# Patient Record
Sex: MALE | Race: OTHER | HISPANIC OR LATINO
[De-identification: names, ages, dates, MRNs, and addresses within clinical notes are randomized per-mention and may not be internally consistent; named-entity substitution may affect disease eponyms.]

---

## 2017-06-06 ENCOUNTER — NON-APPOINTMENT (OUTPATIENT)
Age: 22
End: 2017-06-06

## 2017-06-06 ENCOUNTER — LABORATORY RESULT (OUTPATIENT)
Age: 22
End: 2017-06-06

## 2017-06-06 ENCOUNTER — APPOINTMENT (OUTPATIENT)
Dept: INTERNAL MEDICINE | Facility: CLINIC | Age: 22
End: 2017-06-06

## 2017-06-06 VITALS — SYSTOLIC BLOOD PRESSURE: 110 MMHG | DIASTOLIC BLOOD PRESSURE: 70 MMHG

## 2017-06-06 VITALS — HEIGHT: 64 IN | DIASTOLIC BLOOD PRESSURE: 82 MMHG | SYSTOLIC BLOOD PRESSURE: 140 MMHG

## 2017-06-06 DIAGNOSIS — K59.00 CONSTIPATION, UNSPECIFIED: ICD-10-CM

## 2017-06-07 ENCOUNTER — TRANSCRIPTION ENCOUNTER (OUTPATIENT)
Age: 22
End: 2017-06-07

## 2017-06-07 LAB
ALBUMIN SERPL ELPH-MCNC: 4.9 G/DL
ALP BLD-CCNC: 135 U/L
ALT SERPL-CCNC: 15 U/L
ANION GAP SERPL CALC-SCNC: 18 MMOL/L
AST SERPL-CCNC: 20 U/L
BILIRUB SERPL-MCNC: 0.3 MG/DL
BUN SERPL-MCNC: 17 MG/DL
CALCIUM SERPL-MCNC: 9.4 MG/DL
CHLORIDE SERPL-SCNC: 100 MMOL/L
CHOLEST SERPL-MCNC: 164 MG/DL
CHOLEST/HDLC SERPL: 2.8 RATIO
CO2 SERPL-SCNC: 24 MMOL/L
CREAT SERPL-MCNC: 0.82 MG/DL
GLUCOSE SERPL-MCNC: 95 MG/DL
HDLC SERPL-MCNC: 58 MG/DL
LDLC SERPL CALC-MCNC: 90 MG/DL
MEV IGG FLD QL IA: 117 AU/ML
MEV IGG+IGM SER-IMP: POSITIVE
MUV AB SER-ACNC: POSITIVE
MUV IGG SER QL IA: 44.6 AU/ML
POTASSIUM SERPL-SCNC: 4.6 MMOL/L
PROT SERPL-MCNC: 8.5 G/DL
RUBV IGG FLD-ACNC: 1.6 INDEX
RUBV IGG SER-IMP: POSITIVE
SODIUM SERPL-SCNC: 142 MMOL/L
TRIGL SERPL-MCNC: 78 MG/DL
TSH SERPL-ACNC: 2.09 UIU/ML

## 2017-06-21 ENCOUNTER — APPOINTMENT (OUTPATIENT)
Dept: INTERNAL MEDICINE | Facility: CLINIC | Age: 22
End: 2017-06-21

## 2017-06-21 VITALS
DIASTOLIC BLOOD PRESSURE: 70 MMHG | SYSTOLIC BLOOD PRESSURE: 116 MMHG | WEIGHT: 120 LBS | HEIGHT: 64 IN | BODY MASS INDEX: 20.49 KG/M2

## 2017-06-21 DIAGNOSIS — R10.30 LOWER ABDOMINAL PAIN, UNSPECIFIED: ICD-10-CM

## 2017-06-21 DIAGNOSIS — R58 HEMORRHAGE, NOT ELSEWHERE CLASSIFIED: ICD-10-CM

## 2017-06-21 DIAGNOSIS — Z78.9 OTHER SPECIFIED HEALTH STATUS: ICD-10-CM

## 2017-06-21 LAB
BASOPHILS # BLD AUTO: 0.02 K/UL
BASOPHILS NFR BLD AUTO: 0.5 %
EOSINOPHIL # BLD AUTO: 0.02 K/UL
EOSINOPHIL NFR BLD AUTO: 0.5 %
HCT VFR BLD CALC: 39.9 %
HGB BLD-MCNC: 12.1 G/DL
IMM GRANULOCYTES NFR BLD AUTO: 0 %
LYMPHOCYTES # BLD AUTO: 1.3 K/UL
LYMPHOCYTES NFR BLD AUTO: 30.2 %
MAN DIFF?: NORMAL
MCHC RBC-ENTMCNC: 23.1 PG
MCHC RBC-ENTMCNC: 30.3 GM/DL
MCV RBC AUTO: 76.3 FL
MONOCYTES # BLD AUTO: 0.28 K/UL
MONOCYTES NFR BLD AUTO: 6.5 %
NEUTROPHILS # BLD AUTO: 2.68 K/UL
NEUTROPHILS NFR BLD AUTO: 62.3 %
PLATELET # BLD AUTO: 373 K/UL
RBC # BLD: 5.23 M/UL
RBC # FLD: 16.7 %
WBC # FLD AUTO: 4.3 K/UL

## 2017-06-26 LAB
ADJUSTED MITOGEN: 0.4 IU/ML
ADJUSTED TB AG: 0 IU/ML
M TB IFN-G BLD-IMP: ABNORMAL
QUANTIFERON GOLD NIL: 0.04 IU/ML

## 2017-07-05 ENCOUNTER — APPOINTMENT (OUTPATIENT)
Dept: INTERNAL MEDICINE | Facility: CLINIC | Age: 22
End: 2017-07-05

## 2017-07-07 ENCOUNTER — APPOINTMENT (OUTPATIENT)
Dept: INTERNAL MEDICINE | Facility: CLINIC | Age: 22
End: 2017-07-07

## 2017-07-19 ENCOUNTER — APPOINTMENT (OUTPATIENT)
Dept: INTERNAL MEDICINE | Facility: CLINIC | Age: 22
End: 2017-07-19

## 2017-07-19 DIAGNOSIS — Z02.89 ENCOUNTER FOR OTHER ADMINISTRATIVE EXAMINATIONS: ICD-10-CM

## 2017-07-19 DIAGNOSIS — Z23 ENCOUNTER FOR IMMUNIZATION: ICD-10-CM

## 2017-07-21 ENCOUNTER — APPOINTMENT (OUTPATIENT)
Dept: INTERNAL MEDICINE | Facility: CLINIC | Age: 22
End: 2017-07-21

## 2017-07-21 DIAGNOSIS — Z11.1 ENCOUNTER FOR SCREENING FOR RESPIRATORY TUBERCULOSIS: ICD-10-CM

## 2018-03-26 ENCOUNTER — APPOINTMENT (OUTPATIENT)
Dept: FAMILY MEDICINE | Facility: CLINIC | Age: 23
End: 2018-03-26

## 2018-08-02 ENCOUNTER — APPOINTMENT (OUTPATIENT)
Dept: INTERNAL MEDICINE | Facility: CLINIC | Age: 23
End: 2018-08-02

## 2018-10-10 ENCOUNTER — RECORD ABSTRACTING (OUTPATIENT)
Age: 23
End: 2018-10-10

## 2018-10-15 ENCOUNTER — APPOINTMENT (OUTPATIENT)
Dept: INTERNAL MEDICINE | Facility: CLINIC | Age: 23
End: 2018-10-15
Payer: MEDICAID

## 2018-10-15 VITALS
SYSTOLIC BLOOD PRESSURE: 140 MMHG | HEIGHT: 64 IN | TEMPERATURE: 98.6 F | DIASTOLIC BLOOD PRESSURE: 100 MMHG | WEIGHT: 120 LBS | BODY MASS INDEX: 20.49 KG/M2

## 2018-10-15 VITALS — SYSTOLIC BLOOD PRESSURE: 120 MMHG | DIASTOLIC BLOOD PRESSURE: 80 MMHG

## 2018-10-15 DIAGNOSIS — B35.1 TINEA UNGUIUM: ICD-10-CM

## 2018-10-15 DIAGNOSIS — Z11.3 ENCOUNTER FOR SCREENING FOR INFECTIONS WITH A PREDOMINANTLY SEXUAL MODE OF TRANSMISSION: ICD-10-CM

## 2018-10-15 DIAGNOSIS — Z98.2 PRESENCE OF CEREBROSPINAL FLUID DRAINAGE DEVICE: ICD-10-CM

## 2018-10-15 DIAGNOSIS — R10.9 UNSPECIFIED ABDOMINAL PAIN: ICD-10-CM

## 2018-10-15 DIAGNOSIS — Z23 ENCOUNTER FOR IMMUNIZATION: ICD-10-CM

## 2018-10-15 PROCEDURE — 36415 COLL VENOUS BLD VENIPUNCTURE: CPT

## 2018-10-15 PROCEDURE — 99395 PREV VISIT EST AGE 18-39: CPT | Mod: 25

## 2018-10-15 PROCEDURE — 90686 IIV4 VACC NO PRSV 0.5 ML IM: CPT

## 2018-10-15 PROCEDURE — G0008: CPT

## 2018-10-15 NOTE — PHYSICAL EXAM
[No Acute Distress] : no acute distress [Normal Sclera/Conjunctiva] : normal sclera/conjunctiva [Normal Outer Ear/Nose] : the outer ears and nose were normal in appearance [Thyroid Normal, No Nodules] : the thyroid was normal and there were no nodules present [Clear to Auscultation] : lungs were clear to auscultation bilaterally [Normal Rate] : normal rate  [Regular Rhythm] : with a regular rhythm [No Edema] : there was no peripheral edema [Soft] : abdomen soft [No Masses] : no abdominal mass palpated [No Hernias] : no hernias [Scoliosis] : scoliosis [No Rash] : no rash [Normal Affect] : the affect was normal [de-identified] : wheelchair bound

## 2018-10-15 NOTE — PLAN
[FreeTextEntry1] : Check sugar, chol, std's today.\par Will refer to GI and get sono since he's c/o pain or rectal bleeding for years.

## 2018-10-15 NOTE — HISTORY OF PRESENT ILLNESS
[FreeTextEntry1] : physical [de-identified] : Requests STD screening.\par Has abd pain after a BM.  No blood in stool as previously reported.  Never saw GI.  \par He requests an exam to r/o hernia.

## 2018-10-18 ENCOUNTER — TRANSCRIPTION ENCOUNTER (OUTPATIENT)
Age: 23
End: 2018-10-18

## 2018-10-18 LAB
25(OH)D3 SERPL-MCNC: 17 NG/ML
ALBUMIN SERPL ELPH-MCNC: 4.6 G/DL
ALP BLD-CCNC: 108 U/L
ALT SERPL-CCNC: 9 U/L
ANION GAP SERPL CALC-SCNC: 17 MMOL/L
AST SERPL-CCNC: 17 U/L
BASOPHILS # BLD AUTO: 0.03 K/UL
BASOPHILS NFR BLD AUTO: 0.6 %
BILIRUB SERPL-MCNC: 0.5 MG/DL
BUN SERPL-MCNC: 19 MG/DL
CALCIUM SERPL-MCNC: 9.8 MG/DL
CHLORIDE SERPL-SCNC: 105 MMOL/L
CHOLEST SERPL-MCNC: 143 MG/DL
CHOLEST/HDLC SERPL: 2.9 RATIO
CO2 SERPL-SCNC: 24 MMOL/L
CREAT SERPL-MCNC: 0.81 MG/DL
EOSINOPHIL # BLD AUTO: 0.12 K/UL
EOSINOPHIL NFR BLD AUTO: 2.5 %
GLUCOSE SERPL-MCNC: 70 MG/DL
HBA1C MFR BLD HPLC: 5.1 %
HBV SURFACE AB SER QL: NONREACTIVE
HBV SURFACE AG SER QL: NONREACTIVE
HCT VFR BLD CALC: 38.6 %
HCV AB SER QL: NONREACTIVE
HCV S/CO RATIO: 0.21 S/CO
HDLC SERPL-MCNC: 49 MG/DL
HGB BLD-MCNC: 12.5 G/DL
HIV1+2 AB SPEC QL IA.RAPID: NONREACTIVE
IMM GRANULOCYTES NFR BLD AUTO: 0.2 %
LDLC SERPL CALC-MCNC: 84 MG/DL
LYMPHOCYTES # BLD AUTO: 1.64 K/UL
LYMPHOCYTES NFR BLD AUTO: 34.5 %
MAN DIFF?: NORMAL
MCHC RBC-ENTMCNC: 26.5 PG
MCHC RBC-ENTMCNC: 32.4 GM/DL
MCV RBC AUTO: 81.8 FL
MONOCYTES # BLD AUTO: 0.28 K/UL
MONOCYTES NFR BLD AUTO: 5.9 %
NEUTROPHILS # BLD AUTO: 2.67 K/UL
NEUTROPHILS NFR BLD AUTO: 56.3 %
PLATELET # BLD AUTO: 456 K/UL
POTASSIUM SERPL-SCNC: 4.5 MMOL/L
PROT SERPL-MCNC: 8.3 G/DL
RBC # BLD: 4.72 M/UL
RBC # FLD: 15.6 %
SODIUM SERPL-SCNC: 146 MMOL/L
T PALLIDUM AB SER QL IA: NEGATIVE
TRIGL SERPL-MCNC: 48 MG/DL
TSH SERPL-ACNC: 2.62 UIU/ML
VIT B12 SERPL-MCNC: 341 PG/ML
WBC # FLD AUTO: 4.75 K/UL

## 2019-07-11 ENCOUNTER — TRANSCRIPTION ENCOUNTER (OUTPATIENT)
Age: 24
End: 2019-07-11

## 2022-08-21 ENCOUNTER — NON-APPOINTMENT (OUTPATIENT)
Age: 27
End: 2022-08-21

## 2022-10-03 ENCOUNTER — APPOINTMENT (OUTPATIENT)
Dept: GASTROENTEROLOGY | Facility: CLINIC | Age: 27
End: 2022-10-03

## 2023-05-16 ENCOUNTER — APPOINTMENT (OUTPATIENT)
Dept: FAMILY MEDICINE | Facility: CLINIC | Age: 28
End: 2023-05-16
Payer: MEDICAID

## 2023-05-16 ENCOUNTER — NON-APPOINTMENT (OUTPATIENT)
Age: 28
End: 2023-05-16

## 2023-05-16 VITALS
DIASTOLIC BLOOD PRESSURE: 85 MMHG | RESPIRATION RATE: 12 BRPM | HEIGHT: 60 IN | OXYGEN SATURATION: 90 % | SYSTOLIC BLOOD PRESSURE: 131 MMHG | TEMPERATURE: 97.7 F | HEART RATE: 90 BPM

## 2023-05-16 DIAGNOSIS — Q05.9 SPINA BIFIDA, UNSPECIFIED: ICD-10-CM

## 2023-05-16 DIAGNOSIS — R19.8 OTHER SPECIFIED SYMPTOMS AND SIGNS INVOLVING THE DIGESTIVE SYSTEM AND ABDOMEN: ICD-10-CM

## 2023-05-16 DIAGNOSIS — Z00.00 ENCOUNTER FOR GENERAL ADULT MEDICAL EXAMINATION W/OUT ABNORMAL FINDINGS: ICD-10-CM

## 2023-05-16 DIAGNOSIS — Q67.5 CONGENITAL DEFORMITY OF SPINE: ICD-10-CM

## 2023-05-16 PROCEDURE — 99385 PREV VISIT NEW AGE 18-39: CPT | Mod: 25

## 2023-05-16 PROCEDURE — G0444 DEPRESSION SCREEN ANNUAL: CPT | Mod: 59

## 2023-05-16 NOTE — REVIEW OF SYSTEMS
[Negative] : Musculoskeletal [Abdominal Pain] : no abdominal pain [Nausea] : no nausea [Constipation] : no constipation [Diarrhea] : no diarrhea [Vomiting] : no vomiting [Melena] : no melena [FreeTextEntry7] : blood when wiping for 2 days

## 2023-05-16 NOTE — PHYSICAL EXAM
[No Acute Distress] : no acute distress [Well-Appearing] : well-appearing [Normal] : normal rate, regular rhythm, normal S1 and S2 and no murmur heard [No Stool to Guaiac] : no stool to guaiac [No Mass] : no mass [de-identified] : in wheelchair [FreeTextEntry1] : chaperone Josselyn Ragogna present- no hemorrhoid or fissure noted

## 2023-05-16 NOTE — HEALTH RISK ASSESSMENT
[0] : 2) Feeling down, depressed, or hopeless: Not at all (0) [PHQ-2 Negative - No further assessment needed] : PHQ-2 Negative - No further assessment needed [No] : In the past 12 months have you used drugs other than those required for medical reasons? No [With Family] : lives with family [Employed] : employed [Feels Safe at Home] : Feels safe at home [Fully functional (bathing, dressing, toileting, transferring, walking, feeding)] : Fully functional (bathing, dressing, toileting, transferring, walking, feeding) [Fully functional (using the telephone, shopping, preparing meals, housekeeping, doing laundry, using] : Fully functional and needs no help or supervision to perform IADLs (using the telephone, shopping, preparing meals, housekeeping, doing laundry, using transportation, managing medications and managing finances) [Never] : Never [[id number]] : 0 [HIVDate] : 10/15/2018 [HepatitisCDate] : 10/15/2018 [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

## 2023-05-16 NOTE — ASSESSMENT
[FreeTextEntry1] : CPE- routine labs drawn today, will follow-up results.\par \par Screenings:\par I spent 5 minutes performing a depression screening on this patient.\par \par Congenital scoliosis/spina bifida-independent with ADLs, requesting PT/OT referral which were given today.\par \par Blood when wiping with bowel-no obvious deformities on rectal exam, advised increasing fiber and fluid hydration.  Return precautions given\par \par RTC in 3 months, or sooner as needed

## 2023-05-16 NOTE — HISTORY OF PRESENT ILLNESS
[FreeTextEntry1] : pt. here for c.p.e [de-identified] : Tyshawn is a 26 yo M with pmhx of spina bifida and scoliosis who presents today for new patient CPE. He has had multiple surgeries in the past including bladder surgery, spinal surgeries, leg surgeries,  shunt. He is wheelchair bound but independent with ADLs. He follows with neurology Dr. Harjit Dorsey and urology Dr. Jarrett- he straight caths himself. Previous pcp at [facility] in [place]. He also follows with plastic surgery for buttock ulcers.\par \par He reports regular BM but notes blood when wiping for the last two days. He does not report straining with BMs. No diarrhea or pain after bowel movements.\par \par He takes no prescription medications, just takes a MV.\par He is requesting referral to PT/OT.

## 2023-05-17 ENCOUNTER — NON-APPOINTMENT (OUTPATIENT)
Age: 28
End: 2023-05-17

## 2023-05-17 LAB
25(OH)D3 SERPL-MCNC: 21.7 NG/ML
ALBUMIN SERPL ELPH-MCNC: 4.8 G/DL
ALP BLD-CCNC: 190 U/L
ALT SERPL-CCNC: 15 U/L
ANION GAP SERPL CALC-SCNC: 13 MMOL/L
AST SERPL-CCNC: 17 U/L
BASOPHILS # BLD AUTO: 0.04 K/UL
BASOPHILS NFR BLD AUTO: 0.9 %
BILIRUB SERPL-MCNC: 0.5 MG/DL
BUN SERPL-MCNC: 21 MG/DL
CALCIUM SERPL-MCNC: 9.6 MG/DL
CHLORIDE SERPL-SCNC: 103 MMOL/L
CHOLEST SERPL-MCNC: 157 MG/DL
CO2 SERPL-SCNC: 23 MMOL/L
CREAT SERPL-MCNC: 0.83 MG/DL
EGFR: 122 ML/MIN/1.73M2
EOSINOPHIL # BLD AUTO: 0.1 K/UL
EOSINOPHIL NFR BLD AUTO: 2.3 %
ESTIMATED AVERAGE GLUCOSE: 97 MG/DL
GLUCOSE SERPL-MCNC: 94 MG/DL
HBA1C MFR BLD HPLC: 5 %
HCT VFR BLD CALC: 47.5 %
HDLC SERPL-MCNC: 48 MG/DL
HGB BLD-MCNC: 14.7 G/DL
IMM GRANULOCYTES NFR BLD AUTO: 0 %
LDLC SERPL CALC-MCNC: 88 MG/DL
LYMPHOCYTES # BLD AUTO: 1.56 K/UL
LYMPHOCYTES NFR BLD AUTO: 36.4 %
MAN DIFF?: NORMAL
MCHC RBC-ENTMCNC: 27.6 PG
MCHC RBC-ENTMCNC: 30.9 GM/DL
MCV RBC AUTO: 89.1 FL
MONOCYTES # BLD AUTO: 0.22 K/UL
MONOCYTES NFR BLD AUTO: 5.1 %
NEUTROPHILS # BLD AUTO: 2.36 K/UL
NEUTROPHILS NFR BLD AUTO: 55.3 %
NONHDLC SERPL-MCNC: 109 MG/DL
PLATELET # BLD AUTO: 362 K/UL
POTASSIUM SERPL-SCNC: 4.4 MMOL/L
PROT SERPL-MCNC: 8.2 G/DL
RBC # BLD: 5.33 M/UL
RBC # FLD: 13.8 %
SODIUM SERPL-SCNC: 139 MMOL/L
TRIGL SERPL-MCNC: 104 MG/DL
TSH SERPL-ACNC: 2.56 UIU/ML
WBC # FLD AUTO: 4.28 K/UL

## 2023-08-14 ENCOUNTER — APPOINTMENT (OUTPATIENT)
Dept: FAMILY MEDICINE | Facility: CLINIC | Age: 28
End: 2023-08-14

## 2024-01-13 ENCOUNTER — TRANSCRIPTION ENCOUNTER (OUTPATIENT)
Age: 29
End: 2024-01-13

## 2024-01-14 ENCOUNTER — TRANSCRIPTION ENCOUNTER (OUTPATIENT)
Age: 29
End: 2024-01-14

## 2024-01-14 ENCOUNTER — RESULT REVIEW (OUTPATIENT)
Age: 29
End: 2024-01-14

## 2024-01-14 ENCOUNTER — INPATIENT (INPATIENT)
Facility: HOSPITAL | Age: 29
LOS: 35 days | Discharge: ANOTHER HOSPITAL | DRG: 853 | End: 2024-02-19
Attending: SURGERY | Admitting: SURGERY
Payer: COMMERCIAL

## 2024-01-14 VITALS — OXYGEN SATURATION: 96 % | RESPIRATION RATE: 16 BRPM | HEART RATE: 131 BPM

## 2024-01-14 DIAGNOSIS — M41.9 SCOLIOSIS, UNSPECIFIED: Chronic | ICD-10-CM

## 2024-01-14 DIAGNOSIS — Z98.2 PRESENCE OF CEREBROSPINAL FLUID DRAINAGE DEVICE: ICD-10-CM

## 2024-01-14 DIAGNOSIS — Z87.728 PERSONAL HISTORY OF OTHER SPECIFIED (CORRECTED) CONGENITAL MALFORMATIONS OF NERVOUS SYSTEM AND SENSE ORGANS: ICD-10-CM

## 2024-01-14 DIAGNOSIS — A41.9 SEPSIS, UNSPECIFIED ORGANISM: ICD-10-CM

## 2024-01-14 DIAGNOSIS — U07.1 COVID-19: ICD-10-CM

## 2024-01-14 LAB
ACETONE SERPL-MCNC: ABNORMAL
ACETONE SERPL-MCNC: ABNORMAL
ALBUMIN SERPL ELPH-MCNC: 2.6 G/DL — LOW (ref 3.3–5.2)
ALBUMIN SERPL ELPH-MCNC: 2.6 G/DL — LOW (ref 3.3–5.2)
ALBUMIN SERPL ELPH-MCNC: 2.8 G/DL — LOW (ref 3.3–5.2)
ALBUMIN SERPL ELPH-MCNC: 2.8 G/DL — LOW (ref 3.3–5.2)
ALBUMIN SERPL ELPH-MCNC: 3.7 G/DL — SIGNIFICANT CHANGE UP (ref 3.3–5.2)
ALBUMIN SERPL ELPH-MCNC: 3.7 G/DL — SIGNIFICANT CHANGE UP (ref 3.3–5.2)
ALP SERPL-CCNC: 106 U/L — SIGNIFICANT CHANGE UP (ref 40–120)
ALP SERPL-CCNC: 106 U/L — SIGNIFICANT CHANGE UP (ref 40–120)
ALP SERPL-CCNC: 139 U/L — HIGH (ref 40–120)
ALP SERPL-CCNC: 139 U/L — HIGH (ref 40–120)
ALP SERPL-CCNC: 87 U/L — SIGNIFICANT CHANGE UP (ref 40–120)
ALP SERPL-CCNC: 87 U/L — SIGNIFICANT CHANGE UP (ref 40–120)
ALT FLD-CCNC: 19 U/L — SIGNIFICANT CHANGE UP
ALT FLD-CCNC: 19 U/L — SIGNIFICANT CHANGE UP
ALT FLD-CCNC: 20 U/L — SIGNIFICANT CHANGE UP
ALT FLD-CCNC: 20 U/L — SIGNIFICANT CHANGE UP
ALT FLD-CCNC: 23 U/L — SIGNIFICANT CHANGE UP
ALT FLD-CCNC: 23 U/L — SIGNIFICANT CHANGE UP
ANION GAP SERPL CALC-SCNC: 18 MMOL/L — HIGH (ref 5–17)
ANION GAP SERPL CALC-SCNC: 21 MMOL/L — HIGH (ref 5–17)
APPEARANCE UR: ABNORMAL
APPEARANCE UR: ABNORMAL
APTT BLD: 34.3 SEC — SIGNIFICANT CHANGE UP (ref 24.5–35.6)
APTT BLD: 34.3 SEC — SIGNIFICANT CHANGE UP (ref 24.5–35.6)
APTT BLD: 34.8 SEC — SIGNIFICANT CHANGE UP (ref 24.5–35.6)
APTT BLD: 34.8 SEC — SIGNIFICANT CHANGE UP (ref 24.5–35.6)
APTT BLD: 35 SEC — SIGNIFICANT CHANGE UP (ref 24.5–35.6)
APTT BLD: 35 SEC — SIGNIFICANT CHANGE UP (ref 24.5–35.6)
AST SERPL-CCNC: 36 U/L — SIGNIFICANT CHANGE UP
AST SERPL-CCNC: 36 U/L — SIGNIFICANT CHANGE UP
AST SERPL-CCNC: 44 U/L — HIGH
AST SERPL-CCNC: 44 U/L — HIGH
AST SERPL-CCNC: 69 U/L — HIGH
AST SERPL-CCNC: 69 U/L — HIGH
BACTERIA # UR AUTO: NEGATIVE /HPF — SIGNIFICANT CHANGE UP
BACTERIA # UR AUTO: NEGATIVE /HPF — SIGNIFICANT CHANGE UP
BASE EXCESS BLDV CALC-SCNC: -12.4 MMOL/L — LOW (ref -2–3)
BASE EXCESS BLDV CALC-SCNC: -12.4 MMOL/L — LOW (ref -2–3)
BASE EXCESS BLDV CALC-SCNC: -9.6 MMOL/L — LOW (ref -2–3)
BASE EXCESS BLDV CALC-SCNC: -9.6 MMOL/L — LOW (ref -2–3)
BASOPHILS # BLD AUTO: 0 K/UL — SIGNIFICANT CHANGE UP (ref 0–0.2)
BASOPHILS # BLD AUTO: 0 K/UL — SIGNIFICANT CHANGE UP (ref 0–0.2)
BASOPHILS NFR BLD AUTO: 0 % — SIGNIFICANT CHANGE UP (ref 0–2)
BASOPHILS NFR BLD AUTO: 0 % — SIGNIFICANT CHANGE UP (ref 0–2)
BILIRUB DIRECT SERPL-MCNC: 0.1 MG/DL — SIGNIFICANT CHANGE UP (ref 0–0.3)
BILIRUB DIRECT SERPL-MCNC: 0.1 MG/DL — SIGNIFICANT CHANGE UP (ref 0–0.3)
BILIRUB INDIRECT FLD-MCNC: 0.2 MG/DL — SIGNIFICANT CHANGE UP (ref 0.2–1)
BILIRUB INDIRECT FLD-MCNC: 0.2 MG/DL — SIGNIFICANT CHANGE UP (ref 0.2–1)
BILIRUB SERPL-MCNC: 0.3 MG/DL — LOW (ref 0.4–2)
BILIRUB SERPL-MCNC: <0.2 MG/DL — LOW (ref 0.4–2)
BILIRUB SERPL-MCNC: <0.2 MG/DL — LOW (ref 0.4–2)
BILIRUB UR-MCNC: NEGATIVE — SIGNIFICANT CHANGE UP
BILIRUB UR-MCNC: NEGATIVE — SIGNIFICANT CHANGE UP
BLD GP AB SCN SERPL QL: SIGNIFICANT CHANGE UP
BLD GP AB SCN SERPL QL: SIGNIFICANT CHANGE UP
BUN SERPL-MCNC: 27.1 MG/DL — HIGH (ref 8–20)
BUN SERPL-MCNC: 27.1 MG/DL — HIGH (ref 8–20)
BUN SERPL-MCNC: 32.3 MG/DL — HIGH (ref 8–20)
BUN SERPL-MCNC: 32.3 MG/DL — HIGH (ref 8–20)
BUN SERPL-MCNC: 36.1 MG/DL — HIGH (ref 8–20)
BUN SERPL-MCNC: 36.1 MG/DL — HIGH (ref 8–20)
BUN SERPL-MCNC: 36.6 MG/DL — HIGH (ref 8–20)
BUN SERPL-MCNC: 36.6 MG/DL — HIGH (ref 8–20)
CA-I SERPL-SCNC: 1.07 MMOL/L — LOW (ref 1.15–1.33)
CA-I SERPL-SCNC: 1.07 MMOL/L — LOW (ref 1.15–1.33)
CA-I SERPL-SCNC: 1.09 MMOL/L — LOW (ref 1.15–1.33)
CA-I SERPL-SCNC: 1.09 MMOL/L — LOW (ref 1.15–1.33)
CALCIUM SERPL-MCNC: 6.7 MG/DL — LOW (ref 8.4–10.5)
CALCIUM SERPL-MCNC: 6.7 MG/DL — LOW (ref 8.4–10.5)
CALCIUM SERPL-MCNC: 6.8 MG/DL — LOW (ref 8.4–10.5)
CALCIUM SERPL-MCNC: 6.8 MG/DL — LOW (ref 8.4–10.5)
CALCIUM SERPL-MCNC: 7.1 MG/DL — LOW (ref 8.4–10.5)
CALCIUM SERPL-MCNC: 7.1 MG/DL — LOW (ref 8.4–10.5)
CALCIUM SERPL-MCNC: 7.7 MG/DL — LOW (ref 8.4–10.5)
CALCIUM SERPL-MCNC: 7.7 MG/DL — LOW (ref 8.4–10.5)
CAST: 5 /LPF — HIGH (ref 0–4)
CAST: 5 /LPF — HIGH (ref 0–4)
CHLORIDE BLDV-SCNC: 104 MMOL/L — SIGNIFICANT CHANGE UP (ref 96–108)
CHLORIDE BLDV-SCNC: 104 MMOL/L — SIGNIFICANT CHANGE UP (ref 96–108)
CHLORIDE BLDV-SCNC: 105 MMOL/L — SIGNIFICANT CHANGE UP (ref 96–108)
CHLORIDE BLDV-SCNC: 105 MMOL/L — SIGNIFICANT CHANGE UP (ref 96–108)
CHLORIDE SERPL-SCNC: 100 MMOL/L — SIGNIFICANT CHANGE UP (ref 96–108)
CHLORIDE SERPL-SCNC: 100 MMOL/L — SIGNIFICANT CHANGE UP (ref 96–108)
CHLORIDE SERPL-SCNC: 104 MMOL/L — SIGNIFICANT CHANGE UP (ref 96–108)
CHLORIDE SERPL-SCNC: 104 MMOL/L — SIGNIFICANT CHANGE UP (ref 96–108)
CHLORIDE SERPL-SCNC: 106 MMOL/L — SIGNIFICANT CHANGE UP (ref 96–108)
CHLORIDE SERPL-SCNC: 106 MMOL/L — SIGNIFICANT CHANGE UP (ref 96–108)
CHLORIDE SERPL-SCNC: 98 MMOL/L — SIGNIFICANT CHANGE UP (ref 96–108)
CHLORIDE SERPL-SCNC: 98 MMOL/L — SIGNIFICANT CHANGE UP (ref 96–108)
CO2 SERPL-SCNC: 13 MMOL/L — LOW (ref 22–29)
CO2 SERPL-SCNC: 15 MMOL/L — LOW (ref 22–29)
CO2 SERPL-SCNC: 15 MMOL/L — LOW (ref 22–29)
COLOR SPEC: ABNORMAL
COLOR SPEC: ABNORMAL
CREAT SERPL-MCNC: 1.33 MG/DL — HIGH (ref 0.5–1.3)
CREAT SERPL-MCNC: 1.33 MG/DL — HIGH (ref 0.5–1.3)
CREAT SERPL-MCNC: 1.61 MG/DL — HIGH (ref 0.5–1.3)
CREAT SERPL-MCNC: 1.61 MG/DL — HIGH (ref 0.5–1.3)
CREAT SERPL-MCNC: 2.04 MG/DL — HIGH (ref 0.5–1.3)
DIFF PNL FLD: ABNORMAL
DIFF PNL FLD: ABNORMAL
EGFR: 44 ML/MIN/1.73M2 — LOW
EGFR: 59 ML/MIN/1.73M2 — LOW
EGFR: 59 ML/MIN/1.73M2 — LOW
EGFR: 74 ML/MIN/1.73M2 — SIGNIFICANT CHANGE UP
EGFR: 74 ML/MIN/1.73M2 — SIGNIFICANT CHANGE UP
EOSINOPHIL # BLD AUTO: 0 K/UL — SIGNIFICANT CHANGE UP (ref 0–0.5)
EOSINOPHIL # BLD AUTO: 0 K/UL — SIGNIFICANT CHANGE UP (ref 0–0.5)
EOSINOPHIL NFR BLD AUTO: 0 % — SIGNIFICANT CHANGE UP (ref 0–6)
EOSINOPHIL NFR BLD AUTO: 0 % — SIGNIFICANT CHANGE UP (ref 0–6)
FLUAV AG NPH QL: SIGNIFICANT CHANGE UP
FLUAV AG NPH QL: SIGNIFICANT CHANGE UP
FLUBV AG NPH QL: SIGNIFICANT CHANGE UP
FLUBV AG NPH QL: SIGNIFICANT CHANGE UP
GAS PNL BLDA: SIGNIFICANT CHANGE UP
GAS PNL BLDV: 130 MMOL/L — LOW (ref 136–145)
GAS PNL BLDV: 130 MMOL/L — LOW (ref 136–145)
GAS PNL BLDV: 132 MMOL/L — LOW (ref 136–145)
GAS PNL BLDV: 132 MMOL/L — LOW (ref 136–145)
GAS PNL BLDV: SIGNIFICANT CHANGE UP
GLUCOSE BLDV-MCNC: 108 MG/DL — HIGH (ref 70–99)
GLUCOSE BLDV-MCNC: 108 MG/DL — HIGH (ref 70–99)
GLUCOSE BLDV-MCNC: 122 MG/DL — HIGH (ref 70–99)
GLUCOSE BLDV-MCNC: 122 MG/DL — HIGH (ref 70–99)
GLUCOSE SERPL-MCNC: 101 MG/DL — HIGH (ref 70–99)
GLUCOSE SERPL-MCNC: 101 MG/DL — HIGH (ref 70–99)
GLUCOSE SERPL-MCNC: 112 MG/DL — HIGH (ref 70–99)
GLUCOSE SERPL-MCNC: 112 MG/DL — HIGH (ref 70–99)
GLUCOSE SERPL-MCNC: 117 MG/DL — HIGH (ref 70–99)
GLUCOSE SERPL-MCNC: 117 MG/DL — HIGH (ref 70–99)
GLUCOSE SERPL-MCNC: 136 MG/DL — HIGH (ref 70–99)
GLUCOSE SERPL-MCNC: 136 MG/DL — HIGH (ref 70–99)
GLUCOSE UR QL: NEGATIVE MG/DL — SIGNIFICANT CHANGE UP
GLUCOSE UR QL: NEGATIVE MG/DL — SIGNIFICANT CHANGE UP
HCO3 BLDV-SCNC: 15 MMOL/L — LOW (ref 22–29)
HCO3 BLDV-SCNC: 15 MMOL/L — LOW (ref 22–29)
HCO3 BLDV-SCNC: 18 MMOL/L — LOW (ref 22–29)
HCO3 BLDV-SCNC: 18 MMOL/L — LOW (ref 22–29)
HCT VFR BLD CALC: 40.3 % — SIGNIFICANT CHANGE UP (ref 39–50)
HCT VFR BLD CALC: 40.3 % — SIGNIFICANT CHANGE UP (ref 39–50)
HCT VFR BLD CALC: 41.8 % — SIGNIFICANT CHANGE UP (ref 39–50)
HCT VFR BLD CALC: 41.8 % — SIGNIFICANT CHANGE UP (ref 39–50)
HCT VFR BLD CALC: 42.5 % — SIGNIFICANT CHANGE UP (ref 39–50)
HCT VFR BLD CALC: 42.5 % — SIGNIFICANT CHANGE UP (ref 39–50)
HCT VFR BLD CALC: 46.2 % — SIGNIFICANT CHANGE UP (ref 39–50)
HCT VFR BLD CALC: 46.2 % — SIGNIFICANT CHANGE UP (ref 39–50)
HCT VFR BLDA CALC: 42 % — SIGNIFICANT CHANGE UP
HCT VFR BLDA CALC: 42 % — SIGNIFICANT CHANGE UP
HCT VFR BLDA CALC: 48 % — SIGNIFICANT CHANGE UP
HCT VFR BLDA CALC: 48 % — SIGNIFICANT CHANGE UP
HGB BLD CALC-MCNC: 13.9 G/DL — SIGNIFICANT CHANGE UP (ref 12.6–17.4)
HGB BLD CALC-MCNC: 13.9 G/DL — SIGNIFICANT CHANGE UP (ref 12.6–17.4)
HGB BLD CALC-MCNC: 16.1 G/DL — SIGNIFICANT CHANGE UP (ref 12.6–17.4)
HGB BLD CALC-MCNC: 16.1 G/DL — SIGNIFICANT CHANGE UP (ref 12.6–17.4)
HGB BLD-MCNC: 13.3 G/DL — SIGNIFICANT CHANGE UP (ref 13–17)
HGB BLD-MCNC: 13.3 G/DL — SIGNIFICANT CHANGE UP (ref 13–17)
HGB BLD-MCNC: 14 G/DL — SIGNIFICANT CHANGE UP (ref 13–17)
HGB BLD-MCNC: 14 G/DL — SIGNIFICANT CHANGE UP (ref 13–17)
HGB BLD-MCNC: 14.6 G/DL — SIGNIFICANT CHANGE UP (ref 13–17)
HGB BLD-MCNC: 14.6 G/DL — SIGNIFICANT CHANGE UP (ref 13–17)
HGB BLD-MCNC: 15.3 G/DL — SIGNIFICANT CHANGE UP (ref 13–17)
HGB BLD-MCNC: 15.3 G/DL — SIGNIFICANT CHANGE UP (ref 13–17)
HOROWITZ INDEX BLDV+IHG-RTO: 50 — SIGNIFICANT CHANGE UP
HOROWITZ INDEX BLDV+IHG-RTO: 50 — SIGNIFICANT CHANGE UP
HOROWITZ INDEX BLDV+IHG-RTO: SIGNIFICANT CHANGE UP
HOROWITZ INDEX BLDV+IHG-RTO: SIGNIFICANT CHANGE UP
INR BLD: 1.07 RATIO — SIGNIFICANT CHANGE UP (ref 0.85–1.18)
INR BLD: 1.07 RATIO — SIGNIFICANT CHANGE UP (ref 0.85–1.18)
INR BLD: 1.2 RATIO — HIGH (ref 0.85–1.18)
INR BLD: 1.2 RATIO — HIGH (ref 0.85–1.18)
INR BLD: 1.22 RATIO — HIGH (ref 0.85–1.18)
INR BLD: 1.22 RATIO — HIGH (ref 0.85–1.18)
KETONES UR-MCNC: ABNORMAL MG/DL
KETONES UR-MCNC: ABNORMAL MG/DL
LACTATE BLDV-MCNC: 1.5 MMOL/L — SIGNIFICANT CHANGE UP (ref 0.5–2)
LACTATE BLDV-MCNC: 1.5 MMOL/L — SIGNIFICANT CHANGE UP (ref 0.5–2)
LACTATE BLDV-MCNC: 2.5 MMOL/L — HIGH (ref 0.5–2)
LACTATE BLDV-MCNC: 2.5 MMOL/L — HIGH (ref 0.5–2)
LEUKOCYTE ESTERASE UR-ACNC: ABNORMAL
LEUKOCYTE ESTERASE UR-ACNC: ABNORMAL
LIDOCAIN IGE QN: 37 U/L — SIGNIFICANT CHANGE UP (ref 22–51)
LIDOCAIN IGE QN: 37 U/L — SIGNIFICANT CHANGE UP (ref 22–51)
LYMPHOCYTES # BLD AUTO: 0 % — LOW (ref 13–44)
LYMPHOCYTES # BLD AUTO: 0 % — LOW (ref 13–44)
LYMPHOCYTES # BLD AUTO: 0 K/UL — LOW (ref 1–3.3)
LYMPHOCYTES # BLD AUTO: 0 K/UL — LOW (ref 1–3.3)
MAGNESIUM SERPL-MCNC: 1.3 MG/DL — LOW (ref 1.6–2.6)
MAGNESIUM SERPL-MCNC: 1.3 MG/DL — LOW (ref 1.6–2.6)
MAGNESIUM SERPL-MCNC: 2.3 MG/DL — SIGNIFICANT CHANGE UP (ref 1.6–2.6)
MAGNESIUM SERPL-MCNC: 2.3 MG/DL — SIGNIFICANT CHANGE UP (ref 1.6–2.6)
MAGNESIUM SERPL-MCNC: 2.9 MG/DL — HIGH (ref 1.6–2.6)
MAGNESIUM SERPL-MCNC: 2.9 MG/DL — HIGH (ref 1.6–2.6)
MANUAL SMEAR VERIFICATION: SIGNIFICANT CHANGE UP
MANUAL SMEAR VERIFICATION: SIGNIFICANT CHANGE UP
MCHC RBC-ENTMCNC: 28.4 PG — SIGNIFICANT CHANGE UP (ref 27–34)
MCHC RBC-ENTMCNC: 28.4 PG — SIGNIFICANT CHANGE UP (ref 27–34)
MCHC RBC-ENTMCNC: 28.7 PG — SIGNIFICANT CHANGE UP (ref 27–34)
MCHC RBC-ENTMCNC: 28.7 PG — SIGNIFICANT CHANGE UP (ref 27–34)
MCHC RBC-ENTMCNC: 28.9 PG — SIGNIFICANT CHANGE UP (ref 27–34)
MCHC RBC-ENTMCNC: 28.9 PG — SIGNIFICANT CHANGE UP (ref 27–34)
MCHC RBC-ENTMCNC: 29 PG — SIGNIFICANT CHANGE UP (ref 27–34)
MCHC RBC-ENTMCNC: 29 PG — SIGNIFICANT CHANGE UP (ref 27–34)
MCHC RBC-ENTMCNC: 33 GM/DL — SIGNIFICANT CHANGE UP (ref 32–36)
MCHC RBC-ENTMCNC: 33 GM/DL — SIGNIFICANT CHANGE UP (ref 32–36)
MCHC RBC-ENTMCNC: 33.1 GM/DL — SIGNIFICANT CHANGE UP (ref 32–36)
MCHC RBC-ENTMCNC: 33.1 GM/DL — SIGNIFICANT CHANGE UP (ref 32–36)
MCHC RBC-ENTMCNC: 33.5 GM/DL — SIGNIFICANT CHANGE UP (ref 32–36)
MCHC RBC-ENTMCNC: 33.5 GM/DL — SIGNIFICANT CHANGE UP (ref 32–36)
MCHC RBC-ENTMCNC: 34.4 GM/DL — SIGNIFICANT CHANGE UP (ref 32–36)
MCHC RBC-ENTMCNC: 34.4 GM/DL — SIGNIFICANT CHANGE UP (ref 32–36)
MCV RBC AUTO: 83.7 FL — SIGNIFICANT CHANGE UP (ref 80–100)
MCV RBC AUTO: 83.7 FL — SIGNIFICANT CHANGE UP (ref 80–100)
MCV RBC AUTO: 86.1 FL — SIGNIFICANT CHANGE UP (ref 80–100)
MCV RBC AUTO: 86.1 FL — SIGNIFICANT CHANGE UP (ref 80–100)
MCV RBC AUTO: 86.5 FL — SIGNIFICANT CHANGE UP (ref 80–100)
MCV RBC AUTO: 86.5 FL — SIGNIFICANT CHANGE UP (ref 80–100)
MCV RBC AUTO: 87.3 FL — SIGNIFICANT CHANGE UP (ref 80–100)
MCV RBC AUTO: 87.3 FL — SIGNIFICANT CHANGE UP (ref 80–100)
METAMYELOCYTES # FLD: 4.3 % — HIGH (ref 0–0)
METAMYELOCYTES # FLD: 4.3 % — HIGH (ref 0–0)
MONOCYTES # BLD AUTO: 0.25 K/UL — SIGNIFICANT CHANGE UP (ref 0–0.9)
MONOCYTES # BLD AUTO: 0.25 K/UL — SIGNIFICANT CHANGE UP (ref 0–0.9)
MONOCYTES NFR BLD AUTO: 2.6 % — SIGNIFICANT CHANGE UP (ref 2–14)
MONOCYTES NFR BLD AUTO: 2.6 % — SIGNIFICANT CHANGE UP (ref 2–14)
NEUTROPHILS # BLD AUTO: 8.81 K/UL — HIGH (ref 1.8–7.4)
NEUTROPHILS # BLD AUTO: 8.81 K/UL — HIGH (ref 1.8–7.4)
NEUTROPHILS NFR BLD AUTO: 87 % — HIGH (ref 43–77)
NEUTROPHILS NFR BLD AUTO: 87 % — HIGH (ref 43–77)
NEUTS BAND # BLD: 6.1 % — SIGNIFICANT CHANGE UP (ref 0–8)
NEUTS BAND # BLD: 6.1 % — SIGNIFICANT CHANGE UP (ref 0–8)
NITRITE UR-MCNC: NEGATIVE — SIGNIFICANT CHANGE UP
NITRITE UR-MCNC: NEGATIVE — SIGNIFICANT CHANGE UP
PCO2 BLDV: 35 MMHG — LOW (ref 42–55)
PCO2 BLDV: 35 MMHG — LOW (ref 42–55)
PCO2 BLDV: 39 MMHG — LOW (ref 42–55)
PCO2 BLDV: 39 MMHG — LOW (ref 42–55)
PH BLDV: 7.24 — LOW (ref 7.32–7.43)
PH BLDV: 7.24 — LOW (ref 7.32–7.43)
PH BLDV: 7.26 — LOW (ref 7.32–7.43)
PH BLDV: 7.26 — LOW (ref 7.32–7.43)
PH UR: 6.5 — SIGNIFICANT CHANGE UP (ref 5–8)
PH UR: 6.5 — SIGNIFICANT CHANGE UP (ref 5–8)
PHOSPHATE SERPL-MCNC: 2.9 MG/DL — SIGNIFICANT CHANGE UP (ref 2.4–4.7)
PHOSPHATE SERPL-MCNC: 2.9 MG/DL — SIGNIFICANT CHANGE UP (ref 2.4–4.7)
PHOSPHATE SERPL-MCNC: 3.2 MG/DL — SIGNIFICANT CHANGE UP (ref 2.4–4.7)
PHOSPHATE SERPL-MCNC: 3.2 MG/DL — SIGNIFICANT CHANGE UP (ref 2.4–4.7)
PHOSPHATE SERPL-MCNC: 3.3 MG/DL — SIGNIFICANT CHANGE UP (ref 2.4–4.7)
PHOSPHATE SERPL-MCNC: 3.3 MG/DL — SIGNIFICANT CHANGE UP (ref 2.4–4.7)
PLAT MORPH BLD: NORMAL — SIGNIFICANT CHANGE UP
PLAT MORPH BLD: NORMAL — SIGNIFICANT CHANGE UP
PLATELET # BLD AUTO: 101 K/UL — LOW (ref 150–400)
PLATELET # BLD AUTO: 101 K/UL — LOW (ref 150–400)
PLATELET # BLD AUTO: 110 K/UL — LOW (ref 150–400)
PLATELET # BLD AUTO: 110 K/UL — LOW (ref 150–400)
PLATELET # BLD AUTO: 138 K/UL — LOW (ref 150–400)
PLATELET # BLD AUTO: 138 K/UL — LOW (ref 150–400)
PLATELET # BLD AUTO: 92 K/UL — LOW (ref 150–400)
PLATELET # BLD AUTO: 92 K/UL — LOW (ref 150–400)
PO2 BLDV: 52 MMHG — HIGH (ref 25–45)
PO2 BLDV: 52 MMHG — HIGH (ref 25–45)
PO2 BLDV: <42 MMHG — SIGNIFICANT CHANGE UP (ref 25–45)
PO2 BLDV: <42 MMHG — SIGNIFICANT CHANGE UP (ref 25–45)
POTASSIUM BLDV-SCNC: 3.8 MMOL/L — SIGNIFICANT CHANGE UP (ref 3.5–5.1)
POTASSIUM BLDV-SCNC: 3.8 MMOL/L — SIGNIFICANT CHANGE UP (ref 3.5–5.1)
POTASSIUM BLDV-SCNC: 4.4 MMOL/L — SIGNIFICANT CHANGE UP (ref 3.5–5.1)
POTASSIUM BLDV-SCNC: 4.4 MMOL/L — SIGNIFICANT CHANGE UP (ref 3.5–5.1)
POTASSIUM SERPL-MCNC: 3.4 MMOL/L — LOW (ref 3.5–5.3)
POTASSIUM SERPL-MCNC: 3.4 MMOL/L — LOW (ref 3.5–5.3)
POTASSIUM SERPL-MCNC: 3.9 MMOL/L — SIGNIFICANT CHANGE UP (ref 3.5–5.3)
POTASSIUM SERPL-MCNC: 3.9 MMOL/L — SIGNIFICANT CHANGE UP (ref 3.5–5.3)
POTASSIUM SERPL-MCNC: 4.1 MMOL/L — SIGNIFICANT CHANGE UP (ref 3.5–5.3)
POTASSIUM SERPL-MCNC: 4.1 MMOL/L — SIGNIFICANT CHANGE UP (ref 3.5–5.3)
POTASSIUM SERPL-MCNC: 4.2 MMOL/L — SIGNIFICANT CHANGE UP (ref 3.5–5.3)
POTASSIUM SERPL-MCNC: 4.2 MMOL/L — SIGNIFICANT CHANGE UP (ref 3.5–5.3)
POTASSIUM SERPL-SCNC: 3.4 MMOL/L — LOW (ref 3.5–5.3)
POTASSIUM SERPL-SCNC: 3.4 MMOL/L — LOW (ref 3.5–5.3)
POTASSIUM SERPL-SCNC: 3.9 MMOL/L — SIGNIFICANT CHANGE UP (ref 3.5–5.3)
POTASSIUM SERPL-SCNC: 3.9 MMOL/L — SIGNIFICANT CHANGE UP (ref 3.5–5.3)
POTASSIUM SERPL-SCNC: 4.1 MMOL/L — SIGNIFICANT CHANGE UP (ref 3.5–5.3)
POTASSIUM SERPL-SCNC: 4.1 MMOL/L — SIGNIFICANT CHANGE UP (ref 3.5–5.3)
POTASSIUM SERPL-SCNC: 4.2 MMOL/L — SIGNIFICANT CHANGE UP (ref 3.5–5.3)
POTASSIUM SERPL-SCNC: 4.2 MMOL/L — SIGNIFICANT CHANGE UP (ref 3.5–5.3)
PROT SERPL-MCNC: 5 G/DL — LOW (ref 6.6–8.7)
PROT SERPL-MCNC: 5 G/DL — LOW (ref 6.6–8.7)
PROT SERPL-MCNC: 5.6 G/DL — LOW (ref 6.6–8.7)
PROT SERPL-MCNC: 5.6 G/DL — LOW (ref 6.6–8.7)
PROT SERPL-MCNC: 6.6 G/DL — SIGNIFICANT CHANGE UP (ref 6.6–8.7)
PROT SERPL-MCNC: 6.6 G/DL — SIGNIFICANT CHANGE UP (ref 6.6–8.7)
PROT UR-MCNC: 100 MG/DL
PROT UR-MCNC: 100 MG/DL
PROTHROM AB SERPL-ACNC: 11.9 SEC — SIGNIFICANT CHANGE UP (ref 9.5–13)
PROTHROM AB SERPL-ACNC: 11.9 SEC — SIGNIFICANT CHANGE UP (ref 9.5–13)
PROTHROM AB SERPL-ACNC: 13.3 SEC — HIGH (ref 9.5–13)
PROTHROM AB SERPL-ACNC: 13.3 SEC — HIGH (ref 9.5–13)
PROTHROM AB SERPL-ACNC: 13.5 SEC — HIGH (ref 9.5–13)
PROTHROM AB SERPL-ACNC: 13.5 SEC — HIGH (ref 9.5–13)
RBC # BLD: 4.68 M/UL — SIGNIFICANT CHANGE UP (ref 4.2–5.8)
RBC # BLD: 4.68 M/UL — SIGNIFICANT CHANGE UP (ref 4.2–5.8)
RBC # BLD: 4.83 M/UL — SIGNIFICANT CHANGE UP (ref 4.2–5.8)
RBC # BLD: 4.83 M/UL — SIGNIFICANT CHANGE UP (ref 4.2–5.8)
RBC # BLD: 5.08 M/UL — SIGNIFICANT CHANGE UP (ref 4.2–5.8)
RBC # BLD: 5.08 M/UL — SIGNIFICANT CHANGE UP (ref 4.2–5.8)
RBC # BLD: 5.29 M/UL — SIGNIFICANT CHANGE UP (ref 4.2–5.8)
RBC # BLD: 5.29 M/UL — SIGNIFICANT CHANGE UP (ref 4.2–5.8)
RBC # FLD: 15.3 % — HIGH (ref 10.3–14.5)
RBC # FLD: 15.3 % — HIGH (ref 10.3–14.5)
RBC # FLD: 15.4 % — HIGH (ref 10.3–14.5)
RBC # FLD: 15.4 % — HIGH (ref 10.3–14.5)
RBC # FLD: 15.5 % — HIGH (ref 10.3–14.5)
RBC # FLD: 15.5 % — HIGH (ref 10.3–14.5)
RBC # FLD: 15.7 % — HIGH (ref 10.3–14.5)
RBC # FLD: 15.7 % — HIGH (ref 10.3–14.5)
RBC BLD AUTO: NORMAL — SIGNIFICANT CHANGE UP
RBC BLD AUTO: NORMAL — SIGNIFICANT CHANGE UP
RBC CASTS # UR COMP ASSIST: 1208 /HPF — HIGH (ref 0–4)
RBC CASTS # UR COMP ASSIST: 1208 /HPF — HIGH (ref 0–4)
RSV RNA NPH QL NAA+NON-PROBE: SIGNIFICANT CHANGE UP
RSV RNA NPH QL NAA+NON-PROBE: SIGNIFICANT CHANGE UP
SAO2 % BLDV: 70.7 % — SIGNIFICANT CHANGE UP
SAO2 % BLDV: 70.7 % — SIGNIFICANT CHANGE UP
SAO2 % BLDV: 85.7 % — SIGNIFICANT CHANGE UP
SAO2 % BLDV: 85.7 % — SIGNIFICANT CHANGE UP
SARS-COV-2 RNA SPEC QL NAA+PROBE: DETECTED
SARS-COV-2 RNA SPEC QL NAA+PROBE: DETECTED
SODIUM SERPL-SCNC: 132 MMOL/L — LOW (ref 135–145)
SODIUM SERPL-SCNC: 132 MMOL/L — LOW (ref 135–145)
SODIUM SERPL-SCNC: 133 MMOL/L — LOW (ref 135–145)
SODIUM SERPL-SCNC: 133 MMOL/L — LOW (ref 135–145)
SODIUM SERPL-SCNC: 137 MMOL/L — SIGNIFICANT CHANGE UP (ref 135–145)
SODIUM SERPL-SCNC: 137 MMOL/L — SIGNIFICANT CHANGE UP (ref 135–145)
SODIUM SERPL-SCNC: 138 MMOL/L — SIGNIFICANT CHANGE UP (ref 135–145)
SODIUM SERPL-SCNC: 138 MMOL/L — SIGNIFICANT CHANGE UP (ref 135–145)
SP GR SPEC: 1.03 — SIGNIFICANT CHANGE UP (ref 1–1.03)
SP GR SPEC: 1.03 — SIGNIFICANT CHANGE UP (ref 1–1.03)
SQUAMOUS # UR AUTO: 5 /HPF — SIGNIFICANT CHANGE UP (ref 0–5)
SQUAMOUS # UR AUTO: 5 /HPF — SIGNIFICANT CHANGE UP (ref 0–5)
TROPONIN T, HIGH SENSITIVITY RESULT: 36 NG/L — SIGNIFICANT CHANGE UP (ref 0–51)
TROPONIN T, HIGH SENSITIVITY RESULT: 36 NG/L — SIGNIFICANT CHANGE UP (ref 0–51)
UROBILINOGEN FLD QL: 1 MG/DL — SIGNIFICANT CHANGE UP (ref 0.2–1)
UROBILINOGEN FLD QL: 1 MG/DL — SIGNIFICANT CHANGE UP (ref 0.2–1)
WBC # BLD: 3.84 K/UL — SIGNIFICANT CHANGE UP (ref 3.8–10.5)
WBC # BLD: 3.84 K/UL — SIGNIFICANT CHANGE UP (ref 3.8–10.5)
WBC # BLD: 4.41 K/UL — SIGNIFICANT CHANGE UP (ref 3.8–10.5)
WBC # BLD: 4.41 K/UL — SIGNIFICANT CHANGE UP (ref 3.8–10.5)
WBC # BLD: 7.03 K/UL — SIGNIFICANT CHANGE UP (ref 3.8–10.5)
WBC # BLD: 7.03 K/UL — SIGNIFICANT CHANGE UP (ref 3.8–10.5)
WBC # BLD: 9.46 K/UL — SIGNIFICANT CHANGE UP (ref 3.8–10.5)
WBC # BLD: 9.46 K/UL — SIGNIFICANT CHANGE UP (ref 3.8–10.5)
WBC # FLD AUTO: 3.84 K/UL — SIGNIFICANT CHANGE UP (ref 3.8–10.5)
WBC # FLD AUTO: 3.84 K/UL — SIGNIFICANT CHANGE UP (ref 3.8–10.5)
WBC # FLD AUTO: 4.41 K/UL — SIGNIFICANT CHANGE UP (ref 3.8–10.5)
WBC # FLD AUTO: 4.41 K/UL — SIGNIFICANT CHANGE UP (ref 3.8–10.5)
WBC # FLD AUTO: 7.03 K/UL — SIGNIFICANT CHANGE UP (ref 3.8–10.5)
WBC # FLD AUTO: 7.03 K/UL — SIGNIFICANT CHANGE UP (ref 3.8–10.5)
WBC # FLD AUTO: 9.46 K/UL — SIGNIFICANT CHANGE UP (ref 3.8–10.5)
WBC # FLD AUTO: 9.46 K/UL — SIGNIFICANT CHANGE UP (ref 3.8–10.5)
WBC UR QL: 41 /HPF — HIGH (ref 0–5)
WBC UR QL: 41 /HPF — HIGH (ref 0–5)

## 2024-01-14 PROCEDURE — 44140 PARTIAL REMOVAL OF COLON: CPT

## 2024-01-14 PROCEDURE — 70450 CT HEAD/BRAIN W/O DYE: CPT | Mod: 26

## 2024-01-14 PROCEDURE — 74018 RADEX ABDOMEN 1 VIEW: CPT | Mod: 26

## 2024-01-14 PROCEDURE — 74177 CT ABD & PELVIS W/CONTRAST: CPT | Mod: 26,MG

## 2024-01-14 PROCEDURE — 88307 TISSUE EXAM BY PATHOLOGIST: CPT | Mod: 26

## 2024-01-14 PROCEDURE — 71275 CT ANGIOGRAPHY CHEST: CPT | Mod: 26,MG

## 2024-01-14 PROCEDURE — 44120 REMOVAL OF SMALL INTESTINE: CPT

## 2024-01-14 PROCEDURE — 99223 1ST HOSP IP/OBS HIGH 75: CPT

## 2024-01-14 PROCEDURE — G1004: CPT

## 2024-01-14 PROCEDURE — 93010 ELECTROCARDIOGRAM REPORT: CPT

## 2024-01-14 PROCEDURE — 71045 X-RAY EXAM CHEST 1 VIEW: CPT | Mod: 26,77

## 2024-01-14 PROCEDURE — 49000 EXPLORATION OF ABDOMEN: CPT

## 2024-01-14 PROCEDURE — 36556 INSERT NON-TUNNEL CV CATH: CPT | Mod: RT

## 2024-01-14 PROCEDURE — 88305 TISSUE EXAM BY PATHOLOGIST: CPT | Mod: 26

## 2024-01-14 PROCEDURE — 36600 WITHDRAWAL OF ARTERIAL BLOOD: CPT | Mod: LT

## 2024-01-14 PROCEDURE — 72125 CT NECK SPINE W/O DYE: CPT | Mod: 26

## 2024-01-14 PROCEDURE — 70250 X-RAY EXAM OF SKULL: CPT | Mod: 26

## 2024-01-14 PROCEDURE — 99291 CRITICAL CARE FIRST HOUR: CPT | Mod: 25

## 2024-01-14 PROCEDURE — 99285 EMERGENCY DEPT VISIT HI MDM: CPT | Mod: 57

## 2024-01-14 PROCEDURE — 71045 X-RAY EXAM CHEST 1 VIEW: CPT | Mod: 26,76

## 2024-01-14 PROCEDURE — 51860 REPAIR OF BLADDER WOUND: CPT

## 2024-01-14 PROCEDURE — 51702 INSERT TEMP BLADDER CATH: CPT

## 2024-01-14 DEVICE — STAPLER COVIDIEN ENDO GIA 60-3.8MM BLUE: Type: IMPLANTABLE DEVICE | Status: FUNCTIONAL

## 2024-01-14 DEVICE — STAPLER COVIDIEN ENDO GIA 60-3.8MM BLUE RELOAD: Type: IMPLANTABLE DEVICE | Status: FUNCTIONAL

## 2024-01-14 RX ORDER — BENZOCAINE 10 %
1 GEL (GRAM) MUCOUS MEMBRANE EVERY 6 HOURS
Refills: 0 | Status: DISCONTINUED | OUTPATIENT
Start: 2024-01-14 | End: 2024-01-22

## 2024-01-14 RX ORDER — ONDANSETRON 8 MG/1
4 TABLET, FILM COATED ORAL ONCE
Refills: 0 | Status: COMPLETED | OUTPATIENT
Start: 2024-01-14 | End: 2024-01-14

## 2024-01-14 RX ORDER — CALCIUM GLUCONATE 100 MG/ML
2 VIAL (ML) INTRAVENOUS ONCE
Refills: 0 | Status: COMPLETED | OUTPATIENT
Start: 2024-01-14 | End: 2024-01-14

## 2024-01-14 RX ORDER — SODIUM CHLORIDE 9 MG/ML
1000 INJECTION, SOLUTION INTRAVENOUS ONCE
Refills: 0 | Status: COMPLETED | OUTPATIENT
Start: 2024-01-14 | End: 2024-01-14

## 2024-01-14 RX ORDER — FENTANYL CITRATE 50 UG/ML
50 INJECTION INTRAVENOUS ONCE
Refills: 0 | Status: DISCONTINUED | OUTPATIENT
Start: 2024-01-14 | End: 2024-01-14

## 2024-01-14 RX ORDER — NOREPINEPHRINE BITARTRATE/D5W 8 MG/250ML
0.05 PLASTIC BAG, INJECTION (ML) INTRAVENOUS
Qty: 8 | Refills: 0 | Status: DISCONTINUED | OUTPATIENT
Start: 2024-01-14 | End: 2024-01-16

## 2024-01-14 RX ORDER — PIPERACILLIN AND TAZOBACTAM 4; .5 G/20ML; G/20ML
3.38 INJECTION, POWDER, LYOPHILIZED, FOR SOLUTION INTRAVENOUS ONCE
Refills: 0 | Status: COMPLETED | OUTPATIENT
Start: 2024-01-14 | End: 2024-01-14

## 2024-01-14 RX ORDER — BENZOCAINE AND MENTHOL 5; 1 G/100ML; G/100ML
1 LIQUID ORAL EVERY 8 HOURS
Refills: 0 | Status: DISCONTINUED | OUTPATIENT
Start: 2024-01-14 | End: 2024-01-22

## 2024-01-14 RX ORDER — ACETAMINOPHEN 500 MG
1000 TABLET ORAL ONCE
Refills: 0 | Status: COMPLETED | OUTPATIENT
Start: 2024-01-14 | End: 2024-01-14

## 2024-01-14 RX ORDER — CALCIUM GLUCONATE 100 MG/ML
2 VIAL (ML) INTRAVENOUS
Refills: 0 | Status: COMPLETED | OUTPATIENT
Start: 2024-01-14 | End: 2024-01-14

## 2024-01-14 RX ORDER — PIPERACILLIN AND TAZOBACTAM 4; .5 G/20ML; G/20ML
3.38 INJECTION, POWDER, LYOPHILIZED, FOR SOLUTION INTRAVENOUS EVERY 8 HOURS
Refills: 0 | Status: DISCONTINUED | OUTPATIENT
Start: 2024-01-14 | End: 2024-01-16

## 2024-01-14 RX ORDER — CHLORHEXIDINE GLUCONATE 213 G/1000ML
15 SOLUTION TOPICAL EVERY 12 HOURS
Refills: 0 | Status: DISCONTINUED | OUTPATIENT
Start: 2024-01-14 | End: 2024-01-15

## 2024-01-14 RX ORDER — LIDOCAINE HCL 20 MG/ML
5 VIAL (ML) INJECTION ONCE
Refills: 0 | Status: DISCONTINUED | OUTPATIENT
Start: 2024-01-14 | End: 2024-01-16

## 2024-01-14 RX ORDER — VASOPRESSIN 20 [USP'U]/ML
0.04 INJECTION INTRAVENOUS
Qty: 40 | Refills: 0 | Status: DISCONTINUED | OUTPATIENT
Start: 2024-01-14 | End: 2024-01-15

## 2024-01-14 RX ORDER — SODIUM CHLORIDE 9 MG/ML
1000 INJECTION, SOLUTION INTRAVENOUS
Refills: 0 | Status: DISCONTINUED | OUTPATIENT
Start: 2024-01-14 | End: 2024-01-15

## 2024-01-14 RX ORDER — SODIUM CHLORIDE 9 MG/ML
2100 INJECTION INTRAMUSCULAR; INTRAVENOUS; SUBCUTANEOUS ONCE
Refills: 0 | Status: COMPLETED | OUTPATIENT
Start: 2024-01-14 | End: 2024-01-14

## 2024-01-14 RX ORDER — MAGNESIUM SULFATE 500 MG/ML
2 VIAL (ML) INJECTION
Refills: 0 | Status: COMPLETED | OUTPATIENT
Start: 2024-01-14 | End: 2024-01-14

## 2024-01-14 RX ORDER — VANCOMYCIN HCL 1 G
1000 VIAL (EA) INTRAVENOUS ONCE
Refills: 0 | Status: COMPLETED | OUTPATIENT
Start: 2024-01-14 | End: 2024-01-14

## 2024-01-14 RX ORDER — CHLORHEXIDINE GLUCONATE 213 G/1000ML
1 SOLUTION TOPICAL DAILY
Refills: 0 | Status: DISCONTINUED | OUTPATIENT
Start: 2024-01-14 | End: 2024-01-22

## 2024-01-14 RX ORDER — FENTANYL CITRATE 50 UG/ML
0.5 INJECTION INTRAVENOUS
Qty: 2500 | Refills: 0 | Status: DISCONTINUED | OUTPATIENT
Start: 2024-01-14 | End: 2024-01-15

## 2024-01-14 RX ORDER — METOCLOPRAMIDE HCL 10 MG
10 TABLET ORAL ONCE
Refills: 0 | Status: COMPLETED | OUTPATIENT
Start: 2024-01-14 | End: 2024-01-14

## 2024-01-14 RX ADMIN — FENTANYL CITRATE 50 MICROGRAM(S): 50 INJECTION INTRAVENOUS at 07:12

## 2024-01-14 RX ADMIN — Medication 25 GRAM(S): at 11:51

## 2024-01-14 RX ADMIN — PIPERACILLIN AND TAZOBACTAM 200 GRAM(S): 4; .5 INJECTION, POWDER, LYOPHILIZED, FOR SOLUTION INTRAVENOUS at 11:51

## 2024-01-14 RX ADMIN — PIPERACILLIN AND TAZOBACTAM 200 GRAM(S): 4; .5 INJECTION, POWDER, LYOPHILIZED, FOR SOLUTION INTRAVENOUS at 07:12

## 2024-01-14 RX ADMIN — ONDANSETRON 4 MILLIGRAM(S): 8 TABLET, FILM COATED ORAL at 06:38

## 2024-01-14 RX ADMIN — BENZOCAINE AND MENTHOL 1 LOZENGE: 5; 1 LIQUID ORAL at 11:00

## 2024-01-14 RX ADMIN — SODIUM CHLORIDE 2000 MILLILITER(S): 9 INJECTION, SOLUTION INTRAVENOUS at 14:30

## 2024-01-14 RX ADMIN — Medication 1000 MILLIGRAM(S): at 07:00

## 2024-01-14 RX ADMIN — Medication 200 GRAM(S): at 11:51

## 2024-01-14 RX ADMIN — SODIUM CHLORIDE 120 MILLILITER(S): 9 INJECTION, SOLUTION INTRAVENOUS at 11:47

## 2024-01-14 RX ADMIN — Medication 400 MILLIGRAM(S): at 06:38

## 2024-01-14 RX ADMIN — Medication 1 SPRAY(S): at 10:00

## 2024-01-14 RX ADMIN — ONDANSETRON 4 MILLIGRAM(S): 8 TABLET, FILM COATED ORAL at 06:45

## 2024-01-14 RX ADMIN — FENTANYL CITRATE 50 MICROGRAM(S): 50 INJECTION INTRAVENOUS at 07:30

## 2024-01-14 RX ADMIN — SODIUM CHLORIDE 2000 MILLILITER(S): 9 INJECTION, SOLUTION INTRAVENOUS at 13:46

## 2024-01-14 RX ADMIN — Medication 250 MILLIGRAM(S): at 07:26

## 2024-01-14 RX ADMIN — Medication 200 GRAM(S): at 13:45

## 2024-01-14 RX ADMIN — Medication 25 GRAM(S): at 14:32

## 2024-01-14 RX ADMIN — CHLORHEXIDINE GLUCONATE 1 APPLICATION(S): 213 SOLUTION TOPICAL at 10:03

## 2024-01-14 RX ADMIN — SODIUM CHLORIDE 2000 MILLILITER(S): 9 INJECTION, SOLUTION INTRAVENOUS at 11:52

## 2024-01-14 RX ADMIN — SODIUM CHLORIDE 1000 MILLILITER(S): 9 INJECTION, SOLUTION INTRAVENOUS at 08:48

## 2024-01-14 RX ADMIN — Medication 104 MILLIGRAM(S): at 08:52

## 2024-01-14 RX ADMIN — VASOPRESSIN 6 UNIT(S)/MIN: 20 INJECTION INTRAVENOUS at 14:34

## 2024-01-14 RX ADMIN — SODIUM CHLORIDE 2100 MILLILITER(S): 9 INJECTION INTRAMUSCULAR; INTRAVENOUS; SUBCUTANEOUS at 06:45

## 2024-01-14 RX ADMIN — Medication 6.56 MICROGRAM(S)/KG/MIN: at 08:44

## 2024-01-14 NOTE — PATIENT PROFILE ADULT - NSPROPTRIGHTSUPPORTPERSON_GEN_A_NUR
Detail Level: Detailed Quality 110: Preventive Care And Screening: Influenza Immunization: Influenza Immunization Administered during Influenza season Quality 111:Pneumonia Vaccination Status For Older Adults: Pneumococcal Vaccination Previously Received Quality 130: Documentation Of Current Medications In The Medical Record: Current Medications Documented same name as above

## 2024-01-14 NOTE — CONSULT NOTE ADULT - CONSULT REASON
spina bifida & VPS @ [facility] by Dr Dorsey,   pt here w hypotension/ hypovolemia and suspected sepsis & SBO per imaging spina bifida & VPS @ [facility] by Dr Dorsey,   pt here w hypotension/ hypovolemia and suspected sepsis & SBO per imaging

## 2024-01-14 NOTE — ED PROVIDER NOTE - PHYSICAL EXAMINATION
Gen: ill appear young male, appears tired, responsive to voice. Vomiting on exam. +incontinence of stool (baseline per pt)  Head: normocephalic, atraumatic  EENT: EOMI, dry mucous membranes, no scleral icterus  Lung: no increased work of breathing, clear to auscultation bilaterally, no wheezing,  speaking in full sentences  CV: tachycardic rate, regular rhythm, normal s1/s2, 2+ radial pulses bilaterally  Abd: distended, tender to palpation  MSK: (+) b/l edema up to ankles, emaciated b/l LEs  Neuro: Awake, alert, answering questions appropriately

## 2024-01-14 NOTE — ED PROVIDER NOTE - OBJECTIVE STATEMENT
28 year old male with PMHx spina bifida,  shunt, scoliosis, wheelchair bound, BIBA from home for lethargy. Per EMS, patient was found on toilet lethargic, poorly responsive, BP found to be 50/30, given 250cc IVF, BP improved to 60/40 en route. Patient reports 2 days of intermittent fevers treated with tylenol at home, nasal congestion, then this evening developed abdominal distention and pain, nausea, vomiting, severe generalized weakness. Per mom at bedside patient has had normal activity and PO intake until this evening. Denies any chest pain, difficulty breathing, other complaints.

## 2024-01-14 NOTE — ED PROVIDER NOTE - CLINICAL SUMMARY MEDICAL DECISION MAKING FREE TEXT BOX
28 year old male BIBA from home lethargic, hypotensive, tachycardic, found to be febrile here with rectal temp ~102F. Sepsis work up initiated. Patient was difficult access, emergent R subclavian line placed. CT imaging obtained. 28 year old male BIBA from home lethargic, hypotensive, tachycardic, found to be febrile here with rectal temp ~102F. Sepsis work up initiated. Patient was difficult access, emergent R subclavian line placed. CT imaging obtained demonstrating "Severe small bowel obstruction with a transition point in the right mid   abdomen". Surgery consulted.

## 2024-01-14 NOTE — ED ADULT NURSE NOTE - OBJECTIVE STATEMENT
pt presents for 1 day of severe abd pain, hx spina bifida, sciliocis, wheel chair bound. no bowel movement in 5 days, +fevers at home. straight caths self at home, pt states "it feels like I have a uti." pts abd distended, +n/v yellow liquid, no signs of blood/coffee ground emesis. patient a&ox4, resp even and unlabored.

## 2024-01-14 NOTE — H&P ADULT - HISTORY OF PRESENT ILLNESS
SUBJECTIVE: 29yo M w/ hx of spina bfida and scoliosis, wheelchair bound at baseline, presenting with abdominal pain, n/v. Pt states that the pain began yesterday morning and has been persistent. He had a bowel movement this morning but prior to that his last bowel movement was one week prior. Denies passing gas. Denies fevers at home. Has been vomiting and nauseous. At home he was lethargic and EMS was called. At that time he was hypotensive and tachycardic and he was BIBEMS to [facility] ED for further workup. On arrival he was tachycardic to the 130s and hypotensive to 80s/50s. Labs notable for K 3.4, bicarb 13, Cr 2.04. Actively vomiting in ED. CT scan showed dilated bowel and stomach consistent with severe SBO with transition point in mid abdomen. Surgery consulted for management.     GENERAL: NAD, lying in bed comfortably  HEAD:  Atraumatic, normocephalic  NECK: Supple, no JVD  HEART: Sinus tachycardia, hypotensive on monitor   LUNGS: Unlabored respirations. No conversational dyspnea.   ABDOMEN: Soft, mildly tender to palpation, distended.   EXTREMITIES: No clubbing, cyanosis, or edema  NERVOUS SYSTEM:  A&Ox3  SKIN: No rashes or lesions   SUBJECTIVE: 29yo M w/ hx of spina bfida and scoliosis, wheelchair bound at baseline, presenting with abdominal pain, n/v. Pt states that the pain began yesterday morning and has been persistent. He had a bowel movement this morning but prior to that his last bowel movement was one week prior. Denies passing gas. Denies fevers at home. Has been vomiting and nauseous. At home he was lethargic and EMS was called. At that time he was hypotensive and tachycardic and he was BIBEMS to [facility] ED for further workup. On arrival he was tachycardic to the 130s and hypotensive to 80s/50s. Labs notable for K 3.4, bicarb 13, Cr 2.04. Actively vomiting in ED. CT scan showed dilated bowel and stomach consistent with severe SBO with transition point in mid abdomen. Surgery consulted for management.     GENERAL: NAD, lying in bed comfortably  HEAD:  Atraumatic, normocephalic  NECK: Supple, no JVD  HEART: Sinus tachycardia, hypotensive on monitor   LUNGS: Unlabored respirations. No conversational dyspnea.   ABDOMEN: Soft, mildly tender to palpation, distended.   EXTREMITIES: No clubbing, cyanosis, or edema  NERVOUS SYSTEM:  A&Ox3  SKIN: No rashes or lesions   SUBJECTIVE: 27yo M w/ hx of spina bfida and scoliosis, wheelchair bound at baseline, presenting with abdominal pain, n/v. Pt states that the pain began yesterday morning and has been persistent. He had a bowel movement this morning but prior to that his last bowel movement was one week prior. Denies passing gas. Denies fevers at home. Has been vomiting and nauseous. At home he was lethargic and EMS was called. At that time he was hypotensive and tachycardic and he was BIBEMS to [facility] ED for further workup. On arrival he was tachycardic to the 130s and hypotensive to 80s/50s. Labs notable for K 3.4, bicarb 13, Cr 2.04. Actively vomiting in ED. CT scan showed dilated bowel and stomach consistent with severe SBO with transition point in mid abdomen. Surgery consulted for management.     GENERAL: NAD, lying in bed comfortably  HEAD:  Atraumatic, normocephalic  NECK: Supple, no JVD  HEART: Sinus tachycardia, hypotensive on monitor   LUNGS: Unlabored respirations. No conversational dyspnea.   ABDOMEN: Soft, mildly tender to palpation, distended.   EXTREMITIES: No clubbing, cyanosis, or edema  NERVOUS SYSTEM:  A&Ox3  SKIN: No rashes or lesions

## 2024-01-14 NOTE — ED ADULT NURSE REASSESSMENT NOTE - NS ED NURSE REASSESS COMMENT FT1
unable to place catheter, two RN attempted, Md byers attempted. md llanos and team aware.
Assumed care of pt at 07:15 as stated in report from ILIA Ronquillo. Charting as noted. Patient A&O x4, denies pain/discomfort, denies CP/SOB. Updated on the plan of care. Call bell within reach, bed locked in lowest position. IV site flushed w/ NS. No redness, swelling or pain noted to site. No signs of acute distress noted, safety maintained. Pt remains on CM in sinus tach.

## 2024-01-14 NOTE — PATIENT PROFILE ADULT - FALL HARM RISK - HARM RISK INTERVENTIONS
Assistance OOB with selected safe patient handling equipment/Communicate Risk of Fall with Harm to all staff/Discuss with provider need for PT consult/Monitor gait and stability/Provide patient with walking aids - walker, cane, crutches/Reinforce activity limits and safety measures with patient and family/Tailored Fall Risk Interventions/Visual Cue: Yellow wristband and red socks/Bed in lowest position, wheels locked, appropriate side rails in place/Call bell, personal items and telephone in reach/Instruct patient to call for assistance before getting out of bed or chair/Non-slip footwear when patient is out of bed/[place] to call system/Physically safe environment - no spills, clutter or unnecessary equipment/Purposeful Proactive Rounding/Room/bathroom lighting operational, light cord in reach Assistance OOB with selected safe patient handling equipment/Communicate Risk of Fall with Harm to all staff/Discuss with provider need for PT consult/Monitor gait and stability/Provide patient with walking aids - walker, cane, crutches/Reinforce activity limits and safety measures with patient and family/Tailored Fall Risk Interventions/Visual Cue: Yellow wristband and red socks/Bed in lowest position, wheels locked, appropriate side rails in place/Call bell, personal items and telephone in reach/Instruct patient to call for assistance before getting out of bed or chair/Non-slip footwear when patient is out of bed/[place] to call system/Physically safe environment - no spills, clutter or unnecessary equipment/Purposeful Proactive Rounding/Room/bathroom lighting operational, light cord in reach

## 2024-01-14 NOTE — H&P ADULT - ATTENDING COMMENTS
Patient seen and examined in ED. Pt hypotensive and tachycardic, NG with >800 cc feculent outpt. Will admit to ICU, resuscitate, plan for OR today.

## 2024-01-14 NOTE — ED PROVIDER NOTE - ATTENDING CONTRIBUTION TO CARE
I personally saw the patient with the resident, and completed the key components of the history and physical exam. I then discussed the management plan with the resident.     I have personally provided _50__ minutes of critical care time exclusive of time spent on separately billable procedures. Time includes review of laboratory data, radiology results, discussion with consultants, and monitoring for potential decompensation. Interventions were performed as documented above

## 2024-01-14 NOTE — ED ADULT NURSE NOTE - SEPSIS REFERENCE DATA FOR CRITERIA 1 WBC
1847 Pt arrived from OR via bed. Report given by anesthesia and RN. T306 Awake, clear speech, follows commands, 6L mask even non labored breathing, lungs clear airway patent, neck soft. ST, SR. Skin pink warm dry. PIV patent.     1857, 1858, 1859 c/o pain and nausea, treated per mar    1901 MD Melendez at bedside.     1909 shivering, treated per mar. Warm blankets.     1912 continue to treat for pain per mar    1922 updated Lalo, justinaer. Updated Roman, s.o.     1927 MD Narayanan at bedside. Pt sleeping, face relaxed, even non labored breathing, skin pink warm dry.     1945 neck soft. Airway patent. Resting comfortably.    1954 sitting up, awake, alert, clear speech, follows commands, tolerating sips of water. DENIES nausea and pain.     2009 report given to KALA Luis T306. Pt ready for transfer to room.         
Abnormal WBC: < 4,000 OR > 12,000

## 2024-01-14 NOTE — PATIENT PROFILE ADULT - OVER THE PAST TWO WEEKS HAVE YOU FELT DOWN, DEPRESSED OR HOPELESS?
Emergency Department Provider Note  Location: [address] [place] [address]  1/10/2023     Patient Identification  Joo Sanders is a 22 y.o. male    Chief Complaint  Pharyngitis (Runny nose )      Mode of Arrival  private car    HPI  (History provided by patient)  This is a 22 y.o. male without relevant past medical history presented today for sore throat. Symptoms have been ongoing for the last 2 days. He is endorsing congestion and dry cough. He is also endorsing bilateral ear pain. Denies any associated fever, nausea or vomiting. Denies any chest pain, shortness of breath, palpitations, abdominal pain, or changes to bowel or bladder. He denies any sick contacts. He is up-to-date on his vaccinations. ROS  Review of Systems   All other systems reviewed and are negative. I have reviewed the following nursing documentation:  Allergies: Allergies   Allergen Reactions    Banana Nausea And Vomiting     Watermelon kiwi        Past medical history:  has a past medical history of Asthma, Gonorrhea (01/04/2020), and Seasonal allergies. Past surgical history:  has no past surgical history on file. Home medications:   Prior to Admission medications    Medication Sig Start Date End Date Taking?  Authorizing Provider   penicillin v potassium (VEETID) 500 MG tablet Take 1 tablet by mouth 3 times daily for 10 days 1/10/23 1/20/23 Yes Chuy Amato MD   ibuprofen (IBU) 600 MG tablet Take 1 tablet by mouth every 8 hours as needed for Pain or Fever (with food) 9/23/21   Pj Skelton MD   albuterol sulfate HFA (VENTOLIN HFA) 108 (90 Base) MCG/ACT inhaler Inhale 2 puffs into the lungs 4 times daily as needed for Wheezing 4/29/21   Debora Garibay MD   cetirizine (ZYRTEC ALLERGY) 10 MG tablet Take 1 tablet by mouth daily 4/7/21   Russ العلي MD   fluticasone Daniel Dixon) 50 MCG/ACT nasal spray 2 sprays by Each Nostril route daily 4/7/21   Russ العلي MD       Social history:  reports that he has never smoked. He has never used smokeless tobacco. He reports current alcohol use. He reports that he does not use drugs. Family history:  History reviewed. No pertinent family history. Exam  ED Triage Vitals   BP Temp Temp Source Heart Rate Resp SpO2 Height Weight   01/10/23 1042 01/10/23 1042 01/10/23 1042 01/10/23 1042 01/10/23 1042 01/10/23 1042 01/10/23 1040 01/10/23 1040   (!) 153/95 98.3 °F (36.8 °C) Oral 80 18 100 % 6' (1.829 m) 275 lb (124.7 kg)     Physical Exam  Vitals and nursing note reviewed. Constitutional:       General: He is not in acute distress. Appearance: He is well-developed. HENT:      Head: Normocephalic and atraumatic. Right Ear: A middle ear effusion is present. Tympanic membrane is erythematous. Left Ear: Tympanic membrane and ear canal normal.      Nose: Congestion and rhinorrhea present. Mouth/Throat:      Pharynx: Posterior oropharyngeal erythema present. No oropharyngeal exudate. Tonsils: No tonsillar exudate. 1+ on the right. 1+ on the left. Eyes:      Conjunctiva/sclera: Conjunctivae normal.   Cardiovascular:      Rate and Rhythm: Normal rate and regular rhythm. Heart sounds: Normal heart sounds. No murmur heard. No friction rub. Pulmonary:      Effort: Pulmonary effort is normal.      Breath sounds: Normal breath sounds. Abdominal:      General: There is no distension. Palpations: Abdomen is soft. Tenderness: There is no abdominal tenderness. Musculoskeletal:      Cervical back: Normal range of motion and neck supple. Skin:     General: Skin is warm. Capillary Refill: Capillary refill takes less than 2 seconds. Coloration: Skin is not pale. Neurological:      General: No focal deficit present. Mental Status: He is alert.    Psychiatric:         Mood and Affect: Mood normal.         Behavior: Behavior normal.           MDM/ED Course  ED Medication Orders (From admission, onward)      Start Ordered     Status Ordering Provider    01/10/23 1145 01/10/23 1131  penicillin v potassium (VEETID) tablet 500 mg  ONCE        Question:  Antimicrobial Indications  Answer:  Head and Neck Infection    Last MAR action: Given - by Mark Daniel on 01/10/23 at [address]            EKG  No EKG obtained at this visit      Radiology  No results found. Labs  Results for orders placed or performed during the hospital encounter of 01/10/23   Strep Screen Group A Throat    Specimen: Throat   Result Value Ref Range    Rapid Strep A Screen POSITIVE (A) Negative   Rapid influenza A/B antigens    Specimen: Nasopharyngeal   Result Value Ref Range    Rapid Influenza A Ag Negative Negative    Rapid Influenza B Ag Negative Negative         - Patient seen and evaluated in room 9.  22 y.o. male presented for cough and sore throat. He also has ear pain. Patient presented hypertensive, afebrile, normal heart rate, normal respiratory rate and satting at 100% on room air. On exam he did have an erythematous right tympanic membrane, nasal congestion and oropharyngeal erythema. Given history and exam my differential diagnosis includes but is not limited to COVID-19, influenza, strep pharyngitis, acute otitis media. I have lower suspicion for underlying pneumonia given clear breath sounds and work of breathing therefore I did not obtain a chest x-ray. He has no signs concerning for peritonsillar abscess. I obtained labs  as noted below  - Patient was placed on telemetry during his/her ED stay and no malignant dysrhythmia observed. - Pertinent old records reviewed. -Patient has PCP followup  - Patient was given 500mg penicillinV oral in the ED. Upon reassessment, patient remained hemodynamically. - Diagnostic studies reviewed and interpreted by me     - Lab:   Rapid strep positive  Influenza negative  Covid 19 pending    - I discussed the results with patient.   We agreed to treat with 500mg Penicillin TID x 10 days for acute strep pharyngitis    - Return precautions also discussed. patient verbalized understanding of care plan and agreed to follow-up with PCP as advised. Clinical Impression:  1. Strep pharyngitis          Disposition:  Discharge to home in good condition. Blood pressure (!) 153/95, pulse 80, temperature 98.3 °F (36.8 °C), temperature source Oral, resp. rate 18, height 6' (1.829 m), weight 275 lb (124.7 kg), SpO2 100 %. Patient was given scripts for the following medications. I counseled patient how to take these medications. Discharge Medication List as of 1/10/2023 11:52 AM        START taking these medications    Details   penicillin v potassium (VEETID) 500 MG tablet Take 1 tablet by mouth 3 times daily for 10 days, Disp-30 tablet, R-0Normal             Disposition referral (if applicable):  No follow-up provider specified. This chart was generated in part by using Dragon Dictation system and may contain errors related to that system including errors in grammar, punctuation, and spelling, as well as words and phrases that may be inappropriate. If there are any questions or concerns please feel free to contact the dictating provider for clarification.      Starla Hoover MD  [address] [place] [address]       Starla Hoover MD  01/10/23 0188 no

## 2024-01-14 NOTE — CONSULT NOTE ADULT - SUBJECTIVE AND OBJECTIVE BOX
27yo M w/ hx of spina bifida VPS @ [facility] by Dr Dorsey and scoliosis/ no sensation distal to pubic line, wheelchair bound at baseline w no movement of his legs, self catheterizes bladder, presenting with abdominal pain, n/v. Pt states that the pain began yesterday morning and has been persistent. He had a bowel movement this morning but prior to that his last bowel movement was one week prior. Denies passing gas. Denies fevers at home. Has been vomiting and nauseous. At home he was lethargic and EMS was called. At that time he was hypotensive and tachycardic and he was BIBEMS to [facility] ED for further workup. On arrival he was tachycardic to the 130s and hypotensive to 80s/50s. Labs notable for K 3.4, bicarb 13, Cr 2.04. Actively vomiting in ED. CT scan showed dilated bowel and stomach consistent with severe SBO with transition point in mid abdomen. Surgery consulted for management.     Dr Harris/ NSx consulted for evaluation of VPS and possible need of externalization if needs sx/ open abdomen for SBO management    pt seen in SICU bed, AAOx3 and FX, able to provide relevant history   -headache  denies new to UEs or worsening weakness  denies numbness/ tingling  denies visual changes  denies C/T/LS  Spine pain  + NGT  + void/ cline cath inserted per urology   + bed rest   + venodynes b/l when in bed       MEDICATIONS  (STANDING):  chlorhexidine 2% Cloths 1 Application(s) Topical daily  lidocaine 2% Jelly 5 milliLiter(s) IntraUrethral once  lidocaine 2% Jelly 5 milliLiter(s) IntraUrethral once  multiple electrolytes Injection Type 1 1000 milliLiter(s) (120 mL/Hr) IV Continuous <Continuous>  multiple electrolytes Injection Type 1 Bolus 1000 milliLiter(s) IV Bolus once  norepinephrine Infusion 0.05 MICROgram(s)/kG/Min (6.56 mL/Hr) IV Continuous <Continuous>  piperacillin/tazobactam IVPB.- 3.375 Gram(s) IV Intermittent once  piperacillin/tazobactam IVPB.. 3.375 Gram(s) IV Intermittent every 8 hours  vasopressin Infusion 0.04 Unit(s)/Min (6 mL/Hr) IV Continuous <Continuous>    MEDICATIONS  (PRN):  benzocaine 20% Spray 1 Spray(s) Topical every 6 hours PRN sore throat  benzocaine/menthol Lozenge 1 Lozenge Oral every 8 hours PRN Sore Throat    Allergies    No Known Allergies    Intolerances      Vital Signs Last 24 Hrs  T(C): 37.6 (14 Jan 2024 15:00), Max: 38.9 (14 Jan 2024 05:30)  T(F): 99.7 (14 Jan 2024 15:00), Max: 102 (14 Jan 2024 05:30)  HR: 99 (14 Jan 2024 15:00) (81 - 135)  BP: 101/70 (14 Jan 2024 11:45) (78/46 - 107/75)  BP(mean): 80 (14 Jan 2024 11:45) (59 - 84)  RR: 18 (14 Jan 2024 15:00) (16 - 23)  SpO2: 95% (14 Jan 2024 15:00) (76% - 100%)    Parameters below as of 14 Jan 2024 09:30  Patient On (Oxygen Delivery Method): room air        GCS: 15  Eye response (E)4  Verbal response (V)5  Motor response (M)6        PHYSICAL EXAM:  GENERAL: NAD, well-groomed, well-developed, AAOx3 and very cooperative  HEAD: Atraumatic, Normocephalic, no palpable step-off appreciated on palpation  EYES: b/l EOMI, PERRL, conjunctiva and sclera clear,   NECK: Supple, nontender to palpation   TS/LS: nontender to palpation midline or paraspinal muscles b/l,  NERVOUS SYSTEM:  Alert & Oriented X3, speech is clear and fluent, no dysarthria appreciated. Good concentration & cooperative; Motor Strength 5/5 B/L upper and 0/5 lower extremities w deformities and cold to touch w scars, sensory is at baseline and symmetric b/l face/ chest/UEs and abdomen; No pronators b/l, cerebellar signs grossly intact b/l.FS/ TML.  SKIN: No rashes or lesions appreciated       LABS:                        14.0   7.03  )-----------( 101      ( 14 Jan 2024 10:50 )             41.8     01-14    133<L>  |  100  |  36.6<H>  ----------------------------<  117<H>  3.9   |  15.0<L>  |  2.04<H>    Ca    6.8<L>      14 Jan 2024 10:50  Phos  2.9     01-14  Mg     1.3     01-14    TPro  5.6<L>  /  Alb  2.8<L>  /  TBili  <0.2<L>  /  DBili  x   /  AST  44<H>  /  ALT  19  /  AlkPhos  106  01-14    PT/INR - ( 14 Jan 2024 10:55 )   PT: 13.5 sec;   INR: 1.22 ratio         PTT - ( 14 Jan 2024 10:55 )  PTT:34.8 sec      Urinalysis Basic - ( 14 Jan 2024 10:50 )    Color: x / Appearance: x / SG: x / pH: x  Gluc: 117 mg/dL / Ketone: x  / Bili: x / Urobili: x   Blood: x / Protein: x / Nitrite: x   Leuk Esterase: x / RBC: x / WBC x   Sq Epi: x / Non Sq Epi: x / Bacteria: x          RADIOLOGY & ADDITIONAL TESTS:    < from: CT Cervical Spine No Cont (01.14.24 @ 12:58) >  ACC: [id number] EXAM:  CT CERVICAL SPINE   ORDERED BY: LINNEA HARRELL     PROCEDURE DATE:  01/14/2024    INTERPRETATION:  CLINICAL INDICATION:  shunt  TECHNIQUE:Transaxial images were obtained from the skull base to T2. Multiplanar reconstructed images were obtained.  COMPARISON: There are no prior studies available for comparison.    FINDINGS:    There is no acute fracture or subluxation. Vertebral body heights are maintained. Alignment at the craniocervical junction is unremarkable.   Atlanto-dental distance is not widened. There is no prevertebral soft tissue swelling. 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 Partially visualized surgical fixation hardware noted in the thoracic spine.    The cervical lordosis is unremarkable. There are multilevel degenerative changes characterized by disc osteophyte complexes and facet and uncinate hypertrophy. This results in mild canal stenosis and multilevel neural foraminal narrowing.  Partially visualized enteric tube.  The lung apices are unremarkable.   shunt catheter noted in the left neck.  Partially visualized right-sided central venous catheter.    IMPRESSION:  No acute fracture or traumatic malalignment.   shunt catheter noted in the left neck.    --- End of Report ---  SUE CARBALLO MD; Attending Radiologist  This document has been electronically signed. Jan 14 2024  3:29PM  < end of copied text >      < from: CT Head No Cont (01.14.24 @ 12:58) >  ACC: [id number] EXAM:  CT BRAIN   ORDERED BY: LINNEA HARRELL     PROCEDURE DATE:  01/14/2024    INTERPRETATION:  CT HEAD WITHOUT CONTRAST  TECHNIQUE: Multiple axial images of the head were obtained from the skull base to the vertex without intravenous contrast.  Multiplanar reformats were created according to the standard protocol.    INDICATION: VPS/min size assessment.  COMPARISON: None available at the time of interpretation.  _____________________  FINDINGS:    Findings compatible with a Chiari II malformation.  There are 2 abandoned left parieto-occipital shunt catheters. There is a left parietal approach ventricular shunt catheter with the tip terminating in the posterior body/atria of the right lateral ventricle.   Slitlike ventricular caliber.  No findings suspicious for an acute large vascular territory infarct. No significant midline shift. No acute intracranial hemorrhage. Linear calcific 2.0 x 1.0 x 1.0 cm mass in the medial left parietal lobe.  Postsurgical changes left parietal skull/scalp. No acute fracture.  Paranasal Sinuses, Mastoid Air Cells, and Orbits: Mucosal thickening of some of the ethmoid air cells and of the maxillary sinuses. Visualized mastoid air cells clear. Orbits are unremarkable.  ______________________  IMPRESSION:  1.  No acute intracranial abnormality. No comparison examinations were available at the time of interpretation. There is slitlike ventricular caliber of indeterminate clinical significance. Clinical correlation is advised. An addendum to this report can be made to assess for stability of ventricular caliber once a comparison examination is made available.  2.  The 2.0 x 1.0 x 1.0 cm calcific mass in the left parietal lobe has a broad differential. Comparison with prior examinations would be useful to assess for stability. If these are not available, further evaluation with MR brain without and with contrast would be recommended.  This report was sent via Teams to Linnea Harrell  at1/14/2024 3:34 PM.    --- End of Report ---  JONATAN WILSON MD; Attending Radiologist  This document has been electronically signed. Jan 14 2024  3:37PM  < end of copied text >        I spent a total time of 55 mins with the patient at bedside of which more than 50% of time was spent on counseling/coordination of care 27yo M w/ hx of spina bifida VPS @ [facility] by Dr Dorsey and scoliosis/ no sensation distal to pubic line, wheelchair bound at baseline w no movement of his legs, self catheterizes bladder, presenting with abdominal pain, n/v. Pt states that the pain began yesterday morning and has been persistent. He had a bowel movement this morning but prior to that his last bowel movement was one week prior. Denies passing gas. Denies fevers at home. Has been vomiting and nauseous. At home he was lethargic and EMS was called. At that time he was hypotensive and tachycardic and he was BIBEMS to [facility] ED for further workup. On arrival he was tachycardic to the 130s and hypotensive to 80s/50s. Labs notable for K 3.4, bicarb 13, Cr 2.04. Actively vomiting in ED. CT scan showed dilated bowel and stomach consistent with severe SBO with transition point in mid abdomen. Surgery consulted for management.     Dr Harris/ NSx consulted for evaluation of VPS and possible need of externalization if needs sx/ open abdomen for SBO management    pt seen in SICU bed, AAOx3 and FX, able to provide relevant history   -headache  denies new to UEs or worsening weakness  denies numbness/ tingling  denies visual changes  denies C/T/LS  Spine pain  + NGT  + void/ cline cath inserted per urology   + bed rest   + venodynes b/l when in bed       MEDICATIONS  (STANDING):  chlorhexidine 2% Cloths 1 Application(s) Topical daily  lidocaine 2% Jelly 5 milliLiter(s) IntraUrethral once  lidocaine 2% Jelly 5 milliLiter(s) IntraUrethral once  multiple electrolytes Injection Type 1 1000 milliLiter(s) (120 mL/Hr) IV Continuous <Continuous>  multiple electrolytes Injection Type 1 Bolus 1000 milliLiter(s) IV Bolus once  norepinephrine Infusion 0.05 MICROgram(s)/kG/Min (6.56 mL/Hr) IV Continuous <Continuous>  piperacillin/tazobactam IVPB.- 3.375 Gram(s) IV Intermittent once  piperacillin/tazobactam IVPB.. 3.375 Gram(s) IV Intermittent every 8 hours  vasopressin Infusion 0.04 Unit(s)/Min (6 mL/Hr) IV Continuous <Continuous>    MEDICATIONS  (PRN):  benzocaine 20% Spray 1 Spray(s) Topical every 6 hours PRN sore throat  benzocaine/menthol Lozenge 1 Lozenge Oral every 8 hours PRN Sore Throat    Allergies    No Known Allergies    Intolerances      Vital Signs Last 24 Hrs  T(C): 37.6 (14 Jan 2024 15:00), Max: 38.9 (14 Jan 2024 05:30)  T(F): 99.7 (14 Jan 2024 15:00), Max: 102 (14 Jan 2024 05:30)  HR: 99 (14 Jan 2024 15:00) (81 - 135)  BP: 101/70 (14 Jan 2024 11:45) (78/46 - 107/75)  BP(mean): 80 (14 Jan 2024 11:45) (59 - 84)  RR: 18 (14 Jan 2024 15:00) (16 - 23)  SpO2: 95% (14 Jan 2024 15:00) (76% - 100%)    Parameters below as of 14 Jan 2024 09:30  Patient On (Oxygen Delivery Method): room air        GCS: 15  Eye response (E)4  Verbal response (V)5  Motor response (M)6        PHYSICAL EXAM:  GENERAL: NAD, well-groomed, well-developed, AAOx3 and very cooperative  HEAD: Atraumatic, Normocephalic, no palpable step-off appreciated on palpation  EYES: b/l EOMI, PERRL, conjunctiva and sclera clear,   NECK: Supple, nontender to palpation   TS/LS: nontender to palpation midline or paraspinal muscles b/l,  NERVOUS SYSTEM:  Alert & Oriented X3, speech is clear and fluent, no dysarthria appreciated. Good concentration & cooperative; Motor Strength 5/5 B/L upper and 0/5 lower extremities w deformities and cold to touch w scars, sensory is at baseline and symmetric b/l face/ chest/UEs and abdomen; No pronators b/l, cerebellar signs grossly intact b/l.FS/ TML.  SKIN: No rashes or lesions appreciated       LABS:                        14.0   7.03  )-----------( 101      ( 14 Jan 2024 10:50 )             41.8     01-14    133<L>  |  100  |  36.6<H>  ----------------------------<  117<H>  3.9   |  15.0<L>  |  2.04<H>    Ca    6.8<L>      14 Jan 2024 10:50  Phos  2.9     01-14  Mg     1.3     01-14    TPro  5.6<L>  /  Alb  2.8<L>  /  TBili  <0.2<L>  /  DBili  x   /  AST  44<H>  /  ALT  19  /  AlkPhos  106  01-14    PT/INR - ( 14 Jan 2024 10:55 )   PT: 13.5 sec;   INR: 1.22 ratio         PTT - ( 14 Jan 2024 10:55 )  PTT:34.8 sec      Urinalysis Basic - ( 14 Jan 2024 10:50 )    Color: x / Appearance: x / SG: x / pH: x  Gluc: 117 mg/dL / Ketone: x  / Bili: x / Urobili: x   Blood: x / Protein: x / Nitrite: x   Leuk Esterase: x / RBC: x / WBC x   Sq Epi: x / Non Sq Epi: x / Bacteria: x          RADIOLOGY & ADDITIONAL TESTS:    < from: CT Cervical Spine No Cont (01.14.24 @ 12:58) >  ACC: [id number] EXAM:  CT CERVICAL SPINE   ORDERED BY: LINNEA HARRELL     PROCEDURE DATE:  01/14/2024    INTERPRETATION:  CLINICAL INDICATION:  shunt  TECHNIQUE:Transaxial images were obtained from the skull base to T2. Multiplanar reconstructed images were obtained.  COMPARISON: There are no prior studies available for comparison.    FINDINGS:    There is no acute fracture or subluxation. Vertebral body heights are maintained. Alignment at the craniocervical junction is unremarkable.   Atlanto-dental distance is not widened. There is no prevertebral soft tissue swelling. 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 Partially visualized surgical fixation hardware noted in the thoracic spine.    The cervical lordosis is unremarkable. There are multilevel degenerative changes characterized by disc osteophyte complexes and facet and uncinate hypertrophy. This results in mild canal stenosis and multilevel neural foraminal narrowing.  Partially visualized enteric tube.  The lung apices are unremarkable.   shunt catheter noted in the left neck.  Partially visualized right-sided central venous catheter.    IMPRESSION:  No acute fracture or traumatic malalignment.   shunt catheter noted in the left neck.    --- End of Report ---  SUE CARBALLO MD; Attending Radiologist  This document has been electronically signed. Jan 14 2024  3:29PM  < end of copied text >      < from: CT Head No Cont (01.14.24 @ 12:58) >  ACC: [id number] EXAM:  CT BRAIN   ORDERED BY: LINNEA HARRELL     PROCEDURE DATE:  01/14/2024    INTERPRETATION:  CT HEAD WITHOUT CONTRAST  TECHNIQUE: Multiple axial images of the head were obtained from the skull base to the vertex without intravenous contrast.  Multiplanar reformats were created according to the standard protocol.    INDICATION: VPS/min size assessment.  COMPARISON: None available at the time of interpretation.  _____________________  FINDINGS:    Findings compatible with a Chiari II malformation.  There are 2 abandoned left parieto-occipital shunt catheters. There is a left parietal approach ventricular shunt catheter with the tip terminating in the posterior body/atria of the right lateral ventricle.   Slitlike ventricular caliber.  No findings suspicious for an acute large vascular territory infarct. No significant midline shift. No acute intracranial hemorrhage. Linear calcific 2.0 x 1.0 x 1.0 cm mass in the medial left parietal lobe.  Postsurgical changes left parietal skull/scalp. No acute fracture.  Paranasal Sinuses, Mastoid Air Cells, and Orbits: Mucosal thickening of some of the ethmoid air cells and of the maxillary sinuses. Visualized mastoid air cells clear. Orbits are unremarkable.  ______________________  IMPRESSION:  1.  No acute intracranial abnormality. No comparison examinations were available at the time of interpretation. There is slitlike ventricular caliber of indeterminate clinical significance. Clinical correlation is advised. An addendum to this report can be made to assess for stability of ventricular caliber once a comparison examination is made available.  2.  The 2.0 x 1.0 x 1.0 cm calcific mass in the left parietal lobe has a broad differential. Comparison with prior examinations would be useful to assess for stability. If these are not available, further evaluation with MR brain without and with contrast would be recommended.  This report was sent via Teams to Linnea Harrell  at1/14/2024 3:34 PM.    --- End of Report ---  JONATAN WILSON MD; Attending Radiologist  This document has been electronically signed. Jan 14 2024  3:37PM  < end of copied text >        I spent a total time of 55 mins with the patient at bedside of which more than 50% of time was spent on counseling/coordination of care

## 2024-01-14 NOTE — CHART NOTE - NSCHARTNOTEFT_GEN_A_CORE
Patient has had about >1.5 L drainage from NGT.  Has received a total of 5 L IVF resuscitation thus far.  (Lactate was 2.0 on admission).  Now on Norepinephrine and Vasopressin w/HR in 90s and SBP in low 100s w/MAPs in 80s.  Patient appears comfortable and is using his cell phone.  Reports that he is feeling better.  On exam, abdomen is much softer but still distended, minimally tender diffusely but no guarding, no rebound, no tenderness to percussion.  Of note, patient notes that he has decreased sensation in his abdominal wall below his waistline.  He reports that he can feel me examining his abdomen.  Bedside POCUS now w/improvement in filling of ventricles but still w/collapsing IVC.  Will give an additional liter of fluid.  Bladder now noted to have modest amount urine in it - whereas earlier bladder was reported to be almost empty.  Dr. Harris ([facility] [place] [facility]) reviewed images of CT head and notes that the ventricles are collapsed suggesting that the  shunt is functioning.  Now weaning norepinephrine and dosing has halved in last hour w/ongoing MAPs >80.  Urology is at bedside to place cline.  Will recheck labs after fluid bolus complete.  Will send UA/urine culture as well.  Could this patient who straight cath's himself (and has been difficult to catheterize today) have a UTI which is, in part, driving this septic picture?  Discussed above with Dr. Huddleston. Patient has had about >1.5 L drainage from NGT.  Has received a total of 5 L IVF resuscitation thus far.  (Lactate was 2.0 on admission).  Now on Norepinephrine and Vasopressin w/HR in 90s and SBP in low 100s w/MAPs in 80s.  Patient appears comfortable and is using his cell phone.  Reports that he is feeling better.  On exam, abdomen is much softer but still distended, minimally tender diffusely but no guarding, no rebound, no tenderness to percussion.  Of note, patient notes that he has decreased sensation in his abdominal wall below his waistline.  He reports that he can feel me examining his abdomen.  Bedside POCUS now w/improvement in filling of ventricles but still w/collapsing IVC.  Will give an additional liter of fluid.  Bladder now noted to have modest amount urine in it - whereas earlier bladder was reported to be almost empty.  Dr. Harris ([facility]) reviewed images of CT head and notes that the ventricles are collapsed suggesting that the  shunt is functioning.  Now weaning norepinephrine and dosing has halved in last hour w/ongoing MAPs >80.  Urology is at bedside to place cline.  Will recheck labs after fluid bolus complete.  Will send UA/urine culture as well.  Could this patient who straight cath's himself (and has been difficult to catheterize today) have a UTI which is, in part, driving this septic picture?  Discussed above with Dr. Huddleston. Patient has had about >1.5 L drainage from NGT.  Has received a total of 5 L IVF resuscitation thus far.  (Lactate was 2.0 on admission).  Now on Norepinephrine and Vasopressin w/HR in 90s and SBP in low 100s w/MAPs in 80s.  Patient appears comfortable and is using his cell phone.  Reports that he is feeling markedly better.  On exam, abdomen is much softer but still distended, minimally tender diffusely but no guarding, no rebound, no tenderness to percussion.  Of note, patient notes that he has decreased sensation in his abdominal wall below his waistline.  He reports that he can feel me examining his abdomen.  Bedside POCUS now w/improvement in filling of ventricles but still w/collapsing IVC.  Will give an additional liter of fluid.  Bladder now noted to have modest amount urine in it - whereas earlier bladder was reported to be almost empty.  Dr. Harris ([facility] [place]) reviewed images of CT head and notes that the ventricles are collapsed suggesting that the  shunt is functioning.  Now weaning norepinephrine and dosing has halved in last hour w/ongoing MAPs >80.  Urology is at bedside to place cline.  Will recheck labs after fluid bolus complete.  Will send UA/urine culture as well.  Discussed above with Dr. Huddleston. Patient has had about >1.5 L drainage from NGT.  Has received a total of 5 L IVF resuscitation thus far.  (Lactate was 2.0 on admission).  Now on Norepinephrine and Vasopressin w/HR in 90s and SBP in low 100s w/MAPs in 80s.  Patient appears comfortable and is using his cell phone.  Reports that he is feeling markedly better.  On exam, abdomen is much softer but still distended, minimally tender diffusely but no guarding, no rebound, no tenderness to percussion.  Of note, patient notes that he has decreased sensation in his abdominal wall below his waistline.  He reports that he can feel me examining his abdomen.  Bedside POCUS now w/improvement in filling of ventricles but still w/collapsing IVC.  Will give an additional liter of fluid.  Bladder now noted to have modest amount urine in it - whereas earlier bladder was reported to be almost empty.  Dr. Harris ([facility] [place]) reviewed images of CT head and notes that the ventricles are collapsed suggesting that the  shunt is functioning.  Now weaning norepinephrine and dosing has halved in last hour w/ongoing MAPs >80.  Urology is at bedside to place cline.  Will recheck labs after fluid bolus complete.  Will send UA/urine culture as well.  Discussed above with Dr. Huddleston.

## 2024-01-14 NOTE — PROGRESS NOTE ADULT - SUBJECTIVE AND OBJECTIVE BOX
NSGY Attg:    Case d/w Dr. Huddleston intra-operatively.    There was no pre-operative bowel perforation or free air.  No vic contamination of distal catheter with fecal material.    will defer externalization of shunt at this time  will follow  if persistent fevers, will likely consider shunt tap to assess for shunt infection

## 2024-01-14 NOTE — PROGRESS NOTE ADULT - SUBJECTIVE AND OBJECTIVE BOX
INTERVAL HPI/OVERNIGHT EVENTS:        MEDICATIONS  (STANDING):  chlorhexidine 2% Cloths 1 Application(s) Topical daily  lidocaine 2% Jelly 5 milliLiter(s) IntraUrethral once  lidocaine 2% Jelly 5 milliLiter(s) IntraUrethral once  multiple electrolytes Injection Type 1 1000 milliLiter(s) (120 mL/Hr) IV Continuous <Continuous>  norepinephrine Infusion 0.05 MICROgram(s)/kG/Min (6.56 mL/Hr) IV Continuous <Continuous>    MEDICATIONS  (PRN):  benzocaine 20% Spray 1 Spray(s) Topical every 6 hours PRN sore throat  benzocaine/menthol Lozenge 1 Lozenge Oral every 8 hours PRN Sore Throat      Drug Dosing Weight    Weight (kg): 70 (14 Jan 2024 07:48)      PAST MEDICAL & SURGICAL HISTORY:      ICU Vital Signs Last 24 Hrs  T(C): 37.5 (14 Jan 2024 10:00), Max: 38.9 (14 Jan 2024 05:30)  T(F): 99.5 (14 Jan 2024 10:00), Max: 102 (14 Jan 2024 05:30)  HR: 131 (14 Jan 2024 10:00) (81 - 135)  BP: 94/50 (14 Jan 2024 10:00) (78/46 - 107/70)  BP(mean): 61 (14 Jan 2024 10:00) (61 - 82)  ABP: --  ABP(mean): --  RR: 20 (14 Jan 2024 10:00) (16 - 23)  SpO2: 98% (14 Jan 2024 10:00) (76% - 100%)    O2 Parameters below as of 14 Jan 2024 09:30  Patient On (Oxygen Delivery Method): room air                I&O's Detail          Physical Exam:    Neurological: Awake, appears sick, oriented to person, place and situation    Neck: Neck supple, No JVD    Respiratory:  Breathing comfortably w/mild tachypnea, no accessory muscle use    Cardiovascular: Regular rate & rhythm, normal S1,S2, tachycardia    Gastrointestinal: Distended (although less so now that NGT placed) w/mild tenderness throughout w/o rebound or guarding, midline and transverse incisions well healed, unable to discern fascial defects    Extremities: No peripheral edema, legs contracted and shortened in frog leg position, diminutive feet w/incisions on medial surface    Vascular: Equal and normal pulses: 2+ peripheral pulses throughout    Skin: No rashes    LABS:  CBC Full  -  ( 14 Jan 2024 05:30 )  WBC Count : 3.84 K/uL  RBC Count : 5.29 M/uL  Hemoglobin : 15.3 g/dL  Hematocrit : 46.2 %  Platelet Count - Automated : 138 K/uL  Mean Cell Volume : 87.3 fl  Mean Cell Hemoglobin : 28.9 pg  Mean Cell Hemoglobin Concentration : 33.1 gm/dL  Auto Neutrophil # : x  Auto Lymphocyte # : x  Auto Monocyte # : x  Auto Eosinophil # : x  Auto Basophil # : x  Auto Neutrophil % : x  Auto Lymphocyte % : x  Auto Monocyte % : x  Auto Eosinophil % : x  Auto Basophil % : x    01-14    138  |  104  |  36.1<H>  ----------------------------<  101<H>  3.4<L>   |  13.0<L>  |  2.04<H>    Ca    6.7<L>      14 Jan 2024 05:30    TPro  6.6  /  Alb  3.7  /  TBili  0.3<L>  /  DBili  x   /  AST  36  /  ALT  20  /  AlkPhos  139<H>  01-14    PT/INR - ( 14 Jan 2024 05:30 )   PT: 13.3 sec;   INR: 1.20 ratio         PTT - ( 14 Jan 2024 05:30 )  PTT:35.0 sec  Urinalysis Basic - ( 14 Jan 2024 05:30 )    Color: x / Appearance: x / SG: x / pH: x  Gluc: 101 mg/dL / Ketone: x  / Bili: x / Urobili: x   Blood: x / Protein: x / Nitrite: x   Leuk Esterase: x / RBC: x / WBC x   Sq Epi: x / Non Sq Epi: x / Bacteria: x           INTERVAL HPI/OVERNIGHT EVENTS:  Asked by Dr. Huddleston to evaluate this 28 year old wheelchair bound gentleman w/hx of spina bifida and scoliosis who presents with one day history of abdominal pain, nausea and vomiting.  Last full BM was one week ago but patient reports having a very small BM this am.  Patient vomited on first attempt at NGT placement.  Patient hypotensive to 60s when first presented to hospital w/tachycardia to 130s.  Found on CT scan to have significantly distended small bowel with high grade small bowel obstruction.  Patient has a  shunt which was revised several years ago.    MEDICATIONS  (STANDING):  chlorhexidine 2% Cloths 1 Application(s) Topical daily  lidocaine 2% Jelly 5 milliLiter(s) IntraUrethral once  lidocaine 2% Jelly 5 milliLiter(s) IntraUrethral once  multiple electrolytes Injection Type 1 1000 milliLiter(s) (120 mL/Hr) IV Continuous <Continuous>  norepinephrine Infusion 0.05 MICROgram(s)/kG/Min (6.56 mL/Hr) IV Continuous <Continuous>    MEDICATIONS  (PRN):  benzocaine 20% Spray 1 Spray(s) Topical every 6 hours PRN sore throat  benzocaine/menthol Lozenge 1 Lozenge Oral every 8 hours PRN Sore Throat      Drug Dosing Weight    Weight (kg): 70 (14 Jan 2024 07:48)      PAST MEDICAL & SURGICAL HISTORY:      ICU Vital Signs Last 24 Hrs  T(C): 37.5 (14 Jan 2024 10:00), Max: 38.9 (14 Jan 2024 05:30)  T(F): 99.5 (14 Jan 2024 10:00), Max: 102 (14 Jan 2024 05:30)  HR: 131 (14 Jan 2024 10:00) (81 - 135)  BP: 94/50 (14 Jan 2024 10:00) (78/46 - 107/70)  BP(mean): 61 (14 Jan 2024 10:00) (61 - 82)  ABP: --  ABP(mean): --  RR: 20 (14 Jan 2024 10:00) (16 - 23)  SpO2: 98% (14 Jan 2024 10:00) (76% - 100%)    O2 Parameters below as of 14 Jan 2024 09:30  Patient On (Oxygen Delivery Method): room air      I&O's Detail    Physical Exam:    Neurological: Awake, appears sick, oriented to person, place and situation    Neck: Neck supple, No JVD    Respiratory:  Breathing comfortably w/mild tachypnea, no accessory muscle use    Cardiovascular: Regular rate & rhythm, normal S1,S2, tachycardia    Gastrointestinal: Distended (although less so now that NGT placed) w/mild tenderness throughout w/o rebound or guarding, midline and transverse incisions well healed, unable to discern fascial defects at incisions    Extremities: No peripheral edema, legs contracted and shortened in frog leg position, diminutive feet w/incisions on medial surface    Vascular: Equal and normal pulses: 2+ peripheral pulses throughout    Skin: No rashes    LABS:  CBC Full  -  ( 14 Jan 2024 05:30 )  WBC Count : 3.84 K/uL  RBC Count : 5.29 M/uL  Hemoglobin : 15.3 g/dL  Hematocrit : 46.2 %  Platelet Count - Automated : 138 K/uL  Mean Cell Volume : 87.3 fl  Mean Cell Hemoglobin : 28.9 pg  Mean Cell Hemoglobin Concentration : 33.1 gm/dL  Auto Neutrophil # : x  Auto Lymphocyte # : x  Auto Monocyte # : x  Auto Eosinophil # : x  Auto Basophil # : x  Auto Neutrophil % : x  Auto Lymphocyte % : x  Auto Monocyte % : x  Auto Eosinophil % : x  Auto Basophil % : x    01-14    138  |  104  |  36.1<H>  ----------------------------<  101<H>  3.4<L>   |  13.0<L>  |  2.04<H>    Ca    6.7<L>      14 Jan 2024 05:30    TPro  6.6  /  Alb  3.7  /  TBili  0.3<L>  /  DBili  x   /  AST  36  /  ALT  20  /  AlkPhos  139<H>  01-14    PT/INR - ( 14 Jan 2024 05:30 )   PT: 13.3 sec;   INR: 1.20 ratio         PTT - ( 14 Jan 2024 05:30 )  PTT:35.0 sec  Urinalysis Basic - ( 14 Jan 2024 05:30 )    Color: x / Appearance: x / SG: x / pH: x  Gluc: 101 mg/dL / Ketone: x  / Bili: x / Urobili: x   Blood: x / Protein: x / Nitrite: x   Leuk Esterase: x / RBC: x / WBC x   Sq Epi: x / Non Sq Epi: x / Bacteria: x           INTERVAL HPI/OVERNIGHT EVENTS:  Asked by Dr. Huddleston to evaluate this 28 year old wheelchair bound gentleman w/hx of spina bifida, b/l lower extremity paraplegia and scoliosis who presents with one day history of abdominal pain, nausea and vomiting.  Last full BM was one week ago but patient reports having a very small BM this am.  Patient vomited on first attempt at NGT placement.  Patient hypotensive to 60s when first presented to hospital w/tachycardia to 130s.  Found on CT scan to have significantly distended small bowel with high grade small bowel obstruction.  Patient has a  shunt which was revised several years ago.    MEDICATIONS  (STANDING):  chlorhexidine 2% Cloths 1 Application(s) Topical daily  lidocaine 2% Jelly 5 milliLiter(s) IntraUrethral once  lidocaine 2% Jelly 5 milliLiter(s) IntraUrethral once  multiple electrolytes Injection Type 1 1000 milliLiter(s) (120 mL/Hr) IV Continuous <Continuous>  norepinephrine Infusion 0.05 MICROgram(s)/kG/Min (6.56 mL/Hr) IV Continuous <Continuous>    MEDICATIONS  (PRN):  benzocaine 20% Spray 1 Spray(s) Topical every 6 hours PRN sore throat  benzocaine/menthol Lozenge 1 Lozenge Oral every 8 hours PRN Sore Throat      Drug Dosing Weight    Weight (kg): 70 (14 Jan 2024 07:48)      PAST MEDICAL & SURGICAL HISTORY:      ICU Vital Signs Last 24 Hrs  T(C): 37.5 (14 Jan 2024 10:00), Max: 38.9 (14 Jan 2024 05:30)  T(F): 99.5 (14 Jan 2024 10:00), Max: 102 (14 Jan 2024 05:30)  HR: 131 (14 Jan 2024 10:00) (81 - 135)  BP: 94/50 (14 Jan 2024 10:00) (78/46 - 107/70)  BP(mean): 61 (14 Jan 2024 10:00) (61 - 82)  ABP: --  ABP(mean): --  RR: 20 (14 Jan 2024 10:00) (16 - 23)  SpO2: 98% (14 Jan 2024 10:00) (76% - 100%)    O2 Parameters below as of 14 Jan 2024 09:30  Patient On (Oxygen Delivery Method): room air      I&O's Detail    Physical Exam:    Neurological: Awake, appears sick, oriented to person, place and situation, conversant and appropriate    Neck: Neck supple, No JVD    Respiratory:  Breathing comfortably w/mild tachypnea, no accessory muscle use    Cardiovascular: Regular rate & rhythm, normal S1,S2, tachycardia    Gastrointestinal: Distended (although less so now that NGT placed) w/mild tenderness throughout w/o rebound or guarding, midline and transverse incisions well healed, unable to discern fascial defects at incisions    Extremities: No peripheral edema, legs contracted and shortened in frog leg position, b/l lower extremity plegia, diminutive feet w/incisions on medial surface    Skin: No rashes, feet cool w/poor cap refill    LABS:  CBC Full  -  ( 14 Jan 2024 05:30 )  WBC Count : 3.84 K/uL  RBC Count : 5.29 M/uL  Hemoglobin : 15.3 g/dL  Hematocrit : 46.2 %  Platelet Count - Automated : 138 K/uL  Mean Cell Volume : 87.3 fl  Mean Cell Hemoglobin : 28.9 pg  Mean Cell Hemoglobin Concentration : 33.1 gm/dL  Auto Neutrophil # : x  Auto Lymphocyte # : x  Auto Monocyte # : x  Auto Eosinophil # : x  Auto Basophil # : x  Auto Neutrophil % : x  Auto Lymphocyte % : x  Auto Monocyte % : x  Auto Eosinophil % : x  Auto Basophil % : x    01-14    138  |  104  |  36.1<H>  ----------------------------<  101<H>  3.4<L>   |  13.0<L>  |  2.04<H>    Ca    6.7<L>      14 Jan 2024 05:30    TPro  6.6  /  Alb  3.7  /  TBili  0.3<L>  /  DBili  x   /  AST  36  /  ALT  20  /  AlkPhos  139<H>  01-14    PT/INR - ( 14 Jan 2024 05:30 )   PT: 13.3 sec;   INR: 1.20 ratio         PTT - ( 14 Jan 2024 05:30 )  PTT:35.0 sec  Urinalysis Basic - ( 14 Jan 2024 05:30 )    Color: x / Appearance: x / SG: x / pH: x  Gluc: 101 mg/dL / Ketone: x  / Bili: x / Urobili: x   Blood: x / Protein: x / Nitrite: x   Leuk Esterase: x / RBC: x / WBC x   Sq Epi: x / Non Sq Epi: x / Bacteria: x

## 2024-01-14 NOTE — CONSULT NOTE ADULT - ASSESSMENT
27yo M w/ hx of spina bifida VPS @ [facility] by Dr Dorsey and scoliosis/ no sensation distal to pubic line, wheelchair bound at baseline w no movement of his legs, self catheterizes bladder, presenting with abdominal pain, n/v. Pt states that the pain began yesterday morning and has been persistent. He had a bowel movement this morning but prior to that his last bowel movement was one week prior. Denies passing gas. Denies fevers at home. Has been vomiting and nauseous. At home he was lethargic and EMS was called. At that time he was hypotensive and tachycardic and he was BIBEMS to [facility] ED for further workup. On arrival he was tachycardic to the 130s and hypotensive to 80s/50s. Labs notable for K 3.4, bicarb 13, Cr 2.04. Actively vomiting in ED. CT scan showed dilated bowel and stomach consistent with severe SBO with transition point in mid abdomen. Surgery consulted for management.     Dr Harris/ NSx consulted for evaluation of VPS and possible need of externalization if needs sx/ open abdomen for SBO management    pt seen w Dr. Harris and images recommended/ ordered for further assessment and recommendations:  shunt series  CTB   CT C-Spine   no immediate NSx intervention indicated at this time  neuro-checks q1 HR and ICU care per primary team   HOB > 30 degrees  hold all AC/AP/ ASA or chemical DVT PPx  defer further care to primary team   case and plan d/w SICU team/ Dr Prince 29yo M w/ hx of spina bifida VPS @ [facility] by Dr Dorsey and scoliosis/ no sensation distal to pubic line, wheelchair bound at baseline w no movement of his legs, self catheterizes bladder, presenting with abdominal pain, n/v. Pt states that the pain began yesterday morning and has been persistent. He had a bowel movement this morning but prior to that his last bowel movement was one week prior. Denies passing gas. Denies fevers at home. Has been vomiting and nauseous. At home he was lethargic and EMS was called. At that time he was hypotensive and tachycardic and he was BIBEMS to [facility] ED for further workup. On arrival he was tachycardic to the 130s and hypotensive to 80s/50s. Labs notable for K 3.4, bicarb 13, Cr 2.04. Actively vomiting in ED. CT scan showed dilated bowel and stomach consistent with severe SBO with transition point in mid abdomen. Surgery consulted for management.     Dr Harris/ NSx consulted for evaluation of VPS and possible need of externalization if needs sx/ open abdomen for SBO management    pt seen w Dr. Harris and images recommended/ ordered for further assessment and recommendations:  shunt series  CTB   CT C-Spine   no immediate NSx intervention indicated at this time  neuro-checks q1 HR and ICU care per primary team   HOB > 30 degrees  hold all AC/AP/ ASA or chemical DVT PPx  defer further care to primary team   case and plan d/w SICU team/ Dr Prince

## 2024-01-14 NOTE — CONSULT NOTE ADULT - NS ATTEND AMEND GEN_ALL_CORE FT
NSGY Attg:    see above    patient seen and examined    agree with above  A and O x 3  UE 5/5 bilaterally  stable chronic bilateral LE plegia  shunt catheters x 2 palpable at level of the clavicle    plan of care determined for  shun  imaging reviewed  no hydrocephalus or subdural hematomas/hygromas  intact  shunt catheter runs medially at level of clavicle  plan per ACS/gen surg -- patient consented for externalization of  shunt if needed based on clinical course for SBO/intra-abdominal findings with risks, benefits, and alternatives as below:    benefit: hopeful prevention of shunt infection  alternative: no surgical intervention  risks: bleeding, infection, anticipated need for conversion to ventriculopleural shunt in future

## 2024-01-14 NOTE — ED ADULT NURSE NOTE - NSFALLRISKINTERV_ED_ALL_ED
Assistance OOB with selected safe patient handling equipment if applicable/Assistance with ambulation/Communicate fall risk and risk factors to all staff, patient, and family/Monitor gait and stability/Provide patient with walking aids/Provide visual cue: yellow wristband, yellow gown, etc/Reinforce activity limits and safety measures with patient and family/Call bell, personal items and telephone in reach/Instruct patient to call for assistance before getting out of bed/chair/stretcher/Non-slip footwear applied when patient is off stretcher/[place] to call system/Physically safe environment - no spills, clutter or unnecessary equipment/Purposeful Proactive Rounding/Room/bathroom lighting operational, light cord in reach Assistance OOB with selected safe patient handling equipment if applicable/Assistance with ambulation/Communicate fall risk and risk factors to all staff, patient, and family/Monitor gait and stability/Provide patient with walking aids/Provide visual cue: yellow wristband, yellow gown, etc/Reinforce activity limits and safety measures with patient and family/Call bell, personal items and telephone in reach/Instruct patient to call for assistance before getting out of bed/chair/stretcher/Non-slip footwear applied when patient is off stretcher/[place] to call system/Physically safe environment - no spills, clutter or unnecessary equipment/Purposeful Proactive Rounding/Room/bathroom lighting operational, light cord in reach

## 2024-01-14 NOTE — ED PROVIDER NOTE - PROGRESS NOTE DETAILS
[place]: Surgery at bedside - patient to be admitted to SICU. [place]: Patient seen and examined at bedside during sign out. Dx of SBO shared with patient, still hypotensive after 2L NS. Patient with considerable pain, no nausea or vomiting at this time. Also resident and RN unable to get cline - patient self caths with 10 F at home, smallest we have is 14 F. Surgery called, Urology called - another L of LR ordered, peripheral levo started.

## 2024-01-14 NOTE — ED PROCEDURE NOTE - CPROC ED NUMBER OF LUMENS1
Daily Note     Today's date: 2019  Patient name: Jose Antonio Brennan  :   MRN: [id number]  Referring provider: Belen Duron DO  Dx:   Encounter Diagnosis     ICD-10-CM    1  Acute right-sided low back pain without sciatica M54 5                   Subjective: Pt reports some R sided LBP this visit  Objective: See treatment diary below    Assessment: Pt compliant with HEP assigned in POC and exercise diary- manual strengthing this visit pt reports decreased stiffness and LBP post treatment  Tolerated treatment well  Patient would benefit from continued PT to improve LBP  Plan: Continue per plan of care  Manual  8/15 8/21           LC 10 minutes 10 min           Hamstring stretch Supine 90/90  x5 hold 20 sec  [id number]           Piriformis stretch nt 3x30"           Prone quad stretch x5 hold 20 sec  5x20"           Hip flexor stretch nt 3x30"               Exercise Diary                           DKC nt HEP           Seated trunk flexion nt HEP           PT nt HEP           PT with hip flexion nt HEP           PT with abd crunch   nt HEP triple lumen No.

## 2024-01-14 NOTE — H&P ADULT - NSHPPHYSICALEXAM_GEN_ALL_CORE
GENERAL: NAD  HEAD:  Atraumatic, normocephalic  NECK: Supple, no JVD  HEART: Sinus tachycardia, hypotensive  LUNGS: Unlabored respirations. No conversational dyspnea.   ABDOMEN: Soft, mildly tender to palpation, nondistended   EXTREMITIES: No clubbing, cyanosis, or edema  NERVOUS SYSTEM:  A&Ox3  SKIN: No rashes or lesions

## 2024-01-14 NOTE — PROGRESS NOTE ADULT - ASSESSMENT
28 year old gentleman w/hx of spina bifida, b/l lower extremity paraplegia and scoliosis who presents with high grade bowel obstruction w/shock.  Admit to critical care setting for aggressive resuscitation.    (NGT placed in ER w/drainage of 1L feculent material w/mild improvement in discomfort)    -Arterial line to be placed.  Norepinephrine dosing increasing, will add vasopressin as well.  Bedside POCUS reported to show collapsed IVC w/kissing ventricles and some degree of cardioplegia.  Aggressive hyrdation to continue.  3rd and 4th liters of fluid going in.  Follow abg.  Lactate 2.0 but patient w/significant metabolic acidosis and serum bicarb of 13.  -Adequate sats currently on RA w/grossly clear cxr.  Of note lung fields are very small secondary to body habitus and distended abdomen.  -H/H adequate, scds, chemical prophylaxis  -NPO, NGT -continues to drain - now less dark material  -Patient straight cath's himself several times per day.  Team was unable to place cline in ER.  Urology to see patient to assist with cline placement.  Patient w/latex allergy precluding use of a conventional coudet catheter.  Bladder scan on arrival to SICU suggests minimal urine in bladder.  GALEN w/Cr to 2 -unclear baseline (patient receives care at [facility])  -Febrile to 102.  Found to be COVID +.  Blood cultures sent.  Would send off UA and C&S once cline can be placed as well - but UA may likely be colonized as patient chronically straight cath's.  Would empirically cover w/zosyn  - shunt - Dr. Huddleston has consulted NSGY   -K adequate, would replete Ca (as it does not correct when accounting for albumin)    Plan to resuscitate patient and then re-evaluate for possible operation for high grade small bowel obstruction later today    (Note completed late secondary to patient and SICU acuity). 28 year old gentleman w/hx of spina bifida, b/l lower extremity paraplegia and scoliosis who presents with high grade bowel obstruction w/shock.  Admit to critical care setting for aggressive resuscitation.    (NGT placed in ER w/drainage of 1L feculent material w/mild improvement in discomfort)    -Arterial line to be placed.  Norepinephrine dosing increasing, will add vasopressin as well.  Bedside POCUS reported to show collapsed IVC w/kissing ventricles and some degree of cardioplegia.  Aggressive hyrdation to continue.  3rd and 4th liters of fluid going in.  Follow abg.  Lactate 2.0 but patient w/significant metabolic acidosis and serum bicarb of 13.  -Adequate sats currently on RA w/grossly clear cxr.  Of note lung fields are very small secondary to body habitus and distended abdomen.  -H/H adequate, scds, chemical prophylaxis  -NPO, NGT -continues to drain - now less dark material  -Patient straight cath's himself several times per day.  Team was unable to place cline in ER.  Urology to see patient to assist with cline placement.  Patient w/latex allergy precluding use of a conventional coudet catheter.  Bladder scan on arrival to SICU suggests minimal urine in bladder.  GALEN w/Cr to 2 -unclear baseline (patient receives care at [place])  -Febrile to 102.  Found to be COVID +.  Blood cultures sent.  Would send off UA and C&S once cline can be placed as well - but UA may likely be colonized as patient chronically straight cath's.  Would empirically cover w/zosyn  - shunt - Dr. Huddleston has consulted NSGY   -K adequate, would replete Ca (as it does not correct when accounting for albumin)    Plan to resuscitate patient and then re-evaluate for possible operation for high grade small bowel obstruction later today    (Note completed late secondary to patient and SICU acuity). 28 year old gentleman w/hx of spina bifida, b/l lower extremity paraplegia and scoliosis who presents with high grade bowel obstruction w/shock.  Admit to critical care setting for aggressive resuscitation.    (NGT placed in ER w/drainage of 1L feculent material w/mild improvement in discomfort)    -Arterial line to be placed.  Norepinephrine dosing increasing, will add vasopressin as well.  Bedside POCUS reported to show collapsed IVC w/kissing ventricles and some degree of cardioplegia.  Aggressive hyrdation to continue.  3rd and 4th liters of fluid going in.  Follow abg.  Lactate 2.0 but patient w/significant metabolic acidosis and serum bicarb of 13.  -Adequate sats currently on RA w/grossly clear cxr.  Of note lung fields are very small secondary to body habitus and distended abdomen.  -H/H adequate, scds, chemical prophylaxis  -NPO, NGT -continues to drain - now less dark material  -Patient straight cath's himself several times per day.  Team was unable to place cline in ER.  Urology to see patient to assist with cline placement.  Patient w/latex allergy precluding use of a conventional coudet catheter.  Bladder scan on arrival to SICU suggests minimal urine in bladder.  GALEN w/Cr to 2 -unclear baseline (patient receives care at [place])  -Febrile to 102.  Found to be COVID +.  Blood cultures sent.  Would send off UA and C&S once cline can be placed as well - but UA may likely be colonized as patient chronically straight cath's.  Would empirically cover w/zosyn  - shunt - Dr. Huddleston has consulted NSGY   -K adequate, would replete Ca (as it does not correct when accounting for albumin)  -R subclavian TLC placed in ER, appears to be in adequate position - OK to use - on CXR w/o pneumothorax    Plan to resuscitate patient and then re-evaluate for possible operation for high grade small bowel obstruction later today    (Note completed late secondary to patient and SICU acuity). 28 year old gentleman w/hx of spina bifida, b/l lower extremity paraplegia and scoliosis who presents with high grade bowel obstruction w/shock.  Admit to critical care setting for aggressive resuscitation.    (NGT placed in ER w/drainage of 1L feculent material w/mild improvement in discomfort)    -Arterial line to be placed.  Norepinephrine dosing increasing, will add vasopressin as well.  Bedside POCUS reported to show collapsed IVC w/kissing ventricles and some degree of cardioplegia.  Aggressive hyrdation to continue.  3rd and 4th liters of fluid going in.  Follow abg.  Lactate 2.0 but patient w/significant metabolic acidosis and serum bicarb of 13.  -Adequate sats currently on RA w/grossly clear cxr.  Of note lung fields are very small secondary to body habitus and distended abdomen.  -H/H adequate, scds, chemical prophylaxis  -NPO, NGT -continues to drain - now less dark material  -Patient straight cath's himself several times per day.  Team was unable to place cline in ER.  Urology to see patient to assist with cline placement.  Patient w/latex allergy precluding use of a conventional coudet catheter.  Bladder scan on arrival to SICU suggests minimal urine in bladder.  GALEN w/Cr to 2 -unclear baseline (patient receives care at [place])  -Febrile to 102.  Found to be COVID +.  Blood cultures sent.  Would send off UA and C&S once cline can be placed as well - but UA may likely be colonized as patient chronically straight cath's.  Would empirically cover w/zosyn  - shunt - Dr. Huddleston has consulted NSGY   -K adequate, would replete Ca (as it does not correct when accounting for albumin)  -R subclavian TLC placed in ER, appears to be in adequate position - OK to use - on CXR w/o pneumothorax    Plan to resuscitate patient and then re-evaluate for possible operation for high grade small bowel obstruction later today    (Note completed late secondary to patient and SICU acuity).

## 2024-01-14 NOTE — H&P ADULT - ASSESSMENT
ASSESSMENT: 27yo M w/ hx of spina bifida presenting with SBO. Pt acidotic and hemodynamically unstable, will require operative intervention.     PLAN:  - admit to SICU  - add on for ex lap   - vasopressors started in ED for hemodynamic support  - IVF resuscitation   - NGT to suction  - strict i/o  - serial abdominal exams    Pt seen and plan discussed with attendings, Dr. Huddleston and Dr. Prince   ASSESSMENT: 29yo M w/ hx of spina bifida presenting with SBO. Pt acidotic and hemodynamically unstable, will require operative intervention.     PLAN:  - admit to SICU  - add on for ex lap   - vasopressors started in ED for hemodynamic support  - IVF resuscitation   - NGT to suction  - strict i/o  - serial abdominal exams    Pt seen and plan discussed with attendings, Dr. Huddleston and Dr. Prince

## 2024-01-14 NOTE — ED ADULT TRIAGE NOTE - CHIEF COMPLAINT QUOTE
Pt. BIBA for abdominal pain x2 hours. Pt. abdomen distended and hard on arrival. Pt. BP in field 60/40s. Unable to obtain  BP on both arms  in triage. Pt. has hx of spina bifida. Pt. is A&Ox4 in triage. +2 radial pulses. IVF started by EMS. hx of  shunt. Pt. BIBA for abdominal pain x2 hours. Pt. abdomen distended and hard on arrival. Pt. BP in field 60/40s. Unable to obtain  BP on both arms  in triage. Pt. has hx of spina bifida. Pt. is A&Ox4 in triage. +2 radial pulses. IVF started by EMS. hx of  shunt. Pt. brought to CC. MD Cruz and MD Jacobs at bedside.

## 2024-01-15 LAB
ANION GAP SERPL CALC-SCNC: 17 MMOL/L — SIGNIFICANT CHANGE UP (ref 5–17)
ANION GAP SERPL CALC-SCNC: 17 MMOL/L — SIGNIFICANT CHANGE UP (ref 5–17)
ANION GAP SERPL CALC-SCNC: 18 MMOL/L — HIGH (ref 5–17)
ANION GAP SERPL CALC-SCNC: 18 MMOL/L — HIGH (ref 5–17)
ANISOCYTOSIS BLD QL: SLIGHT — SIGNIFICANT CHANGE UP
BASOPHILS # BLD AUTO: 0 K/UL — SIGNIFICANT CHANGE UP (ref 0–0.2)
BASOPHILS # BLD AUTO: 0.02 K/UL — SIGNIFICANT CHANGE UP (ref 0–0.2)
BASOPHILS # BLD AUTO: 0.02 K/UL — SIGNIFICANT CHANGE UP (ref 0–0.2)
BASOPHILS NFR BLD AUTO: 0 % — SIGNIFICANT CHANGE UP (ref 0–2)
BASOPHILS NFR BLD AUTO: 0.4 % — SIGNIFICANT CHANGE UP (ref 0–2)
BASOPHILS NFR BLD AUTO: 0.4 % — SIGNIFICANT CHANGE UP (ref 0–2)
BLASTS # FLD: 0.4 % — HIGH (ref 0–0)
BLASTS # FLD: 0.4 % — HIGH (ref 0–0)
BUN SERPL-MCNC: 17.5 MG/DL — SIGNIFICANT CHANGE UP (ref 8–20)
BUN SERPL-MCNC: 17.5 MG/DL — SIGNIFICANT CHANGE UP (ref 8–20)
BUN SERPL-MCNC: 23.4 MG/DL — HIGH (ref 8–20)
BUN SERPL-MCNC: 23.4 MG/DL — HIGH (ref 8–20)
BURR CELLS BLD QL SMEAR: PRESENT — SIGNIFICANT CHANGE UP
CALCIUM SERPL-MCNC: 7.2 MG/DL — LOW (ref 8.4–10.5)
CALCIUM SERPL-MCNC: 7.2 MG/DL — LOW (ref 8.4–10.5)
CALCIUM SERPL-MCNC: 7.3 MG/DL — LOW (ref 8.4–10.5)
CALCIUM SERPL-MCNC: 7.3 MG/DL — LOW (ref 8.4–10.5)
CHLORIDE SERPL-SCNC: 107 MMOL/L — SIGNIFICANT CHANGE UP (ref 96–108)
CHLORIDE SERPL-SCNC: 107 MMOL/L — SIGNIFICANT CHANGE UP (ref 96–108)
CHLORIDE SERPL-SCNC: 108 MMOL/L — SIGNIFICANT CHANGE UP (ref 96–108)
CHLORIDE SERPL-SCNC: 108 MMOL/L — SIGNIFICANT CHANGE UP (ref 96–108)
CO2 SERPL-SCNC: 16 MMOL/L — LOW (ref 22–29)
CO2 SERPL-SCNC: 16 MMOL/L — LOW (ref 22–29)
CO2 SERPL-SCNC: 18 MMOL/L — LOW (ref 22–29)
CO2 SERPL-SCNC: 18 MMOL/L — LOW (ref 22–29)
CREAT SERPL-MCNC: 1.21 MG/DL — SIGNIFICANT CHANGE UP (ref 0.5–1.3)
CREAT SERPL-MCNC: 1.21 MG/DL — SIGNIFICANT CHANGE UP (ref 0.5–1.3)
CREAT SERPL-MCNC: 1.31 MG/DL — HIGH (ref 0.5–1.3)
CREAT SERPL-MCNC: 1.31 MG/DL — HIGH (ref 0.5–1.3)
DACRYOCYTES BLD QL SMEAR: SLIGHT — SIGNIFICANT CHANGE UP
DACRYOCYTES BLD QL SMEAR: SLIGHT — SIGNIFICANT CHANGE UP
EGFR: 75 ML/MIN/1.73M2 — SIGNIFICANT CHANGE UP
EGFR: 75 ML/MIN/1.73M2 — SIGNIFICANT CHANGE UP
EGFR: 83 ML/MIN/1.73M2 — SIGNIFICANT CHANGE UP
EGFR: 83 ML/MIN/1.73M2 — SIGNIFICANT CHANGE UP
ELLIPTOCYTES BLD QL SMEAR: SLIGHT — SIGNIFICANT CHANGE UP
EOSINOPHIL # BLD AUTO: 0 K/UL — SIGNIFICANT CHANGE UP (ref 0–0.5)
EOSINOPHIL # BLD AUTO: 0.08 K/UL — SIGNIFICANT CHANGE UP (ref 0–0.5)
EOSINOPHIL # BLD AUTO: 0.08 K/UL — SIGNIFICANT CHANGE UP (ref 0–0.5)
EOSINOPHIL NFR BLD AUTO: 0 % — SIGNIFICANT CHANGE UP (ref 0–6)
EOSINOPHIL NFR BLD AUTO: 2.2 % — SIGNIFICANT CHANGE UP (ref 0–6)
EOSINOPHIL NFR BLD AUTO: 2.2 % — SIGNIFICANT CHANGE UP (ref 0–6)
GAS PNL BLDA: SIGNIFICANT CHANGE UP
GIANT PLATELETS BLD QL SMEAR: PRESENT — SIGNIFICANT CHANGE UP
GLUCOSE SERPL-MCNC: 86 MG/DL — SIGNIFICANT CHANGE UP (ref 70–99)
GLUCOSE SERPL-MCNC: 86 MG/DL — SIGNIFICANT CHANGE UP (ref 70–99)
GLUCOSE SERPL-MCNC: 89 MG/DL — SIGNIFICANT CHANGE UP (ref 70–99)
GLUCOSE SERPL-MCNC: 89 MG/DL — SIGNIFICANT CHANGE UP (ref 70–99)
HCT VFR BLD CALC: 36.6 % — LOW (ref 39–50)
HCT VFR BLD CALC: 36.6 % — LOW (ref 39–50)
HCT VFR BLD CALC: 44 % — SIGNIFICANT CHANGE UP (ref 39–50)
HCT VFR BLD CALC: 44 % — SIGNIFICANT CHANGE UP (ref 39–50)
HGB BLD-MCNC: 12.8 G/DL — LOW (ref 13–17)
HGB BLD-MCNC: 12.8 G/DL — LOW (ref 13–17)
HGB BLD-MCNC: 14.6 G/DL — SIGNIFICANT CHANGE UP (ref 13–17)
HGB BLD-MCNC: 14.6 G/DL — SIGNIFICANT CHANGE UP (ref 13–17)
HYPOCHROMIA BLD QL: SLIGHT — SIGNIFICANT CHANGE UP
HYPOCHROMIA BLD QL: SLIGHT — SIGNIFICANT CHANGE UP
LYMPHOCYTES # BLD AUTO: 0.21 K/UL — LOW (ref 1–3.3)
LYMPHOCYTES # BLD AUTO: 0.21 K/UL — LOW (ref 1–3.3)
LYMPHOCYTES # BLD AUTO: 0.24 K/UL — LOW (ref 1–3.3)
LYMPHOCYTES # BLD AUTO: 0.24 K/UL — LOW (ref 1–3.3)
LYMPHOCYTES # BLD AUTO: 0.29 K/UL — LOW (ref 1–3.3)
LYMPHOCYTES # BLD AUTO: 0.29 K/UL — LOW (ref 1–3.3)
LYMPHOCYTES # BLD AUTO: 0.31 K/UL — LOW (ref 1–3.3)
LYMPHOCYTES # BLD AUTO: 0.31 K/UL — LOW (ref 1–3.3)
LYMPHOCYTES # BLD AUTO: 2.6 % — LOW (ref 13–44)
LYMPHOCYTES # BLD AUTO: 2.6 % — LOW (ref 13–44)
LYMPHOCYTES # BLD AUTO: 3.5 % — LOW (ref 13–44)
LYMPHOCYTES # BLD AUTO: 3.5 % — LOW (ref 13–44)
LYMPHOCYTES # BLD AUTO: 5.4 % — LOW (ref 13–44)
LYMPHOCYTES # BLD AUTO: 5.4 % — LOW (ref 13–44)
LYMPHOCYTES # BLD AUTO: 7.5 % — LOW (ref 13–44)
LYMPHOCYTES # BLD AUTO: 7.5 % — LOW (ref 13–44)
MACROCYTES BLD QL: SLIGHT — SIGNIFICANT CHANGE UP
MAGNESIUM SERPL-MCNC: 2.2 MG/DL — SIGNIFICANT CHANGE UP (ref 1.6–2.6)
MAGNESIUM SERPL-MCNC: 2.2 MG/DL — SIGNIFICANT CHANGE UP (ref 1.6–2.6)
MAGNESIUM SERPL-MCNC: 2.2 MG/DL — SIGNIFICANT CHANGE UP (ref 1.8–2.6)
MAGNESIUM SERPL-MCNC: 2.2 MG/DL — SIGNIFICANT CHANGE UP (ref 1.8–2.6)
MANUAL SMEAR VERIFICATION: SIGNIFICANT CHANGE UP
MCHC RBC-ENTMCNC: 27.4 PG — SIGNIFICANT CHANGE UP (ref 27–34)
MCHC RBC-ENTMCNC: 27.4 PG — SIGNIFICANT CHANGE UP (ref 27–34)
MCHC RBC-ENTMCNC: 28.8 PG — SIGNIFICANT CHANGE UP (ref 27–34)
MCHC RBC-ENTMCNC: 28.8 PG — SIGNIFICANT CHANGE UP (ref 27–34)
MCHC RBC-ENTMCNC: 33.2 GM/DL — SIGNIFICANT CHANGE UP (ref 32–36)
MCHC RBC-ENTMCNC: 33.2 GM/DL — SIGNIFICANT CHANGE UP (ref 32–36)
MCHC RBC-ENTMCNC: 35 GM/DL — SIGNIFICANT CHANGE UP (ref 32–36)
MCHC RBC-ENTMCNC: 35 GM/DL — SIGNIFICANT CHANGE UP (ref 32–36)
MCV RBC AUTO: 82.4 FL — SIGNIFICANT CHANGE UP (ref 80–100)
MCV RBC AUTO: 82.4 FL — SIGNIFICANT CHANGE UP (ref 80–100)
MCV RBC AUTO: 82.7 FL — SIGNIFICANT CHANGE UP (ref 80–100)
MCV RBC AUTO: 82.7 FL — SIGNIFICANT CHANGE UP (ref 80–100)
METAMYELOCYTES # FLD: 0.9 % — HIGH (ref 0–0)
METAMYELOCYTES # FLD: 0.9 % — HIGH (ref 0–0)
METAMYELOCYTES # FLD: 1.8 % — HIGH (ref 0–0)
METAMYELOCYTES # FLD: 1.8 % — HIGH (ref 0–0)
METAMYELOCYTES # FLD: 4.4 % — HIGH (ref 0–0)
METAMYELOCYTES # FLD: 4.4 % — HIGH (ref 0–0)
METAMYELOCYTES # FLD: 5.4 % — HIGH (ref 0–0)
METAMYELOCYTES # FLD: 5.4 % — HIGH (ref 0–0)
MICROCYTES BLD QL: SLIGHT — SIGNIFICANT CHANGE UP
MICROCYTES BLD QL: SLIGHT — SIGNIFICANT CHANGE UP
MONOCYTES # BLD AUTO: 0 K/UL — SIGNIFICANT CHANGE UP (ref 0–0.9)
MONOCYTES # BLD AUTO: 0 K/UL — SIGNIFICANT CHANGE UP (ref 0–0.9)
MONOCYTES # BLD AUTO: 0.07 K/UL — SIGNIFICANT CHANGE UP (ref 0–0.9)
MONOCYTES # BLD AUTO: 0.07 K/UL — SIGNIFICANT CHANGE UP (ref 0–0.9)
MONOCYTES # BLD AUTO: 0.08 K/UL — SIGNIFICANT CHANGE UP (ref 0–0.9)
MONOCYTES # BLD AUTO: 0.08 K/UL — SIGNIFICANT CHANGE UP (ref 0–0.9)
MONOCYTES # BLD AUTO: 0.24 K/UL — SIGNIFICANT CHANGE UP (ref 0–0.9)
MONOCYTES # BLD AUTO: 0.24 K/UL — SIGNIFICANT CHANGE UP (ref 0–0.9)
MONOCYTES NFR BLD AUTO: 0 % — LOW (ref 2–14)
MONOCYTES NFR BLD AUTO: 0 % — LOW (ref 2–14)
MONOCYTES NFR BLD AUTO: 0.9 % — LOW (ref 2–14)
MONOCYTES NFR BLD AUTO: 0.9 % — LOW (ref 2–14)
MONOCYTES NFR BLD AUTO: 2.2 % — SIGNIFICANT CHANGE UP (ref 2–14)
MONOCYTES NFR BLD AUTO: 2.2 % — SIGNIFICANT CHANGE UP (ref 2–14)
MONOCYTES NFR BLD AUTO: 5.4 % — SIGNIFICANT CHANGE UP (ref 2–14)
MONOCYTES NFR BLD AUTO: 5.4 % — SIGNIFICANT CHANGE UP (ref 2–14)
MYELOCYTES NFR BLD: 0.9 % — HIGH (ref 0–0)
MYELOCYTES NFR BLD: 6.3 % — HIGH (ref 0–0)
MYELOCYTES NFR BLD: 6.3 % — HIGH (ref 0–0)
NEUTROPHILS # BLD AUTO: 3.11 K/UL — SIGNIFICANT CHANGE UP (ref 1.8–7.4)
NEUTROPHILS # BLD AUTO: 3.11 K/UL — SIGNIFICANT CHANGE UP (ref 1.8–7.4)
NEUTROPHILS # BLD AUTO: 3.3 K/UL — SIGNIFICANT CHANGE UP (ref 1.8–7.4)
NEUTROPHILS # BLD AUTO: 3.3 K/UL — SIGNIFICANT CHANGE UP (ref 1.8–7.4)
NEUTROPHILS # BLD AUTO: 7.27 K/UL — SIGNIFICANT CHANGE UP (ref 1.8–7.4)
NEUTROPHILS # BLD AUTO: 7.27 K/UL — SIGNIFICANT CHANGE UP (ref 1.8–7.4)
NEUTROPHILS # BLD AUTO: 8.42 K/UL — HIGH (ref 1.8–7.4)
NEUTROPHILS # BLD AUTO: 8.42 K/UL — HIGH (ref 1.8–7.4)
NEUTROPHILS NFR BLD AUTO: 73.9 % — SIGNIFICANT CHANGE UP (ref 43–77)
NEUTROPHILS NFR BLD AUTO: 73.9 % — SIGNIFICANT CHANGE UP (ref 43–77)
NEUTROPHILS NFR BLD AUTO: 80.2 % — HIGH (ref 43–77)
NEUTROPHILS NFR BLD AUTO: 80.2 % — HIGH (ref 43–77)
NEUTROPHILS NFR BLD AUTO: 82.6 % — HIGH (ref 43–77)
NEUTROPHILS NFR BLD AUTO: 82.6 % — HIGH (ref 43–77)
NEUTROPHILS NFR BLD AUTO: 86.8 % — HIGH (ref 43–77)
NEUTROPHILS NFR BLD AUTO: 86.8 % — HIGH (ref 43–77)
NEUTS BAND # BLD: 0.9 % — SIGNIFICANT CHANGE UP (ref 0–8)
NEUTS BAND # BLD: 1.8 % — SIGNIFICANT CHANGE UP (ref 0–8)
NEUTS BAND # BLD: 1.8 % — SIGNIFICANT CHANGE UP (ref 0–8)
NEUTS BAND # BLD: 11.3 % — HIGH (ref 0–8)
NEUTS BAND # BLD: 11.3 % — HIGH (ref 0–8)
NRBC # BLD: 0 /100 WBCS — SIGNIFICANT CHANGE UP (ref 0–0)
NRBC # BLD: 0 /100 WBCS — SIGNIFICANT CHANGE UP (ref 0–0)
OVALOCYTES BLD QL SMEAR: SLIGHT — SIGNIFICANT CHANGE UP
PHOSPHATE SERPL-MCNC: 2.6 MG/DL — SIGNIFICANT CHANGE UP (ref 2.4–4.7)
PHOSPHATE SERPL-MCNC: 2.6 MG/DL — SIGNIFICANT CHANGE UP (ref 2.4–4.7)
PHOSPHATE SERPL-MCNC: 2.9 MG/DL — SIGNIFICANT CHANGE UP (ref 2.4–4.7)
PHOSPHATE SERPL-MCNC: 2.9 MG/DL — SIGNIFICANT CHANGE UP (ref 2.4–4.7)
PLAT MORPH BLD: NORMAL — SIGNIFICANT CHANGE UP
PLATELET # BLD AUTO: 67 K/UL — LOW (ref 150–400)
PLATELET # BLD AUTO: 67 K/UL — LOW (ref 150–400)
PLATELET # BLD AUTO: 95 K/UL — LOW (ref 150–400)
PLATELET # BLD AUTO: 95 K/UL — LOW (ref 150–400)
POIKILOCYTOSIS BLD QL AUTO: SIGNIFICANT CHANGE UP
POLYCHROMASIA BLD QL SMEAR: SLIGHT — SIGNIFICANT CHANGE UP
POTASSIUM SERPL-MCNC: 3.6 MMOL/L — SIGNIFICANT CHANGE UP (ref 3.5–5.3)
POTASSIUM SERPL-MCNC: 3.6 MMOL/L — SIGNIFICANT CHANGE UP (ref 3.5–5.3)
POTASSIUM SERPL-MCNC: 4.3 MMOL/L — SIGNIFICANT CHANGE UP (ref 3.5–5.3)
POTASSIUM SERPL-MCNC: 4.3 MMOL/L — SIGNIFICANT CHANGE UP (ref 3.5–5.3)
POTASSIUM SERPL-SCNC: 3.6 MMOL/L — SIGNIFICANT CHANGE UP (ref 3.5–5.3)
POTASSIUM SERPL-SCNC: 3.6 MMOL/L — SIGNIFICANT CHANGE UP (ref 3.5–5.3)
POTASSIUM SERPL-SCNC: 4.3 MMOL/L — SIGNIFICANT CHANGE UP (ref 3.5–5.3)
POTASSIUM SERPL-SCNC: 4.3 MMOL/L — SIGNIFICANT CHANGE UP (ref 3.5–5.3)
PROCALCITONIN SERPL-MCNC: >100 NG/ML — HIGH (ref 0.02–0.1)
RBC # BLD: 4.44 M/UL — SIGNIFICANT CHANGE UP (ref 4.2–5.8)
RBC # BLD: 4.44 M/UL — SIGNIFICANT CHANGE UP (ref 4.2–5.8)
RBC # BLD: 5.32 M/UL — SIGNIFICANT CHANGE UP (ref 4.2–5.8)
RBC # BLD: 5.32 M/UL — SIGNIFICANT CHANGE UP (ref 4.2–5.8)
RBC # FLD: 15.6 % — HIGH (ref 10.3–14.5)
RBC # FLD: 15.6 % — HIGH (ref 10.3–14.5)
RBC # FLD: 15.7 % — HIGH (ref 10.3–14.5)
RBC # FLD: 15.7 % — HIGH (ref 10.3–14.5)
RBC BLD AUTO: ABNORMAL
SMUDGE CELLS # BLD: PRESENT — SIGNIFICANT CHANGE UP
SMUDGE CELLS # BLD: PRESENT — SIGNIFICANT CHANGE UP
SODIUM SERPL-SCNC: 142 MMOL/L — SIGNIFICANT CHANGE UP (ref 135–145)
SPHEROCYTES BLD QL SMEAR: SLIGHT — SIGNIFICANT CHANGE UP
SPHEROCYTES BLD QL SMEAR: SLIGHT — SIGNIFICANT CHANGE UP
VARIANT LYMPHS # BLD: 1.7 % — SIGNIFICANT CHANGE UP (ref 0–6)
VARIANT LYMPHS # BLD: 1.7 % — SIGNIFICANT CHANGE UP (ref 0–6)
VARIANT LYMPHS # BLD: 2.6 % — SIGNIFICANT CHANGE UP (ref 0–6)
VARIANT LYMPHS # BLD: 2.6 % — SIGNIFICANT CHANGE UP (ref 0–6)
VARIANT LYMPHS # BLD: 2.7 % — SIGNIFICANT CHANGE UP (ref 0–6)
VARIANT LYMPHS # BLD: 2.7 % — SIGNIFICANT CHANGE UP (ref 0–6)
VARIANT LYMPHS # BLD: 3.5 % — SIGNIFICANT CHANGE UP (ref 0–6)
VARIANT LYMPHS # BLD: 3.5 % — SIGNIFICANT CHANGE UP (ref 0–6)
WBC # BLD: 8.2 K/UL — SIGNIFICANT CHANGE UP (ref 3.8–10.5)
WBC # BLD: 8.2 K/UL — SIGNIFICANT CHANGE UP (ref 3.8–10.5)
WBC # BLD: 8.97 K/UL — SIGNIFICANT CHANGE UP (ref 3.8–10.5)
WBC # BLD: 8.97 K/UL — SIGNIFICANT CHANGE UP (ref 3.8–10.5)
WBC # FLD AUTO: 8.2 K/UL — SIGNIFICANT CHANGE UP (ref 3.8–10.5)
WBC # FLD AUTO: 8.2 K/UL — SIGNIFICANT CHANGE UP (ref 3.8–10.5)
WBC # FLD AUTO: 8.97 K/UL — SIGNIFICANT CHANGE UP (ref 3.8–10.5)
WBC # FLD AUTO: 8.97 K/UL — SIGNIFICANT CHANGE UP (ref 3.8–10.5)

## 2024-01-15 PROCEDURE — 71045 X-RAY EXAM CHEST 1 VIEW: CPT | Mod: 26

## 2024-01-15 PROCEDURE — 99232 SBSQ HOSP IP/OBS MODERATE 35: CPT

## 2024-01-15 RX ORDER — ALBUMIN HUMAN 25 %
500 VIAL (ML) INTRAVENOUS ONCE
Refills: 0 | Status: COMPLETED | OUTPATIENT
Start: 2024-01-15 | End: 2024-01-15

## 2024-01-15 RX ORDER — FENTANYL CITRATE 50 UG/ML
50 INJECTION INTRAVENOUS ONCE
Refills: 0 | Status: DISCONTINUED | OUTPATIENT
Start: 2024-01-15 | End: 2024-01-15

## 2024-01-15 RX ORDER — SODIUM CHLORIDE 9 MG/ML
1000 INJECTION, SOLUTION INTRAVENOUS
Refills: 0 | Status: DISCONTINUED | OUTPATIENT
Start: 2024-01-15 | End: 2024-01-16

## 2024-01-15 RX ORDER — HEPARIN SODIUM 5000 [USP'U]/ML
5000 INJECTION INTRAVENOUS; SUBCUTANEOUS EVERY 8 HOURS
Refills: 0 | Status: DISCONTINUED | OUTPATIENT
Start: 2024-01-15 | End: 2024-01-16

## 2024-01-15 RX ORDER — CALCIUM GLUCONATE 100 MG/ML
2 VIAL (ML) INTRAVENOUS ONCE
Refills: 0 | Status: COMPLETED | OUTPATIENT
Start: 2024-01-15 | End: 2024-01-16

## 2024-01-15 RX ORDER — SODIUM CHLORIDE 9 MG/ML
1000 INJECTION, SOLUTION INTRAVENOUS
Refills: 0 | Status: DISCONTINUED | OUTPATIENT
Start: 2024-01-15 | End: 2024-01-20

## 2024-01-15 RX ORDER — ALBUMIN HUMAN 25 %
250 VIAL (ML) INTRAVENOUS ONCE
Refills: 0 | Status: COMPLETED | OUTPATIENT
Start: 2024-01-15 | End: 2024-01-15

## 2024-01-15 RX ORDER — POTASSIUM PHOSPHATE, MONOBASIC POTASSIUM PHOSPHATE, DIBASIC 236; 224 MG/ML; MG/ML
15 INJECTION, SOLUTION INTRAVENOUS ONCE
Refills: 0 | Status: COMPLETED | OUTPATIENT
Start: 2024-01-15 | End: 2024-01-15

## 2024-01-15 RX ADMIN — CHLORHEXIDINE GLUCONATE 1 APPLICATION(S): 213 SOLUTION TOPICAL at 12:01

## 2024-01-15 RX ADMIN — SODIUM CHLORIDE 120 MILLILITER(S): 9 INJECTION, SOLUTION INTRAVENOUS at 08:37

## 2024-01-15 RX ADMIN — HEPARIN SODIUM 5000 UNIT(S): 5000 INJECTION INTRAVENOUS; SUBCUTANEOUS at 13:22

## 2024-01-15 RX ADMIN — FENTANYL CITRATE 3.5 MICROGRAM(S)/KG/HR: 50 INJECTION INTRAVENOUS at 02:41

## 2024-01-15 RX ADMIN — FENTANYL CITRATE 50 MICROGRAM(S): 50 INJECTION INTRAVENOUS at 03:00

## 2024-01-15 RX ADMIN — Medication 6.56 MICROGRAM(S)/KG/MIN: at 02:41

## 2024-01-15 RX ADMIN — Medication 250 MILLILITER(S): at 09:45

## 2024-01-15 RX ADMIN — Medication 6.56 MICROGRAM(S)/KG/MIN: at 00:39

## 2024-01-15 RX ADMIN — HEPARIN SODIUM 5000 UNIT(S): 5000 INJECTION INTRAVENOUS; SUBCUTANEOUS at 06:35

## 2024-01-15 RX ADMIN — PIPERACILLIN AND TAZOBACTAM 25 GRAM(S): 4; .5 INJECTION, POWDER, LYOPHILIZED, FOR SOLUTION INTRAVENOUS at 13:21

## 2024-01-15 RX ADMIN — SODIUM CHLORIDE 1000 MILLILITER(S): 9 INJECTION, SOLUTION INTRAVENOUS at 20:03

## 2024-01-15 RX ADMIN — CHLORHEXIDINE GLUCONATE 15 MILLILITER(S): 213 SOLUTION TOPICAL at 06:35

## 2024-01-15 RX ADMIN — PIPERACILLIN AND TAZOBACTAM 25 GRAM(S): 4; .5 INJECTION, POWDER, LYOPHILIZED, FOR SOLUTION INTRAVENOUS at 22:21

## 2024-01-15 RX ADMIN — PIPERACILLIN AND TAZOBACTAM 25 GRAM(S): 4; .5 INJECTION, POWDER, LYOPHILIZED, FOR SOLUTION INTRAVENOUS at 00:37

## 2024-01-15 RX ADMIN — PIPERACILLIN AND TAZOBACTAM 25 GRAM(S): 4; .5 INJECTION, POWDER, LYOPHILIZED, FOR SOLUTION INTRAVENOUS at 06:35

## 2024-01-15 RX ADMIN — SODIUM CHLORIDE 2000 MILLILITER(S): 9 INJECTION, SOLUTION INTRAVENOUS at 00:38

## 2024-01-15 RX ADMIN — POTASSIUM PHOSPHATE, MONOBASIC POTASSIUM PHOSPHATE, DIBASIC 62.5 MILLIMOLE(S): 236; 224 INJECTION, SOLUTION INTRAVENOUS at 20:03

## 2024-01-15 RX ADMIN — SODIUM CHLORIDE 100 MILLILITER(S): 9 INJECTION, SOLUTION INTRAVENOUS at 21:12

## 2024-01-15 RX ADMIN — Medication 200 GRAM(S): at 00:38

## 2024-01-15 RX ADMIN — FENTANYL CITRATE 50 MICROGRAM(S): 50 INJECTION INTRAVENOUS at 02:41

## 2024-01-15 RX ADMIN — Medication 125 MILLILITER(S): at 23:38

## 2024-01-15 RX ADMIN — HEPARIN SODIUM 5000 UNIT(S): 5000 INJECTION INTRAVENOUS; SUBCUTANEOUS at 22:21

## 2024-01-15 RX ADMIN — Medication 250 MILLILITER(S): at 02:39

## 2024-01-15 RX ADMIN — SODIUM CHLORIDE 120 MILLILITER(S): 9 INJECTION, SOLUTION INTRAVENOUS at 00:38

## 2024-01-15 NOTE — PROGRESS NOTE ADULT - SUBJECTIVE AND OBJECTIVE BOX
SICU Daily Progress Note  =====================================================  HPI:  27yo M w/ hx of spina bfida and scoliosis, wheelchair bound at baseline, presenting with abdominal pain, n/v. Pt states that the pain began yesterday morning and has been persistent. He had a bowel movement this morning but prior to that his last bowel movement was one week prior. Denies passing gas. Denies fevers at home. Has been vomiting and nauseous. At home he was lethargic and EMS was called. At that time he was hypotensive and tachycardic and he was BIBEMS to [facility] ED for further workup. On arrival he was tachycardic to the 130s and hypotensive to 80s/50s. Labs notable for K 3.4, bicarb 13, Cr 2.04. Actively vomiting in ED. CT scan showed dilated bowel and stomach consistent with severe SBO with transition point in mid abdomen. Surgery consulted for management.    (14 Jan 2024 11:27)      Interval/Overnight Events:       Patient was brought to the OR 1/14 for ex lap and small bowel resection, c/b bladder perf with primary repair. Neurosurgery involved d/t h/o peritoneal VPS. Received 6L crystalloid resuscitation pre-op, 4L intra-op, and 1L crystalloid and 1L Albumin 5% post-op in the SICU. Patient was on levo and vaso post-op, pressor requirements now downtrending, vaso off and Levo @.05. Acidosis is improving and lactate cleared. Patient tolerating PSV 5/6/40% since this morning.         PMH:      Spina bifida (wheelchair bound)     shunt      Allergies: No Known Allergies      MEDICATIONS:   --------------------------------------------------------------------------------------  Neurologic Medications  fentaNYL   Infusion 0.5 MICROgram(s)/kG/Hr IV Continuous <Continuous>    Respiratory Medications    Cardiovascular Medications  norepinephrine Infusion 0.05 MICROgram(s)/kG/Min IV Continuous <Continuous>    Gastrointestinal Medications  multiple electrolytes Injection Type 1 1000 milliLiter(s) IV Continuous <Continuous>    Genitourinary Medications    Hematologic/Oncologic Medications  heparin   Injectable 5000 Unit(s) SubCutaneous every 8 hours    Antimicrobial/Immunologic Medications  piperacillin/tazobactam IVPB.. 3.375 Gram(s) IV Intermittent every 8 hours    Endocrine/Metabolic Medications  vasopressin Infusion 0.04 Unit(s)/Min IV Continuous <Continuous>    Topical/Other Medications  benzocaine 20% Spray 1 Spray(s) Topical every 6 hours PRN sore throat  benzocaine/menthol Lozenge 1 Lozenge Oral every 8 hours PRN Sore Throat  chlorhexidine 0.12% Liquid 15 milliLiter(s) Oral Mucosa every 12 hours  chlorhexidine 2% Cloths 1 Application(s) Topical daily  lidocaine 2% Jelly 5 milliLiter(s) IntraUrethral once  lidocaine 2% Jelly 5 milliLiter(s) IntraUrethral once    METABOLIC/FLUIDS/ELECTROLYTES  multiple electrolytes Injection Type 1 1000 milliLiter(s) IV Continuous <Continuous>      HEMATOLOGIC  [x] VTE Prophylaxis: heparin   Injectable 5000 Unit(s) SubCutaneous every 8 hours    Transfusions:	[] PRBC	[] Platelets		[] FFP	[] Cryoprecipitate    INFECTIOUS DISEASE  Antimicrobials/Immunologic Medications:  piperacillin/tazobactam IVPB.. 3.375 Gram(s) IV Intermittent every 8 hours      --------------------------------------------------------------------------------------    VITAL SIGNS, INS/OUTS (last 24 hours):  --------------------------------------------------------------------------------------  T(C): 36.9 (01-15-24 @ 11:25), Max: 37.7 (01-14-24 @ 15:30)  HR: 113 (01-15-24 @ 14:00) (87 - 136)  BP: --  BP(mean): --  ABP: 103/50 (01-15-24 @ 14:00) (71/45 - 128/76)  ABP(mean): 67 (01-15-24 @ 14:00) (53 - 97)  RR: 18 (01-15-24 @ 14:00) (13 - 33)  SpO2: 100% (01-15-24 @ 14:00) (84% - 100%)  Wt(kg): --  CVP(mm Hg): --  CI: 7.5 (01-15-24 @ 14:00) (7 - 7.9)  CAPILLARY BLOOD GLUCOSE       N/A      01-14 @ 07:01  -  01-15 @ 07:00  --------------------------------------------------------  IN:    FentaNYL: 28 mL    IV PiggyBack: 100 mL    IV PiggyBack: 300 mL    IV PiggyBack: 225 mL    Lactated Ringers Bolus: 1000 mL    multiple electrolytes Injection Type 1 Bolus: 4000 mL    multiple electrolytes Injection Type 1.: 1800 mL    Norepinephrine: 256.6 mL    Sodium Chloride 0.9% Bolus: 2000 mL    Vasopressin: 66 mL  Total IN: 9775.6 mL    OUT:    Bulb (mL): 325 mL    Indwelling Catheter - Urethral (mL): 1210 mL    Nasogastric/Oral tube (mL): 1600 mL  Total OUT: 3135 mL    Total NET: 6640.6 mL      01-15 @ 07:01  -  01-15 @ 14:22  --------------------------------------------------------  IN:    Albumin 5%  - 500 mL: 500 mL    FentaNYL: 24.5 mL    IV PiggyBack: 50 mL    multiple electrolytes Injection Type 1.: 840 mL    Norepinephrine: 78.7 mL  Total IN: 1493.2 mL    OUT:    Bulb (mL): 35 mL    Indwelling Catheter - Urethral (mL): 675 mL    Vasopressin: 0 mL  Total OUT: 710 mL    Total NET: 783.2 mL      --------------------------------------------------------------------------------------    EXAM  NEUROLOGY  Exam: Awake, alert, oriented x3 with choices, follows commands with b/l UE, no focal deficits.  RASS 0 to -1.     HEENT  Exam: Normocephalic, atraumatic, EOMI.     RESPIRATORY  Exam: Lungs clear to auscultation, Normal expansion/effort.    CARDIOVASCULAR  Exam: S1, S2.  sinus tachycardia.     GI/NUTRITION  Exam: Abdomen soft, appropriately tender around incision site, Non-distended. Wound:  c/d/i    VASCULAR  Exam: Extremities warm, pink, well-perfused.    MUSCULOSKELETAL  Exam: B/l upper extremities moving spontaneously without limitations. Motor Strength 5/5 B/L upper and 0/5 lower extremities, sensory is at baseline and symmetric b/l     SKIN  Exam: Good skin turgor, no skin breakdown.        LABS  --------------------------------------------------------------------------------------                                            14.6                  Neurophils% (auto):   86.8   (01-15 @ 03:00):    8.20 )-----------(95           Lymphocytes% (auto):  2.6                                           44.0                   Eosinphils% (auto):   0.0      Manual%: Neutrophils x    ; Lymphocytes x    ; Eosinophils x    ; Bands%: 1.8  ; Blasts x          01-15    142  |  108  |  23.4<H>  ----------------------------<  89  4.3   |  16.0<L>  |  1.31<H>    Ca    7.2<L>      15 Renny 2024 03:00  Phos  2.9     01-15  Mg     2.2     01-15    TPro  5.0<L>  /  Alb  2.6<L>  /  TBili  0.3<L>  /  DBili  0.1  /  AST  69<H>  /  ALT  23  /  AlkPhos  87  01-14    ( 01-14 @ 22:20 )   PT: 11.9 sec;   INR: 1.07 ratio  aPTT: 34.3 sec    ABG - ( 15 Renny 2024 03:47 )  pH: 7.450 /  pCO2: 22    /  pO2: 193   / HCO3: 15    / Base Excess: -8.7  /  SaO2: 100.0 / Lactate: x        VBG - ( 14 Jan 2024 22:26 )  pH: 7.260 /  pCO2: 39    /  pO2: <42   / HCO3: 18    / Base Excess: -9.6  /  SvO2: 70.7  / Lactate: 2.50     RECENT CULTURES:  01-14 @ 05:30 .Blood Blood-Peripheral     No growth at 24 hours      01-14 @ 05:25 .Blood Blood-Peripheral     No growth at 24 hours          --------------------------------------------------------------------------------------    IMAGING STUDIES:   < from: CT Abdomen and Pelvis w/ IV Cont (01.14.24 @ 06:44) >  Severe small bowel obstruction with a transition point in the right mid   abdomen.     < end of copied text >  < from: CT Head No Cont (01.14.24 @ 12:58) >  1.  No acute intracranial abnormality. No comparison examinations were   available at the time of interpretation. There is slitlike ventricular   caliber of indeterminate clinical significance. Clinical correlation is   advised. An addendum to this report can be made to assess for stability   of ventricular caliber once a comparison examination is made available.  2.  The 2.0 x 1.0 x 1.0 cm calcific mass in the left parietal lobe has a   broad differential. Comparison with prior examinations would be useful to   assess for stability. If these are not available, further evaluation with   MR brain without and with contrast would be recommended.    < end of copied text > SICU Daily Progress Note  =====================================================  HPI:  27yo M w/ hx of spina bfida and scoliosis, wheelchair bound at baseline, presenting with abdominal pain, n/v. Pt states that the pain began yesterday morning and has been persistent. He had a bowel movement this morning but prior to that his last bowel movement was one week prior. Denies passing gas. Denies fevers at home. Has been vomiting and nauseous. At home he was lethargic and EMS was called. At that time he was hypotensive and tachycardic and he was BIBEMS to [facility] ED for further workup. On arrival he was tachycardic to the 130s and hypotensive to 80s/50s. Labs notable for K 3.4, bicarb 13, Cr 2.04. Actively vomiting in ED. CT scan showed dilated bowel and stomach consistent with severe SBO with transition point in mid abdomen. Surgery consulted for management.    (14 Jan 2024 11:27)      Interval/Overnight Events:       Patient was brought to the OR 1/14 for ex lap and small bowel resection, c/b bladder perf with primary repair. Neurosurgery involved d/t h/o peritoneal VPS. Received 6L crystalloid resuscitation pre-op, 4L intra-op, and 1L crystalloid and 1L Albumin 5% post-op in the SICU. Patient was on levo and vaso post-op, pressor requirements now downtrending, vaso off and Levo @.05. Acidosis is improving and lactate cleared. Patient tolerating PSV 5/6/40% since this morning.         PMH:      Spina bifida (wheelchair bound)     shunt      Allergies: No Known Allergies      MEDICATIONS:   --------------------------------------------------------------------------------------  Neurologic Medications  fentaNYL   Infusion 0.5 MICROgram(s)/kG/Hr IV Continuous <Continuous>    Respiratory Medications    Cardiovascular Medications  norepinephrine Infusion 0.05 MICROgram(s)/kG/Min IV Continuous <Continuous>    Gastrointestinal Medications  multiple electrolytes Injection Type 1 1000 milliLiter(s) IV Continuous <Continuous>    Genitourinary Medications    Hematologic/Oncologic Medications  heparin   Injectable 5000 Unit(s) SubCutaneous every 8 hours    Antimicrobial/Immunologic Medications  piperacillin/tazobactam IVPB.. 3.375 Gram(s) IV Intermittent every 8 hours    Endocrine/Metabolic Medications  vasopressin Infusion 0.04 Unit(s)/Min IV Continuous <Continuous>    Topical/Other Medications  benzocaine 20% Spray 1 Spray(s) Topical every 6 hours PRN sore throat  benzocaine/menthol Lozenge 1 Lozenge Oral every 8 hours PRN Sore Throat  chlorhexidine 0.12% Liquid 15 milliLiter(s) Oral Mucosa every 12 hours  chlorhexidine 2% Cloths 1 Application(s) Topical daily  lidocaine 2% Jelly 5 milliLiter(s) IntraUrethral once  lidocaine 2% Jelly 5 milliLiter(s) IntraUrethral once    METABOLIC/FLUIDS/ELECTROLYTES  multiple electrolytes Injection Type 1 1000 milliLiter(s) IV Continuous <Continuous>      HEMATOLOGIC  [x] VTE Prophylaxis: heparin   Injectable 5000 Unit(s) SubCutaneous every 8 hours    Transfusions:	[] PRBC	[] Platelets		[] FFP	[] Cryoprecipitate    INFECTIOUS DISEASE  Antimicrobials/Immunologic Medications:  piperacillin/tazobactam IVPB.. 3.375 Gram(s) IV Intermittent every 8 hours      --------------------------------------------------------------------------------------    VITAL SIGNS, INS/OUTS (last 24 hours):  --------------------------------------------------------------------------------------  T(C): 36.9 (01-15-24 @ 11:25), Max: 37.7 (01-14-24 @ 15:30)  HR: 113 (01-15-24 @ 14:00) (87 - 136)  BP: --  BP(mean): --  ABP: 103/50 (01-15-24 @ 14:00) (71/45 - 128/76)  ABP(mean): 67 (01-15-24 @ 14:00) (53 - 97)  RR: 18 (01-15-24 @ 14:00) (13 - 33)  SpO2: 100% (01-15-24 @ 14:00) (84% - 100%)  Wt(kg): --  CVP(mm Hg): --  CI: 7.5 (01-15-24 @ 14:00) (7 - 7.9)  CAPILLARY BLOOD GLUCOSE       N/A      01-14 @ 07:01  -  01-15 @ 07:00  --------------------------------------------------------  IN:    FentaNYL: 28 mL    IV PiggyBack: 100 mL    IV PiggyBack: 300 mL    IV PiggyBack: 225 mL    Lactated Ringers Bolus: 1000 mL    multiple electrolytes Injection Type 1 Bolus: 4000 mL    multiple electrolytes Injection Type 1.: 1800 mL    Norepinephrine: 256.6 mL    Sodium Chloride 0.9% Bolus: 2000 mL    Vasopressin: 66 mL  Total IN: 9775.6 mL    OUT:    Bulb (mL): 325 mL    Indwelling Catheter - Urethral (mL): 1210 mL    Nasogastric/Oral tube (mL): 1600 mL  Total OUT: 3135 mL    Total NET: 6640.6 mL      01-15 @ 07:01  -  01-15 @ 14:22  --------------------------------------------------------  IN:    Albumin 5%  - 500 mL: 500 mL    FentaNYL: 24.5 mL    IV PiggyBack: 50 mL    multiple electrolytes Injection Type 1.: 840 mL    Norepinephrine: 78.7 mL  Total IN: 1493.2 mL    OUT:    Bulb (mL): 35 mL    Indwelling Catheter - Urethral (mL): 675 mL    Vasopressin: 0 mL  Total OUT: 710 mL    Total NET: 783.2 mL      --------------------------------------------------------------------------------------    EXAM  NEUROLOGY  Exam: Awake, alert, oriented x3 with choices, follows commands with b/l UE, no focal deficits.  RASS 0 to -1.     HEENT  Exam: Normocephalic, atraumatic, EOMI.     RESPIRATORY  Exam: Lungs clear to auscultation, Normal expansion/effort.    CARDIOVASCULAR  Exam: S1, S2.  sinus tachycardia.     GI/NUTRITION  Exam: Abdomen soft, appropriately tender around incision site, Non-distended. Wound:  c/d/i    VASCULAR  Exam: Extremities warm, pink, well-perfused.    MUSCULOSKELETAL  Exam: B/l upper extremities moving spontaneously without limitations. Motor Strength 5/5 B/L upper and 0/5 lower extremities, sensory is at baseline and symmetric b/l     SKIN  Exam: Good skin turgor, no skin breakdown.        LABS  --------------------------------------------------------------------------------------                                            14.6                  Neurophils% (auto):   86.8   (01-15 @ 03:00):    8.20 )-----------(95           Lymphocytes% (auto):  2.6                                           44.0                   Eosinphils% (auto):   0.0      Manual%: Neutrophils x    ; Lymphocytes x    ; Eosinophils x    ; Bands%: 1.8  ; Blasts x          01-15    142  |  108  |  23.4<H>  ----------------------------<  89  4.3   |  16.0<L>  |  1.31<H>    Ca    7.2<L>      15 Renny 2024 03:00  Phos  2.9     01-15  Mg     2.2     01-15    TPro  5.0<L>  /  Alb  2.6<L>  /  TBili  0.3<L>  /  DBili  0.1  /  AST  69<H>  /  ALT  23  /  AlkPhos  87  01-14    ( 01-14 @ 22:20 )   PT: 11.9 sec;   INR: 1.07 ratio  aPTT: 34.3 sec    ABG - ( 15 Renny 2024 03:47 )  pH: 7.450 /  pCO2: 22    /  pO2: 193   / HCO3: 15    / Base Excess: -8.7  /  SaO2: 100.0 / Lactate: x        VBG - ( 14 Jan 2024 22:26 )  pH: 7.260 /  pCO2: 39    /  pO2: <42   / HCO3: 18    / Base Excess: -9.6  /  SvO2: 70.7  / Lactate: 2.50     RECENT CULTURES:  01-14 @ 05:30 .Blood Blood-Peripheral     No growth at 24 hours      01-14 @ 05:25 .Blood Blood-Peripheral     No growth at 24 hours          --------------------------------------------------------------------------------------    IMAGING STUDIES:   < from: CT Abdomen and Pelvis w/ IV Cont (01.14.24 @ 06:44) >  Severe small bowel obstruction with a transition point in the right mid   abdomen.     < end of copied text >  < from: CT Head No Cont (01.14.24 @ 12:58) >  1.  No acute intracranial abnormality. No comparison examinations were   available at the time of interpretation. There is slitlike ventricular   caliber of indeterminate clinical significance. Clinical correlation is   advised. An addendum to this report can be made to assess for stability   of ventricular caliber once a comparison examination is made available.  2.  The 2.0 x 1.0 x 1.0 cm calcific mass in the left parietal lobe has a   broad differential. Comparison with prior examinations would be useful to   assess for stability. If these are not available, further evaluation with   MR brain without and with contrast would be recommended.    < end of copied text > SICU Daily Progress Note  =====================================================  HPI:  27yo M w/ hx of spina bfida and scoliosis, wheelchair bound at baseline, presenting with abdominal pain, n/v. Pt states that the pain began yesterday morning and has been persistent. He had a bowel movement this morning but prior to that his last bowel movement was one week prior. Denies passing gas. Denies fevers at home. Has been vomiting and nauseous. At home he was lethargic and EMS was called. At that time he was hypotensive and tachycardic and he was BIBEMS to [facility] ED for further workup. On arrival he was tachycardic to the 130s and hypotensive to 80s/50s. Labs notable for K 3.4, bicarb 13, Cr 2.04. Actively vomiting in ED. CT scan showed dilated bowel and stomach consistent with severe SBO with transition point in mid abdomen. Surgery consulted for management.    (14 Jan 2024 11:27)      Interval/Overnight Events:       Patient was brought to the OR 1/14 for ex lap and small bowel resection, c/b bladder perf with primary repair. Neurosurgery involved d/t h/o peritoneal VPS. Received 6L crystalloid resuscitation pre-op, 4L intra-op, and 1L crystalloid and 1L Albumin 5% post-op in the SICU. Patient was on levo and vaso post-op, pressor requirements now downtrending, vaso off and Levo @.05. Acidosis is improving and lactate cleared. Patient tolerating PSV 5/6/40% since this morning.         PMH:      Spina bifida (wheelchair bound)     shunt      Allergies: No Known Allergies      MEDICATIONS:   --------------------------------------------------------------------------------------  Neurologic Medications  fentaNYL   Infusion 0.5 MICROgram(s)/kG/Hr IV Continuous <Continuous>    Respiratory Medications    Cardiovascular Medications  norepinephrine Infusion 0.05 MICROgram(s)/kG/Min IV Continuous <Continuous>    Gastrointestinal Medications  multiple electrolytes Injection Type 1 1000 milliLiter(s) IV Continuous <Continuous>    Genitourinary Medications    Hematologic/Oncologic Medications  heparin   Injectable 5000 Unit(s) SubCutaneous every 8 hours    Antimicrobial/Immunologic Medications  piperacillin/tazobactam IVPB.. 3.375 Gram(s) IV Intermittent every 8 hours    Endocrine/Metabolic Medications  vasopressin Infusion 0.04 Unit(s)/Min IV Continuous <Continuous>    Topical/Other Medications  benzocaine 20% Spray 1 Spray(s) Topical every 6 hours PRN sore throat  benzocaine/menthol Lozenge 1 Lozenge Oral every 8 hours PRN Sore Throat  chlorhexidine 0.12% Liquid 15 milliLiter(s) Oral Mucosa every 12 hours  chlorhexidine 2% Cloths 1 Application(s) Topical daily  lidocaine 2% Jelly 5 milliLiter(s) IntraUrethral once  lidocaine 2% Jelly 5 milliLiter(s) IntraUrethral once    METABOLIC/FLUIDS/ELECTROLYTES  multiple electrolytes Injection Type 1 1000 milliLiter(s) IV Continuous <Continuous>      HEMATOLOGIC  [x] VTE Prophylaxis: heparin   Injectable 5000 Unit(s) SubCutaneous every 8 hours    Transfusions:	[] PRBC	[] Platelets		[] FFP	[] Cryoprecipitate    INFECTIOUS DISEASE  Antimicrobials/Immunologic Medications:  piperacillin/tazobactam IVPB.. 3.375 Gram(s) IV Intermittent every 8 hours      --------------------------------------------------------------------------------------    VITAL SIGNS, INS/OUTS (last 24 hours):  --------------------------------------------------------------------------------------  T(C): 36.9 (01-15-24 @ 11:25), Max: 37.7 (01-14-24 @ 15:30)  HR: 113 (01-15-24 @ 14:00) (87 - 136)  BP: --  BP(mean): --  ABP: 103/50 (01-15-24 @ 14:00) (71/45 - 128/76)  ABP(mean): 67 (01-15-24 @ 14:00) (53 - 97)  RR: 18 (01-15-24 @ 14:00) (13 - 33)  SpO2: 100% (01-15-24 @ 14:00) (84% - 100%)  Wt(kg): --  CVP(mm Hg): --  CI: 7.5 (01-15-24 @ 14:00) (7 - 7.9)  CAPILLARY BLOOD GLUCOSE       N/A      01-14 @ 07:01  -  01-15 @ 07:00  --------------------------------------------------------  IN:    FentaNYL: 28 mL    IV PiggyBack: 100 mL    IV PiggyBack: 300 mL    IV PiggyBack: 225 mL    Lactated Ringers Bolus: 1000 mL    multiple electrolytes Injection Type 1 Bolus: 4000 mL    multiple electrolytes Injection Type 1.: 1800 mL    Norepinephrine: 256.6 mL    Sodium Chloride 0.9% Bolus: 2000 mL    Vasopressin: 66 mL  Total IN: 9775.6 mL    OUT:    Bulb (mL): 325 mL    Indwelling Catheter - Urethral (mL): 1210 mL    Nasogastric/Oral tube (mL): 1600 mL  Total OUT: 3135 mL    Total NET: 6640.6 mL      01-15 @ 07:01  -  01-15 @ 14:22  --------------------------------------------------------  IN:    Albumin 5%  - 500 mL: 500 mL    FentaNYL: 24.5 mL    IV PiggyBack: 50 mL    multiple electrolytes Injection Type 1.: 840 mL    Norepinephrine: 78.7 mL  Total IN: 1493.2 mL    OUT:    Bulb (mL): 35 mL    Indwelling Catheter - Urethral (mL): 675 mL    Vasopressin: 0 mL  Total OUT: 710 mL    Total NET: 783.2 mL      --------------------------------------------------------------------------------------    EXAM  NEUROLOGY  Exam: Awake, alert, oriented x3 with choices, follows commands with b/l UE, no focal deficits.  RASS 0 to -1.     HEENT  Exam: Normocephalic, atraumatic, EOMI.     RESPIRATORY  Exam: Lungs clear to auscultation, Normal expansion/effort.    CARDIOVASCULAR  Exam: S1, S2.  sinus tachycardia.     GI/NUTRITION  Exam: Abdomen soft, appropriately tender around incision site, Non-distended. Wound:  c/d/i    VASCULAR  Exam: Extremities warm, pink, well-perfused.    MUSCULOSKELETAL  Exam: B/l upper extremities moving spontaneously without limitations. Motor Strength 5/5 B/L upper and 0/5 lower extremities, sensory is at baseline and symmetric b/l     SKIN  Exam: Good skin turgor, no skin breakdown.        LABS  --------------------------------------------------------------------------------------                                            14.6                  Neurophils% (auto):   86.8   (01-15 @ 03:00):    8.20 )-----------(95           Lymphocytes% (auto):  2.6                                           44.0                   Eosinphils% (auto):   0.0      Manual%: Neutrophils x    ; Lymphocytes x    ; Eosinophils x    ; Bands%: 1.8  ; Blasts x          01-15    142  |  108  |  23.4<H>  ----------------------------<  89  4.3   |  16.0<L>  |  1.31<H>    Ca    7.2<L>      15 Renny 2024 03:00  Phos  2.9     01-15  Mg     2.2     01-15    TPro  5.0<L>  /  Alb  2.6<L>  /  TBili  0.3<L>  /  DBili  0.1  /  AST  69<H>  /  ALT  23  /  AlkPhos  87  01-14    ( 01-14 @ 22:20 )   PT: 11.9 sec;   INR: 1.07 ratio  aPTT: 34.3 sec    ABG - ( 15 Renny 2024 03:47 )  pH: 7.450 /  pCO2: 22    /  pO2: 193   / HCO3: 15    / Base Excess: -8.7  /  SaO2: 100.0 / Lactate: x        VBG - ( 14 Jan 2024 22:26 )  pH: 7.260 /  pCO2: 39    /  pO2: <42   / HCO3: 18    / Base Excess: -9.6  /  SvO2: 70.7  / Lactate: 2.50     RECENT CULTURES:  01-14 @ 05:30 .Blood Blood-Peripheral     No growth at 24 hours      01-14 @ 05:25 .Blood Blood-Peripheral     No growth at 24 hours          --------------------------------------------------------------------------------------    IMAGING STUDIES:   < from: CT Abdomen and Pelvis w/ IV Cont (01.14.24 @ 06:44) >  Severe small bowel obstruction with a transition point in the right mid   abdomen.     < end of copied text >  < from: CT Head No Cont (01.14.24 @ 12:58) >  1.  No acute intracranial abnormality. No comparison examinations were   available at the time of interpretation. There is slitlike ventricular   caliber of indeterminate clinical significance. Clinical correlation is   advised. An addendum to this report can be made to assess for stability   of ventricular caliber once a comparison examination is made available.  2.  The 2.0 x 1.0 x 1.0 cm calcific mass in the left parietal lobe has a   broad differential. Comparison with prior examinations would be useful to   assess for stability. If these are not available, further evaluation with   MR brain without and with contrast would be recommended.    < end of copied text >

## 2024-01-15 NOTE — PROGRESS NOTE ADULT - ASSESSMENT
ASSESSMENT:   27yo M w/ hx of spina bifida VPS @ [facility] by Dr Dorsey and scoliosis/ no sensation distal to pubic line, wheelchair bound at baseline w no movement of his legs, self catheterizes bladder, presenting with abdominal pain, n/v. Consulted for evaluation of VPS.  -S/p OR w/ general surgery on 1/14 for small bowel resection for small bowel obx.     PLAN:    -No acute neurosurgical intervention recommended at this time  -Continue q1h neuro checks until repeat CTH; if repeat CTH stable okay for q2h neuro checks   -Recommend repeat CTH today   -Monitor for fevers   -If persistent fevers, will likely consider shunt tap to assess for shunt infection  -Will continue to follow  -Awaiting shunt/valve records for review   -Discussed case w/ Dr. Harris   ASSESSMENT:   29yo M w/ hx of spina bifida VPS @ [facility] by Dr Dorsey and scoliosis/ no sensation distal to pubic line, wheelchair bound at baseline w no movement of his legs, self catheterizes bladder, presenting with abdominal pain, n/v. Consulted for evaluation of VPS.  -S/p OR w/ general surgery on 1/14 for small bowel resection for small bowel obx.     PLAN:    -No acute neurosurgical intervention recommended at this time  -Continue q1h neuro checks until repeat CTH; if repeat CTH stable okay for q2h neuro checks   -Recommend repeat CTH today   -Monitor for fevers   -If persistent fevers, will likely consider shunt tap to assess for shunt infection  -Will continue to follow  -Awaiting shunt/valve records for review   -Discussed case w/ Dr. Harris   ASSESSMENT:   29yo M w/ hx of spina bifida VPS @ [facility] by Dr Dorsey and scoliosis/ no sensation distal to pubic line, wheelchair bound at baseline w no movement of his legs, self catheterizes bladder, presenting with abdominal pain, n/v. Consulted for evaluation of VPS.  -S/p OR w/ general surgery on 1/14 for small bowel resection for small bowel obx.     PLAN:    -No acute neurosurgical intervention recommended at this time  -Continue q1h neuro checks until repeat CTH; if repeat CTH stable okay for q2h neuro checks   -Recommend repeat CTH today   -Monitor for fevers   -If persistent fevers, will likely consider shunt tap to assess for shunt infection  -Will continue to follow  -Awaiting shunt/valve records for review   -Discussed case w/ Dr. Harris   ASSESSMENT:   29yo M w/ hx of spina bifida VPS @ [facility] by Dr Dorsey and scoliosis/ no sensation distal to pubic line, wheelchair bound at baseline w no movement of his legs, self catheterizes bladder, presenting with abdominal pain, n/v. Consulted for evaluation of VPS.  -S/p OR w/ general surgery on 1/14 for small bowel resection for small bowel obx.     PLAN:    -No acute neurosurgical intervention recommended at this time  -Continue q1h neuro checks until repeat CTH; if repeat CTH stable okay for q2h neuro checks   -Recommend repeat CTH today   -Monitor for fevers   -If persistent fevers, will likely consider shunt tap to assess for shunt infection  -Will continue to follow  -Awaiting shunt/valve records for review   -Discussed case w/ Dr. Harris

## 2024-01-15 NOTE — PROGRESS NOTE ADULT - NS ATTEND AMEND GEN_ALL_CORE FT
I have seen and examined the patient during SICU multidisciplinary rounds from 9268-1751 hrs.   Events noted.    Neuro: Awake, RASS 0 to -1,   CV: HD abnormal on pressors, LA cleared  Pulm: Gas exchange adequate, vented RBSI 50to70  GI: soft, tympanic, dressing c/d/i drain serous  : urine floe low, electrolytes ojk, collapsable cava and IJ on POCUS, hyperdynamic ventricles, contractility OK  ID: per io abx  Heme: H/H stable on dvt chemoprophylaxes  Endo: glycemia at target    Plan:  SBO s/p X lap SBR colectomy and bladder repair.  gas exchange adequate, RSBI ready   appear hypovolemic, plan for colloid and after tentative extubation   pressor requirement significantly decreased after volume repletion, extubated uneventfully.  NPO  DVT chemoprophylaxes  4 days abx. I have seen and examined the patient during SICU multidisciplinary rounds from 9540-7943 hrs.   Events noted.    Neuro: Awake, RASS 0 to -1,   CV: HD abnormal on pressors, LA cleared  Pulm: Gas exchange adequate, vented RBSI 50to70  GI: soft, tympanic, dressing c/d/i drain serous  : urine floe low, electrolytes ojk, collapsable cava and IJ on POCUS, hyperdynamic ventricles, contractility OK  ID: per io abx  Heme: H/H stable on dvt chemoprophylaxes  Endo: glycemia at target    Plan:  SBO s/p X lap SBR colectomy and bladder repair.  gas exchange adequate, RSBI ready   appear hypovolemic, plan for colloid and after tentative extubation   pressor requirement significantly decreased after volume repletion, extubated uneventfully.  NPO  DVT chemoprophylaxes  4 days abx. I have seen and examined the patient during SICU multidisciplinary rounds from 8272-2742 hrs.   Events noted.    Neuro: Awake, RASS 0 to -1,   CV: HD abnormal on pressors, LA cleared  Pulm: Gas exchange adequate, vented RBSI 50to70  GI: soft, tympanic, dressing c/d/i drain serous  : urine floe low, electrolytes ojk, collapsable cava and IJ on POCUS, hyperdynamic ventricles, contractility OK  ID: per io abx  Heme: H/H stable on dvt chemoprophylaxes  Endo: glycemia at target    Plan:  SBO s/p X lap SBR colectomy and bladder repair.  gas exchange adequate, RSBI ready   appear hypovolemic, plan for colloid and after tentative extubation   pressor requirement significantly decreased after volume repletion, extubated uneventfully.  NPO  DVT chemoprophylaxes  4 days abx.

## 2024-01-15 NOTE — PROGRESS NOTE ADULT - SUBJECTIVE AND OBJECTIVE BOX
SUBJECTIVE:   29yo M w/ hx of spina bifida VPS @ [facility] by Dr Dorsey and scoliosis/ no sensation distal to pubic line, wheelchair bound at baseline w no movement of his legs, self catheterizes bladder, presenting with abdominal pain, n/v. Pt states that the pain began yesterday morning and has been persistent. He had a bowel movement this morning but prior to that his last bowel movement was one week prior. Denies passing gas. Denies fevers at home. Has been vomiting and nauseous. At home he was lethargic and EMS was called. At that time he was hypotensive and tachycardic and he was BIBEMS to [facility] ED for further workup. On arrival he was tachycardic to the 130s and hypotensive to 80s/50s. Labs notable for K 3.4, bicarb 13, Cr 2.04. Actively vomiting in ED. CT scan showed dilated bowel and stomach consistent with severe SBO with transition point in mid abdomen. Surgery consulted for management.     Dr Harris/ NSx consulted for evaluation of VPS and possible need of externalization if needs sx/ open abdomen for SBO management  -S/p OR w/ general surgery on 1/14 for small bowel resection for small bowel obx.     INTERVAL HX/OVERNIGHT EVENTS:  Patient seen and examined at bedside, still intubated but alert.      Vital Signs Last 24 Hrs  T(C): 36.9 (01-15-24 @ 11:25), Max: 37.8 (01-14-24 @ 14:00)  T(F): 98.4 (01-15-24 @ 11:25), Max: 100 (01-14-24 @ 14:00)  HR: 117 (01-15-24 @ 13:15) (87 - 136)  BP: --  BP(mean): --  RR: 16 (01-15-24 @ 13:15) (14 - 33)  SpO2: 100% (01-15-24 @ 13:15) (84% - 100%)    PHYSICAL EXAM:    GENERAL: NAD, intubated    MENTAL STATUS: Alert, opens eyes spontaneously, eyes tract, follows commands appropriately     CRANIAL NERVES: PERRL, EOMI without nystagmus. Hearing grossly intact.   MOTOR: strength 5/5 b/l UEs, 0/5 b/l LEs  SENSATION: grossly intact to light touch all extremities   SKIN: Warm, dry    LABS:                          14.6   8.20  )-----------( 95       ( 15 Renny 2024 03:00 )             44.0    01-15    142  |  108  |  23.4<H>  ----------------------------<  89  4.3   |  16.0<L>  |  1.31<H>    Ca    7.2<L>      15 Renny 2024 03:00  Phos  2.9     01-15  Mg     2.2     01-15    TPro  5.0<L>  /  Alb  2.6<L>  /  TBili  0.3<L>  /  DBili  0.1  /  AST  69<H>  /  ALT  23  /  AlkPhos  87  01-14  PT/INR - ( 14 Jan 2024 22:20 )   PT: 11.9 sec;   INR: 1.07 ratio         PTT - ( 14 Jan 2024 22:20 )  PTT:34.3 sec    01-14 @ 07:01  -  01-15 @ 07:00  --------------------------------------------------------  IN: 9775.6 mL / OUT: 3135 mL / NET: 6640.6 mL    01-15 @ 07:01  -  01-15 @ 13:47  --------------------------------------------------------  IN: 1313.1 mL / OUT: 460 mL / NET: 853.1 mL        IMAGING:     Xray Shunt Series (01.14.24 @ 13:35)   IMPRESSION:  Impression: Limited shunt survey. The shunt was not completely imaged in   the upper abdomen. Consider repeat study as clinically warranted.  Dilated loops of bowel throughout the abdomen. An NG tube is noted in the   distal stomach.      CT Head No Cont (01.14.24 @ 12:58)   IMPRESSION:  1.  No acute intracranial abnormality. No comparison examinations were   available at the time of interpretation. There is slitlike ventricular   caliber of indeterminate clinical significance. Clinical correlation is   advised. An addendum to this report can be made to assess for stability   of ventricular caliber once a comparison examination is made available.  2.  The 2.0 x 1.0 x 1.0 cm calcific mass in the left parietal lobe has a   broad differential. Comparison with prior examinations would be useful to   assess for stability. If these are not available, further evaluation with   MR brain without and with contrast would be recommended.         SUBJECTIVE:   27yo M w/ hx of spina bifida VPS @ [facility] by Dr Dorsey and scoliosis/ no sensation distal to pubic line, wheelchair bound at baseline w no movement of his legs, self catheterizes bladder, presenting with abdominal pain, n/v. Pt states that the pain began yesterday morning and has been persistent. He had a bowel movement this morning but prior to that his last bowel movement was one week prior. Denies passing gas. Denies fevers at home. Has been vomiting and nauseous. At home he was lethargic and EMS was called. At that time he was hypotensive and tachycardic and he was BIBEMS to [facility] ED for further workup. On arrival he was tachycardic to the 130s and hypotensive to 80s/50s. Labs notable for K 3.4, bicarb 13, Cr 2.04. Actively vomiting in ED. CT scan showed dilated bowel and stomach consistent with severe SBO with transition point in mid abdomen. Surgery consulted for management.     Dr Harris/ NSx consulted for evaluation of VPS and possible need of externalization if needs sx/ open abdomen for SBO management  -S/p OR w/ general surgery on 1/14 for small bowel resection for small bowel obx.     INTERVAL HX/OVERNIGHT EVENTS:  Patient seen and examined at bedside, still intubated but alert.      Vital Signs Last 24 Hrs  T(C): 36.9 (01-15-24 @ 11:25), Max: 37.8 (01-14-24 @ 14:00)  T(F): 98.4 (01-15-24 @ 11:25), Max: 100 (01-14-24 @ 14:00)  HR: 117 (01-15-24 @ 13:15) (87 - 136)  BP: --  BP(mean): --  RR: 16 (01-15-24 @ 13:15) (14 - 33)  SpO2: 100% (01-15-24 @ 13:15) (84% - 100%)    PHYSICAL EXAM:    GENERAL: NAD, intubated    MENTAL STATUS: Alert, opens eyes spontaneously, eyes tract, follows commands appropriately     CRANIAL NERVES: PERRL, EOMI without nystagmus. Hearing grossly intact.   MOTOR: strength 5/5 b/l UEs, 0/5 b/l LEs  SENSATION: grossly intact to light touch all extremities   SKIN: Warm, dry    LABS:                          14.6   8.20  )-----------( 95       ( 15 Renny 2024 03:00 )             44.0    01-15    142  |  108  |  23.4<H>  ----------------------------<  89  4.3   |  16.0<L>  |  1.31<H>    Ca    7.2<L>      15 Renny 2024 03:00  Phos  2.9     01-15  Mg     2.2     01-15    TPro  5.0<L>  /  Alb  2.6<L>  /  TBili  0.3<L>  /  DBili  0.1  /  AST  69<H>  /  ALT  23  /  AlkPhos  87  01-14  PT/INR - ( 14 Jan 2024 22:20 )   PT: 11.9 sec;   INR: 1.07 ratio         PTT - ( 14 Jan 2024 22:20 )  PTT:34.3 sec    01-14 @ 07:01  -  01-15 @ 07:00  --------------------------------------------------------  IN: 9775.6 mL / OUT: 3135 mL / NET: 6640.6 mL    01-15 @ 07:01  -  01-15 @ 13:47  --------------------------------------------------------  IN: 1313.1 mL / OUT: 460 mL / NET: 853.1 mL        IMAGING:     Xray Shunt Series (01.14.24 @ 13:35)   IMPRESSION:  Impression: Limited shunt survey. The shunt was not completely imaged in   the upper abdomen. Consider repeat study as clinically warranted.  Dilated loops of bowel throughout the abdomen. An NG tube is noted in the   distal stomach.      CT Head No Cont (01.14.24 @ 12:58)   IMPRESSION:  1.  No acute intracranial abnormality. No comparison examinations were   available at the time of interpretation. There is slitlike ventricular   caliber of indeterminate clinical significance. Clinical correlation is   advised. An addendum to this report can be made to assess for stability   of ventricular caliber once a comparison examination is made available.  2.  The 2.0 x 1.0 x 1.0 cm calcific mass in the left parietal lobe has a   broad differential. Comparison with prior examinations would be useful to   assess for stability. If these are not available, further evaluation with   MR brain without and with contrast would be recommended.         SUBJECTIVE:   29yo M w/ hx of spina bifida VPS @ [facility] by Dr Dorsey and scoliosis/ no sensation distal to pubic line, wheelchair bound at baseline w no movement of his legs, self catheterizes bladder, presenting with abdominal pain, n/v. Pt states that the pain began yesterday morning and has been persistent. He had a bowel movement this morning but prior to that his last bowel movement was one week prior. Denies passing gas. Denies fevers at home. Has been vomiting and nauseous. At home he was lethargic and EMS was called. At that time he was hypotensive and tachycardic and he was BIBEMS to [facility] ED for further workup. On arrival he was tachycardic to the 130s and hypotensive to 80s/50s. Labs notable for K 3.4, bicarb 13, Cr 2.04. Actively vomiting in ED. CT scan showed dilated bowel and stomach consistent with severe SBO with transition point in mid abdomen. Surgery consulted for management.     Dr Harris/ NSx consulted for evaluation of VPS and possible need of externalization if needs sx/ open abdomen for SBO management  -S/p OR w/ general surgery on 1/14 for small bowel resection for small bowel obx.     INTERVAL HX/OVERNIGHT EVENTS:  Patient seen and examined at bedside, still intubated but alert.      Vital Signs Last 24 Hrs  T(C): 36.9 (01-15-24 @ 11:25), Max: 37.8 (01-14-24 @ 14:00)  T(F): 98.4 (01-15-24 @ 11:25), Max: 100 (01-14-24 @ 14:00)  HR: 117 (01-15-24 @ 13:15) (87 - 136)  BP: --  BP(mean): --  RR: 16 (01-15-24 @ 13:15) (14 - 33)  SpO2: 100% (01-15-24 @ 13:15) (84% - 100%)    PHYSICAL EXAM:    GENERAL: NAD, intubated    MENTAL STATUS: Alert, opens eyes spontaneously, eyes tract, follows commands appropriately     CRANIAL NERVES: PERRL, EOMI without nystagmus. Hearing grossly intact.   MOTOR: strength 5/5 b/l UEs, 0/5 b/l LEs  SENSATION: grossly intact to light touch all extremities   SKIN: Warm, dry    LABS:                          14.6   8.20  )-----------( 95       ( 15 Renny 2024 03:00 )             44.0    01-15    142  |  108  |  23.4<H>  ----------------------------<  89  4.3   |  16.0<L>  |  1.31<H>    Ca    7.2<L>      15 Renny 2024 03:00  Phos  2.9     01-15  Mg     2.2     01-15    TPro  5.0<L>  /  Alb  2.6<L>  /  TBili  0.3<L>  /  DBili  0.1  /  AST  69<H>  /  ALT  23  /  AlkPhos  87  01-14  PT/INR - ( 14 Jan 2024 22:20 )   PT: 11.9 sec;   INR: 1.07 ratio         PTT - ( 14 Jan 2024 22:20 )  PTT:34.3 sec    01-14 @ 07:01  -  01-15 @ 07:00  --------------------------------------------------------  IN: 9775.6 mL / OUT: 3135 mL / NET: 6640.6 mL    01-15 @ 07:01  -  01-15 @ 13:47  --------------------------------------------------------  IN: 1313.1 mL / OUT: 460 mL / NET: 853.1 mL        IMAGING:     Xray Shunt Series (01.14.24 @ 13:35)   IMPRESSION:  Impression: Limited shunt survey. The shunt was not completely imaged in   the upper abdomen. Consider repeat study as clinically warranted.  Dilated loops of bowel throughout the abdomen. An NG tube is noted in the   distal stomach.      CT Head No Cont (01.14.24 @ 12:58)   IMPRESSION:  1.  No acute intracranial abnormality. No comparison examinations were   available at the time of interpretation. There is slitlike ventricular   caliber of indeterminate clinical significance. Clinical correlation is   advised. An addendum to this report can be made to assess for stability   of ventricular caliber once a comparison examination is made available.  2.  The 2.0 x 1.0 x 1.0 cm calcific mass in the left parietal lobe has a   broad differential. Comparison with prior examinations would be useful to   assess for stability. If these are not available, further evaluation with   MR brain without and with contrast would be recommended.         SUBJECTIVE:   29yo M w/ hx of spina bifida VPS @ [facility] by Dr Dorsey and scoliosis/ no sensation distal to pubic line, wheelchair bound at baseline w no movement of his legs, self catheterizes bladder, presenting with abdominal pain, n/v. Pt states that the pain began yesterday morning and has been persistent. He had a bowel movement this morning but prior to that his last bowel movement was one week prior. Denies passing gas. Denies fevers at home. Has been vomiting and nauseous. At home he was lethargic and EMS was called. At that time he was hypotensive and tachycardic and he was BIBEMS to [facility] ED for further workup. On arrival he was tachycardic to the 130s and hypotensive to 80s/50s. Labs notable for K 3.4, bicarb 13, Cr 2.04. Actively vomiting in ED. CT scan showed dilated bowel and stomach consistent with severe SBO with transition point in mid abdomen. Surgery consulted for management.     Dr Harris/ NSx consulted for evaluation of VPS and possible need of externalization if needs sx/ open abdomen for SBO management  -S/p OR w/ general surgery on 1/14 for small bowel resection for small bowel obx.     INTERVAL HX/OVERNIGHT EVENTS:  Patient seen and examined at bedside, still intubated but alert.      Vital Signs Last 24 Hrs  T(C): 36.9 (01-15-24 @ 11:25), Max: 37.8 (01-14-24 @ 14:00)  T(F): 98.4 (01-15-24 @ 11:25), Max: 100 (01-14-24 @ 14:00)  HR: 117 (01-15-24 @ 13:15) (87 - 136)  BP: --  BP(mean): --  RR: 16 (01-15-24 @ 13:15) (14 - 33)  SpO2: 100% (01-15-24 @ 13:15) (84% - 100%)    PHYSICAL EXAM:    GENERAL: NAD, intubated    MENTAL STATUS: Alert, opens eyes spontaneously, eyes tract, follows commands appropriately     CRANIAL NERVES: PERRL, EOMI without nystagmus. Hearing grossly intact.   MOTOR: strength 5/5 b/l UEs, 0/5 b/l LEs  SENSATION: grossly intact to light touch all extremities   SKIN: Warm, dry    LABS:                          14.6   8.20  )-----------( 95       ( 15 Renny 2024 03:00 )             44.0    01-15    142  |  108  |  23.4<H>  ----------------------------<  89  4.3   |  16.0<L>  |  1.31<H>    Ca    7.2<L>      15 Renny 2024 03:00  Phos  2.9     01-15  Mg     2.2     01-15    TPro  5.0<L>  /  Alb  2.6<L>  /  TBili  0.3<L>  /  DBili  0.1  /  AST  69<H>  /  ALT  23  /  AlkPhos  87  01-14  PT/INR - ( 14 Jan 2024 22:20 )   PT: 11.9 sec;   INR: 1.07 ratio         PTT - ( 14 Jan 2024 22:20 )  PTT:34.3 sec    01-14 @ 07:01  -  01-15 @ 07:00  --------------------------------------------------------  IN: 9775.6 mL / OUT: 3135 mL / NET: 6640.6 mL    01-15 @ 07:01  -  01-15 @ 13:47  --------------------------------------------------------  IN: 1313.1 mL / OUT: 460 mL / NET: 853.1 mL        IMAGING:     Xray Shunt Series (01.14.24 @ 13:35)   IMPRESSION:  Impression: Limited shunt survey. The shunt was not completely imaged in   the upper abdomen. Consider repeat study as clinically warranted.  Dilated loops of bowel throughout the abdomen. An NG tube is noted in the   distal stomach.      CT Head No Cont (01.14.24 @ 12:58)   IMPRESSION:  1.  No acute intracranial abnormality. No comparison examinations were   available at the time of interpretation. There is slitlike ventricular   caliber of indeterminate clinical significance. Clinical correlation is   advised. An addendum to this report can be made to assess for stability   of ventricular caliber once a comparison examination is made available.  2.  The 2.0 x 1.0 x 1.0 cm calcific mass in the left parietal lobe has a   broad differential. Comparison with prior examinations would be useful to   assess for stability. If these are not available, further evaluation with   MR brain without and with contrast would be recommended.

## 2024-01-15 NOTE — PROGRESS NOTE ADULT - ASSESSMENT
ASSESSMENT:  Patient is a 27yo M, PMHx of spina bifida (wheelchair bound at baseline), scoliosis, VPS, presented on 1/14 BIBEMS with abdominal pain, n/v, and lethargy, found to have severe SBO, metabolic acidosis, and to be in septic and hypovolemic shock. Patient was brought to the OR 1/14 for ex lap and small bowel resection, c/b bladder perf with primary repair, and admitted to SICU post-operatively for further management.     PLAN:    NEURO:  - PMHx of spina bifida (wheelchair bound at baseline, no sensation distal to pubic line), and VPS, neurosurgery following for for evaluation of VPS; no acute neurosurgical intervention recommended, will continue q1h neuro checks until repeat CTH at 6PM  - Will continue sedation and pain control with Fentanyl @.5, goal RASS 0 to -1  - CTH 1/14 with no acute intracranial abnormality     CARDIOVASCULAR:  - Patient presented in profound shock state likely d/t hypovolemia (n/v, insensible losses iso open abdominal surgery) and sepsis iso peritonitis: received total 10L crystalloid and 1L colloid fluid resuscitation, lactate cleared and able to turn off vaso overnight   - POCUS this morning showed normal to hyperdynamic LV systolic function, collapse of IJ  - Will titrate vasopressors to MAP goal >65; vasopressor requirements decreased after additional 500cc albumin, Levo down from .15 to .05  - Will continue to monitor hemodynamics using flotrac and markers of end-organ perfusion, additional volume resuscitation as indicated     PULMONARY:   - Type 4 respiratory failure iso hypovolemic and septic shock requiring intubation; patient now tolerating PSV 5/6/40%  - CXR this morning with mild right atelectasis, no evidence of PTX  - Given that pressor requirements and ventilator settings are minimal, will plan to extubate this afternoon     GASTROINTESTINAL:  - SBO at previous SBR site, s/p ex-lap and SBR on 1/4 (240cm small bowel remaining)   - Will keep NPO and maintain NGT to suction for gastric decompression, CTM output  - Continue Plasmalyte @120/hr while NPO    RENAL:  - GALEN on admission (36.1/2.04), unknown baseline, likely pre-renal iso hypotension vs ishcemic ATN; now improving (23.4/1.31)  - HAGMA on admission iso elevated lactate, lactate has now cleared, with unclear source of persistent acidosis (glucose well-controlled, no c/f DKA, uremia may be contributing)  - Intra-operative bladder perf, 19F Ramsey on top of bladder  - Patient straight-catheterizes with 10Fr at home, 10fr placed by urology     ENDOCRINE:    INFECTIOUS DISEASE:   - Will plan to complete 4-day course of Zosyn iso peritonitis with adequate source control   - Blood cultures 1/14 with NGTD  - If persistent fevers, will likely consider shunt tap (per Nsx) to assess for shunt infection  - Will continue to monitor WBC and fever curve, trend procalcitonin to guide     HEMATOLOGY/DVT PROPHYLAXIS:  - Lovenox  - SCDs    LINES:    ETHICS:  #Code Status: ( )  Full code   ( ) DNR/DNI     ASSESSMENT:  Patient is a 29yo M, PMHx of spina bifida (wheelchair bound at baseline), scoliosis, VPS, presented on 1/14 BIBEMS with abdominal pain, n/v, and lethargy, found to have severe SBO, metabolic acidosis, and to be in septic and hypovolemic shock. Patient was brought to the OR 1/14 for ex lap and small bowel resection, c/b bladder perf with primary repair, and admitted to SICU post-operatively for further management.     PLAN:    NEURO:  - PMHx of spina bifida (wheelchair bound at baseline, no sensation distal to pubic line), and VPS, neurosurgery following for for evaluation of VPS; no acute neurosurgical intervention recommended, will continue q1h neuro checks until repeat CTH at 6PM  - Will continue sedation and pain control with Fentanyl @.5, goal RASS 0 to -1  - CTH 1/14 with no acute intracranial abnormality     CARDIOVASCULAR:  - Patient presented in profound shock state likely d/t hypovolemia (n/v, insensible losses iso open abdominal surgery) and sepsis iso peritonitis: received total 10L crystalloid and 1L colloid fluid resuscitation, lactate cleared and able to turn off vaso overnight   - POCUS this morning showed normal to hyperdynamic LV systolic function, collapse of IJ  - Will titrate vasopressors to MAP goal >65; vasopressor requirements decreased after additional 500cc albumin, Levo down from .15 to .05  - Will continue to monitor hemodynamics using flotrac and markers of end-organ perfusion, additional volume resuscitation as indicated     PULMONARY:   - Type 4 respiratory failure iso hypovolemic and septic shock requiring intubation; patient now tolerating PSV 5/6/40%  - CXR this morning with mild right atelectasis, no evidence of PTX  - Given that pressor requirements and ventilator settings are minimal, will plan to extubate this afternoon     GASTROINTESTINAL:  - SBO at previous SBR site, s/p ex-lap and SBR on 1/4 (240cm small bowel remaining)   - Will keep NPO and maintain NGT to suction for gastric decompression, CTM output  - Continue Plasmalyte @120/hr while NPO    RENAL:  - GALEN on admission (36.1/2.04), unknown baseline, likely pre-renal iso hypotension vs ishcemic ATN; now improving (23.4/1.31)  - HAGMA on admission iso elevated lactate, lactate has now cleared, with unclear source of persistent acidosis (glucose well-controlled, no c/f DKA, uremia may be contributing)  - Intra-operative bladder perf, 19F Ramsey on top of bladder  - Patient straight-catheterizes with 10Fr at home, 10fr placed by urology     ENDOCRINE:    INFECTIOUS DISEASE:   - Will plan to complete 4-day course of Zosyn iso peritonitis with adequate source control   - Blood cultures 1/14 with NGTD  - If persistent fevers, will likely consider shunt tap (per Nsx) to assess for shunt infection  - Will continue to monitor WBC and fever curve, trend procalcitonin to guide     HEMATOLOGY/DVT PROPHYLAXIS:  - Lovenox  - SCDs    LINES:    ETHICS:  #Code Status: ( )  Full code   ( ) DNR/DNI     ASSESSMENT:  Patient is a 29yo M, PMHx of spina bifida (wheelchair bound at baseline), scoliosis, VPS, presented on 1/14 BIBEMS with abdominal pain, n/v, and lethargy, found to have severe SBO, metabolic acidosis, and to be in septic and hypovolemic shock. Patient was brought to the OR 1/14 for ex lap and small bowel resection, c/b bladder perf with primary repair, and admitted to SICU post-operatively for further management.     PLAN:    NEURO:  - PMHx of spina bifida (wheelchair bound at baseline, no sensation distal to pubic line), and VPS, neurosurgery following for for evaluation of VPS; no acute neurosurgical intervention recommended, will continue q1h neuro checks until repeat CTH at 6PM  - Will continue sedation and pain control with Fentanyl @.5, goal RASS 0 to -1  - CTH 1/14 with no acute intracranial abnormality     CARDIOVASCULAR:  - Patient presented in profound shock state likely d/t hypovolemia (n/v, insensible losses iso open abdominal surgery) and sepsis iso peritonitis: received total 10L crystalloid and 1L colloid fluid resuscitation, lactate cleared and able to turn off vaso overnight   - POCUS this morning showed normal to hyperdynamic LV systolic function, collapse of IJ  - Will titrate vasopressors to MAP goal >65; vasopressor requirements decreased after additional 500cc albumin, Levo down from .15 to .05  - Will continue to monitor hemodynamics using flotrac and markers of end-organ perfusion, additional volume resuscitation as indicated     PULMONARY:   - Type 4 respiratory failure iso hypovolemic and septic shock requiring intubation; patient now tolerating PSV 5/6/40%  - CXR this morning with mild right atelectasis, no evidence of PTX  - Given that pressor requirements and ventilator settings are minimal, will plan to extubate this afternoon     GASTROINTESTINAL:  - SBO at previous SBR site, s/p ex-lap and SBR on 1/4 (240cm small bowel remaining)   - Will keep NPO and maintain NGT to suction for gastric decompression, CTM output  - Continue Plasmalyte @120/hr while NPO    RENAL:  - GALEN on admission (36.1/2.04), unknown baseline, likely pre-renal iso hypotension vs ishcemic ATN; now improving (23.4/1.31), will CTM UOP and CMP  - HAGMA on admission iso elevated lactate, lactate has now cleared, with unclear source of persistent acidosis (glucose well-controlled, no c/f DKA, uremia may be contributing)  - Intra-operative bladder perf s/p primary repair; monitor output of 19F Ramsey on top of bladder; urology advised flushing drain daily (to gravity)  - Patient straight-catheterizes with 10Fr at home, 10fr placed by urology 1/14    ENDOCRINE:  -Glucose well-controlled, no active issues    INFECTIOUS DISEASE:   - Will plan to complete 4-day course of Zosyn iso peritonitis with adequate source control   - Blood cultures 1/14 with NGTD  - If persistent fevers, will likely consider shunt tap (per Nsx) to assess for shunt infection  - Will continue to monitor WBC and fever curve, trend procalcitonin to guide antibiotic duration    HEMATOLOGY/DVT PROPHYLAXIS:  - SQH Q8 for chemoprophylaxis iso GALEN; SCDs     LINES:  - Rt subclavian CVC 1/4  - Lt axillary arterial line 1/4    ETHICS:  - Presumed full code    DISPO:  - SICU      ASSESSMENT:  Patient is a 27yo M, PMHx of spina bifida (wheelchair bound at baseline), scoliosis, VPS, presented on 1/14 BIBEMS with abdominal pain, n/v, and lethargy, found to have severe SBO, metabolic acidosis, and to be in septic and hypovolemic shock. Patient was brought to the OR 1/14 for ex lap and small bowel resection, c/b bladder perf with primary repair, and admitted to SICU post-operatively for further management.     PLAN:    NEURO:  - PMHx of spina bifida (wheelchair bound at baseline, no sensation distal to pubic line), and VPS, neurosurgery following for for evaluation of VPS; no acute neurosurgical intervention recommended, will continue q1h neuro checks until repeat CTH at 6PM  - Will continue sedation and pain control with Fentanyl @.5, goal RASS 0 to -1  - CTH 1/14 with no acute intracranial abnormality     CARDIOVASCULAR:  - Patient presented in profound shock state likely d/t hypovolemia (n/v, insensible losses iso open abdominal surgery) and sepsis iso peritonitis: received total 10L crystalloid and 1L colloid fluid resuscitation, lactate cleared and able to turn off vaso overnight   - POCUS this morning showed normal to hyperdynamic LV systolic function, collapse of IJ  - Will titrate vasopressors to MAP goal >65; vasopressor requirements decreased after additional 500cc albumin, Levo down from .15 to .05  - Will continue to monitor hemodynamics using flotrac and markers of end-organ perfusion, additional volume resuscitation as indicated     PULMONARY:   - Type 4 respiratory failure iso hypovolemic and septic shock requiring intubation; patient now tolerating PSV 5/6/40%  - CXR this morning with mild right atelectasis, no evidence of PTX  - Given that pressor requirements and ventilator settings are minimal, will plan to extubate this afternoon     GASTROINTESTINAL:  - SBO at previous SBR site, s/p ex-lap and SBR on 1/4 (240cm small bowel remaining)   - Will keep NPO and maintain NGT to suction for gastric decompression, CTM output  - Continue Plasmalyte @120/hr while NPO    RENAL:  - GALEN on admission (36.1/2.04), unknown baseline, likely pre-renal iso hypotension vs ishcemic ATN; now improving (23.4/1.31), will CTM UOP and CMP  - HAGMA on admission iso elevated lactate, lactate has now cleared, with unclear source of persistent acidosis (glucose well-controlled, no c/f DKA, uremia may be contributing)  - Intra-operative bladder perf s/p primary repair; monitor output of 19F Ramsey on top of bladder; urology advised flushing drain daily (to gravity)  - Patient straight-catheterizes with 10Fr at home, 10fr placed by urology 1/14    ENDOCRINE:  -Glucose well-controlled, no active issues    INFECTIOUS DISEASE:   - Will plan to complete 4-day course of Zosyn iso peritonitis with adequate source control   - Blood cultures 1/14 with NGTD  - If persistent fevers, will likely consider shunt tap (per Nsx) to assess for shunt infection  - Will continue to monitor WBC and fever curve, trend procalcitonin to guide antibiotic duration    HEMATOLOGY/DVT PROPHYLAXIS:  - SQH Q8 for chemoprophylaxis iso GALEN; SCDs     LINES:  - Rt subclavian CVC 1/4  - Lt axillary arterial line 1/4    ETHICS:  - Presumed full code    DISPO:  - SICU

## 2024-01-15 NOTE — PROGRESS NOTE ADULT - NS ATTEND AMEND GEN_ALL_CORE FT
NSGY Attg:    see above    patient seen and examined    agree with above    plan of cared determined for VPS  CT head pending  intubated, awake, following commands  T max 100  defer shunt tap   will follow

## 2024-01-16 LAB
ANION GAP SERPL CALC-SCNC: 11 MMOL/L — SIGNIFICANT CHANGE UP (ref 5–17)
ANION GAP SERPL CALC-SCNC: 9 MMOL/L — SIGNIFICANT CHANGE UP (ref 5–17)
BASOPHILS # BLD AUTO: 0 K/UL — SIGNIFICANT CHANGE UP (ref 0–0.2)
BASOPHILS NFR BLD AUTO: 0 % — SIGNIFICANT CHANGE UP (ref 0–2)
BUN SERPL-MCNC: 12.8 MG/DL — SIGNIFICANT CHANGE UP (ref 8–20)
BUN SERPL-MCNC: 14.9 MG/DL — SIGNIFICANT CHANGE UP (ref 8–20)
BURR CELLS BLD QL SMEAR: PRESENT — SIGNIFICANT CHANGE UP
CALCIUM SERPL-MCNC: 7.9 MG/DL — LOW (ref 8.4–10.5)
CALCIUM SERPL-MCNC: 8.5 MG/DL — SIGNIFICANT CHANGE UP (ref 8.4–10.5)
CHLORIDE SERPL-SCNC: 106 MMOL/L — SIGNIFICANT CHANGE UP (ref 96–108)
CHLORIDE SERPL-SCNC: 110 MMOL/L — HIGH (ref 96–108)
CO2 SERPL-SCNC: 24 MMOL/L — SIGNIFICANT CHANGE UP (ref 22–29)
CO2 SERPL-SCNC: 25 MMOL/L — SIGNIFICANT CHANGE UP (ref 22–29)
CREAT SERPL-MCNC: 0.89 MG/DL — SIGNIFICANT CHANGE UP (ref 0.5–1.3)
CREAT SERPL-MCNC: 0.97 MG/DL — SIGNIFICANT CHANGE UP (ref 0.5–1.3)
EGFR: 108 ML/MIN/1.73M2 — SIGNIFICANT CHANGE UP
EGFR: 118 ML/MIN/1.73M2 — SIGNIFICANT CHANGE UP
EOSINOPHIL # BLD AUTO: 0 K/UL — SIGNIFICANT CHANGE UP (ref 0–0.5)
EOSINOPHIL NFR BLD AUTO: 0 % — SIGNIFICANT CHANGE UP (ref 0–6)
GAS PNL BLDA: SIGNIFICANT CHANGE UP
GIANT PLATELETS BLD QL SMEAR: PRESENT — SIGNIFICANT CHANGE UP
GLUCOSE BLDC GLUCOMTR-MCNC: 98 MG/DL — SIGNIFICANT CHANGE UP (ref 70–99)
GLUCOSE SERPL-MCNC: 108 MG/DL — HIGH (ref 70–99)
GLUCOSE SERPL-MCNC: 129 MG/DL — HIGH (ref 70–99)
HCT VFR BLD CALC: 34.1 % — LOW (ref 39–50)
HCT VFR BLD CALC: 34.2 % — LOW (ref 39–50)
HGB BLD-MCNC: 11.9 G/DL — LOW (ref 13–17)
HGB BLD-MCNC: 12.1 G/DL — LOW (ref 13–17)
LYMPHOCYTES # BLD AUTO: 0.06 K/UL — LOW (ref 1–3.3)
LYMPHOCYTES # BLD AUTO: 0.66 K/UL — LOW (ref 1–3.3)
LYMPHOCYTES # BLD AUTO: 0.9 % — LOW (ref 13–44)
LYMPHOCYTES # BLD AUTO: 1 K/UL — SIGNIFICANT CHANGE UP (ref 1–3.3)
LYMPHOCYTES # BLD AUTO: 7 % — LOW (ref 13–44)
LYMPHOCYTES # BLD AUTO: 9.5 % — LOW (ref 13–44)
MAGNESIUM SERPL-MCNC: 1.9 MG/DL — SIGNIFICANT CHANGE UP (ref 1.8–2.6)
MAGNESIUM SERPL-MCNC: 2 MG/DL — SIGNIFICANT CHANGE UP (ref 1.6–2.6)
MANUAL SMEAR VERIFICATION: SIGNIFICANT CHANGE UP
MCHC RBC-ENTMCNC: 28.9 PG — SIGNIFICANT CHANGE UP (ref 27–34)
MCHC RBC-ENTMCNC: 29.4 PG — SIGNIFICANT CHANGE UP (ref 27–34)
MCHC RBC-ENTMCNC: 34.9 GM/DL — SIGNIFICANT CHANGE UP (ref 32–36)
MCHC RBC-ENTMCNC: 35.4 GM/DL — SIGNIFICANT CHANGE UP (ref 32–36)
MCV RBC AUTO: 82.8 FL — SIGNIFICANT CHANGE UP (ref 80–100)
MCV RBC AUTO: 83 FL — SIGNIFICANT CHANGE UP (ref 80–100)
METAMYELOCYTES # FLD: 0.9 % — HIGH (ref 0–0)
METAMYELOCYTES # FLD: 5.2 % — HIGH (ref 0–0)
MONOCYTES # BLD AUTO: 0.12 K/UL — SIGNIFICANT CHANGE UP (ref 0–0.9)
MONOCYTES # BLD AUTO: 0.16 K/UL — SIGNIFICANT CHANGE UP (ref 0–0.9)
MONOCYTES # BLD AUTO: 0.37 K/UL — SIGNIFICANT CHANGE UP (ref 0–0.9)
MONOCYTES NFR BLD AUTO: 1.7 % — LOW (ref 2–14)
MONOCYTES NFR BLD AUTO: 1.7 % — LOW (ref 2–14)
MONOCYTES NFR BLD AUTO: 3.5 % — SIGNIFICANT CHANGE UP (ref 2–14)
NEUTROPHILS # BLD AUTO: 6.42 K/UL — SIGNIFICANT CHANGE UP (ref 1.8–7.4)
NEUTROPHILS # BLD AUTO: 8.41 K/UL — HIGH (ref 1.8–7.4)
NEUTROPHILS # BLD AUTO: 9.02 K/UL — HIGH (ref 1.8–7.4)
NEUTROPHILS NFR BLD AUTO: 63.5 % — SIGNIFICANT CHANGE UP (ref 43–77)
NEUTROPHILS NFR BLD AUTO: 70.4 % — SIGNIFICANT CHANGE UP (ref 43–77)
NEUTROPHILS NFR BLD AUTO: 86.1 % — HIGH (ref 43–77)
NEUTS BAND # BLD: 19.1 % — HIGH (ref 0–8)
NEUTS BAND # BLD: 27.8 % — HIGH (ref 0–8)
PHOSPHATE SERPL-MCNC: 1.2 MG/DL — LOW (ref 2.4–4.7)
PHOSPHATE SERPL-MCNC: 2.7 MG/DL — SIGNIFICANT CHANGE UP (ref 2.4–4.7)
PLAT MORPH BLD: NORMAL — SIGNIFICANT CHANGE UP
PLATELET # BLD AUTO: 46 K/UL — LOW (ref 150–400)
PLATELET # BLD AUTO: 53 K/UL — LOW (ref 150–400)
POIKILOCYTOSIS BLD QL AUTO: SLIGHT — SIGNIFICANT CHANGE UP
POTASSIUM SERPL-MCNC: 3.3 MMOL/L — LOW (ref 3.5–5.3)
POTASSIUM SERPL-MCNC: 3.8 MMOL/L — SIGNIFICANT CHANGE UP (ref 3.5–5.3)
POTASSIUM SERPL-SCNC: 3.3 MMOL/L — LOW (ref 3.5–5.3)
POTASSIUM SERPL-SCNC: 3.8 MMOL/L — SIGNIFICANT CHANGE UP (ref 3.5–5.3)
RBC # BLD: 4.12 M/UL — LOW (ref 4.2–5.8)
RBC # BLD: 4.12 M/UL — LOW (ref 4.2–5.8)
RBC # FLD: 15.8 % — HIGH (ref 10.3–14.5)
RBC # FLD: 15.9 % — HIGH (ref 10.3–14.5)
RBC BLD AUTO: ABNORMAL
RBC BLD AUTO: NORMAL — SIGNIFICANT CHANGE UP
RBC BLD AUTO: NORMAL — SIGNIFICANT CHANGE UP
SODIUM SERPL-SCNC: 141 MMOL/L — SIGNIFICANT CHANGE UP (ref 135–145)
SODIUM SERPL-SCNC: 143 MMOL/L — SIGNIFICANT CHANGE UP (ref 135–145)
VARIANT LYMPHS # BLD: 0.9 % — SIGNIFICANT CHANGE UP (ref 0–6)
WBC # BLD: 10.48 K/UL — SIGNIFICANT CHANGE UP (ref 3.8–10.5)
WBC # BLD: 9.4 K/UL — SIGNIFICANT CHANGE UP (ref 3.8–10.5)
WBC # FLD AUTO: 10.48 K/UL — SIGNIFICANT CHANGE UP (ref 3.8–10.5)
WBC # FLD AUTO: 9.4 K/UL — SIGNIFICANT CHANGE UP (ref 3.8–10.5)

## 2024-01-16 PROCEDURE — 99291 CRITICAL CARE FIRST HOUR: CPT

## 2024-01-16 PROCEDURE — 99232 SBSQ HOSP IP/OBS MODERATE 35: CPT

## 2024-01-16 PROCEDURE — 70450 CT HEAD/BRAIN W/O DYE: CPT | Mod: 26

## 2024-01-16 RX ORDER — MAGNESIUM SULFATE 500 MG/ML
2 VIAL (ML) INJECTION ONCE
Refills: 0 | Status: COMPLETED | OUTPATIENT
Start: 2024-01-16 | End: 2024-01-16

## 2024-01-16 RX ORDER — CEFOTETAN DISODIUM 1 G
2 VIAL (EA) INJECTION EVERY 12 HOURS
Refills: 0 | Status: DISCONTINUED | OUTPATIENT
Start: 2024-01-16 | End: 2024-01-17

## 2024-01-16 RX ORDER — POTASSIUM CHLORIDE 20 MEQ
20 PACKET (EA) ORAL
Refills: 0 | Status: COMPLETED | OUTPATIENT
Start: 2024-01-16 | End: 2024-01-16

## 2024-01-16 RX ADMIN — PIPERACILLIN AND TAZOBACTAM 25 GRAM(S): 4; .5 INJECTION, POWDER, LYOPHILIZED, FOR SOLUTION INTRAVENOUS at 22:41

## 2024-01-16 RX ADMIN — Medication 100 MILLIEQUIVALENT(S): at 05:13

## 2024-01-16 RX ADMIN — PIPERACILLIN AND TAZOBACTAM 25 GRAM(S): 4; .5 INJECTION, POWDER, LYOPHILIZED, FOR SOLUTION INTRAVENOUS at 05:12

## 2024-01-16 RX ADMIN — Medication 100 MILLIEQUIVALENT(S): at 07:24

## 2024-01-16 RX ADMIN — CHLORHEXIDINE GLUCONATE 1 APPLICATION(S): 213 SOLUTION TOPICAL at 13:24

## 2024-01-16 RX ADMIN — Medication 85 MILLIMOLE(S): at 21:36

## 2024-01-16 RX ADMIN — Medication 200 GRAM(S): at 01:38

## 2024-01-16 RX ADMIN — Medication 100 MILLIEQUIVALENT(S): at 06:16

## 2024-01-16 RX ADMIN — PIPERACILLIN AND TAZOBACTAM 25 GRAM(S): 4; .5 INJECTION, POWDER, LYOPHILIZED, FOR SOLUTION INTRAVENOUS at 16:05

## 2024-01-16 RX ADMIN — HEPARIN SODIUM 5000 UNIT(S): 5000 INJECTION INTRAVENOUS; SUBCUTANEOUS at 05:13

## 2024-01-16 RX ADMIN — Medication 25 GRAM(S): at 20:11

## 2024-01-16 NOTE — CHART NOTE - NSCHARTNOTEFT_GEN_A_CORE
Notified by nursing staff that pt has not made any urine output for 2 consecutive hours. Pt was making more than adequate urine output prior to this event. Concern secondary to 10 [place] Sharp in place with history bladder injury requiring repair in OR. Sharp was flushed with 50 cc of NaCl and Sharp is now putting out appropriate urine.  Pt's creatinine continues to downtrend and pt abdominal exam and drain output remains unchanged. Will continue to closely monitor. Notified by nursing staff that pt has not made any urine output for 2 consecutive hours. Pt was making more than adequate urine output prior to this event. Concern secondary to 10 [place] Sharp in place with history bladder injury requiring repair in OR. Sharp was flushed with 50 cc of NaCl and Sharp is now putting out appropriate urine.  Pt's creatinine continues to downtrend and pt abdominal exam and drain output remains unchanged. Will continue to closely monitor.

## 2024-01-16 NOTE — PROGRESS NOTE ADULT - ASSESSMENT
Assessment: 27yo male with PMHx of spina bifida (wheelchair bound at baseline), scoliosis,  Shunt BIBEMS 1/14 with abdominal pain, n/v, and lethargy, found to have severe SBO, metabolic acidosis, and to be in septic and hypovolemic shock. Patient was brought to the OR 1/14 for ex lap and small bowel resection, c/b bladder perf with primary repair, and admitted to SICU post-op for further management.       Plan   NEURO:   - PMHx of spina bifida (wheelchair bound at baseline, no sensation distal to pubic line), VPS, neurosurgery following for evaluation of VPS; no acute neurosurgical intervention recommended   - CTH 1/14 with no acute intracranial abnormality, possible head CT later today     CARDIOVASCULAR: hypovolemic/septic shock on levo   - lactate cleared and off vaso since 1/14, levo titrated off earlier this AM   - POCUS last night showed collapsed IVC, 500 cc albumin given yesterday day and 1L LR and 250cc albumin given last night, maintenance fluids switched to D5 for sugars in the 70s-80s   - maintain MAP >65   - Will continue to monitor hemodynamics using flotrac and markers of end-organ perfusion, additional volume resuscitation as indicated       PULMONARY: extubated 1/15   - Saturating well on room air, maintain O2 sat >92%      GASTROINTESTINAL: SBO at previous SBR site, s/p ex-lap 2/ SBR on 1/14 (240cm small bowel remaining)   - Will keep NPO and maintain NGT to suction for gastric decompression   - Switched to D5/LR for maintenance fluids     RENAL: GALEN, straight-caths with 10Fr at home, 10fr placed by urology 1/14   - GALEN on admission (36.1/2.04), unknown baseline, likely pre-renal d/t hypotension vs ischemic ATN; now improving, Cr down to 1.12   - Intra-op bladder perf s/p primary repair; monitor output of 19F Ramsey on top of bladder; urology advised flushing drain daily (to gravity)     ENDOCRINE:   -Glucose well-controlled, no active issues     INFECTIOUS DISEASE:    - Zosyn, peritonitis with adequate source control, stop 1/21/24   - Blood cultures 1/14 with NGTD   - If persistent fevers, will likely consider shunt tap (per Nsx) to assess for shunt infection   - Will continue to monitor WBC and fever curve     HEMATOLOGY/DVT PROPHYLAXIS:   - SQH Q8 for chemoprophylaxis, SCDs                  Assessment: 29yo male with PMHx of spina bifida (wheelchair bound at baseline), scoliosis,  Shunt BIBEMS 1/14 with abdominal pain, n/v, and lethargy, found to have severe SBO, metabolic acidosis, and to be in septic and hypovolemic shock. Patient was brought to the OR 1/14 for ex lap and small bowel resection, c/b bladder perf with primary repair, and admitted to SICU post-op for further management.       Plan   NEURO:   - PMHx of spina bifida (wheelchair bound at baseline, no sensation distal to pubic line), VPS, neurosurgery following for evaluation of VPS; no acute neurosurgical intervention recommended   - CTH 1/14 with no acute intracranial abnormality, possible head CT later today     CARDIOVASCULAR: hypovolemic/septic shock on levo   - lactate cleared and off vaso since 1/14, levo titrated off earlier this AM   - POCUS last night showed collapsed IVC, 500 cc albumin given yesterday day and 1L LR and 250cc albumin given last night, maintenance fluids switched to D5 for sugars in the 70s-80s   - maintain MAP >65   - Will continue to monitor hemodynamics using flotrac and markers of end-organ perfusion, additional volume resuscitation as indicated       PULMONARY: extubated 1/15   - Saturating well on room air, maintain O2 sat >92%      GASTROINTESTINAL: SBO at previous SBR site, s/p ex-lap 2/ SBR on 1/14 (240cm small bowel remaining)   - Will keep NPO and maintain NGT to suction for gastric decompression   - Switched to D5/LR for maintenance fluids     RENAL: GALEN, straight-caths with 10Fr at home, 10fr placed by urology 1/14   - GALEN on admission (36.1/2.04), unknown baseline, likely pre-renal d/t hypotension vs ischemic ATN; now improving, Cr down to 1.12   - Intra-op bladder perf s/p primary repair; monitor output of 19F Ramsey on top of bladder; urology advised flushing drain daily (to gravity)     ENDOCRINE:   -Glucose well-controlled, no active issues     INFECTIOUS DISEASE:    - Zosyn, peritonitis with adequate source control, stop 1/21/24   - Blood cultures 1/14 with NGTD   - If persistent fevers, will likely consider shunt tap (per Nsx) to assess for shunt infection   - Will continue to monitor WBC and fever curve     HEMATOLOGY/DVT PROPHYLAXIS:   - SQH Q8 for chemoprophylaxis, SCDs

## 2024-01-16 NOTE — DIETITIAN INITIAL EVALUATION ADULT - PERTINENT MEDS FT
MEDICATIONS  (STANDING):  dextrose 5% + lactated ringers. 1000 milliLiter(s) (100 mL/Hr) IV Continuous <Continuous>  norepinephrine Infusion 0.05 MICROgram(s)/kG/Min (6.56 mL/Hr) IV Continuous <Continuous>  piperacillin/tazobactam IVPB.. 3.375 Gram(s) IV Intermittent every 8 hours

## 2024-01-16 NOTE — PROGRESS NOTE ADULT - SUBJECTIVE AND OBJECTIVE BOX
SUBJECTIVE:   29yo M w/ hx of spina bifida VPS @ [facility] by Dr Dorsey and scoliosis/ no sensation distal to pubic line, wheelchair bound at baseline w no movement of his legs, self catheterizes bladder, presenting with abdominal pain, n/v. Pt states that the pain began yesterday morning and has been persistent. He had a bowel movement this morning but prior to that his last bowel movement was one week prior. Denies passing gas. Denies fevers at home. Has been vomiting and nauseous. At home he was lethargic and EMS was called. At that time he was hypotensive and tachycardic and he was BIBEMS to [facility] ED for further workup. On arrival he was tachycardic to the 130s and hypotensive to 80s/50s. Labs notable for K 3.4, bicarb 13, Cr 2.04. Actively vomiting in ED. CT scan showed dilated bowel and stomach consistent with severe SBO with transition point in mid abdomen. Surgery consulted for management.     Dr Harris/ NSx consulted for evaluation of VPS and possible need of externalization if needs sx/ open abdomen for SBO management  -S/p OR w/ general surgery on 1/14 for small bowel resection for small bowel obx.     INTERVAL HX/OVERNIGHT EVENTS:  Patient seen and examined at bedside. Extubated and alert. Denies any headache. Afebrile overnight. CTH completed this morning, official read pending.     Vital Signs Last 24 Hrs  T(C): 36.9 (01-16-24 @ 07:27), Max: 37.3 (01-16-24 @ 00:31)  T(F): 98.4 (01-16-24 @ 07:27), Max: 99.2 (01-16-24 @ 00:31)  HR: 101 (01-16-24 @ 11:30) (98 - 126)  BP: --  BP(mean): --  RR: 19 (01-16-24 @ 11:30) (8 - 26)  SpO2: 100% (01-16-24 @ 11:30) (98% - 100%)    PHYSICAL EXAM:    GENERAL: NAD  MENTAL STATUS: AOx3, alert, opens eyes spontaneously, follows commands appropriately     CRANIAL NERVES: PERRL, EOMI without nystagmus. Hearing grossly intact.   MOTOR: strength 5/5 b/l UEs, 0/5 b/l LEs  SENSATION: grossly intact to light touch all extremities   SKIN: Warm, dry    LABS:                          11.9   9.40  )-----------( 53       ( 16 Jan 2024 02:35 )             34.1   01-16    141  |  106  |  14.9  ----------------------------<  129<H>  3.3<L>   |  24.0  |  0.97    Ca    8.5      16 Jan 2024 02:35  Phos  2.7     01-16  Mg     2.0     01-16    TPro  5.0<L>  /  Alb  2.6<L>  /  TBili  0.3<L>  /  DBili  0.1  /  AST  69<H>  /  ALT  23  /  AlkPhos  87  01-14          IMAGING:     Xray Shunt Series (01.14.24 @ 13:35)   IMPRESSION:  Impression: Limited shunt survey. The shunt was not completely imaged in   the upper abdomen. Consider repeat study as clinically warranted.  Dilated loops of bowel throughout the abdomen. An NG tube is noted in the   distal stomach.      CT Head No Cont (01.14.24 @ 12:58)   IMPRESSION:  1.  No acute intracranial abnormality. No comparison examinations were   available at the time of interpretation. There is slitlike ventricular   caliber of indeterminate clinical significance. Clinical correlation is   advised. An addendum to this report can be made to assess for stability   of ventricular caliber once a comparison examination is made available.  2.  The 2.0 x 1.0 x 1.0 cm calcific mass in the left parietal lobe has a   broad differential. Comparison with prior examinations would be useful to   assess for stability. If these are not available, further evaluation with   MR brain without and with contrast would be recommended.         SUBJECTIVE:   27yo M w/ hx of spina bifida VPS @ [facility] by Dr Dorsey and scoliosis/ no sensation distal to pubic line, wheelchair bound at baseline w no movement of his legs, self catheterizes bladder, presenting with abdominal pain, n/v. Pt states that the pain began yesterday morning and has been persistent. He had a bowel movement this morning but prior to that his last bowel movement was one week prior. Denies passing gas. Denies fevers at home. Has been vomiting and nauseous. At home he was lethargic and EMS was called. At that time he was hypotensive and tachycardic and he was BIBEMS to [facility] ED for further workup. On arrival he was tachycardic to the 130s and hypotensive to 80s/50s. Labs notable for K 3.4, bicarb 13, Cr 2.04. Actively vomiting in ED. CT scan showed dilated bowel and stomach consistent with severe SBO with transition point in mid abdomen. Surgery consulted for management.     Dr Harris/ NSx consulted for evaluation of VPS and possible need of externalization if needs sx/ open abdomen for SBO management  -S/p OR w/ general surgery on 1/14 for small bowel resection for small bowel obx.     INTERVAL HX/OVERNIGHT EVENTS:  Patient seen and examined at bedside. Extubated and alert. Denies any headache. Afebrile overnight. CTH completed this morning, official read pending.     Vital Signs Last 24 Hrs  T(C): 36.9 (01-16-24 @ 07:27), Max: 37.3 (01-16-24 @ 00:31)  T(F): 98.4 (01-16-24 @ 07:27), Max: 99.2 (01-16-24 @ 00:31)  HR: 101 (01-16-24 @ 11:30) (98 - 126)  BP: --  BP(mean): --  RR: 19 (01-16-24 @ 11:30) (8 - 26)  SpO2: 100% (01-16-24 @ 11:30) (98% - 100%)    PHYSICAL EXAM:    GENERAL: NAD  MENTAL STATUS: AOx3, alert, opens eyes spontaneously, follows commands appropriately     CRANIAL NERVES: PERRL, EOMI without nystagmus. Hearing grossly intact.   MOTOR: strength 5/5 b/l UEs, 0/5 b/l LEs  SENSATION: grossly intact to light touch all extremities   SKIN: Warm, dry    LABS:                          11.9   9.40  )-----------( 53       ( 16 Jan 2024 02:35 )             34.1   01-16    141  |  106  |  14.9  ----------------------------<  129<H>  3.3<L>   |  24.0  |  0.97    Ca    8.5      16 Jan 2024 02:35  Phos  2.7     01-16  Mg     2.0     01-16    TPro  5.0<L>  /  Alb  2.6<L>  /  TBili  0.3<L>  /  DBili  0.1  /  AST  69<H>  /  ALT  23  /  AlkPhos  87  01-14          IMAGING:     Xray Shunt Series (01.14.24 @ 13:35)   IMPRESSION:  Impression: Limited shunt survey. The shunt was not completely imaged in   the upper abdomen. Consider repeat study as clinically warranted.  Dilated loops of bowel throughout the abdomen. An NG tube is noted in the   distal stomach.      CT Head No Cont (01.14.24 @ 12:58)   IMPRESSION:  1.  No acute intracranial abnormality. No comparison examinations were   available at the time of interpretation. There is slitlike ventricular   caliber of indeterminate clinical significance. Clinical correlation is   advised. An addendum to this report can be made to assess for stability   of ventricular caliber once a comparison examination is made available.  2.  The 2.0 x 1.0 x 1.0 cm calcific mass in the left parietal lobe has a   broad differential. Comparison with prior examinations would be useful to   assess for stability. If these are not available, further evaluation with   MR brain without and with contrast would be recommended.

## 2024-01-16 NOTE — DIETITIAN INITIAL EVALUATION ADULT - ADD RECOMMEND
Diet advancement per medical team when medically feasible; consider clear liquid diet  Rx: MVI daily via appropriate route  Monitor I/Os and bowel trends

## 2024-01-16 NOTE — DIETITIAN INITIAL EVALUATION ADULT - NSFNSGIIOFT_GEN_A_CORE
01-15-24 @ 07:01  -  01-16-24 @ 07:00  --------------------------------------------------------  OUT:    Nasogastric/Oral tube (mL): 300 mL  Total OUT: 300 mL    Total NET: -300 mL

## 2024-01-16 NOTE — PROGRESS NOTE ADULT - CRITICAL CARE ATTENDING COMMENT
Patient admitted to SICU s/p ex lap SBR, bladder repair.   Was hemodynamically unstable postop, weaned off pressors early this AM.   Successfully extubated, on NC w/o distress, will wean towards RA as tolerated.   Had BM; however, NGT w/ bilious output; will keep in place for now. Strict I&Os, monitor   Patient has small caliber cline (placed by urology given reported hx of urethral stricture) - noted to have low uop - initially responded to IVF; however, noted to have some degree of retention (~400ml in bladder per bedside scans) - ~50ml obtained after flushing catheter: given fresh repair and need for decompression, will discuss w/ urology for recommendations (?increase size of catheter). Continue q4 bladder scans and flushes as indicated - for now.   Acute drop in platelets in setting of sepsis and significant IVFs: will screen for HIT.   Mobilize as tolerated. DVT ppx. Patient admitted to SICU s/p ex lap SBR, bladder repair.   Was hemodynamically unstable postop, weaned off pressors early this AM.   Successfully extubated, on NC w/o distress, will wean towards RA as tolerated.   Had BM; however, NGT w/ bilious output; will keep in place for now. Strict I&Os, monitor   Patient has small caliber cline (placed by urology given reported hx of urethral stricture) - noted to have low uop - initially responded to IVF; however, noted to have some degree of retention (~400ml in bladder per bedside scans) - ~50ml obtained after flushing catheter: given fresh repair and need for decompression, will discuss w/ urology for recommendations (?increase size of catheter). Continue q4 bladder scans and flushes as indicated - for now.   Acute drop in platelets in setting of sepsis and significant IVFs: will screen for HIT.   Mobilize as tolerated.   DVT ppx.  Continue zosyn, trend vitals/labs.

## 2024-01-16 NOTE — CHART NOTE - NSCHARTNOTEFT_GEN_A_CORE
pt with minimal output, bladder scan showed 400ml by ICU team  cline gently irrigated with 20ml water, aspirated urine easily  150ml yellow urine drained while observing for UO to drain  cline continuing to drain well, very small amount of sediment particles noted in urine  please irrigate and gently aspirate qh to ensure 10Fr cline drains adequately  please attach cline to stat lock to prevent catheter being on traction or getting pulled out

## 2024-01-16 NOTE — CHART NOTE - NSCHARTNOTEFT_GEN_A_CORE
Patient without UOP from cline catheter.  Bladder scan with 400cc, cline irrigated with minimal output.  FRITZ with serous/serosanginous output increased from prior.  Labs sent to ensure there is no kidney injury and electrolytes wnl. Urology able to aspirate 100cc of urine w/ sediment. Discussed with urology and SICU attending - I have concern that bladder is not being appropriately decompressed with 10F cline in the setting of recent repair of bladder.  Patient and patient's mother unable to provide us with patient's urologist.  SICU attending and urology team feel risk of dilation of urethral stricture and upsizing cline is not worth benefit at this time. We will continue to monitor output.  If drainage from FRITZ continues to be high will consider urine creatine level.  Serous drainage may be result of  shunt.

## 2024-01-16 NOTE — PROGRESS NOTE ADULT - ASSESSMENT
27 yo male with placement of 10Fr cline, POD#2 s/p ex-lap, MUKUL, partial Lt colectomy, closure on cystotomy. Covid+, SBO, GALEN  - cline not draining well again, irrigated with 40ml saline  - monitor UO  - GALEN resolved  - gently flush cline as needed, small catheter may be positional  - will follow 29 yo male with placement of 10Fr cline, POD#2 s/p ex-lap, MUKUL, partial Lt colectomy, closure on cystotomy. Covid+, SBO, GALEN  - cline not draining well again, irrigated with 40ml saline  - monitor UO  - GALEN resolved  - gently flush cline as needed, small catheter may be positional  - will follow

## 2024-01-16 NOTE — PROGRESS NOTE ADULT - SUBJECTIVE AND OBJECTIVE BOX
24H events: Pt now extubated and saturating well on room air. Pt got 500cc albumin yesterday day and 1L LR and 250cc albumin overnight for tachycardia and collapsed IVC on POCUS. Off vaso as of 1/14 and off levo as of early this AM. Pt remains tachycardic despite volume.     PAST MEDICAL & SURGICAL HISTORY:  Spina bifida    Scoliosis    MEDICATIONS  (STANDING):  chlorhexidine 2% Cloths 1 Application(s) Topical daily  dextrose 5% + lactated ringers. 1000 milliLiter(s) (100 mL/Hr) IV Continuous <Continuous>  heparin   Injectable 5000 Unit(s) SubCutaneous every 8 hours  norepinephrine Infusion 0.05 MICROgram(s)/kG/Min (6.56 mL/Hr) IV Continuous <Continuous>  piperacillin/tazobactam IVPB.. 3.375 Gram(s) IV Intermittent every 8 hours  potassium chloride  20 mEq/100 mL IVPB 20 milliEquivalent(s) IV Intermittent every 1 hour    MEDICATIONS  (PRN):  benzocaine 20% Spray 1 Spray(s) Topical every 6 hours PRN sore throat  benzocaine/menthol Lozenge 1 Lozenge Oral every 8 hours PRN Sore Throat      ICU Vital Signs Last 24 Hrs  T(C): 37.2 (16 Jan 2024 04:00), Max: 37.3 (16 Jan 2024 00:31)  T(F): 98.9 (16 Jan 2024 04:00), Max: 99.2 (16 Jan 2024 00:31)  HR: 110 (16 Jan 2024 04:15) (87 - 126)  ABP: 102/53 (16 Jan 2024 04:15) (89/49 - 120/62)  ABP(mean): 69 (16 Jan 2024 04:15) (61 - 85)  RR: 17 (16 Jan 2024 04:15) (8 - 29)  SpO2: 99% (16 Jan 2024 04:15) (98% - 100%)    O2 Parameters below as of 16 Jan 2024 04:00  Patient On (Oxygen Delivery Method): nasal cannula  O2 Flow (L/min): 2      Drug Dosing Weight  Height (cm): 132.1 (14 Jan 2024 09:30)  Weight (kg): 70 (14 Jan 2024 07:48)  BMI (kg/m2): 40.1 (14 Jan 2024 09:30)  BSA (m2): 1.51 (14 Jan 2024 09:30)    CENTRAL LINE: [x ] YES [ ] NO  LOCATION: R sublavian  DATE INSERTED: 1/14  REMOVE: [ ] YES [ x] NO  EXPLAIN: multiple drips    DEL CASTILLO: [x ] YES [ ] NO    DATE INSERTED: 1/14  REMOVE: [ ] YES [x ] NO  EXPLAIN: straight caths at home, bladder injury intra-op     A-LINE: [x ] YES [ ] NO  LOCATION: L axilla  DATE INSERTED: 1/14  REMOVE: [ ] YES [x ] NO  EXPLAIN: close HD monitoring       ABG - ( 16 Jan 2024 04:05 )  pH, Arterial: 7.450 pH  /  pCO2: 35    /  pO2: 103   / HCO3: 24    / Base Excess: 0.3   /  SaO2: 99.5          I&O's Detail    14 Jan 2024 07:01  -  15 Renny 2024 07:00  --------------------------------------------------------  IN:    FentaNYL: 28 mL    IV PiggyBack: 100 mL    IV PiggyBack: 300 mL    IV PiggyBack: 225 mL    Lactated Ringers Bolus: 1000 mL    multiple electrolytes Injection Type 1 Bolus: 4000 mL    multiple electrolytes Injection Type 1.: 1800 mL    Norepinephrine: 256.6 mL    Sodium Chloride 0.9% Bolus: 2000 mL    Vasopressin: 66 mL  Total IN: 9775.6 mL    OUT:    Bulb (mL): 325 mL    Indwelling Catheter - Urethral (mL): 1210 mL    Nasogastric/Oral tube (mL): 1600 mL  Total OUT: 3135 mL    Total NET: 6640.6 mL      15 Renny 2024 07:01  -  16 Jan 2024 04:37  --------------------------------------------------------  IN:    Albumin 5%  - 500 mL: 500 mL    dextrose 5% + lactated ringers: 800 mL    FentaNYL: 28 mL    IV PiggyBack: 250 mL    IV PiggyBack: 200 mL    Lactated Ringers: 1000 mL    multiple electrolytes Injection Type 1.: 1440 mL    Norepinephrine: 141.6 mL  Total IN: 4359.6 mL    OUT:    Bulb (mL): 55 mL    Indwelling Catheter - Urethral (mL): 2670 mL    IV PiggyBack: 0 mL    Nasogastric/Oral tube (mL): 300 mL    Vasopressin: 0 mL  Total OUT: 3025 mL    Total NET: 1334.6 mL      Physical Exam:  NEUROLOGY: Awake and alert, follows commands with b/l UE, no focal deficits.     HEENT: Normocephalic, atraumatic, EOMI.      RESPIRATORY: Lungs clear to auscultation, Normal expansion/effort.     CARDIOVASCULAR: S1, S2.  sinus tachycardia to 120s     GI/NUTRITION: Abdomen soft, appropriately tender around incision site, Non-distended. Wound:  c/d/i     VASCULAR: Extremities warm, pink, well-perfused.     MUSCULOSKELETAL: B/l upper extremities moving spontaneously without limitations. Motor Strength 5/5 B/L upper and 0/5 lower extremities, sensory is at baseline and symmetric b/l      SKIN: Good skin turgor, no skin breakdown.     LABS:  CBC Full  -  ( 16 Jan 2024 02:35 )  WBC Count : 9.40 K/uL  RBC Count : 4.12 M/uL  Hemoglobin : 11.9 g/dL  Hematocrit : 34.1 %  Platelet Count - Automated : 53 K/uL  Mean Cell Volume : 82.8 fl  Mean Cell Hemoglobin : 28.9 pg  Mean Cell Hemoglobin Concentration : 34.9 gm/dL  Auto Neutrophil # : 8.41 K/uL  Auto Lymphocyte # : 0.66 K/uL  Auto Monocyte # : 0.16 K/uL  Auto Eosinophil # : 0.00 K/uL  Auto Basophil # : 0.00 K/uL  Auto Neutrophil % : 70.4 %  Auto Lymphocyte % : 7.0 %  Auto Monocyte % : 1.7 %  Auto Eosinophil % : 0.0 %  Auto Basophil % : 0.0 %    01-16    141  |  106  |  14.9  ----------------------------<  129<H>  3.3<L>   |  24.0  |  0.97    Ca    8.5      16 Jan 2024 02:35  Phos  2.7     01-16  Mg     2.0     01-16    TPro  5.0<L>  /  Alb  2.6<L>  /  TBili  0.3<L>  /  DBili  0.1  /  AST  69<H>  /  ALT  23  /  AlkPhos  87  01-14    PT/INR - ( 14 Jan 2024 22:20 )   PT: 11.9 sec;   INR: 1.07 ratio    PTT - ( 14 Jan 2024 22:20 )  PTT:34.3 sec

## 2024-01-16 NOTE — DIETITIAN INITIAL EVALUATION ADULT - ORAL INTAKE PTA/DIET HISTORY
Chart reviewed, pt now extubated; remains NPO at this time with NGT to suction secondary to SBO s/p ex lap and SBR. Last documented NGT output 1/15 - 1100 cc total for the day, no output documented on flow sheet thus far today. Last BM today x 2. Pt noted to have 240 cm of small bowel remaining. Prior to admission had normal PO intake per notes. RD remains available, to follow up as feasible. Recommendations below.

## 2024-01-16 NOTE — PROGRESS NOTE ADULT - SUBJECTIVE AND OBJECTIVE BOX
Subjective:28yMale with spina bifida, admitted with SBO, pt self catheterizes at home with 10Fr cline.  10Fr cline placed by  team, pt POD#2 s/p ex-lap, MUKUL, partial Lt colectomy, closure on cystotomy. Covid+. Cline occasional stops draining well, irrigated overnight, better output. UO 2885ml/day.    Cline: yellow    Vital Signs Last 24 Hrs  T(C): 36.9 (16 Jan 2024 07:27), Max: 37.3 (16 Jan 2024 00:31)  T(F): 98.4 (16 Jan 2024 07:27), Max: 99.2 (16 Jan 2024 00:31)  HR: 108 (16 Jan 2024 08:00) (98 - 126)  BP: --  BP(mean): --  RR: 19 (16 Jan 2024 08:00) (8 - 26)  SpO2: 99% (16 Jan 2024 08:00) (98% - 100%)    Parameters below as of 16 Jan 2024 08:00  Patient On (Oxygen Delivery Method): nasal cannula      I&O's Detail    15 Renny 2024 07:01  -  16 Jan 2024 07:00  --------------------------------------------------------  IN:    Albumin 5%  - 500 mL: 500 mL    dextrose 5% + lactated ringers: 1100 mL    FentaNYL: 28 mL    IV PiggyBack: 250 mL    IV PiggyBack: 200 mL    IV PiggyBack: 300 mL    Lactated Ringers: 1000 mL    multiple electrolytes Injection Type 1.: 1440 mL    Norepinephrine: 141.6 mL  Total IN: 4959.6 mL    OUT:    Bulb (mL): 55 mL    Indwelling Catheter - Urethral (mL): 2885 mL    IV PiggyBack: 0 mL    Nasogastric/Oral tube (mL): 300 mL    Vasopressin: 0 mL  Total OUT: 3240 mL    Total NET: 1719.6 mL      16 Jan 2024 07:01  -  16 Jan 2024 10:32  --------------------------------------------------------  IN:  Total IN: 0 mL    OUT:    Bulb (mL): 145 mL    Indwelling Catheter - Urethral (mL): 0 mL  Total OUT: 145 mL    Total NET: -145 mL          Labs:                        11.9   9.40  )-----------( 53       ( 16 Jan 2024 02:35 )             34.1     01-16    141  |  106  |  14.9  ----------------------------<  129<H>  3.3<L>   |  24.0  |  0.97    Ca    8.5      16 Jan 2024 02:35  Phos  2.7     01-16  Mg     2.0     01-16    TPro  5.0<L>  /  Alb  2.6<L>  /  TBili  0.3<L>  /  DBili  0.1  /  AST  69<H>  /  ALT  23  /  AlkPhos  87  01-14    PT/INR - ( 14 Jan 2024 22:20 )   PT: 11.9 sec;   INR: 1.07 ratio         PTT - ( 14 Jan 2024 22:20 )  PTT:34.3 sec      Culture - Urine (collected 14 Jan 2024 15:15)  Source: Catheterized Catheterized  Preliminary Report (15 Renny 2024 23:25):    50,000 - 99,000 CFU/mL Escherichia coli    Culture - Blood (collected 14 Jan 2024 05:30)  Source: .Blood Blood-Peripheral  Preliminary Report (16 Jan 2024 09:01):    No growth at 48 Hours    Culture - Blood (collected 14 Jan 2024 05:25)  Source: .Blood Blood-Peripheral  Preliminary Report (16 Jan 2024 09:01):    No growth at 48 Hours

## 2024-01-16 NOTE — DIETITIAN INITIAL EVALUATION ADULT - OTHER INFO
27 yo male with pmhx of spina bifida,  shunt, scoliosis, wheelchair bound. BIBA from home with lethargy, hypotensive BP 50/30, 2 days of intermittent fevers, nasal congestion. Pt developed nausea, vomiting, severe generalized weakness,  found to have severe SBO, metabolic acidosis, and to be in septic and hypovolemic shock. Patient was brought to the OR 1/14 for ex lap and small bowel resection, c/b bladder perf with primary repair, and admitted to SICU post-op for further management.

## 2024-01-16 NOTE — DIETITIAN INITIAL EVALUATION ADULT - PERTINENT LABORATORY DATA
01-16    141  |  106  |  14.9  ----------------------------<  129<H>  3.3<L>   |  24.0  |  0.97    Ca    8.5      16 Jan 2024 02:35  Phos  2.7     01-16  Mg     2.0     01-16    TPro  5.0<L>  /  Alb  2.6<L>  /  TBili  0.3<L>  /  DBili  0.1  /  AST  69<H>  /  ALT  23  /  AlkPhos  87  01-14

## 2024-01-16 NOTE — PROGRESS NOTE ADULT - ASSESSMENT
ASSESSMENT:   27yo M w/ hx of spina bifida VPS @ [facility] by Dr Dorsey and scoliosis/ no sensation distal to pubic line, wheelchair bound at baseline w no movement of his legs, self catheterizes bladder, presenting with abdominal pain, n/v. Consulted for evaluation of VPS.  -S/p OR w/ general surgery on 1/14 for small bowel resection for small bowel obx.     PLAN:    -No acute neurosurgical intervention recommended at this time  -Continue q1h neuro checks until official read of CTH completed; if repeat CTH stable okay for q2h neuro checks   -Repeat CTH completed, official read pending  -Monitor for fevers   -If persistent fevers, will likely consider shunt tap to assess for shunt infection  -Will continue to follow  -Awaiting shunt/valve records for review   -Discussed case w/ Dr. Harris   ASSESSMENT:   27yo M w/ hx of spina bifida VPS @ [facility] by Dr Dorsey and scoliosis/ no sensation distal to pubic line, wheelchair bound at baseline w no movement of his legs, self catheterizes bladder, presenting with abdominal pain, n/v. Consulted for evaluation of VPS.  -S/p OR w/ general surgery on 1/14 for small bowel resection for small bowel obx.     PLAN:    -No acute neurosurgical intervention recommended at this time  -Continue q1h neuro checks until official read of CTH completed; if repeat CTH stable okay for q2h neuro checks   -Repeat CTH completed, official read pending  -Monitor for fevers   -If persistent fevers, will likely consider shunt tap to assess for shunt infection  -Will continue to follow  -Awaiting shunt/valve records for review   -Discussed case w/ Dr. Harris

## 2024-01-17 LAB
-  AMOXICILLIN/CLAVULANIC ACID: SIGNIFICANT CHANGE UP
-  AMOXICILLIN/CLAVULANIC ACID: SIGNIFICANT CHANGE UP
-  AMPICILLIN/SULBACTAM: SIGNIFICANT CHANGE UP
-  AMPICILLIN/SULBACTAM: SIGNIFICANT CHANGE UP
-  AMPICILLIN: SIGNIFICANT CHANGE UP
-  AMPICILLIN: SIGNIFICANT CHANGE UP
-  AZTREONAM: SIGNIFICANT CHANGE UP
-  AZTREONAM: SIGNIFICANT CHANGE UP
-  CEFAZOLIN: SIGNIFICANT CHANGE UP
-  CEFAZOLIN: SIGNIFICANT CHANGE UP
-  CEFEPIME: SIGNIFICANT CHANGE UP
-  CEFEPIME: SIGNIFICANT CHANGE UP
-  CEFOXITIN: SIGNIFICANT CHANGE UP
-  CEFTRIAXONE: SIGNIFICANT CHANGE UP
-  CEFTRIAXONE: SIGNIFICANT CHANGE UP
-  CEFUROXIME: SIGNIFICANT CHANGE UP
-  CIPROFLOXACIN: SIGNIFICANT CHANGE UP
-  CIPROFLOXACIN: SIGNIFICANT CHANGE UP
-  ERTAPENEM: SIGNIFICANT CHANGE UP
-  ERTAPENEM: SIGNIFICANT CHANGE UP
-  GENTAMICIN: SIGNIFICANT CHANGE UP
-  GENTAMICIN: SIGNIFICANT CHANGE UP
-  IMIPENEM: SIGNIFICANT CHANGE UP
-  LEVOFLOXACIN: SIGNIFICANT CHANGE UP
-  LEVOFLOXACIN: SIGNIFICANT CHANGE UP
-  MEROPENEM: SIGNIFICANT CHANGE UP
-  MEROPENEM: SIGNIFICANT CHANGE UP
-  NITROFURANTOIN: SIGNIFICANT CHANGE UP
-  NITROFURANTOIN: SIGNIFICANT CHANGE UP
-  PIPERACILLIN/TAZOBACTAM: SIGNIFICANT CHANGE UP
-  PIPERACILLIN/TAZOBACTAM: SIGNIFICANT CHANGE UP
-  TOBRAMYCIN: SIGNIFICANT CHANGE UP
-  TOBRAMYCIN: SIGNIFICANT CHANGE UP
-  TRIMETHOPRIM/SULFAMETHOXAZOLE: SIGNIFICANT CHANGE UP
-  TRIMETHOPRIM/SULFAMETHOXAZOLE: SIGNIFICANT CHANGE UP
ANION GAP SERPL CALC-SCNC: 11 MMOL/L — SIGNIFICANT CHANGE UP (ref 5–17)
ANION GAP SERPL CALC-SCNC: 11 MMOL/L — SIGNIFICANT CHANGE UP (ref 5–17)
BUN SERPL-MCNC: 12.8 MG/DL — SIGNIFICANT CHANGE UP (ref 8–20)
BUN SERPL-MCNC: 9.9 MG/DL — SIGNIFICANT CHANGE UP (ref 8–20)
CALCIUM SERPL-MCNC: 7.5 MG/DL — LOW (ref 8.4–10.5)
CALCIUM SERPL-MCNC: 7.5 MG/DL — LOW (ref 8.4–10.5)
CHLORIDE SERPL-SCNC: 105 MMOL/L — SIGNIFICANT CHANGE UP (ref 96–108)
CHLORIDE SERPL-SCNC: 110 MMOL/L — HIGH (ref 96–108)
CO2 SERPL-SCNC: 22 MMOL/L — SIGNIFICANT CHANGE UP (ref 22–29)
CO2 SERPL-SCNC: 25 MMOL/L — SIGNIFICANT CHANGE UP (ref 22–29)
CREAT SERPL-MCNC: 0.89 MG/DL — SIGNIFICANT CHANGE UP (ref 0.5–1.3)
CREAT SERPL-MCNC: 1.02 MG/DL — SIGNIFICANT CHANGE UP (ref 0.5–1.3)
CULTURE RESULTS: ABNORMAL
EGFR: 101 ML/MIN/1.73M2 — SIGNIFICANT CHANGE UP
EGFR: 118 ML/MIN/1.73M2 — SIGNIFICANT CHANGE UP
GLUCOSE SERPL-MCNC: 104 MG/DL — HIGH (ref 70–99)
GLUCOSE SERPL-MCNC: 107 MG/DL — HIGH (ref 70–99)
HCT VFR BLD CALC: 35.9 % — LOW (ref 39–50)
HEPARIN-PF4 AB RESULT: <0.6 U/ML — SIGNIFICANT CHANGE UP (ref 0–0.9)
HGB BLD-MCNC: 11.8 G/DL — LOW (ref 13–17)
MAGNESIUM SERPL-MCNC: 1.8 MG/DL — SIGNIFICANT CHANGE UP (ref 1.6–2.6)
MAGNESIUM SERPL-MCNC: 2.5 MG/DL — SIGNIFICANT CHANGE UP (ref 1.6–2.6)
MCHC RBC-ENTMCNC: 27.3 PG — SIGNIFICANT CHANGE UP (ref 27–34)
MCHC RBC-ENTMCNC: 32.9 GM/DL — SIGNIFICANT CHANGE UP (ref 32–36)
MCV RBC AUTO: 83.1 FL — SIGNIFICANT CHANGE UP (ref 80–100)
METHOD TYPE: SIGNIFICANT CHANGE UP
METHOD TYPE: SIGNIFICANT CHANGE UP
ORGANISM # SPEC MICROSCOPIC CNT: ABNORMAL
ORGANISM # SPEC MICROSCOPIC CNT: ABNORMAL
ORGANISM # SPEC MICROSCOPIC CNT: SIGNIFICANT CHANGE UP
PF4 HEPARIN CMPLX AB SER-ACNC: NEGATIVE — SIGNIFICANT CHANGE UP
PHOSPHATE SERPL-MCNC: 2.1 MG/DL — LOW (ref 2.4–4.7)
PHOSPHATE SERPL-MCNC: 3.3 MG/DL — SIGNIFICANT CHANGE UP (ref 2.4–4.7)
PLATELET # BLD AUTO: 56 K/UL — LOW (ref 150–400)
POTASSIUM SERPL-MCNC: 3.5 MMOL/L — SIGNIFICANT CHANGE UP (ref 3.5–5.3)
POTASSIUM SERPL-MCNC: 3.6 MMOL/L — SIGNIFICANT CHANGE UP (ref 3.5–5.3)
POTASSIUM SERPL-SCNC: 3.5 MMOL/L — SIGNIFICANT CHANGE UP (ref 3.5–5.3)
POTASSIUM SERPL-SCNC: 3.6 MMOL/L — SIGNIFICANT CHANGE UP (ref 3.5–5.3)
RBC # BLD: 4.32 M/UL — SIGNIFICANT CHANGE UP (ref 4.2–5.8)
RBC # FLD: 15.7 % — HIGH (ref 10.3–14.5)
SODIUM SERPL-SCNC: 141 MMOL/L — SIGNIFICANT CHANGE UP (ref 135–145)
SODIUM SERPL-SCNC: 143 MMOL/L — SIGNIFICANT CHANGE UP (ref 135–145)
SPECIMEN SOURCE: SIGNIFICANT CHANGE UP
WBC # BLD: 11.48 K/UL — HIGH (ref 3.8–10.5)
WBC # FLD AUTO: 11.48 K/UL — HIGH (ref 3.8–10.5)

## 2024-01-17 PROCEDURE — 74018 RADEX ABDOMEN 1 VIEW: CPT | Mod: 26

## 2024-01-17 PROCEDURE — 99233 SBSQ HOSP IP/OBS HIGH 50: CPT

## 2024-01-17 RX ORDER — CEFEPIME 1 G/1
2000 INJECTION, POWDER, FOR SOLUTION INTRAMUSCULAR; INTRAVENOUS EVERY 8 HOURS
Refills: 0 | Status: DISCONTINUED | OUTPATIENT
Start: 2024-01-17 | End: 2024-01-17

## 2024-01-17 RX ORDER — POTASSIUM CHLORIDE 20 MEQ
20 PACKET (EA) ORAL
Refills: 0 | Status: COMPLETED | OUTPATIENT
Start: 2024-01-17 | End: 2024-01-17

## 2024-01-17 RX ORDER — CEFEPIME 1 G/1
2000 INJECTION, POWDER, FOR SOLUTION INTRAMUSCULAR; INTRAVENOUS EVERY 8 HOURS
Refills: 0 | Status: COMPLETED | OUTPATIENT
Start: 2024-01-17 | End: 2024-01-20

## 2024-01-17 RX ORDER — ACETAMINOPHEN 500 MG
1000 TABLET ORAL EVERY 6 HOURS
Refills: 0 | Status: COMPLETED | OUTPATIENT
Start: 2024-01-17 | End: 2024-01-18

## 2024-01-17 RX ORDER — MAGNESIUM SULFATE 500 MG/ML
2 VIAL (ML) INJECTION ONCE
Refills: 0 | Status: COMPLETED | OUTPATIENT
Start: 2024-01-17 | End: 2024-01-17

## 2024-01-17 RX ORDER — ENOXAPARIN SODIUM 100 MG/ML
40 INJECTION SUBCUTANEOUS EVERY 24 HOURS
Refills: 0 | Status: DISCONTINUED | OUTPATIENT
Start: 2024-01-17 | End: 2024-01-21

## 2024-01-17 RX ORDER — POTASSIUM PHOSPHATE, MONOBASIC POTASSIUM PHOSPHATE, DIBASIC 236; 224 MG/ML; MG/ML
15 INJECTION, SOLUTION INTRAVENOUS ONCE
Refills: 0 | Status: COMPLETED | OUTPATIENT
Start: 2024-01-17 | End: 2024-01-17

## 2024-01-17 RX ADMIN — Medication 400 MILLIGRAM(S): at 15:39

## 2024-01-17 RX ADMIN — CHLORHEXIDINE GLUCONATE 1 APPLICATION(S): 213 SOLUTION TOPICAL at 15:01

## 2024-01-17 RX ADMIN — CEFEPIME 2000 MILLIGRAM(S): 1 INJECTION, POWDER, FOR SOLUTION INTRAMUSCULAR; INTRAVENOUS at 21:46

## 2024-01-17 RX ADMIN — Medication 25 GRAM(S): at 23:38

## 2024-01-17 RX ADMIN — POTASSIUM PHOSPHATE, MONOBASIC POTASSIUM PHOSPHATE, DIBASIC 62.5 MILLIMOLE(S): 236; 224 INJECTION, SOLUTION INTRAVENOUS at 23:38

## 2024-01-17 RX ADMIN — Medication 100 GRAM(S): at 05:48

## 2024-01-17 RX ADMIN — Medication 100 MILLIEQUIVALENT(S): at 05:47

## 2024-01-17 RX ADMIN — ENOXAPARIN SODIUM 40 MILLIGRAM(S): 100 INJECTION SUBCUTANEOUS at 23:38

## 2024-01-17 RX ADMIN — Medication 1000 MILLIGRAM(S): at 16:09

## 2024-01-17 RX ADMIN — Medication 100 MILLIEQUIVALENT(S): at 06:54

## 2024-01-17 RX ADMIN — Medication 100 MILLIEQUIVALENT(S): at 08:47

## 2024-01-17 RX ADMIN — SODIUM CHLORIDE 100 MILLILITER(S): 9 INJECTION, SOLUTION INTRAVENOUS at 15:39

## 2024-01-17 NOTE — PROGRESS NOTE ADULT - ASSESSMENT
Assessment: 29yo male with PMHx of spina bifida (wheelchair bound at baseline), scoliosis,  Shunt BIBEMS 1/14 with abdominal pain, n/v, and lethargy, found to have severe SBO, metabolic acidosis, and to be in septic and hypovolemic shock. Patient was brought to the OR 1/14 for ex lap and small bowel resection, c/b bladder perf with primary repair, and admitted to SICU post-op for further management.      Plan      NEURO:    - PMHx of spina bifida (wheelchair bound at baseline, no sensation distal to pubic line), VPS, neurosurgery following for evaluation of VPS; no acute neurosurgical intervention recommended    - Most recent CTH from 1/16 stable with no evidence of hydrocephalus      CARDIOVASCULAR: hypovolemic/septic shock    - lactate cleared, off pressors for >24 hours   - maintain MAP >65    - Will continue to monitor hemodynamics using a-line and markers of end-organ perfusion, additional volume resuscitation as indicated        PULMONARY: extubated 1/15    - Saturating well on room air, maintain O2 sat >92%       GASTROINTESTINAL: SBO at previous SBR site, s/p ex-lap 2/ SBR on 1/14 (250cm small bowel remaining)    - Will keep NPO and maintain NGT to suction for gastric decompression    - monitor NGT output    - D5/LR for maintenance fluids      RENAL: GALEN- resolving, straight-caths with 10Fr at home, 10fr placed by urology 1/14    - AGLEN on admission (36.1/2.04), unknown baseline, likely pre-renal d/t hypotension vs ischemic ATN; now improving, Cr down to 1   - Intra-op bladder perf s/p primary repair; monitor output of 19F Ramsey on top of bladder; urology advised flushing drain daily (to gravity)    - bladder scan Q4 hours      ENDOCRINE:    -Glucose well-controlled, no active issues      INFECTIOUS DISEASE:     - Zosyn de-escalated to cefotetan    - Blood cultures 1/14 with NGTD    - If persistent fevers, will likely consider shunt tap (per Nsx) to assess for shunt infection    - Will continue to monitor WBC and fever curve    - awaiting sensitivities for urine culture      HEMATOLOGY/DVT PROPHYLAXIS:    - SQH held while awaiting HIT Panel    - [facility] [place]   Assessment: 29yo male with PMHx of spina bifida (wheelchair bound at baseline), scoliosis,  Shunt BIBEMS 1/14 with abdominal pain, n/v, and lethargy, found to have severe SBO, metabolic acidosis, and to be in septic and hypovolemic shock. Patient was brought to the OR 1/14 for ex lap and small bowel resection, c/b bladder perf with primary repair, and admitted to SICU post-op for further management.      Plan      NEURO:    - PMHx of spina bifida (wheelchair bound at baseline, no sensation distal to pubic line), VPS, neurosurgery following for evaluation of VPS; no acute neurosurgical intervention recommended    - Most recent CTH from 1/16 stable with no evidence of hydrocephalus      CARDIOVASCULAR: hypovolemic/septic shock    - lactate cleared, off pressors for >24 hours   - maintain MAP >65    - Will continue to monitor hemodynamics using a-line and markers of end-organ perfusion, additional volume resuscitation as indicated        PULMONARY: extubated 1/15    - Saturating well on room air, maintain O2 sat >92%       GASTROINTESTINAL: SBO at previous SBR site, s/p ex-lap 2/ SBR on 1/14 (250cm small bowel remaining)    - monitor NGT output    - D5/LR for maintenance fluids      RENAL: GALEN- resolving, straight-caths with 10Fr at home, 10fr placed by urology 1/14    - GALEN on admission (36.1/2.04), unknown baseline, likely pre-renal d/t hypotension vs ischemic ATN; now improving, Cr down to 1   - Intra-op bladder perf s/p primary repair; monitor output of 19F Ramsey on top of bladder; urology advised flushing drain daily (to gravity)    - bladder scan Q4 hours      ENDOCRINE:    -Glucose well-controlled, no active issues      INFECTIOUS DISEASE:     - Zosyn de-escalated to cefotetan    - Blood cultures 1/14 with NGTD    - If persistent fevers, will likely consider shunt tap (per Nsx) to assess for shunt infection    - Will continue to monitor WBC and fever curve    - awaiting sensitivities for urine culture      HEMATOLOGY/DVT PROPHYLAXIS:    - SQH held while awaiting HIT Panel    - SCDs

## 2024-01-17 NOTE — PROGRESS NOTE ADULT - ASSESSMENT
28yMale POD#3 s/p ex-lap, partial left colectomy, MUKUL, closure of cystotomy, placement of cline catheter for SBO. Covid +  - UO deceases occasionally- small catheter and may occasionally have some sediment preventing better drainage  - cline irrigated and aspirated, small amount of cloudy urine aspirated, remained of drainage relatively clear yellow urine  - irrigate and aspirate the cline gently for adequate drainage from this 10Fr catheter  - monitor UO

## 2024-01-17 NOTE — PROGRESS NOTE ADULT - SUBJECTIVE AND OBJECTIVE BOX
Subjective:28yMale POD#3 s/p ex-lap, partial left colectomy, MUKUL, closure of cystotomy, placement of cline catheter for SBO. Covid +.  Pt resting comfortably, NGT in place, no c/o abdominal pain at this time.  Cline in place, draining yellow urine.    Cline: yellow    Vital Signs Last 24 Hrs  T(C): 37.2 (17 Jan 2024 07:37), Max: 37.2 (17 Jan 2024 07:37)  T(F): 98.9 (17 Jan 2024 07:37), Max: 98.9 (17 Jan 2024 07:37)  HR: 106 (17 Jan 2024 10:00) (96 - 112)  BP: --  BP(mean): --  RR: 19 (17 Jan 2024 10:00) (15 - 22)  SpO2: 98% (17 Jan 2024 10:00) (93% - 100%)    Parameters below as of 17 Jan 2024 08:00  Patient On (Oxygen Delivery Method): room air      I&O's Detail    16 Jan 2024 07:01  -  17 Jan 2024 07:00  --------------------------------------------------------  IN:    dextrose 5% + lactated ringers: 2400 mL    IV PiggyBack: 50 mL    IV PiggyBack: 100 mL    IV PiggyBack: 499.8 mL    IV PiggyBack: 50 mL  Total IN: 3099.8 mL    OUT:    Bulb (mL): 595 mL    Indwelling Catheter - Urethral (mL): 1420 mL    Nasogastric/Oral tube (mL): 860 mL  Total OUT: 2875 mL    Total NET: 224.8 mL      17 Jan 2024 07:01  -  17 Jan 2024 10:56  --------------------------------------------------------  IN:    dextrose 5% + lactated ringers: 400 mL    IV PiggyBack: 100 mL  Total IN: 500 mL    OUT:    Bulb (mL): 405 mL  Total OUT: 405 mL    Total NET: 95 mL          Labs:                        11.8   11.48 )-----------( 56       ( 17 Jan 2024 04:00 )             35.9     01-17    141  |  105  |  12.8  ----------------------------<  104<H>  3.5   |  25.0  |  1.02    Ca    7.5<L>      17 Jan 2024 04:00  Phos  3.3     01-17  Mg     2.5     01-17            Culture - Urine (collected 14 Jan 2024 15:15)  Source: Catheterized Catheterized  Preliminary Report (16 Jan 2024 20:54):    50,000 - 99,000 CFU/mL Escherichia coli    50,000 - 99,000 CFU/mL Morganella morganii

## 2024-01-17 NOTE — PROGRESS NOTE ADULT - SUBJECTIVE AND OBJECTIVE BOX
24H events: CT head  WNL with no evidence of hydrocephalus. FRITZ drain continues to have high outputs with serosanguinous fluid. UOP has been difficult to access. 10F cline positional and needs to be flushed frequently. Pt with worsening thrombocytopenia. HIIT panel/TONEY sent. Zosyn de-escalated to cefotetan. Pt has been off pressors since 4AM yesterday and heart rate ahs improved.        PAST MEDICAL & SURGICAL HISTORY:  Spina bifida      Scoliosis      MEDICATIONS  (STANDING):  cefoTEtan  IVPB 2 Gram(s) IV Intermittent every 12 hours  chlorhexidine 2% Cloths 1 Application(s) Topical daily  dextrose 5% + lactated ringers. 1000 milliLiter(s) (100 mL/Hr) IV Continuous <Continuous>  potassium chloride  20 mEq/100 mL IVPB 20 milliEquivalent(s) IV Intermittent every 1 hour    MEDICATIONS  (PRN):  benzocaine 20% Spray 1 Spray(s) Topical every 6 hours PRN sore throat  benzocaine/menthol Lozenge 1 Lozenge Oral every 8 hours PRN Sore Throat      ICU Vital Signs Last 24 Hrs  T(C): 36.7 (16 Jan 2024 20:17), Max: 36.9 (16 Jan 2024 07:27)  T(F): 98 (16 Jan 2024 20:17), Max: 98.4 (16 Jan 2024 07:27)  HR: 102 (17 Jan 2024 01:00) (96 - 115)  ABP: 111/70 (17 Jan 2024 01:00) (93/51 - 112/70)  ABP(mean): 86 (17 Jan 2024 01:00) (66 - 86)  RR: 20 (17 Jan 2024 01:00) (15 - 22)  SpO2: 95% (17 Jan 2024 01:00) (93% - 100%)    O2 Parameters below as of 17 Jan 2024 00:00  Patient On (Oxygen Delivery Method): room air      Drug Dosing Weight  Height (cm): 132.1 (14 Jan 2024 09:30)  Weight (kg): 70 (14 Jan 2024 07:48)  BMI (kg/m2): 40.1 (14 Jan 2024 09:30)  BSA (m2): 1.51 (14 Jan 2024 09:30)    CENTRAL LINE: [x ] YES [ ] NO  LOCATION: R Subclavian TLC  DATE INSERTED: 1/14  REMOVE: [ ] YES [ x] NO  EXPLAIN: multiple drips / access     CLINE: [x ] YES [ ] NO    DATE INSERTED: 1/14  REMOVE: [ ] YES [x ] NO  EXPLAIN: straight caths at baseline, I&Os    A-LINE: [x ] YES [ ] NO  LOCATION: left axilla DATE INSERTED: 1/14  REMOVE: [ x] YES [ ] NO  EXPLAIN: possibly remove later today if pt remains HD stable            ABG - ( 16 Jan 2024 04:05 )  pH, Arterial: 7.450 pH, Blood: x     /  pCO2: 35    /  pO2: 103   / HCO3: 24    / Base Excess: 0.3   /  SaO2: 99.5        I&O's Detail    15 Renny 2024 07:01  -  16 Jan 2024 07:00  --------------------------------------------------------  IN:    Albumin 5%  - 500 mL: 500 mL    dextrose 5% + lactated ringers: 1100 mL    FentaNYL: 28 mL    IV PiggyBack: 250 mL    IV PiggyBack: 200 mL    IV PiggyBack: 300 mL    Lactated Ringers: 1000 mL    multiple electrolytes Injection Type 1.: 1440 mL    Norepinephrine: 141.6 mL  Total IN: 4959.6 mL    OUT:    Bulb (mL): 55 mL    Indwelling Catheter - Urethral (mL): 2885 mL    IV PiggyBack: 0 mL    Nasogastric/Oral tube (mL): 300 mL    Vasopressin: 0 mL  Total OUT: 3240 mL    Total NET: 1719.6 mL      16 Jan 2024 07:01  -  17 Jan 2024 05:13  --------------------------------------------------------  IN:    dextrose 5% + lactated ringers: 1800 mL    IV PiggyBack: 50 mL    IV PiggyBack: 100 mL    IV PiggyBack: 416.5 mL  Total IN: 2366.5 mL    OUT:    Bulb (mL): 505 mL    Indwelling Catheter - Urethral (mL): 895 mL    Nasogastric/Oral tube (mL): 610 mL  Total OUT: 2010 mL    Total NET: 356.5 mL        Physical Exam:    NEUROLOGY: Awake and alert, follows commands with b/l UE, no focal deficits.      HEENT: Normocephalic, atraumatic, EOMI.       RESPIRATORY: Lungs clear to auscultation, Normal expansion/effort. Room air      CARDIOVASCULAR: S1, S2.  sinus tachycardia to low 100s      GI/NUTRITION: Abdomen soft, appropriately tender around incision site, Non-distended. Wound:  c/d/i      VASCULAR: Extremities warm, pink, well-perfused.      MUSCULOSKELETAL: B/l upper extremities moving spontaneously without limitations. Motor Strength 5/5 B/L upper and 0/5 lower extremities, sensory is at baseline and symmetric b/l       SKIN: Good skin turgor, no skin breakdown.     LABS:  CBC Full  -  ( 17 Jan 2024 04:00 )  WBC Count : 11.48 K/uL  RBC Count : 4.32 M/uL  Hemoglobin : 11.8 g/dL  Hematocrit : 35.9 %  Platelet Count - Automated : 56 K/uL  Mean Cell Volume : 83.1 fl  Mean Cell Hemoglobin : 27.3 pg  Mean Cell Hemoglobin Concentration : 32.9 gm/dL      01-17    141  |  105  |  12.8  ----------------------------<  104<H>  3.5   |  25.0  |  1.02    Ca    7.5<L>      17 Jan 2024 04:00  Phos  3.3     01-17  Mg     2.5     01-17

## 2024-01-17 NOTE — CHART NOTE - NSCHARTNOTEFT_GEN_A_CORE
SICU TRANSFER NOTE  -----------------------------  ICU Admission Date: 01/14/2024  Transfer Date: 01-17-24 @ 11:17    Admission Diagnosis:   1. SBO  2. Acidosis  3. Septic Shock  4. COVID +      Active Problems/injuries:  1. SBO  2. Acidosis  3. Septic Shock  4. COVID +    Procedures:   1. Exploratory Laparotomy 01/14  2. Urinary Device Placement 01/14    Consultants:  [ ] Cardiology  [ ] Endocrine  [ ] Infectious Disease  [ ] Medicine  [X]Neurosurgery  [ ] Ortho       [ ] Weight Bearing Status:  [ ] Palliative       [ ] Advanced Directives:    [ ] Physical Medicine and Rehab       [ ] Disposition :   [ ] Plastics  [ ] Pulmonary  [X] Urology    Medications  benzocaine 20% Spray 1 Spray(s) Topical every 6 hours PRN  benzocaine/menthol Lozenge 1 Lozenge Oral every 8 hours PRN  cefoTEtan  IVPB 2 Gram(s) IV Intermittent every 12 hours  chlorhexidine 2% Cloths 1 Application(s) Topical daily  dextrose 5% + lactated ringers. 1000 milliLiter(s) IV Continuous <Continuous>      [ ] I attest I have reviewed and reconciled all medications prior to transfer    IV Fluids  lactated ringers.: Solution, 1000 milliLiter(s) infuse at 1000 mL/Hr  lactated ringers.: Solution, 1000 milliLiter(s) infuse at 75 mL/Hr  Provider's Contact #: [phone]  lactated ringers Bolus:   1000 milliLiter(s), IV Bolus, once, infuse over 60 Minute(s), Stop After 1 Doses  Provider's Contact #: [phone]  sodium chloride 0.9% Bolus:   2100 milliLiter(s), IV Bolus, once, infuse over 60 Minute(s), Stop After 1 Doses     (Calc Info: 30 milliLiter(s)/Kg/DOSE x 70 Kg = 2,100 milliLiter(s)/Dose     (Requested dose was 30 milliLiter(s) per Kg)    Indication: XXXXXX    Antibiotics:  cefoTEtan  IVPB 2 Gram(s) IV Intermittent every 12 hours    Indication: XXXXXXX End Date:XXXXXXX      I have discussed this case with xxxxxENTER [email or web address] upon transfer and all questions regarding ICU course were answered.  The following items are to be followed up:  1. Evaluation of VPS  2. maintain MAP >65  3. Continue to monitor hemodynamics using A-line and markers of end-organ perfusion.   4. Maintain O2 sat >92%  5. Monitor NGT output  6. Keep NPO and maintain NGT to suction  7. Monitor output of 19F brandon on top of bladder.  8. Bladder scan Q4 hous.  9. Maintain Euglycemia   10. Continue to monitor WBC and fever curve  11. Follow up on sensitivites for urine culture  12. SQH held while awaiting HIT panel. SICU TRANSFER NOTE  -----------------------------  ICU Admission Date: 01/14/2024  Transfer Date: 01-17-24 @ 12:05    Admission Diagnosis:  1. SBO  2. Acidosis  3. Septic Shock  4. COVID +  5. Bladder Injury    Active Problems/injuries:   1. SBO  2. Acidosis  3. Septic Shock  4. COVID +  5. Bladder Injury    Procedures:   1. Exploratory Laparotomy, small bowel resection, partial left colectomy, and bladder repair 01/14:  Injury to sigmoid colon requiring resection of 5cm and side to side colocolonic anastomosis with GA 80mm blue staple. During lysis of adhesions the bladder was injured and it was repaired in 2 layers, inner chromic and outer Vicryl. Small bowel was significantly dilated and ran from LT to TI. Approximately 30cm proximal from TI, a previous are of small bowel resection was identified with significant small bowel dilatation proximal to it, decision was made to resect previous anastomosis with 40cm if dilated atomic small bowel. Resection was performed with a GA 80mm blue stapler on a side to side fashion.    2. Urinary Device Placement 01/14- patient has challenging anatomy therefore urology placed 10 Fr silicone catheter.     Consultants:  [ ] Cardiology  [ ] Endocrine  [ ] Infectious Disease  [ ] Medicine  [X]Neurosurgery  [ ] Ortho       [ ] Weight Bearing Status:  [ ] Palliative       [ ] Advanced Directives:    [ ] Physical Medicine and Rehab       [ ] Disposition :   [ ] Plastics  [ ] Pulmonary  [X]Urology    Medications  benzocaine 20% Spray 1 Spray(s) Topical every 6 hours PRN  benzocaine/menthol Lozenge 1 Lozenge Oral every 8 hours PRN  cefoTEtan  IVPB 2 Gram(s) IV Intermittent every 12 hours  chlorhexidine 2% Cloths 1 Application(s) Topical daily  dextrose 5% + lactated ringers. 1000 milliLiter(s) IV Continuous <Continuous>      [X] I attest I have reviewed and reconciled all medications prior to transfer    IV Fluids  lactated ringers.: Solution, 1000 milliLiter(s) infuse at 1000 mL/Hr  lactated ringers.: Solution, 1000 milliLiter(s) infuse at 75 mL/Hr  Provider's Contact #: [phone]  lactated ringers Bolus:   1000 milliLiter(s), IV Bolus, once, infuse over 60 Minute(s), Stop After 1 Doses  Provider's Contact #: [phone]  sodium chloride 0.9% Bolus:   2100 milliLiter(s), IV Bolus, once, infuse over 60 Minute(s), Stop After 1 Doses     (Calc Info: 30 milliLiter(s)/Kg/DOSE x 70 Kg = 2,100 milliLiter(s)/Dose     (Requested dose was 30 milliLiter(s) per Kg)    Indication: NPO    Antibiotics:  cefoTEtan  IVPB 2 Gram(s) IV Intermittent every 12 hours    Indication: Empiric End Date: Dec 14 2024    I have discussed this case with Trauma Team upon transfer and all questions regarding ICU course were answered.  The following items are to be followed up:  1. Serial neurologic assessments  2. maintain MAP >65  4. Maintain O2 sat >92%  5. Clears and ADAT. NGT DC'd today  7. Monitor output of 19F brandon on top of bladder.  8. Bladder scan Q4 hours: Pt s/p bladder repair. Flush Sharp every 4 hours with 50 cc   9. F/up urology recs  9. Maintain Euglycemia   10.Continue to monitor WBC and fever curve  11. Follow up on sensitivities for urine culture  12. SQH held while awaiting HIT panel.

## 2024-01-17 NOTE — PROGRESS NOTE ADULT - NS ATTEND AMEND GEN_ALL_CORE FT
Seen and examined on SICU rounds.   Remains hemodynamically stable, no respiratory distress.   NGT output was 860ml/24hrs; however, has had multiple bowel movements with minimal NGT output all morning into early afternoon today.   Sharp has been adequately managed w/ q4 bladder scans and flushes [small caliber, appears to be prone to clogging].   HIT screen negative (PF4 Ab).    AVSS; acceptable labs and uop (0.85ml/kg/hr).     --MMPR.   --Dc NGT, CLD as tolerated.   --IS, OOBTC/mobilize as tolerated.   --DVT ppx.   --Continue cefotetan. Zosyn dc'ed in setting of thrombocytopenia.   --Ok to dc central line.     *Ok to downgrade from SICU level of care.

## 2024-01-18 LAB
ANION GAP SERPL CALC-SCNC: 10 MMOL/L — SIGNIFICANT CHANGE UP (ref 5–17)
ANION GAP SERPL CALC-SCNC: 10 MMOL/L — SIGNIFICANT CHANGE UP (ref 5–17)
BASOPHILS # BLD AUTO: 0 K/UL — SIGNIFICANT CHANGE UP (ref 0–0.2)
BASOPHILS NFR BLD AUTO: 0 % — SIGNIFICANT CHANGE UP (ref 0–2)
BUN SERPL-MCNC: 9.2 MG/DL — SIGNIFICANT CHANGE UP (ref 8–20)
BUN SERPL-MCNC: 9.9 MG/DL — SIGNIFICANT CHANGE UP (ref 8–20)
CALCIUM SERPL-MCNC: 7.6 MG/DL — LOW (ref 8.4–10.5)
CALCIUM SERPL-MCNC: 8 MG/DL — LOW (ref 8.4–10.5)
CHLORIDE SERPL-SCNC: 109 MMOL/L — HIGH (ref 96–108)
CHLORIDE SERPL-SCNC: 109 MMOL/L — HIGH (ref 96–108)
CO2 SERPL-SCNC: 23 MMOL/L — SIGNIFICANT CHANGE UP (ref 22–29)
CO2 SERPL-SCNC: 25 MMOL/L — SIGNIFICANT CHANGE UP (ref 22–29)
CREAT ?TM UR-MCNC: <4 MG/DL — SIGNIFICANT CHANGE UP
CREAT SERPL-MCNC: 0.81 MG/DL — SIGNIFICANT CHANGE UP (ref 0.5–1.3)
CREAT SERPL-MCNC: 0.81 MG/DL — SIGNIFICANT CHANGE UP (ref 0.5–1.3)
EGFR: 122 ML/MIN/1.73M2 — SIGNIFICANT CHANGE UP
EGFR: 122 ML/MIN/1.73M2 — SIGNIFICANT CHANGE UP
EOSINOPHIL # BLD AUTO: 0 K/UL — SIGNIFICANT CHANGE UP (ref 0–0.5)
EOSINOPHIL NFR BLD AUTO: 0 % — SIGNIFICANT CHANGE UP (ref 0–6)
GIANT PLATELETS BLD QL SMEAR: PRESENT — SIGNIFICANT CHANGE UP
GLUCOSE SERPL-MCNC: 99 MG/DL — SIGNIFICANT CHANGE UP (ref 70–99)
GLUCOSE SERPL-MCNC: 99 MG/DL — SIGNIFICANT CHANGE UP (ref 70–99)
HCT VFR BLD CALC: 34.6 % — LOW (ref 39–50)
HGB BLD-MCNC: 11.2 G/DL — LOW (ref 13–17)
LYMPHOCYTES # BLD AUTO: 0.65 K/UL — LOW (ref 1–3.3)
LYMPHOCYTES # BLD AUTO: 6.9 % — LOW (ref 13–44)
MAGNESIUM SERPL-MCNC: 2.2 MG/DL — SIGNIFICANT CHANGE UP (ref 1.6–2.6)
MAGNESIUM SERPL-MCNC: 2.4 MG/DL — SIGNIFICANT CHANGE UP (ref 1.6–2.6)
MANUAL SMEAR VERIFICATION: SIGNIFICANT CHANGE UP
MCHC RBC-ENTMCNC: 27.2 PG — SIGNIFICANT CHANGE UP (ref 27–34)
MCHC RBC-ENTMCNC: 32.4 GM/DL — SIGNIFICANT CHANGE UP (ref 32–36)
MCV RBC AUTO: 84 FL — SIGNIFICANT CHANGE UP (ref 80–100)
MONOCYTES # BLD AUTO: 0.49 K/UL — SIGNIFICANT CHANGE UP (ref 0–0.9)
MONOCYTES NFR BLD AUTO: 5.2 % — SIGNIFICANT CHANGE UP (ref 2–14)
NEUTROPHILS # BLD AUTO: 8.26 K/UL — HIGH (ref 1.8–7.4)
NEUTROPHILS NFR BLD AUTO: 87 % — HIGH (ref 43–77)
NEUTS BAND # BLD: 0.9 % — SIGNIFICANT CHANGE UP (ref 0–8)
PHOSPHATE SERPL-MCNC: 3 MG/DL — SIGNIFICANT CHANGE UP (ref 2.4–4.7)
PHOSPHATE SERPL-MCNC: 3.1 MG/DL — SIGNIFICANT CHANGE UP (ref 2.4–4.7)
PLAT MORPH BLD: NORMAL — SIGNIFICANT CHANGE UP
PLATELET # BLD AUTO: 81 K/UL — LOW (ref 150–400)
POLYCHROMASIA BLD QL SMEAR: SLIGHT — SIGNIFICANT CHANGE UP
POTASSIUM SERPL-MCNC: 3.8 MMOL/L — SIGNIFICANT CHANGE UP (ref 3.5–5.3)
POTASSIUM SERPL-MCNC: 4 MMOL/L — SIGNIFICANT CHANGE UP (ref 3.5–5.3)
POTASSIUM SERPL-SCNC: 3.8 MMOL/L — SIGNIFICANT CHANGE UP (ref 3.5–5.3)
POTASSIUM SERPL-SCNC: 4 MMOL/L — SIGNIFICANT CHANGE UP (ref 3.5–5.3)
RBC # BLD: 4.12 M/UL — LOW (ref 4.2–5.8)
RBC # FLD: 16 % — HIGH (ref 10.3–14.5)
RBC BLD AUTO: ABNORMAL
SODIUM SERPL-SCNC: 142 MMOL/L — SIGNIFICANT CHANGE UP (ref 135–145)
SODIUM SERPL-SCNC: 144 MMOL/L — SIGNIFICANT CHANGE UP (ref 135–145)
WBC # BLD: 9.4 K/UL — SIGNIFICANT CHANGE UP (ref 3.8–10.5)
WBC # FLD AUTO: 9.4 K/UL — SIGNIFICANT CHANGE UP (ref 3.8–10.5)

## 2024-01-18 PROCEDURE — 99233 SBSQ HOSP IP/OBS HIGH 50: CPT

## 2024-01-18 RX ORDER — POTASSIUM CHLORIDE 20 MEQ
20 PACKET (EA) ORAL ONCE
Refills: 0 | Status: COMPLETED | OUTPATIENT
Start: 2024-01-18 | End: 2024-01-18

## 2024-01-18 RX ORDER — ACETAMINOPHEN 500 MG
975 TABLET ORAL EVERY 6 HOURS
Refills: 0 | Status: DISCONTINUED | OUTPATIENT
Start: 2024-01-18 | End: 2024-01-20

## 2024-01-18 RX ORDER — PSYLLIUM SEED (WITH DEXTROSE)
1 POWDER (GRAM) ORAL
Refills: 0 | Status: DISCONTINUED | OUTPATIENT
Start: 2024-01-18 | End: 2024-01-22

## 2024-01-18 RX ADMIN — Medication 20 MILLIEQUIVALENT(S): at 23:09

## 2024-01-18 RX ADMIN — Medication 400 MILLIGRAM(S): at 17:30

## 2024-01-18 RX ADMIN — Medication 1 PACKET(S): at 12:59

## 2024-01-18 RX ADMIN — CEFEPIME 2000 MILLIGRAM(S): 1 INJECTION, POWDER, FOR SOLUTION INTRAMUSCULAR; INTRAVENOUS at 21:27

## 2024-01-18 RX ADMIN — Medication 400 MILLIGRAM(S): at 09:44

## 2024-01-18 RX ADMIN — Medication 1000 MILLIGRAM(S): at 03:51

## 2024-01-18 RX ADMIN — Medication 1 PACKET(S): at 19:46

## 2024-01-18 RX ADMIN — Medication 975 MILLIGRAM(S): at 23:14

## 2024-01-18 RX ADMIN — CEFEPIME 2000 MILLIGRAM(S): 1 INJECTION, POWDER, FOR SOLUTION INTRAMUSCULAR; INTRAVENOUS at 19:02

## 2024-01-18 RX ADMIN — CEFEPIME 2000 MILLIGRAM(S): 1 INJECTION, POWDER, FOR SOLUTION INTRAMUSCULAR; INTRAVENOUS at 06:02

## 2024-01-18 RX ADMIN — SODIUM CHLORIDE 42 MILLILITER(S): 9 INJECTION, SOLUTION INTRAVENOUS at 23:14

## 2024-01-18 RX ADMIN — CHLORHEXIDINE GLUCONATE 1 APPLICATION(S): 213 SOLUTION TOPICAL at 12:52

## 2024-01-18 RX ADMIN — Medication 400 MILLIGRAM(S): at 03:18

## 2024-01-18 NOTE — PROGRESS NOTE ADULT - SUBJECTIVE AND OBJECTIVE BOX
INTERVAL HPI/OVERNIGHT EVENTS: Patient mentating well, denies headache, nausea/vomiting/ dizziness.  Patient remains off pressors. Lactate cleared. Intermittent periods of sinus tachycardia- improved. Continues to have diarrhea, 500cc of output yesterday. Tolerating clears however, patient with poor po intake. IVF continue. [place] remains intermittently draining with irrigation.  H/h stable. Leukocytosis continues to improve, low grade fevers 100.9.  Cultures NGTD.        PAST MEDICAL & SURGICAL HISTORY:  Spina bifida      Scoliosis          MEDICATIONS  (STANDING):  cefepime  Injectable. 2000 milliGRAM(s) IV Push every 8 hours  chlorhexidine 2% Cloths 1 Application(s) Topical daily  dextrose 5% + lactated ringers. 1000 milliLiter(s) (75 mL/Hr) IV Continuous <Continuous>  enoxaparin Injectable 40 milliGRAM(s) SubCutaneous every 24 hours    MEDICATIONS  (PRN):  acetaminophen   IVPB .. 1000 milliGRAM(s) IV Intermittent every 6 hours PRN Temp greater or equal to 38C (100.4F), Mild Pain (1 - 3), Moderate Pain (4 - 6), Severe Pain (7 - 10)  benzocaine 20% Spray 1 Spray(s) Topical every 6 hours PRN sore throat  benzocaine/menthol Lozenge 1 Lozenge Oral every 8 hours PRN Sore Throat      ICU Vital Signs Last 24 Hrs  T(C): 37.7 (18 Jan 2024 00:00), Max: 38.3 (17 Jan 2024 16:00)  T(F): 99.9 (18 Jan 2024 00:00), Max: 100.9 (17 Jan 2024 16:00)  HR: 94 (18 Jan 2024 00:00) (91 - 118)  BP: 109/77 (18 Jan 2024 00:00) (97/64 - 123/102)  BP(mean): 87 (18 Jan 2024 00:00) (75 - 109)  ABP: 120/82 (17 Jan 2024 15:00) (97/61 - 120/82)  ABP(mean): 100 (17 Jan 2024 15:00) (76 - 100)  RR: 18 (18 Jan 2024 00:00) (17 - 25)  SpO2: 100% (18 Jan 2024 00:00) (95% - 100%)    O2 Parameters below as of 18 Jan 2024 00:00  Patient On (Oxygen Delivery Method): room air            Drug Dosing Weight  Height (cm): 132.1 (14 Jan 2024 09:30)  Weight (kg): 70 (14 Jan 2024 07:48)  BMI (kg/m2): 40.1 (14 Jan 2024 09:30)  BSA (m2): 1.51 (14 Jan 2024 09:30)    CENTRAL LINE: [ ] YES [ ] NO  LOCATION:   DATE INSERTED:  REMOVE: [ ] YES [ ] NO  EXPLAIN:    DEL [place]STILLO: [ ] YES [ ] NO    DATE INSERTED:  REMOVE: [ ] YES [ ] NO  EXPLAIN:    A-LINE: [ ] YES [ ] NO  LOCATION:   DATE INSERTED:  REMOVE: [ ] YES [ ] NO  EXPLAIN:    ABG - ( 16 Jan 2024 04:05 )  pH, Arterial: 7.450 pH, Blood: x     /  pCO2: 35    /  pO2: 103   / HCO3: 24    / Base Excess: 0.3   /  SaO2: 99.5                I&O's Detail    16 Jan 2024 07:01  -  17 Jan 2024 07:00  --------------------------------------------------------  IN:    dextrose 5% + lactated ringers: 2400 mL    IV PiggyBack: 50 mL    IV PiggyBack: 100 mL    IV PiggyBack: 499.8 mL    IV PiggyBack: 50 mL  Total IN: 3099.8 mL    OUT:    Bulb (mL): 595 mL    Indwelling Catheter - Urethral (mL): 1420 mL    Nasogastric/Oral tube (mL): 860 mL  Total OUT: 2875 mL    Total NET: 224.8 mL      17 Jan 2024 07:01  -  18 Jan 2024 03:19  --------------------------------------------------------  IN:    dextrose 5% + lactated ringers: 1650 mL    IV PiggyBack: 100 mL    IV PiggyBack: 250 mL    IV PiggyBack: 50 mL  Total IN: 2050 mL    OUT:    Bulb (mL): 242 mL    Indwelling Catheter - Urethral (mL): 1330 mL    Nasogastric/Oral tube (mL): 0 mL  Total OUT: 1572 mL    Total NET: 478 mL              Physical Exam:    Neurological:  GCS   15   CAM  neg    RASS 0   No neuro deficits    HEENT: PERRL, EOMI     Respiratory: Unlabored, no accessory muscle use    Cardiovascular: Regular rate & rhythm- sinus tachycardia     Gastrointestinal: Softly distended, +ttp over incision site, incision c/d/i, FRITZ w/ serosanginous output, (of note patient states he cannot feel lower quadrants of abdomen at baseline)    Extremities: +1 peripheral edema, legs contracted and shortened in frog leg position, b/l lower extremities baseline plegia.  B/l upper extremities moving spontaneously without limitations.     Skin: No rashes    LABS:  CBC Full  -  ( 18 Jan 2024 02:15 )  WBC Count : 9.40 K/uL  RBC Count : 4.12 M/uL  Hemoglobin : 11.2 g/dL  Hematocrit : 34.6 %  Platelet Count - Automated : x  Mean Cell Volume : 84.0 fl  Mean Cell Hemoglobin : 27.2 pg  Mean Cell Hemoglobin Concentration : 32.4 gm/dL  Auto Neutrophil # : x  Auto Lymphocyte # : x  Auto Monocyte # : x  Auto Eosinophil # : x  Auto Basophil # : x  Auto Neutrophil % : x  Auto Lymphocyte % : x  Auto Monocyte % : x  Auto Eosinophil % : x  Auto Basophil % : x    01-17    143  |  110<H>  |  9.9  ----------------------------<  107<H>  3.6   |  22.0  |  0.89    Ca    7.5<L>      17 Jan 2024 21:52  Phos  2.1     01-17  Mg     1.8     01-17        Urinalysis Basic - ( 17 Jan 2024 21:52 )    Color: x / Appearance: x / SG: x / pH: x  Gluc: 107 mg/dL / Ketone: x  / Bili: x / Urobili: x   Blood: x / Protein: x / Nitrite: x   Leuk Esterase: x / RBC: x / WBC x   Sq Epi: x / Non Sq Epi: x / Bacteria: x

## 2024-01-18 NOTE — CHART NOTE - NSCHARTNOTEFT_GEN_A_CORE
Right subclavian TLC was removed in trendelenburg position, patient instructed to hold breath during removal. Pressure was held for 10 minutes, hemostasis achieved. Patient tolerated procedure well.

## 2024-01-18 NOTE — PROGRESS NOTE ADULT - SUBJECTIVE AND OBJECTIVE BOX
Subjective: 28yMale seen and examined at bedside. Pt is POD4 s/p ex lap, partial left colectomy, MUKUL, closure of cystotomy, placement of cline catheter for SBO. Covid +. Patient was seen and he was resting comfortable, no abdominal complaints/ pain at this time. Cline in place, draining yellow to clear urine.     Cline: yellow to clear urine output     Vital Signs Last 24 Hrs  T(C): 37.8 (18 Jan 2024 11:11), Max: 38.3 (17 Jan 2024 16:00)  T(F): 100 (18 Jan 2024 11:11), Max: 100.9 (17 Jan 2024 16:00)  HR: 79 (18 Jan 2024 11:11) (70 - 118)  BP: 108/80 (18 Jan 2024 11:00) (97/64 - 134/90)  BP(mean): 89 (18 Jan 2024 11:00) (67 - 109)  RR: 19 (18 Jan 2024 11:11) (16 - 25)  SpO2: 96% (18 Jan 2024 11:11) (95% - 100%)    Parameters below as of 18 Jan 2024 06:00  Patient On (Oxygen Delivery Method): room air      Physical  General: NAD, resting comforably   Respiratory: respiration non labored   Gastrointestinal: soft, non-distended   : 10 fr cline in place, draining well, easily flushed at the time with 20 cc of NaCl, small amount of cloudy urine aspirated and the rest remained clear       I&O's Detail    17 Jan 2024 07:01  -  18 Jan 2024 07:00  --------------------------------------------------------  IN:    dextrose 5% + lactated ringers: 2025 mL    IV PiggyBack: 100 mL    IV PiggyBack: 250 mL    IV PiggyBack: 50 mL  Total IN: 2425 mL    OUT:    Bulb (mL): 242 mL    Indwelling Catheter - Urethral (mL): 1780 mL    Nasogastric/Oral tube (mL): 0 mL    Rectal Tube (mL): 800 mL  Total OUT: 2822 mL    Total NET: -397 mL      18 Jan 2024 07:01  -  18 Jan 2024 11:53  --------------------------------------------------------  IN:    dextrose 5% + lactated ringers: 150 mL  Total IN: 150 mL    OUT:    Indwelling Catheter - Urethral (mL): 50 mL  Total OUT: 50 mL    Total NET: 100 mL          Labs:                        11.2   9.40  )-----------( 81       ( 18 Jan 2024 02:15 )             34.6     01-18    142  |  109<H>  |  9.9  ----------------------------<  99  4.0   |  23.0  |  0.81    Ca    7.6<L>      18 Jan 2024 02:15  Phos  3.1     01-18  Mg     2.4     01-18

## 2024-01-18 NOTE — PROGRESS NOTE ADULT - ASSESSMENT
Assessment and Plan: 27yo male with PMHx of spina bifida (wheelchair bound at baseline), scoliosis,  Shunt BIBEMS 1/14 with abdominal pain, n/v, and lethargy, found to have severe SBO, metabolic acidosis, and to be in septic and hypovolemic shock. Patient was brought to the OR 1/14 for ex lap and small bowel resection, c/b bladder perf with primary repair and partial colectomy and admitted to SICU post-op for further management.        Neuro:   shunt functioning well. Continue to monitor patient neurologic exams. F/u neurosurgery recommendations. Continue to optimize pain control. Avoid deliriogenic medications, optimize sleep hyigene.     CV: Hypovolemic and septic shock secondary to SBO now resolved. Remains off all vasopressors.  Still intermittently with sinus tachycardia. Continue hemodynamic monitoring    Pulm: COVID + - appears to be asymptomatic.  C/w Pulmonary toilet.  Continue incentive spirometer.  Chest PT.  Encourage OOB to chair and ambulation     GI/Nutrition: SBO s/p SBR, partial colectomy - now with diarrhea and return of bowel function? Advance diet as patient is able to tolerate. Monitor output from rectal tube. Monitor output from FRITZ.     /Renal: Hx of urethral stricture now with bladder injury s/p primary repair.  Continue to irrigate cline to keep patent. Bladder scan as needed.  GALEN improving, urine creatine off drain pending. C/w cline for at least 2 weeks. Monitor UOP as much as able. Monitor BMP.  Replete Lytes as needed      HEME-   DVT: SCDs, h/h stable, thrombocytopenia improving. HIT negative. Lovenox for chemical dvt ppx     ID: Zosyn transitioned to Cefepime for colonization of urine culture (given chronic straight catheterizations)     Lines/Tubes: Continue cline     Endo: Maintain Euglycemia 120-180 especially while NPO     Skin: Frequent turning and positioning, offloading while in bed.     Code Status: Full    Dispo: Downgrade level of care    Assessment and Plan: 29yo male with PMHx of spina bifida (wheelchair bound at baseline), scoliosis,  Shunt BIBEMS 1/14 with abdominal pain, n/v, and lethargy, found to have severe SBO, metabolic acidosis, and to be in septic and hypovolemic shock. Patient was brought to the OR 1/14 for ex lap and small bowel resection, c/b bladder perf with primary repair and partial colectomy and admitted to SICU post-op for further management.        Neuro:   shunt functioning well. Continue to monitor patient neurologic exams. F/u neurosurgery recommendations. Continue to optimize pain control. Avoid deliriogenic medications, optimize sleep hygiene     CV: Hypovolemic and septic shock secondary to SBO now resolved. Remains off all vasopressors.  Still intermittently with sinus tachycardia. Continue hemodynamic monitoring    Pulm: COVID + - appears to be asymptomatic.  C/w Pulmonary toilet.  Continue incentive spirometer.  Chest PT.  Encourage OOB to chair and ambulation     GI/Nutrition: SBO s/p SBR, partial colectomy - now with diarrhea and return of bowel function? Advance diet as patient is able to tolerate. Monitor output from rectal tube. Monitor output from FRITZ.     /Renal: Hx of urethral stricture now with bladder injury s/p primary repair.  Continue to irrigate cline to keep patent. Bladder scan as needed.  GALEN improving, urine creatine off drain pending. C/w cline for at least 2 weeks. Monitor UOP as much as able. Monitor BMP.  Replete Lytes as needed      HEME-   DVT: SCDs, h/h stable, thrombocytopenia improving. HIT negative. Lovenox for chemical dvt ppx     ID: Zosyn transitioned to Cefepime for colonization of urine culture (given chronic straight catheterizations). Bandemia improving 25% bands--> 9%    Lines/Tubes: Continue cline     Endo: Maintain Euglycemia 120-180 especially while NPO     Skin: Frequent turning and positioning, offloading while in bed.     Code Status: Full    Dispo: Downgrade level of care    Assessment and Plan: 29yo male with PMHx of spina bifida (wheelchair bound at baseline), scoliosis,  Shunt BIBEMS 1/14 with abdominal pain, n/v, and lethargy, found to have severe SBO, metabolic acidosis, and to be in septic and hypovolemic shock. Patient was brought to the OR 1/14 for ex lap and small bowel resection, c/b bladder perf with primary repair and partial colectomy and admitted to SICU post-op for further management.        Neuro:   shunt functioning well. Continue to monitor patient neurologic exams. F/u neurosurgery recommendations. Continue to optimize pain control. Avoid deliriogenic medications, optimize sleep hygiene     CV: Hypovolemic and septic shock secondary to SBO now resolved. Remains off all vasopressors.  Still intermittently with sinus tachycardia. Continue hemodynamic monitoring    Pulm: COVID + - appears to be asymptomatic.  C/w Pulmonary toilet.  Continue incentive spirometer.  Chest PT.  Encourage OOB to chair and ambulation     GI/Nutrition: SBO s/p SBR, partial colectomy - now with diarrhea and return of bowel function? Advance diet as patient is able to tolerate. Monitor output from rectal tube. Monitor output from FRITZ.     /Renal: Hx of urethral stricture now with bladder injury s/p primary repair.  Continue to irrigate cline to keep patent. Bladder scan q4 for now.  GALEN improving, urine creatine off drain pending. C/w cline for at least 2 weeks. Monitor UOP as much as able. Monitor BMP.  Replete Lytes as needed      HEME-   DVT: SCDs, h/h stable, thrombocytopenia improving. HIT negative. Lovenox for chemical dvt ppx     ID: Zosyn transitioned to Cefepime for colonization of urine culture (given chronic straight catheterizations). Bandemia improving 25% bands--> 9%    Lines/Tubes: Continue cline     Endo: Maintain Euglycemia 120-180 especially while NPO     Skin: Frequent turning and positioning, offloading while in bed.     Code Status: Full    Dispo: Downgrade level of care

## 2024-01-18 NOTE — PROGRESS NOTE ADULT - ASSESSMENT
28yMale POD#3 s/p ex-lap, partial left colectomy, MUKUL, closure of cystotomy, placement of cline catheter for SBO. Covid +    Plan:   - cline irrigated and aspirated, small amount of cloudy urine aspirated, remainer of drainage clear yellow urine  - irrigate and aspirate the cline gently for adequate drainage from this 10Fr catheter  - monitor UO

## 2024-01-18 NOTE — PROGRESS NOTE ADULT - NS ATTEND AMEND GEN_ALL_CORE FT
Seen and examined on SICU rounds.     Afebrile this AM, HDS, no respiratory distress.   No leukocytosis, improving thrombocytopenia; acceptable 'lytes and uop (1.06ml/kg/hr).     Intermittent episodes of low grade fever overnight (Tmax 100.9); UA concerning for E. coli, Morganella UTI (vs. likely colonization - in setting of self catherizations at baseline) - abx switched to cefepime for coverage - will continue to complete total abx course of 7 days (already s/p zosyn, which was switched to cefotetan 2/2 thrombocytopenia).   Will add bulking agents given continuous diarrhea; has rectal tube for now  - will plan to dc w/in next 24hrs to avoid complications.   Cline irrigation + bladder scan protocol working appropriately, cline appears to have fewer episodes of being clogged + intraabdominal drain Cr does not indicate intraperitoneal urine leakage (total drain output = 242/24hrs).   Continue DVT ppx.

## 2024-01-19 LAB
ACETONE SERPL-MCNC: NEGATIVE — SIGNIFICANT CHANGE UP
ANION GAP SERPL CALC-SCNC: 10 MMOL/L — SIGNIFICANT CHANGE UP (ref 5–17)
ANION GAP SERPL CALC-SCNC: 12 MMOL/L — SIGNIFICANT CHANGE UP (ref 5–17)
APTT BLD: 35 SEC — SIGNIFICANT CHANGE UP (ref 24.5–35.6)
BUN SERPL-MCNC: 11.2 MG/DL — SIGNIFICANT CHANGE UP (ref 8–20)
BUN SERPL-MCNC: 14.7 MG/DL — SIGNIFICANT CHANGE UP (ref 8–20)
CALCIUM SERPL-MCNC: 7.7 MG/DL — LOW (ref 8.4–10.5)
CALCIUM SERPL-MCNC: 7.8 MG/DL — LOW (ref 8.4–10.5)
CHLORIDE SERPL-SCNC: 111 MMOL/L — HIGH (ref 96–108)
CHLORIDE SERPL-SCNC: 111 MMOL/L — HIGH (ref 96–108)
CO2 SERPL-SCNC: 19 MMOL/L — LOW (ref 22–29)
CO2 SERPL-SCNC: 21 MMOL/L — LOW (ref 22–29)
CREAT SERPL-MCNC: 0.81 MG/DL — SIGNIFICANT CHANGE UP (ref 0.5–1.3)
CREAT SERPL-MCNC: 0.95 MG/DL — SIGNIFICANT CHANGE UP (ref 0.5–1.3)
CULTURE RESULTS: SIGNIFICANT CHANGE UP
CULTURE RESULTS: SIGNIFICANT CHANGE UP
EGFR: 110 ML/MIN/1.73M2 — SIGNIFICANT CHANGE UP
EGFR: 122 ML/MIN/1.73M2 — SIGNIFICANT CHANGE UP
GLUCOSE SERPL-MCNC: 102 MG/DL — HIGH (ref 70–99)
GLUCOSE SERPL-MCNC: 104 MG/DL — HIGH (ref 70–99)
HCT VFR BLD CALC: 35.5 % — LOW (ref 39–50)
HCT VFR BLD CALC: 38.8 % — LOW (ref 39–50)
HGB BLD-MCNC: 11.6 G/DL — LOW (ref 13–17)
HGB BLD-MCNC: 13 G/DL — SIGNIFICANT CHANGE UP (ref 13–17)
INR BLD: 1.41 RATIO — HIGH (ref 0.85–1.18)
LACTATE SERPL-SCNC: 1.3 MMOL/L — SIGNIFICANT CHANGE UP (ref 0.5–2)
MAGNESIUM SERPL-MCNC: 2 MG/DL — SIGNIFICANT CHANGE UP (ref 1.6–2.6)
MAGNESIUM SERPL-MCNC: 2.1 MG/DL — SIGNIFICANT CHANGE UP (ref 1.6–2.6)
MCHC RBC-ENTMCNC: 28.4 PG — SIGNIFICANT CHANGE UP (ref 27–34)
MCHC RBC-ENTMCNC: 28.4 PG — SIGNIFICANT CHANGE UP (ref 27–34)
MCHC RBC-ENTMCNC: 32.7 GM/DL — SIGNIFICANT CHANGE UP (ref 32–36)
MCHC RBC-ENTMCNC: 33.5 GM/DL — SIGNIFICANT CHANGE UP (ref 32–36)
MCV RBC AUTO: 84.7 FL — SIGNIFICANT CHANGE UP (ref 80–100)
MCV RBC AUTO: 86.8 FL — SIGNIFICANT CHANGE UP (ref 80–100)
PHOSPHATE SERPL-MCNC: 2.3 MG/DL — LOW (ref 2.4–4.7)
PHOSPHATE SERPL-MCNC: 3 MG/DL — SIGNIFICANT CHANGE UP (ref 2.4–4.7)
PLATELET # BLD AUTO: 138 K/UL — LOW (ref 150–400)
PLATELET # BLD AUTO: 144 K/UL — LOW (ref 150–400)
POTASSIUM SERPL-MCNC: 3.7 MMOL/L — SIGNIFICANT CHANGE UP (ref 3.5–5.3)
POTASSIUM SERPL-MCNC: 4.8 MMOL/L — SIGNIFICANT CHANGE UP (ref 3.5–5.3)
POTASSIUM SERPL-SCNC: 3.7 MMOL/L — SIGNIFICANT CHANGE UP (ref 3.5–5.3)
POTASSIUM SERPL-SCNC: 4.8 MMOL/L — SIGNIFICANT CHANGE UP (ref 3.5–5.3)
PROTHROM AB SERPL-ACNC: 15.5 SEC — HIGH (ref 9.5–13)
RBC # BLD: 4.09 M/UL — LOW (ref 4.2–5.8)
RBC # BLD: 4.58 M/UL — SIGNIFICANT CHANGE UP (ref 4.2–5.8)
RBC # FLD: 16.2 % — HIGH (ref 10.3–14.5)
RBC # FLD: 16.2 % — HIGH (ref 10.3–14.5)
SODIUM SERPL-SCNC: 139 MMOL/L — SIGNIFICANT CHANGE UP (ref 135–145)
SODIUM SERPL-SCNC: 144 MMOL/L — SIGNIFICANT CHANGE UP (ref 135–145)
SPECIMEN SOURCE: SIGNIFICANT CHANGE UP
SPECIMEN SOURCE: SIGNIFICANT CHANGE UP
WBC # BLD: 7.29 K/UL — SIGNIFICANT CHANGE UP (ref 3.8–10.5)
WBC # BLD: 7.43 K/UL — SIGNIFICANT CHANGE UP (ref 3.8–10.5)
WBC # FLD AUTO: 7.29 K/UL — SIGNIFICANT CHANGE UP (ref 3.8–10.5)
WBC # FLD AUTO: 7.43 K/UL — SIGNIFICANT CHANGE UP (ref 3.8–10.5)

## 2024-01-19 PROCEDURE — 71045 X-RAY EXAM CHEST 1 VIEW: CPT | Mod: 26

## 2024-01-19 PROCEDURE — 99232 SBSQ HOSP IP/OBS MODERATE 35: CPT

## 2024-01-19 PROCEDURE — 93970 EXTREMITY STUDY: CPT | Mod: 26

## 2024-01-19 PROCEDURE — 99024 POSTOP FOLLOW-UP VISIT: CPT

## 2024-01-19 RX ORDER — SODIUM CHLORIDE 9 MG/ML
1000 INJECTION, SOLUTION INTRAVENOUS ONCE
Refills: 0 | Status: COMPLETED | OUTPATIENT
Start: 2024-01-19 | End: 2024-01-19

## 2024-01-19 RX ORDER — POTASSIUM CHLORIDE 20 MEQ
10 PACKET (EA) ORAL
Refills: 0 | Status: COMPLETED | OUTPATIENT
Start: 2024-01-19 | End: 2024-01-19

## 2024-01-19 RX ORDER — KETOROLAC TROMETHAMINE 30 MG/ML
15 SYRINGE (ML) INJECTION ONCE
Refills: 0 | Status: DISCONTINUED | OUTPATIENT
Start: 2024-01-19 | End: 2024-01-19

## 2024-01-19 RX ADMIN — Medication 1 PACKET(S): at 06:51

## 2024-01-19 RX ADMIN — Medication 100 MILLIEQUIVALENT(S): at 07:45

## 2024-01-19 RX ADMIN — Medication 100 MILLIEQUIVALENT(S): at 06:50

## 2024-01-19 RX ADMIN — ENOXAPARIN SODIUM 40 MILLIGRAM(S): 100 INJECTION SUBCUTANEOUS at 23:38

## 2024-01-19 RX ADMIN — CEFEPIME 2000 MILLIGRAM(S): 1 INJECTION, POWDER, FOR SOLUTION INTRAMUSCULAR; INTRAVENOUS at 14:57

## 2024-01-19 RX ADMIN — ENOXAPARIN SODIUM 40 MILLIGRAM(S): 100 INJECTION SUBCUTANEOUS at 00:32

## 2024-01-19 RX ADMIN — CEFEPIME 2000 MILLIGRAM(S): 1 INJECTION, POWDER, FOR SOLUTION INTRAMUSCULAR; INTRAVENOUS at 07:12

## 2024-01-19 RX ADMIN — CHLORHEXIDINE GLUCONATE 1 APPLICATION(S): 213 SOLUTION TOPICAL at 12:43

## 2024-01-19 RX ADMIN — SODIUM CHLORIDE 42 MILLILITER(S): 9 INJECTION, SOLUTION INTRAVENOUS at 22:25

## 2024-01-19 RX ADMIN — Medication 975 MILLIGRAM(S): at 00:07

## 2024-01-19 RX ADMIN — Medication 100 MILLIEQUIVALENT(S): at 11:04

## 2024-01-19 RX ADMIN — Medication 15 MILLIGRAM(S): at 03:29

## 2024-01-19 RX ADMIN — Medication 975 MILLIGRAM(S): at 17:56

## 2024-01-19 RX ADMIN — CEFEPIME 2000 MILLIGRAM(S): 1 INJECTION, POWDER, FOR SOLUTION INTRAMUSCULAR; INTRAVENOUS at 22:25

## 2024-01-19 RX ADMIN — Medication 15 MILLIGRAM(S): at 00:37

## 2024-01-19 RX ADMIN — SODIUM CHLORIDE 2000 MILLILITER(S): 9 INJECTION, SOLUTION INTRAVENOUS at 07:45

## 2024-01-19 RX ADMIN — Medication 975 MILLIGRAM(S): at 16:27

## 2024-01-19 NOTE — PROGRESS NOTE ADULT - SUBJECTIVE AND OBJECTIVE BOX
Subjective:28yMale POD#5 s/p ex-lap, MUKUL, SBR, partial L colectomy, closure of cystotomy, cline placed- 10fr, +covid.  Pt resting relatively comfortably, pt has been spiking temps, 102', cline draining better, yellow urine.    Cline: yellow    Vital Signs Last 24 Hrs  T(C): 37.4 (19 Jan 2024 09:00), Max: 39.1 (18 Jan 2024 21:00)  T(F): 99.3 (19 Jan 2024 09:00), Max: 102.4 (18 Jan 2024 21:00)  HR: 80 (19 Jan 2024 09:00) (79 - 132)  BP: 98/70 (19 Jan 2024 09:00) (85/58 - 166/134)  BP(mean): 80 (19 Jan 2024 09:00) (63 - 148)  RR: 20 (19 Jan 2024 09:00) (15 - 22)  SpO2: 96% (19 Jan 2024 09:00) (95% - 100%)    Parameters below as of 18 Jan 2024 20:00  Patient On (Oxygen Delivery Method): room air      I&O's Detail    18 Jan 2024 07:01  -  19 Jan 2024 07:00  --------------------------------------------------------  IN:    dextrose 5% + lactated ringers: 1107 mL    IV PiggyBack: 100 mL  Total IN: 1207 mL    OUT:    Blood Loss (mL): 30 mL    Indwelling Catheter - Urethral (mL): 1335 mL    Rectal Tube (mL): 1150 mL  Total OUT: 2515 mL    Total NET: -1308 mL      19 Jan 2024 07:01  -  19 Jan 2024 09:57  --------------------------------------------------------  IN:    dextrose 5% + lactated ringers: 42 mL    IV PiggyBack: 100 mL    multiple electrolytes Injection Type 1 Bolus: 1000 mL  Total IN: 1142 mL    OUT:  Total OUT: 0 mL    Total NET: 1142 mL          Labs:                        13.0   7.43  )-----------( 138      ( 19 Jan 2024 03:06 )             38.8     01-19    144  |  111<H>  |  11.2  ----------------------------<  102<H>  3.7   |  21.0<L>  |  0.95    Ca    7.8<L>      19 Jan 2024 03:06  Phos  3.0     01-19  Mg     2.0     01-19

## 2024-01-19 NOTE — PROGRESS NOTE ADULT - ASSESSMENT
28yMale POD#5 s/p ex-lap, MUKUL, SBR, partial L colectomy, closure of cystotomy, cline placed- 10fr, +covid, spina bifida  - cont cline cath for UO monitoring  - flush cline gently to ensure patency of small catheter  - cont care as per primary team  - would keep cline in place until pt is able to start self catheterizing again, poss d/c with cline to leg bag

## 2024-01-19 NOTE — PROGRESS NOTE ADULT - ASSESSMENT
29yo male with PMHx of spina bifida (wheelchair bound at baseline), scoliosis,  Shunt BIBEMS 1/14 with abdominal pain, n/v, and lethargy, found to have severe SBO, metabolic acidosis, and to be in septic and hypovolemic shock. Patient was brought to the OR 1/14 for ex lap and small bowel resection, c/b bladder perf with primary repair and partial colectomy and admitted to SICU post-op for further management.        Neuro:   shunt appears to be functioning well given no hydrocephalus on CT head. Continue to monitor patient neurologic exams. F/u neurosurgery recommendations. Continue to optimize pain control. Avoid deliriogenic medications, optimize sleep hygiene     CV: Sinus tachycardia - SIRs, no infectious source identified at this time. Continue hemodynamic monitoring    Pulm: COVID + - appears to be asymptomatic.  CXR pending this AM to r/o new infiltrate or worsening COVID. C/w Pulmonary toilet.  Continue incentive spirometer.  Chest PT.  Encourage OOB to chair and ambulation     GI/Nutrition: SBO s/p SBR, partial colectomy - now with diarrhea and return of bowel function? No worsening abdominal exam, FRITZ without change in quality. Abdomen does NOT appear source of SIRs. Will continue diet for adequate enteral nutrition for now.  Monitor output from FRITZ.     /Renal: Hx of urethral stricture now with bladder injury s/p primary repair.  Continue to irrigate cline to keep patent. Bladder scan q4 for now.  GALEN improving, urine creatine <4. C/w cline for at least 2 weeks. Monitor UOP as much as able. Monitor BMP.  Replete Lytes as needed      HEME-   DVT: SCDs, h/h stable, thrombocytopenia improving. HIT negative. Lovenox for chemical dvt ppx     ID: Zosyn transitioned to Cefepime for colonization of urine culture (given chronic straight catheterizations).     Lines/Tubes: Continue cline     Endo: Maintain Euglycemia 120-180     Skin: Frequent turning and positioning, offloading while in bed.     Code Status: Full    Dispo: Continue SICU

## 2024-01-19 NOTE — PROGRESS NOTE ADULT - SUBJECTIVE AND OBJECTIVE BOX
INTERVAL HPI/OVERNIGHT EVENTS: Patient mentating well, reports minimal pain/ tenderness. Patient febrile overnight to 102 - given tylenol, cxr and blood cultures negative. Acidosis on AM labs (non gap)- lactate pending and acetone. FRITZ with serous/serosanginous output with 30cc of output. Del Castillo continues to drain 1.2L overnight. Cr improved. Rectal tube with 1L yesterday, 700 overnight. Metamucil added.       PAST MEDICAL & SURGICAL HISTORY:  Spina bifida      Scoliosis          MEDICATIONS  (STANDING):  cefepime  Injectable. 2000 milliGRAM(s) IV Push every 8 hours  chlorhexidine 2% Cloths 1 Application(s) Topical daily  dextrose 5% + lactated ringers. 1000 milliLiter(s) (42 mL/Hr) IV Continuous <Continuous>  enoxaparin Injectable 40 milliGRAM(s) SubCutaneous every 24 hours  potassium chloride  10 mEq/100 mL IVPB 10 milliEquivalent(s) IV Intermittent every 1 hour  psyllium Powder 1 Packet(s) Oral two times a day    MEDICATIONS  (PRN):  acetaminophen     Tablet .. 975 milliGRAM(s) Oral every 6 hours PRN Temp greater or equal to 38.5C (101.3F)  benzocaine 20% Spray 1 Spray(s) Topical every 6 hours PRN sore throat  benzocaine/menthol Lozenge 1 Lozenge Oral every 8 hours PRN Sore Throat      ICU Vital Signs Last 24 Hrs  T(C): 37.6 (19 Jan 2024 03:00), Max: 39.1 (18 Jan 2024 21:00)  T(F): 99.7 (19 Jan 2024 03:00), Max: 102.4 (18 Jan 2024 21:00)  HR: 94 (19 Jan 2024 03:00) (70 - 132)  BP: 94/59 (19 Jan 2024 03:00) (92/62 - 166/134)  BP(mean): 70 (19 Jan 2024 03:00) (70 - 147)  ABP: --  ABP(mean): --  RR: 18 (19 Jan 2024 03:00) (15 - 24)  SpO2: 98% (19 Jan 2024 02:00) (95% - 100%)    O2 Parameters below as of 18 Jan 2024 20:00  Patient On (Oxygen Delivery Method): room air            Drug Dosing Weight  Height (cm): 132.1 (14 Jan 2024 09:30)  Weight (kg): 70 (14 Jan 2024 07:48)  BMI (kg/m2): 40.1 (14 Jan 2024 09:30)  BSA (m2): 1.51 (14 Jan 2024 09:30)    CENTRAL LINE: [ ] YES [ ] NO  LOCATION:   DATE INSERTED:  REMOVE: [ ] YES [ ] NO  EXPLAIN:    DEL CASTILLO: [ ] YES [ ] NO    DATE INSERTED:  REMOVE: [ ] YES [ ] NO  EXPLAIN:    A-LINE: [ ] YES [ ] NO  LOCATION:   DATE INSERTED:  REMOVE: [ ] YES [ ] NO  EXPLAIN:        I&O's Detail    17 Jan 2024 07:01  -  18 Jan 2024 07:00  --------------------------------------------------------  IN:    dextrose 5% + lactated ringers: 2025 mL    IV PiggyBack: 100 mL    IV PiggyBack: 250 mL    IV PiggyBack: 50 mL  Total IN: 2425 mL    OUT:    Bulb (mL): 242 mL    Indwelling Catheter - Urethral (mL): 1780 mL    Nasogastric/Oral tube (mL): 0 mL    Rectal Tube (mL): 800 mL  Total OUT: 2822 mL    Total NET: -397 mL      18 Jan 2024 07:01  -  19 Jan 2024 06:24  --------------------------------------------------------  IN:    dextrose 5% + lactated ringers: 981 mL  Total IN: 981 mL    OUT:    Blood Loss (mL): 30 mL    Indwelling Catheter - Urethral (mL): 1210 mL    Rectal Tube (mL): 1150 mL  Total OUT: 2390 mL    Total NET: -1409 mL              Physical Exam:    Neurological:  GCS   15   CAM  neg    RASS 0   see below plegia     HEENT: PERRL, EOMI     Respiratory: Unlabored, no accessory muscle use    Cardiovascular: Regular rate & rhythm- sinus tachycardia     Gastrointestinal: Softly distended, +ttp over incision site( baseline patient has poor sensation), incision c/d/i, FRITZ w/ serosanginous output, midline incision w/ fibrinouse exudate- no erythema    Extremities: +1 peripheral edema, legs contracted and shortened in frog leg position, b/l lower extremities baseline plegia.  B/l upper extremities moving spontaneously without limitations.     Skin: No rashes    LABS:  CBC Full  -  ( 19 Jan 2024 03:06 )  WBC Count : 7.43 K/uL  RBC Count : 4.58 M/uL  Hemoglobin : 13.0 g/dL  Hematocrit : 38.8 %  Platelet Count - Automated : 138 K/uL  Mean Cell Volume : 84.7 fl  Mean Cell Hemoglobin : 28.4 pg  Mean Cell Hemoglobin Concentration : 33.5 gm/dL  Auto Neutrophil # : x  Auto Lymphocyte # : x  Auto Monocyte # : x  Auto Eosinophil # : x  Auto Basophil # : x  Auto Neutrophil % : x  Auto Lymphocyte % : x  Auto Monocyte % : x  Auto Eosinophil % : x  Auto Basophil % : x    01-19    144  |  111<H>  |  11.2  ----------------------------<  102<H>  3.7   |  21.0<L>  |  0.95    Ca    7.8<L>      19 Jan 2024 03:06  Phos  3.0     01-19  Mg     2.0     01-19        Urinalysis Basic - ( 19 Jan 2024 03:06 )    Color: x / Appearance: x / SG: x / pH: x  Gluc: 102 mg/dL / Ketone: x  / Bili: x / Urobili: x   Blood: x / Protein: x / Nitrite: x   Leuk Esterase: x / RBC: x / WBC x   Sq Epi: x / Non Sq Epi: x / Bacteria: x           INTERVAL HPI/OVERNIGHT EVENTS: Patient mentating well, reports minimal pain/ tenderness. Patient febrile overnight to 102 - given tylenol, cxr, no blood cultures sent overnight. Acidosis on AM labs (non gap)- lactate pending and acetone. FRITZ with serous/serosanginous output with 30cc of output. Del Castillo continues to drain 1.2L overnight. Cr improved. Rectal tube with 1L yesterday, 700 overnight. Metamucil added.       PAST MEDICAL & SURGICAL HISTORY:  Spina bifida      Scoliosis          MEDICATIONS  (STANDING):  cefepime  Injectable. 2000 milliGRAM(s) IV Push every 8 hours  chlorhexidine 2% Cloths 1 Application(s) Topical daily  dextrose 5% + lactated ringers. 1000 milliLiter(s) (42 mL/Hr) IV Continuous <Continuous>  enoxaparin Injectable 40 milliGRAM(s) SubCutaneous every 24 hours  potassium chloride  10 mEq/100 mL IVPB 10 milliEquivalent(s) IV Intermittent every 1 hour  psyllium Powder 1 Packet(s) Oral two times a day    MEDICATIONS  (PRN):  acetaminophen     Tablet .. 975 milliGRAM(s) Oral every 6 hours PRN Temp greater or equal to 38.5C (101.3F)  benzocaine 20% Spray 1 Spray(s) Topical every 6 hours PRN sore throat  benzocaine/menthol Lozenge 1 Lozenge Oral every 8 hours PRN Sore Throat      ICU Vital Signs Last 24 Hrs  T(C): 37.6 (19 Jan 2024 03:00), Max: 39.1 (18 Jan 2024 21:00)  T(F): 99.7 (19 Jan 2024 03:00), Max: 102.4 (18 Jan 2024 21:00)  HR: 94 (19 Jan 2024 03:00) (70 - 132)  BP: 94/59 (19 Jan 2024 03:00) (92/62 - 166/134)  BP(mean): 70 (19 Jan 2024 03:00) (70 - 147)  ABP: --  ABP(mean): --  RR: 18 (19 Jan 2024 03:00) (15 - 24)  SpO2: 98% (19 Jan 2024 02:00) (95% - 100%)    O2 Parameters below as of 18 Jan 2024 20:00  Patient On (Oxygen Delivery Method): room air            Drug Dosing Weight  Height (cm): 132.1 (14 Jan 2024 09:30)  Weight (kg): 70 (14 Jan 2024 07:48)  BMI (kg/m2): 40.1 (14 Jan 2024 09:30)  BSA (m2): 1.51 (14 Jan 2024 09:30)    CENTRAL LINE: [ ] YES [ ] NO  LOCATION:   DATE INSERTED:  REMOVE: [ ] YES [ ] NO  EXPLAIN:    DEL CASTILLO: [ ] YES [ ] NO    DATE INSERTED:  REMOVE: [ ] YES [ ] NO  EXPLAIN:    A-LINE: [ ] YES [ ] NO  LOCATION:   DATE INSERTED:  REMOVE: [ ] YES [ ] NO  EXPLAIN:        I&O's Detail    17 Jan 2024 07:01  -  18 Jan 2024 07:00  --------------------------------------------------------  IN:    dextrose 5% + lactated ringers: 2025 mL    IV PiggyBack: 100 mL    IV PiggyBack: 250 mL    IV PiggyBack: 50 mL  Total IN: 2425 mL    OUT:    Bulb (mL): 242 mL    Indwelling Catheter - Urethral (mL): 1780 mL    Nasogastric/Oral tube (mL): 0 mL    Rectal Tube (mL): 800 mL  Total OUT: 2822 mL    Total NET: -397 mL      18 Jan 2024 07:01  -  19 Jan 2024 06:24  --------------------------------------------------------  IN:    dextrose 5% + lactated ringers: 981 mL  Total IN: 981 mL    OUT:    Blood Loss (mL): 30 mL    Indwelling Catheter - Urethral (mL): 1210 mL    Rectal Tube (mL): 1150 mL  Total OUT: 2390 mL    Total NET: -1409 mL              Physical Exam:    Neurological:  GCS   15   CAM  neg    RASS 0   see below plegia     HEENT: PERRL, EOMI     Respiratory: Unlabored, no accessory muscle use    Cardiovascular: Regular rate & rhythm- sinus tachycardia     Gastrointestinal: Softly distended, +ttp over incision site( baseline patient has poor sensation), incision c/d/i, FRITZ w/ serosanginous output, midline incision w/ fibrinouse exudate- no erythema    Extremities: +1 peripheral edema, legs contracted and shortened in frog leg position, b/l lower extremities baseline plegia.  B/l upper extremities moving spontaneously without limitations.     Skin: No rashes    LABS:  CBC Full  -  ( 19 Jan 2024 03:06 )  WBC Count : 7.43 K/uL  RBC Count : 4.58 M/uL  Hemoglobin : 13.0 g/dL  Hematocrit : 38.8 %  Platelet Count - Automated : 138 K/uL  Mean Cell Volume : 84.7 fl  Mean Cell Hemoglobin : 28.4 pg  Mean Cell Hemoglobin Concentration : 33.5 gm/dL  Auto Neutrophil # : x  Auto Lymphocyte # : x  Auto Monocyte # : x  Auto Eosinophil # : x  Auto Basophil # : x  Auto Neutrophil % : x  Auto Lymphocyte % : x  Auto Monocyte % : x  Auto Eosinophil % : x  Auto Basophil % : x    01-19    144  |  111<H>  |  11.2  ----------------------------<  102<H>  3.7   |  21.0<L>  |  0.95    Ca    7.8<L>      19 Jan 2024 03:06  Phos  3.0     01-19  Mg     2.0     01-19        Urinalysis Basic - ( 19 Jan 2024 03:06 )    Color: x / Appearance: x / SG: x / pH: x  Gluc: 102 mg/dL / Ketone: x  / Bili: x / Urobili: x   Blood: x / Protein: x / Nitrite: x   Leuk Esterase: x / RBC: x / WBC x   Sq Epi: x / Non Sq Epi: x / Bacteria: x

## 2024-01-19 NOTE — PROGRESS NOTE ADULT - NS ATTEND AMEND GEN_ALL_CORE FT
I agree with the above. I personally examined and saw the patient. At neurological baseline, looking well. Given high risk of causing a shunt malfunction with shunt tap (as catheter is surrounded by parenchyma), will follow up other fever work up first. Patient unable to get LP due to prior spine fusion.

## 2024-01-19 NOTE — CHART NOTE - NSCHARTNOTEFT_GEN_A_CORE
IR consulted for LP for CSF profile given febrile. Patient with extensive spinal fusion, LP unable to be performed. Due to pending fever w/u and stable neurologic exam, no need for shunt tap at this time. Will follow clinical course. IR consulted for LP for CSF profile given febrile. Patient with extensive spinal fusion, LP unable to be performed. Due to pending fever w/u and stable neurologic exam, no need for shunt tap at this time. Will follow clinical course.    ATTENDING ATTESTATION: see full attestation from progress note from today. Danuta Richmond MD

## 2024-01-19 NOTE — PROGRESS NOTE ADULT - NS ATTEND AMEND GEN_ALL_CORE FT
Seen and examined on SICU rounds.   Overnight events noted, episodes of fever. Afebrile and HDS this AM. No evidence of distress.   Has been tolerating diet and having BMs.   No leukocytosis, acceptable lytes.     --Given  shunt and fevers: discussed with NSG and IR, to undergo CSF tap.   --Obtain blood cultures stat.   --UA w/ E coli and morganella. Continue cefepime for now (no further fevers this AM, no leukocytosis); trend vitals, labs, f/u blood cultures - will consider abx adjustment as indicated.   --NPO for CSF tap; diet s/p as tolerated.   --DVT ppx.

## 2024-01-19 NOTE — PROGRESS NOTE ADULT - ASSESSMENT
Assessment:  27yo M w/ hx of spina bifida VPS @ [facility] by Dr Dorsey and scoliosis/ no sensation distal to pubic line, wheelchair bound at baseline w no movement of his legs, self catheterizes bladder, presenting with abdominal pain, n/v. Consulted for evaluation of VPS.  - Febrile overnight, fever w/u pending  - Neurologically stable      Plan:  - Discussed and examined with Dr. Richmond  - Recommend LP to r/o CSF infection given febrile to 102.4 overnight, will need to consult IR given difficult spinal anatomy   - Q4 hour neuro checks  - SBP goal normotensive  - DVT prophylaxis: SCDs, lovenox   - Further plan pending LP results  Assessment:  27yo M w/ hx of spina bifida VPS @ [facility] by Dr Dorsey and scoliosis/ no sensation distal to pubic line, wheelchair bound at baseline w no movement of his legs, self catheterizes bladder, presenting with abdominal pain, n/v. 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 Richmond  - Q4 hour neuro checks  - Will follow along, given history of spinal fusion and spina bifida, IR unable to do LP  - Will follow up fever workup before consideration of shunt tap due to concern for possible shunt malfunction with tap as shunt is surrounded by brain parenchyma  - SBP goal normotensive  - DVT prophylaxis: SCDs, lovenox   - Further plan pending LP results

## 2024-01-19 NOTE — CHART NOTE - NSCHARTNOTEFT_GEN_A_CORE
Source: Patient [ ]  Family [ ]   other [x] EMR    Current Diet: Diet, Regular:   Supplement Feeding Modality:  Oral  Ensure Enlive Cans or Servings Per Day:  1       Frequency:  Three Times a day (01-19-24 @ 14:21)    Pt was NPO this AM - diet just advanced this afternoon.    Current Weight:   1/16 62.8 kg  no new weights, +1 dependent edema per documentation     Pertinent Medications: MEDICATIONS  (STANDING):  cefepime  Injectable. 2000 milliGRAM(s) IV Push every 8 hours  dextrose 5% + lactated ringers. 1000 milliLiter(s) (42 mL/Hr) IV Continuous <Continuous>  enoxaparin Injectable 40 milliGRAM(s) SubCutaneous every 24 hours  psyllium Powder 1 Packet(s) Oral two times a day    Pertinent Labs: 01-19 Na139 mmol/L Glu 104 mg/dL<H> K+ 4.8 mmol/L Cr  0.81 mg/dL BUN 14.7 mg/dL Phos 2.3 mg/dL<L>       Skin: midline abdomen surgical incision    Estimated Needs:   [x] no change since previous assessment  [ ] recalculated:     Hospital Course:  29yo male with PMHx of spina bifida (wheelchair bound at baseline), scoliosis,  Shunt BIBEMS 1/14 with abdominal pain, n/v, and lethargy, found to have severe SBO, metabolic acidosis, and to be in septic and hypovolemic shock. Patient was brought to the OR 1/14 for ex lap and small bowel resection, c/b bladder perf with primary repair and partial colectomy and admitted to SICU post-op for further management. Planned LP this AM - per NeuroIR patient with extensive spinal fusion, LP unable to be performed.    Current Nutrition Diagnosis: Pt remains a high nutrition risk secondary to inadequate energy-protein intake related to SBO s/p ex lap and partial colectomy as evidenced by pt NPO 1/14-1/17, advanced to [facility]D 1/17->regular 1/8, then NPO again 1/19 for planned LP.    Pt admitted with SBO, now s/p ex lap and partial colectomy. He was NPO this AM for planned LP, unable to be completed. Diet just re-instated this afternoon. Pt noted to have diarrhea, receiving metamucil, rectal tube in place, output yesterday 1/18 - 1250 ml total, output so far today per documentation 700 ml; antibiotics noted. Pt ordered for Ensure Enlive, facility no longer carries product, substituting with Ensure Plus HP. RD to follow up as feasible. Recommendations below:    Recommendations:   1. Continue current diet as tolerated; encourage PO intake as able  2. Continue Ensure Plus HP TID (350 kcals, 20 g pro each)  3. Rx: MVI daily to optimize nutrition  4. Continue metamucil to bulk stool; monitor I/Os/rectal tube output; ?consider imodium   5. Obtain daily weights and trend  6. Monitor electrolytes, replete prn    Monitoring and Evaluation:   [x] PO intake [x] Tolerance to diet prescription [X] Weights  [X] Follow up per protocol [X] Labs: chem 8, phos, mg

## 2024-01-19 NOTE — PROGRESS NOTE ADULT - SUBJECTIVE AND OBJECTIVE BOX
Patient is a 28y old  Male who presents with a chief complaint of small bowel obstruction (19 Jan 2024 06:24)    HPI:  SUBJECTIVE: 27yo M w/ hx of spina bfida and scoliosis, wheelchair bound at baseline, presenting with abdominal pain, n/v. Pt states that the pain began yesterday morning and has been persistent. He had a bowel movement this morning but prior to that his last bowel movement was one week prior. Denies passing gas. Denies fevers at home. Has been vomiting and nauseous. At home he was lethargic and EMS was called. At that time he was hypotensive and tachycardic and he was BIBEMS to [facility] ED for further workup. On arrival he was tachycardic to the 130s and hypotensive to 80s/50s. Labs notable for K 3.4, bicarb 13, Cr 2.04. Actively vomiting in ED. CT scan showed dilated bowel and stomach consistent with severe SBO with transition point in mid abdomen. Surgery consulted for management.  (14 Jan 2024 11:27) Patient underwent exlap on 1/14 with 5cm of bowel resection with anastomoses. Patient with known VPS since child, neurosurgery consulted given abdominal findings.       Interval history:  Patient seen and examined by neurosurgery team. Patient POD5 from ex lap. Overnight, febrile to 102.4, patient denies symptoms including fevers and chills. Fever w/u ordered, pending. No other acute events reported.       Vital Signs Last 24 Hrs  T(C): 37.4 (19 Jan 2024 09:00), Max: 39.1 (18 Jan 2024 21:00)  T(F): 99.3 (19 Jan 2024 09:00), Max: 102.4 (18 Jan 2024 21:00)  HR: 80 (19 Jan 2024 09:00) (79 - 132)  BP: 98/70 (19 Jan 2024 09:00) (85/58 - 166/134)  BP(mean): 80 (19 Jan 2024 09:00) (63 - 148)  RR: 20 (19 Jan 2024 09:00) (15 - 22)  SpO2: 96% (19 Jan 2024 09:00) (95% - 100%)    Parameters below as of 18 Jan 2024 20:00  Patient On (Oxygen Delivery Method): room air      Physical Exam:  Constitutional: NAD, lying in bed  Neuro  * Mental Status:  GCS 15: Awake, alert, oriented to conversation. No aphasia or difficulty speaking. No dysarthria.   * Cranial Nerves: Cnii-Cnxii grossly intact. PERRL, EOMI, tongue midline, no gaze deviation  * Motor: RUE 5/5, LUE 5/5, b/l LE contracted and immobile   * Sensory: sensation intact b/l UE   * Reflexes: not assessed       LABS:                        13.0   7.43  )-----------( 138      ( 19 Jan 2024 03:06 )             38.8     01-19    144  |  111<H>  |  11.2  ----------------------------<  102<H>  3.7   |  21.0<L>  |  0.95    Ca    7.8<L>      19 Jan 2024 03:06  Phos  3.0     01-19  Mg     2.0     01-19      Medications:  MEDICATIONS  (STANDING):  cefepime  Injectable. 2000 milliGRAM(s) IV Push every 8 hours  chlorhexidine 2% Cloths 1 Application(s) Topical daily  dextrose 5% + lactated ringers. 1000 milliLiter(s) (42 mL/Hr) IV Continuous <Continuous>  enoxaparin Injectable 40 milliGRAM(s) SubCutaneous every 24 hours  potassium chloride  10 mEq/100 mL IVPB 10 milliEquivalent(s) IV Intermittent every 1 hour  psyllium Powder 1 Packet(s) Oral two times a day    MEDICATIONS  (PRN):  acetaminophen     Tablet .. 975 milliGRAM(s) Oral every 6 hours PRN Temp greater or equal to 38.5C (101.3F)  benzocaine 20% Spray 1 Spray(s) Topical every 6 hours PRN sore throat  benzocaine/menthol Lozenge 1 Lozenge Oral every 8 hours PRN Sore Throat      RADIOLOGY & ADDITIONAL STUDIES:  No new neurosurgical imaging to review     < from: CT Head No Cont (01.16.24 @ 10:57) >  IMPRESSION: Stable follow-up CT exam when compared with 1/14/2024.    --- End of Report ---  PRATIBHA LUTHER MD; Attending Radiologist  This document has been electronically signed. Jan 17 2024  7:57AM    < end of copied text >

## 2024-01-20 LAB
ALBUMIN SERPL ELPH-MCNC: 2.8 G/DL — LOW (ref 3.3–5.2)
ALP SERPL-CCNC: 49 U/L — SIGNIFICANT CHANGE UP (ref 40–120)
ALT FLD-CCNC: 10 U/L — SIGNIFICANT CHANGE UP
ANION GAP SERPL CALC-SCNC: 13 MMOL/L — SIGNIFICANT CHANGE UP (ref 5–17)
ANION GAP SERPL CALC-SCNC: 13 MMOL/L — SIGNIFICANT CHANGE UP (ref 5–17)
ANION GAP SERPL CALC-SCNC: 15 MMOL/L — SIGNIFICANT CHANGE UP (ref 5–17)
ANISOCYTOSIS BLD QL: SLIGHT — SIGNIFICANT CHANGE UP
APTT BLD: 38.8 SEC — HIGH (ref 24.5–35.6)
AST SERPL-CCNC: 13 U/L — SIGNIFICANT CHANGE UP
BASOPHILS # BLD AUTO: 0 K/UL — SIGNIFICANT CHANGE UP (ref 0–0.2)
BASOPHILS NFR BLD AUTO: 0 % — SIGNIFICANT CHANGE UP (ref 0–2)
BILIRUB DIRECT SERPL-MCNC: 0.3 MG/DL — SIGNIFICANT CHANGE UP (ref 0–0.3)
BILIRUB INDIRECT FLD-MCNC: 0.5 MG/DL — SIGNIFICANT CHANGE UP (ref 0.2–1)
BILIRUB SERPL-MCNC: 0.8 MG/DL — SIGNIFICANT CHANGE UP (ref 0.4–2)
BLD GP AB SCN SERPL QL: SIGNIFICANT CHANGE UP
BUN SERPL-MCNC: 16.7 MG/DL — SIGNIFICANT CHANGE UP (ref 8–20)
BUN SERPL-MCNC: 26.7 MG/DL — HIGH (ref 8–20)
BUN SERPL-MCNC: 27.1 MG/DL — HIGH (ref 8–20)
C DIFF BY PCR RESULT: SIGNIFICANT CHANGE UP
CALCIUM SERPL-MCNC: 6.8 MG/DL — LOW (ref 8.4–10.5)
CALCIUM SERPL-MCNC: 7.3 MG/DL — LOW (ref 8.4–10.5)
CALCIUM SERPL-MCNC: 7.3 MG/DL — LOW (ref 8.4–10.5)
CHLORIDE SERPL-SCNC: 108 MMOL/L — SIGNIFICANT CHANGE UP (ref 96–108)
CHLORIDE SERPL-SCNC: 113 MMOL/L — HIGH (ref 96–108)
CHLORIDE SERPL-SCNC: 115 MMOL/L — HIGH (ref 96–108)
CO2 SERPL-SCNC: 16 MMOL/L — LOW (ref 22–29)
CO2 SERPL-SCNC: 16 MMOL/L — LOW (ref 22–29)
CO2 SERPL-SCNC: 20 MMOL/L — LOW (ref 22–29)
CREAT SERPL-MCNC: 0.9 MG/DL — SIGNIFICANT CHANGE UP (ref 0.5–1.3)
CREAT SERPL-MCNC: 1.08 MG/DL — SIGNIFICANT CHANGE UP (ref 0.5–1.3)
CREAT SERPL-MCNC: 1.12 MG/DL — SIGNIFICANT CHANGE UP (ref 0.5–1.3)
EGFR: 118 ML/MIN/1.73M2 — SIGNIFICANT CHANGE UP
EGFR: 91 ML/MIN/1.73M2 — SIGNIFICANT CHANGE UP
EGFR: 95 ML/MIN/1.73M2 — SIGNIFICANT CHANGE UP
EOSINOPHIL # BLD AUTO: 0 K/UL — SIGNIFICANT CHANGE UP (ref 0–0.5)
EOSINOPHIL NFR BLD AUTO: 0 % — SIGNIFICANT CHANGE UP (ref 0–6)
FIBRINOGEN PPP-MCNC: 324 MG/DL — SIGNIFICANT CHANGE UP (ref 200–450)
GAS PNL BLDA: SIGNIFICANT CHANGE UP
GIANT PLATELETS BLD QL SMEAR: PRESENT — SIGNIFICANT CHANGE UP
GLUCOSE SERPL-MCNC: 100 MG/DL — HIGH (ref 70–99)
GLUCOSE SERPL-MCNC: 124 MG/DL — HIGH (ref 70–99)
GLUCOSE SERPL-MCNC: 90 MG/DL — SIGNIFICANT CHANGE UP (ref 70–99)
HCT VFR BLD CALC: 18.5 % — CRITICAL LOW (ref 39–50)
HCT VFR BLD CALC: 20.6 % — CRITICAL LOW (ref 39–50)
HCT VFR BLD CALC: 29.8 % — LOW (ref 39–50)
HGB BLD-MCNC: 6 G/DL — CRITICAL LOW (ref 13–17)
HGB BLD-MCNC: 6.7 G/DL — CRITICAL LOW (ref 13–17)
HGB BLD-MCNC: 9.6 G/DL — LOW (ref 13–17)
INR BLD: 1.28 RATIO — HIGH (ref 0.85–1.18)
LACTATE SERPL-SCNC: 1 MMOL/L — SIGNIFICANT CHANGE UP (ref 0.5–2)
LACTATE SERPL-SCNC: 1.2 MMOL/L — SIGNIFICANT CHANGE UP (ref 0.5–2)
LYMPHOCYTES # BLD AUTO: 0.8 K/UL — LOW (ref 1–3.3)
LYMPHOCYTES # BLD AUTO: 8.8 % — LOW (ref 13–44)
MAGNESIUM SERPL-MCNC: 1.8 MG/DL — SIGNIFICANT CHANGE UP (ref 1.6–2.6)
MAGNESIUM SERPL-MCNC: 2.2 MG/DL — SIGNIFICANT CHANGE UP (ref 1.6–2.6)
MAGNESIUM SERPL-MCNC: 2.6 MG/DL — SIGNIFICANT CHANGE UP (ref 1.6–2.6)
MANUAL SMEAR VERIFICATION: SIGNIFICANT CHANGE UP
MCHC RBC-ENTMCNC: 27.6 PG — SIGNIFICANT CHANGE UP (ref 27–34)
MCHC RBC-ENTMCNC: 27.7 PG — SIGNIFICANT CHANGE UP (ref 27–34)
MCHC RBC-ENTMCNC: 28.7 PG — SIGNIFICANT CHANGE UP (ref 27–34)
MCHC RBC-ENTMCNC: 32.2 GM/DL — SIGNIFICANT CHANGE UP (ref 32–36)
MCHC RBC-ENTMCNC: 32.4 GM/DL — SIGNIFICANT CHANGE UP (ref 32–36)
MCHC RBC-ENTMCNC: 32.5 GM/DL — SIGNIFICANT CHANGE UP (ref 32–36)
MCV RBC AUTO: 84.8 FL — SIGNIFICANT CHANGE UP (ref 80–100)
MCV RBC AUTO: 86.1 FL — SIGNIFICANT CHANGE UP (ref 80–100)
MCV RBC AUTO: 88.5 FL — SIGNIFICANT CHANGE UP (ref 80–100)
MONOCYTES # BLD AUTO: 0.16 K/UL — SIGNIFICANT CHANGE UP (ref 0–0.9)
MONOCYTES NFR BLD AUTO: 1.7 % — LOW (ref 2–14)
NEUTROPHILS # BLD AUTO: 8.1 K/UL — HIGH (ref 1.8–7.4)
NEUTROPHILS NFR BLD AUTO: 88.6 % — HIGH (ref 43–77)
OB PNL STL: POSITIVE
PHOSPHATE SERPL-MCNC: 1.7 MG/DL — LOW (ref 2.4–4.7)
PHOSPHATE SERPL-MCNC: 3.1 MG/DL — SIGNIFICANT CHANGE UP (ref 2.4–4.7)
PHOSPHATE SERPL-MCNC: 4.3 MG/DL — SIGNIFICANT CHANGE UP (ref 2.4–4.7)
PLAT MORPH BLD: NORMAL — SIGNIFICANT CHANGE UP
PLATELET # BLD AUTO: 184 K/UL — SIGNIFICANT CHANGE UP (ref 150–400)
PLATELET # BLD AUTO: 194 K/UL — SIGNIFICANT CHANGE UP (ref 150–400)
PLATELET # BLD AUTO: 217 K/UL — SIGNIFICANT CHANGE UP (ref 150–400)
POLYCHROMASIA BLD QL SMEAR: SLIGHT — SIGNIFICANT CHANGE UP
POTASSIUM SERPL-MCNC: 4 MMOL/L — SIGNIFICANT CHANGE UP (ref 3.5–5.3)
POTASSIUM SERPL-MCNC: 4.1 MMOL/L — SIGNIFICANT CHANGE UP (ref 3.5–5.3)
POTASSIUM SERPL-MCNC: 4.3 MMOL/L — SIGNIFICANT CHANGE UP (ref 3.5–5.3)
POTASSIUM SERPL-SCNC: 4 MMOL/L — SIGNIFICANT CHANGE UP (ref 3.5–5.3)
POTASSIUM SERPL-SCNC: 4.1 MMOL/L — SIGNIFICANT CHANGE UP (ref 3.5–5.3)
POTASSIUM SERPL-SCNC: 4.3 MMOL/L — SIGNIFICANT CHANGE UP (ref 3.5–5.3)
PROT SERPL-MCNC: 5 G/DL — LOW (ref 6.6–8.7)
PROTHROM AB SERPL-ACNC: 14.1 SEC — HIGH (ref 9.5–13)
RAPID RVP RESULT: SIGNIFICANT CHANGE UP
RBC # BLD: 2.09 M/UL — LOW (ref 4.2–5.8)
RBC # BLD: 2.43 M/UL — LOW (ref 4.2–5.8)
RBC # BLD: 3.46 M/UL — LOW (ref 4.2–5.8)
RBC # FLD: 16.1 % — HIGH (ref 10.3–14.5)
RBC # FLD: 16.3 % — HIGH (ref 10.3–14.5)
RBC # FLD: 16.5 % — HIGH (ref 10.3–14.5)
RBC BLD AUTO: ABNORMAL
SARS-COV-2 RNA SPEC QL NAA+PROBE: SIGNIFICANT CHANGE UP
SODIUM SERPL-SCNC: 141 MMOL/L — SIGNIFICANT CHANGE UP (ref 135–145)
SODIUM SERPL-SCNC: 142 MMOL/L — SIGNIFICANT CHANGE UP (ref 135–145)
SODIUM SERPL-SCNC: 146 MMOL/L — HIGH (ref 135–145)
VARIANT LYMPHS # BLD: 0.9 % — SIGNIFICANT CHANGE UP (ref 0–6)
WBC # BLD: 7.06 K/UL — SIGNIFICANT CHANGE UP (ref 3.8–10.5)
WBC # BLD: 8.14 K/UL — SIGNIFICANT CHANGE UP (ref 3.8–10.5)
WBC # BLD: 9.14 K/UL — SIGNIFICANT CHANGE UP (ref 3.8–10.5)
WBC # FLD AUTO: 7.06 K/UL — SIGNIFICANT CHANGE UP (ref 3.8–10.5)
WBC # FLD AUTO: 8.14 K/UL — SIGNIFICANT CHANGE UP (ref 3.8–10.5)
WBC # FLD AUTO: 9.14 K/UL — SIGNIFICANT CHANGE UP (ref 3.8–10.5)

## 2024-01-20 PROCEDURE — 99232 SBSQ HOSP IP/OBS MODERATE 35: CPT | Mod: 57

## 2024-01-20 PROCEDURE — 99292 CRITICAL CARE ADDL 30 MIN: CPT | Mod: 25

## 2024-01-20 PROCEDURE — 99024 POSTOP FOLLOW-UP VISIT: CPT

## 2024-01-20 PROCEDURE — 99291 CRITICAL CARE FIRST HOUR: CPT | Mod: 25

## 2024-01-20 PROCEDURE — 36620 INSERTION CATHETER ARTERY: CPT

## 2024-01-20 PROCEDURE — 71045 X-RAY EXAM CHEST 1 VIEW: CPT | Mod: 26

## 2024-01-20 PROCEDURE — 74177 CT ABD & PELVIS W/CONTRAST: CPT | Mod: 26

## 2024-01-20 PROCEDURE — 76937 US GUIDE VASCULAR ACCESS: CPT | Mod: 26

## 2024-01-20 RX ORDER — ALBUMIN HUMAN 25 %
250 VIAL (ML) INTRAVENOUS ONCE
Refills: 0 | Status: COMPLETED | OUTPATIENT
Start: 2024-01-20 | End: 2024-01-20

## 2024-01-20 RX ORDER — METOCLOPRAMIDE HCL 10 MG
10 TABLET ORAL EVERY 6 HOURS
Refills: 0 | Status: DISCONTINUED | OUTPATIENT
Start: 2024-01-20 | End: 2024-01-22

## 2024-01-20 RX ORDER — PANTOPRAZOLE SODIUM 20 MG/1
8 TABLET, DELAYED RELEASE ORAL
Qty: 80 | Refills: 0 | Status: DISCONTINUED | OUTPATIENT
Start: 2024-01-20 | End: 2024-01-22

## 2024-01-20 RX ORDER — ACETAMINOPHEN 500 MG
1000 TABLET ORAL EVERY 6 HOURS
Refills: 0 | Status: DISCONTINUED | OUTPATIENT
Start: 2024-01-20 | End: 2024-02-07

## 2024-01-20 RX ORDER — NOREPINEPHRINE BITARTRATE/D5W 8 MG/250ML
0.01 PLASTIC BAG, INJECTION (ML) INTRAVENOUS
Qty: 8 | Refills: 0 | Status: DISCONTINUED | OUTPATIENT
Start: 2024-01-20 | End: 2024-01-22

## 2024-01-20 RX ORDER — PANTOPRAZOLE SODIUM 20 MG/1
40 TABLET, DELAYED RELEASE ORAL DAILY
Refills: 0 | Status: DISCONTINUED | OUTPATIENT
Start: 2024-01-20 | End: 2024-01-20

## 2024-01-20 RX ORDER — SODIUM CHLORIDE 9 MG/ML
1000 INJECTION, SOLUTION INTRAVENOUS ONCE
Refills: 0 | Status: COMPLETED | OUTPATIENT
Start: 2024-01-20 | End: 2024-01-20

## 2024-01-20 RX ORDER — PANTOPRAZOLE SODIUM 20 MG/1
80 TABLET, DELAYED RELEASE ORAL ONCE
Refills: 0 | Status: COMPLETED | OUTPATIENT
Start: 2024-01-20 | End: 2024-01-20

## 2024-01-20 RX ORDER — SODIUM CHLORIDE 9 MG/ML
1000 INJECTION, SOLUTION INTRAVENOUS
Refills: 0 | Status: DISCONTINUED | OUTPATIENT
Start: 2024-01-20 | End: 2024-01-21

## 2024-01-20 RX ORDER — MEROPENEM 1 G/30ML
1000 INJECTION INTRAVENOUS EVERY 8 HOURS
Refills: 0 | Status: DISCONTINUED | OUTPATIENT
Start: 2024-01-20 | End: 2024-01-20

## 2024-01-20 RX ORDER — ACETAMINOPHEN 500 MG
1000 TABLET ORAL ONCE
Refills: 0 | Status: COMPLETED | OUTPATIENT
Start: 2024-01-20 | End: 2024-01-20

## 2024-01-20 RX ORDER — KETOROLAC TROMETHAMINE 30 MG/ML
15 SYRINGE (ML) INJECTION ONCE
Refills: 0 | Status: DISCONTINUED | OUTPATIENT
Start: 2024-01-20 | End: 2024-01-20

## 2024-01-20 RX ORDER — PANTOPRAZOLE SODIUM 20 MG/1
40 TABLET, DELAYED RELEASE ORAL EVERY 12 HOURS
Refills: 0 | Status: DISCONTINUED | OUTPATIENT
Start: 2024-01-20 | End: 2024-01-20

## 2024-01-20 RX ORDER — FENTANYL CITRATE 50 UG/ML
25 INJECTION INTRAVENOUS ONCE
Refills: 0 | Status: DISCONTINUED | OUTPATIENT
Start: 2024-01-20 | End: 2024-01-20

## 2024-01-20 RX ORDER — MEROPENEM 1 G/30ML
1000 INJECTION INTRAVENOUS EVERY 8 HOURS
Refills: 0 | Status: DISCONTINUED | OUTPATIENT
Start: 2024-01-20 | End: 2024-01-22

## 2024-01-20 RX ORDER — SODIUM CHLORIDE 9 MG/ML
1000 INJECTION INTRAMUSCULAR; INTRAVENOUS; SUBCUTANEOUS ONCE
Refills: 0 | Status: COMPLETED | OUTPATIENT
Start: 2024-01-20 | End: 2024-01-20

## 2024-01-20 RX ORDER — MAGNESIUM SULFATE 500 MG/ML
2 VIAL (ML) INJECTION DAILY
Refills: 0 | Status: DISCONTINUED | OUTPATIENT
Start: 2024-01-20 | End: 2024-01-22

## 2024-01-20 RX ADMIN — SODIUM CHLORIDE 1000 MILLILITER(S): 9 INJECTION, SOLUTION INTRAVENOUS at 01:25

## 2024-01-20 RX ADMIN — SODIUM CHLORIDE 6000 MILLILITER(S): 9 INJECTION, SOLUTION INTRAVENOUS at 16:29

## 2024-01-20 RX ADMIN — Medication 400 MILLIGRAM(S): at 01:49

## 2024-01-20 RX ADMIN — Medication 125 MILLILITER(S): at 06:46

## 2024-01-20 RX ADMIN — SODIUM CHLORIDE 1000 MILLILITER(S): 9 INJECTION INTRAMUSCULAR; INTRAVENOUS; SUBCUTANEOUS at 04:30

## 2024-01-20 RX ADMIN — CEFEPIME 2000 MILLIGRAM(S): 1 INJECTION, POWDER, FOR SOLUTION INTRAMUSCULAR; INTRAVENOUS at 15:13

## 2024-01-20 RX ADMIN — Medication 15 MILLIGRAM(S): at 06:00

## 2024-01-20 RX ADMIN — CEFEPIME 2000 MILLIGRAM(S): 1 INJECTION, POWDER, FOR SOLUTION INTRAMUSCULAR; INTRAVENOUS at 06:00

## 2024-01-20 RX ADMIN — Medication 85 MILLIMOLE(S): at 06:01

## 2024-01-20 RX ADMIN — Medication 1 PACKET(S): at 06:03

## 2024-01-20 RX ADMIN — CHLORHEXIDINE GLUCONATE 1 APPLICATION(S): 213 SOLUTION TOPICAL at 11:16

## 2024-01-20 RX ADMIN — Medication 125 MILLILITER(S): at 12:48

## 2024-01-20 RX ADMIN — PANTOPRAZOLE SODIUM 80 MILLIGRAM(S): 20 TABLET, DELAYED RELEASE ORAL at 21:45

## 2024-01-20 RX ADMIN — Medication 15 MILLIGRAM(S): at 08:17

## 2024-01-20 RX ADMIN — FENTANYL CITRATE 25 MICROGRAM(S): 50 INJECTION INTRAVENOUS at 23:20

## 2024-01-20 RX ADMIN — Medication 1.31 MICROGRAM(S)/KG/MIN: at 04:54

## 2024-01-20 RX ADMIN — Medication 25 GRAM(S): at 06:48

## 2024-01-20 RX ADMIN — Medication 104 MILLIGRAM(S): at 21:45

## 2024-01-20 RX ADMIN — Medication 125 MILLILITER(S): at 10:44

## 2024-01-20 RX ADMIN — MEROPENEM 1000 MILLIGRAM(S): 1 INJECTION INTRAVENOUS at 21:45

## 2024-01-20 RX ADMIN — PANTOPRAZOLE SODIUM 40 MILLIGRAM(S): 20 TABLET, DELAYED RELEASE ORAL at 17:59

## 2024-01-20 RX ADMIN — Medication 125 MILLILITER(S): at 04:30

## 2024-01-20 NOTE — PROGRESS NOTE ADULT - NS ATTEND AMEND GEN_ALL_CORE FT
I agree with the above. I personally examined and saw the patient. Neurointact, no headaches. His only symptom at his last shunt malfunction was headaches. Will follow-along.

## 2024-01-20 NOTE — PROGRESS NOTE ADULT - CRITICAL CARE ATTENDING COMMENT
Seen and examined on SICU rounds.   Noted to be tachycardic, intermittently febrile overnight (afebrile this AM), hemodynamically unstable (on low rate pressors), low uop; subsequently noted to have melena w/ acute drop in H/H. No leukocytosis.   Remains at baseline mental status, abdomen soft, ND, no TTP. Abdominal drains w/ serosanguinous output.     --To receive 2u PRBC + 1L IVF bolus. Also given 500ml albumin; hemodynamics responding to resuscitation w/ downtrending pressor requirement.   --No evidence of respiratory distress.   --Events communicated to NSG (in setting of  shunt + fevers).   --To remain NPO, trend vitals/labs/I&Os.   --Continue abx and f/u Bcx.

## 2024-01-20 NOTE — CHART NOTE - NSCHARTNOTEFT_GEN_A_CORE
Eval pf pt at bedside after receiving 2U PRBC, 1 of 2 FFP being hung.  Pressor requirements decreasing with addition of colloid.  Levophed at 0.1mck/kg/min with MAP of 73 and improved sinus tach.  pt noted to have another melena BM, no vic blood noted.    Alerted by nurse the FRITZ drain output had just changed in color from clear straw, to bilious with sediment present.      Pt laying comfortably in bed, NAD  A&O x3 at baseline mental status  unlabored breathing  sinus tach, warm, distal extremities  abd soft without rebound or guarding  cline in place with little yellow urine  FRITZ in place with above noted output    ICU Vital Signs Last 24 Hrs  T(C): 37 (20 Jan 2024 17:00), Max: 39.3 (20 Jan 2024 03:00)  T(F): 98.6 (20 Jan 2024 17:00), Max: 102.7 (20 Jan 2024 03:00)  HR: 110 (20 Jan 2024 18:00) (77 - 137)  BP: 98/55 (20 Jan 2024 16:45) (74/60 - 135/53)  BP(mean): 64 (20 Jan 2024 16:45) (58 - 94)  ABP: 103/52 (20 Jan 2024 18:00) (80/45 - 135/60)  ABP(mean): 71 (20 Jan 2024 18:00) (53 - 83)  RR: 20 (20 Jan 2024 18:00) (14 - 24)  SpO2: 100% (20 Jan 2024 18:00) (96% - 100%)      I&O's Detail    19 Jan 2024 07:01  -  20 Jan 2024 07:00  --------------------------------------------------------  IN:    dextrose 5% + lactated ringers: 420 mL    IV PiggyBack: 200 mL    multiple electrolytes Injection Type 1 Bolus: 1000 mL    Oral Fluid: 120 mL  Total IN: 1740 mL    OUT:    Indwelling Catheter - Urethral (mL): 850 mL    Rectal Tube (mL): 1200 mL  Total OUT: 2050 mL    Total NET: -310 mL      20 Jan 2024 07:01 - 20 Jan 2024 18:14  --------------------------------------------------------  IN:    Albumin 5%  - 500 mL: 750 mL    dextrose 5% + lactated ringers: 462 mL    IV PiggyBack: 581 mL    Norepinephrine: 79 mL    PRBCs (Packed Red Blood Cells): 640 mL  Total IN: 2512 mL    OUT:    Bulb (mL): 1170 mL    Indwelling Catheter - Urethral (mL): 100 mL  Total OUT: 1270 mL    Total NET: 1242 mL        Dr Jacobsen made aware, to bedside  Repeat labs due at 2000 which will be 4 hours after the initial unit of blood administered.

## 2024-01-20 NOTE — PROGRESS NOTE ADULT - SUBJECTIVE AND OBJECTIVE BOX
24h Events:  BCx sent yesterday as patient has been persistently febrile. This AM patient became more tachycardic to the 120s-130s. At this time patient was HD stable and complaining of rigors. Denied CP, SOB, abdominal pain. Patient was febrile to around 102. Ordered patient for PRN ofirmev and tordol for fever. POCUS showed collapsing IJ and hyperdynamic ventricle. IVC difficult to assess due to patients anatomy.     ICU Vital Signs Last 24 Hrs  T(C): 39.3 (20 Jan 2024 03:00), Max: 39.3 (20 Jan 2024 03:00)  T(F): 102.7 (20 Jan 2024 03:00), Max: 102.7 (20 Jan 2024 03:00)  HR: 137 (20 Jan 2024 03:00) (80 - 137)  BP: 108/76 (19 Jan 2024 22:00) (85/58 - 127/99)  BP(mean): 87 (19 Jan 2024 22:00) (63 - 106)  ABP: --  ABP(mean): --  RR: 23 (20 Jan 2024 03:00) (16 - 23)  SpO2: 96% (20 Jan 2024 03:00) (96% - 100%)        I&O's Detail    18 Jan 2024 07:01  -  19 Jan 2024 07:00  --------------------------------------------------------  IN:    dextrose 5% + lactated ringers: 1107 mL    IV PiggyBack: 100 mL  Total IN: 1207 mL    OUT:    Blood Loss (mL): 30 mL    Indwelling Catheter - Urethral (mL): 1335 mL    Rectal Tube (mL): 1150 mL  Total OUT: 2515 mL    Total NET: -1308 mL      19 Jan 2024 07:01  -  20 Jan 2024 04:52  --------------------------------------------------------  IN:    dextrose 5% + lactated ringers: 420 mL    IV PiggyBack: 200 mL    multiple electrolytes Injection Type 1 Bolus: 1000 mL    Oral Fluid: 120 mL  Total IN: 1740 mL    OUT:    Indwelling Catheter - Urethral (mL): 850 mL    Rectal Tube (mL): 200 mL  Total OUT: 1050 mL    Total NET: 690 mL              MEDICATIONS  (STANDING):  cefepime  Injectable. 2000 milliGRAM(s) IV Push every 8 hours  chlorhexidine 2% Cloths 1 Application(s) Topical daily  dextrose 5% + lactated ringers. 1000 milliLiter(s) (42 mL/Hr) IV Continuous <Continuous>  enoxaparin Injectable 40 milliGRAM(s) SubCutaneous every 24 hours  ketorolac   Injectable 15 milliGRAM(s) IV Push once  magnesium sulfate  IVPB 2 Gram(s) IV Intermittent daily  norepinephrine Infusion 0.01 MICROgram(s)/kG/Min (1.31 mL/Hr) IV Continuous <Continuous>  psyllium Powder 1 Packet(s) Oral two times a day  sodium phosphate 30 milliMole(s)/500 mL IVPB 30 milliMole(s) IV Intermittent once    MEDICATIONS  (PRN):  acetaminophen     Tablet .. 975 milliGRAM(s) Oral every 6 hours PRN Temp greater or equal to 38.5C (101.3F)  benzocaine 20% Spray 1 Spray(s) Topical every 6 hours PRN sore throat  benzocaine/menthol Lozenge 1 Lozenge Oral every 8 hours PRN Sore Throat      Physical Exam:    Gen: Resting comfortably in bed    HEENT: PERRL, EOMI    Neurological: Alert and oriented x3 without focal deficit    Neck: Trachea midline, no evidence of JVD, FROM without pain, neck symmetric    Pulmonary: CTA B/L,  equal rise and fall of the chest, no increased WOB    Cardiovascular: regular rate and rhythm, S1/S2    Gastrointestinal: Soft, non-tender, non-distended    : Sharp in place draining clear yellow urine / Voiding    Extremities: Without clubbing/cyanosis/edema    Skin: Intact    Musculoskeletal: FROM without pain, no deformity or areas of tenderness    LABS:  CBC Full  -  ( 20 Jan 2024 02:15 )  WBC Count : 7.06 K/uL  RBC Count : 3.46 M/uL  Hemoglobin : 9.6 g/dL  Hematocrit : 29.8 %  Platelet Count - Automated : 194 K/uL  Mean Cell Volume : 86.1 fl  Mean Cell Hemoglobin : 27.7 pg  Mean Cell Hemoglobin Concentration : 32.2 gm/dL  Auto Neutrophil # : x  Auto Lymphocyte # : x  Auto Monocyte # : x  Auto Eosinophil # : x  Auto Basophil # : x  Auto Neutrophil % : x  Auto Lymphocyte % : x  Auto Monocyte % : x  Auto Eosinophil % : x  Auto Basophil % : x    01-20    141  |  108  |  16.7  ----------------------------<  100<H>  4.1   |  20.0<L>  |  0.90    Ca    7.3<L>      20 Jan 2024 02:15  Phos  1.7     01-20  Mg     1.8     01-20      PT/INR - ( 19 Jan 2024 10:30 )   PT: 15.5 sec;   INR: 1.41 ratio         PTT - ( 19 Jan 2024 10:30 )  PTT:35.0 sec  Urinalysis Basic - ( 20 Jan 2024 02:15 )    Color: x / Appearance: x / SG: x / pH: x  Gluc: 100 mg/dL / Ketone: x  / Bili: x / Urobili: x   Blood: x / Protein: x / Nitrite: x   Leuk Esterase: x / RBC: x / WBC x   Sq Epi: x / Non Sq Epi: x / Bacteria: x      RECENT CULTURES:  01-14 Catheterized Catheterized Escherichia coli ESBL  Morganella morganii XXXX   50,000 - 99,000 CFU/mL Escherichia coli ESBL  50,000 - 99,000 CFU/mL Morganella morganii    01-14 .Blood Blood-Peripheral XXXX XXXX   No growth at 5 days    01-14 .Blood Blood-Peripheral XXXX XXXX   No growth at 5 days                 24h Events:  BCx sent yesterday as patient has been persistently febrile. NSx considering culturing CSF due to Hx of  shunt. This AM patient became more tachycardic to the 120s-130s. At this time patient was HD stable and complaining of rigors. Denied CP, SOB, abdominal pain. Patient was febrile to around 102. Ordered patient for PRN ofirmev and tordol for fever. POCUS showed collapsing IJ and hyperdynamic ventricle. IVC difficult to assess due to patients anatomy. Patient given 1L NS. Urine output began to decrease and patient became acutely hypotensive to with MAPs in the 50s. Additional 1L NS given and albumin 250ml. Patient HR responded appropriately. MAPs continued to stay persistently low with tachycardia. Gave additional 1L NS, 250 albumin and started patient on levo for presumed septic shock. Due to recent abdominal operation, decision made to order CT scan. Lactate remains cleared. Patient continues to have liquid BMs in rectal tube, will send C. Diff PCR/Toxin.    ICU Vital Signs Last 24 Hrs  T(C): 39.3 (20 Jan 2024 03:00), Max: 39.3 (20 Jan 2024 03:00)  T(F): 102.7 (20 Jan 2024 03:00), Max: 102.7 (20 Jan 2024 03:00)  HR: 137 (20 Jan 2024 03:00) (80 - 137)  BP: 108/76 (19 Jan 2024 22:00) (85/58 - 127/99)  BP(mean): 87 (19 Jan 2024 22:00) (63 - 106)  ABP: --  ABP(mean): --  RR: 23 (20 Jan 2024 03:00) (16 - 23)  SpO2: 96% (20 Jan 2024 03:00) (96% - 100%)        I&O's Detail    18 Jan 2024 07:01  -  19 Jan 2024 07:00  --------------------------------------------------------  IN:    dextrose 5% + lactated ringers: 1107 mL    IV PiggyBack: 100 mL  Total IN: 1207 mL    OUT:    Blood Loss (mL): 30 mL    Indwelling Catheter - Urethral (mL): 1335 mL    Rectal Tube (mL): 1150 mL  Total OUT: 2515 mL    Total NET: -1308 mL      19 Jan 2024 07:01  -  20 Jan 2024 04:52  --------------------------------------------------------  IN:    dextrose 5% + lactated ringers: 420 mL    IV PiggyBack: 200 mL    multiple electrolytes Injection Type 1 Bolus: 1000 mL    Oral Fluid: 120 mL  Total IN: 1740 mL    OUT:    Indwelling Catheter - Urethral (mL): 850 mL    Rectal Tube (mL): 200 mL  Total OUT: 1050 mL    Total NET: 690 mL              MEDICATIONS  (STANDING):  cefepime  Injectable. 2000 milliGRAM(s) IV Push every 8 hours  chlorhexidine 2% Cloths 1 Application(s) Topical daily  dextrose 5% + lactated ringers. 1000 milliLiter(s) (42 mL/Hr) IV Continuous <Continuous>  enoxaparin Injectable 40 milliGRAM(s) SubCutaneous every 24 hours  ketorolac   Injectable 15 milliGRAM(s) IV Push once  magnesium sulfate  IVPB 2 Gram(s) IV Intermittent daily  norepinephrine Infusion 0.01 MICROgram(s)/kG/Min (1.31 mL/Hr) IV Continuous <Continuous>  psyllium Powder 1 Packet(s) Oral two times a day  sodium phosphate 30 milliMole(s)/500 mL IVPB 30 milliMole(s) IV Intermittent once    MEDICATIONS  (PRN):  acetaminophen     Tablet .. 975 milliGRAM(s) Oral every 6 hours PRN Temp greater or equal to 38.5C (101.3F)  benzocaine 20% Spray 1 Spray(s) Topical every 6 hours PRN sore throat  benzocaine/menthol Lozenge 1 Lozenge Oral every 8 hours PRN Sore Throat      Physical Exam:    Neurological:  A and O x 3    HEENT: PERRL, EOMI     Respiratory: Unlabored, no accessory muscle use    Cardiovascular: Sinus tachycardia     Gastrointestinal: Softly distended, incision c/d/i, FRITZ w/ serosanginous output, midline incision w/ fibrinouse exudate- no erythema    Extremities: +1 peripheral edema, legs contracted and shortened in frog leg position, b/l lower extremities baseline plegia.  B/l upper extremities moving spontaneously without limitations.     Skin: No rashes      LABS:  CBC Full  -  ( 20 Jan 2024 02:15 )  WBC Count : 7.06 K/uL  RBC Count : 3.46 M/uL  Hemoglobin : 9.6 g/dL  Hematocrit : 29.8 %  Platelet Count - Automated : 194 K/uL  Mean Cell Volume : 86.1 fl  Mean Cell Hemoglobin : 27.7 pg  Mean Cell Hemoglobin Concentration : 32.2 gm/dL  Auto Neutrophil # : x  Auto Lymphocyte # : x  Auto Monocyte # : x  Auto Eosinophil # : x  Auto Basophil # : x  Auto Neutrophil % : x  Auto Lymphocyte % : x  Auto Monocyte % : x  Auto Eosinophil % : x  Auto Basophil % : x    01-20    141  |  108  |  16.7  ----------------------------<  100<H>  4.1   |  20.0<L>  |  0.90    Ca    7.3<L>      20 Jan 2024 02:15  Phos  1.7     01-20  Mg     1.8     01-20      PT/INR - ( 19 Jan 2024 10:30 )   PT: 15.5 sec;   INR: 1.41 ratio         PTT - ( 19 Jan 2024 10:30 )  PTT:35.0 sec  Urinalysis Basic - ( 20 Jan 2024 02:15 )    Color: x / Appearance: x / SG: x / pH: x  Gluc: 100 mg/dL / Ketone: x  / Bili: x / Urobili: x   Blood: x / Protein: x / Nitrite: x   Leuk Esterase: x / RBC: x / WBC x   Sq Epi: x / Non Sq Epi: x / Bacteria: x      RECENT CULTURES:  01-14 Catheterized Catheterized Escherichia coli ESBL  Morganella morganii XXXX   50,000 - 99,000 CFU/mL Escherichia coli ESBL  50,000 - 99,000 CFU/mL Morganella morganii    01-14 .Blood Blood-Peripheral XXXX XXXX   No growth at 5 days    01-14 .Blood Blood-Peripheral XXXX XXXX   No growth at 5 days

## 2024-01-20 NOTE — PROGRESS NOTE ADULT - SUBJECTIVE AND OBJECTIVE BOX
HPI:    SUBJECTIVE: 27yo M w/ hx of spina bfida and scoliosis, wheelchair bound at baseline, presenting with abdominal pain, n/v. Pt states that the pain began yesterday morning and has been persistent. He had a bowel movement this morning but prior to that his last bowel movement was one week prior. Denies passing gas. Denies fevers at home. Has been vomiting and nauseous. At home he was lethargic and EMS was called. At that time he was hypotensive and tachycardic and he was BIBEMS to [facility] ED for further workup. On arrival he was tachycardic to the 130s and hypotensive to 80s/50s. Labs notable for K 3.4, bicarb 13, Cr 2.04. Actively vomiting in ED. CT scan showed dilated bowel and stomach consistent with severe SBO with transition point in mid abdomen. Surgery consulted for management.         INTERVAL HPI/OVERNIGHT EVENTS:  28y Male seen and examined at bedside. States he has no complaints at this time. Tmax overnight : 39.3 Pending CT chest abdomen and pelvis.     Vital Signs Last 24 Hrs  T(C): 35.6 (20 Jan 2024 07:30), Max: 39.3 (20 Jan 2024 03:00)  T(F): 96.1 (20 Jan 2024 07:30), Max: 102.7 (20 Jan 2024 03:00)  HR: 112 (20 Jan 2024 08:15) (89 - 137)  BP: 88/56 (20 Jan 2024 08:15) (82/58 - 127/99)  BP(mean): 67 (20 Jan 2024 08:15) (65 - 106)  RR: 24 (20 Jan 2024 08:15) (16 - 24)  SpO2: 97% (20 Jan 2024 08:15) (96% - 100%)            Physical Exam:  Constitutional: NAD, lying in bed  Neuro  * Mental Status:  GCS 15: Awake, alert, oriented to conversation. No aphasia or difficulty speaking. No dysarthria.   * Cranial Nerves: Cnii-Cnxii grossly intact. PERRL, EOMI, tongue midline, no gaze deviation  * Motor: RUE 5/5, LUE 5/5, b/l LE contracted and immobile     LABS:                        9.6    7.06  )-----------( 194      ( 20 Jan 2024 02:15 )             29.8     01-20    141  |  108  |  16.7  ----------------------------<  100<H>  4.1   |  20.0<L>  |  0.90    Ca    7.3<L>      20 Jan 2024 02:15  Phos  1.7     01-20  Mg     1.8     01-20      PT/INR - ( 19 Jan 2024 10:30 )   PT: 15.5 sec;   INR: 1.41 ratio         PTT - ( 19 Jan 2024 10:30 )  PTT:35.0 sec  Urinalysis Basic - ( 20 Jan 2024 02:15 )    Color: x / Appearance: x / SG: x / pH: x  Gluc: 100 mg/dL / Ketone: x  / Bili: x / Urobili: x   Blood: x / Protein: x / Nitrite: x   Leuk Esterase: x / RBC: x / WBC x   Sq Epi: x / Non Sq Epi: x / Bacteria: x        01-19 @ 07:01 - 01-20 @ 07:00  --------------------------------------------------------  IN: 1740 mL / OUT: 2050 mL / NET: -310 mL    01-20 @ 07:01 - 01-20 @ 10:05  --------------------------------------------------------  IN: 506 mL / OUT: 370 mL / NET: 136 mL        RADIOLOGY & ADDITIONAL TESTS:  < from: CT Head No Cont (01.16.24 @ 10:57) >  IMPRESSION: Stable follow-up CT exam when compared with 1/14/2024.    --- End of Report ---    < end of copied text >   HPI:  SUBJECTIVE: 29yo M w/ hx of spina bfida and scoliosis, wheelchair bound at baseline, left VPS presenting with abdominal pain, n/v. Pt states that the pain began yesterday morning and has been persistent. He had a bowel movement this morning but prior to that his last bowel movement was one week prior. Denies passing gas. Denies fevers at home. Has been vomiting and nauseous. At home he was lethargic and EMS was called. At that time he was hypotensive and tachycardic and he was BIBEMS to [facility] ED for further workup. On arrival he was tachycardic to the 130s and hypotensive to 80s/50s. Labs notable for K 3.4, bicarb 13, Cr 2.04. Actively vomiting in ED. CT scan showed dilated bowel and stomach consistent with severe SBO with transition point in mid abdomen. Surgery consulted for management.       INTERVAL HPI/OVERNIGHT EVENTS:  28y Male seen and examined at bedside. States he has no complaints at this time, no headaches. Tmax overnight : 39.3 Pending CT chest abdomen and pelvis.     Vital Signs Last 24 Hrs  T(C): 35.6 (20 Jan 2024 07:30), Max: 39.3 (20 Jan 2024 03:00)  T(F): 96.1 (20 Jan 2024 07:30), Max: 102.7 (20 Jan 2024 03:00)  HR: 112 (20 Jan 2024 08:15) (89 - 137)  BP: 88/56 (20 Jan 2024 08:15) (82/58 - 127/99)  BP(mean): 67 (20 Jan 2024 08:15) (65 - 106)  RR: 24 (20 Jan 2024 08:15) (16 - 24)  SpO2: 97% (20 Jan 2024 08:15) (96% - 100%)            Physical Exam:  Constitutional: NAD, lying in bed  Neuro  * Mental Status:  GCS 15: Awake, alert, oriented to conversation. No aphasia or difficulty speaking. No dysarthria.   * Cranial Nerves: Cnii-Cnxii grossly intact. PERRL, EOMI, tongue midline, no gaze deviation  * Motor: RUE 5/5, LUE 5/5, b/l LE contracted and immobile     LABS:                        9.6    7.06  )-----------( 194      ( 20 Jan 2024 02:15 )             29.8     01-20    141  |  108  |  16.7  ----------------------------<  100<H>  4.1   |  20.0<L>  |  0.90    Ca    7.3<L>      20 Jan 2024 02:15  Phos  1.7     01-20  Mg     1.8     01-20      PT/INR - ( 19 Jan 2024 10:30 )   PT: 15.5 sec;   INR: 1.41 ratio         PTT - ( 19 Jan 2024 10:30 )  PTT:35.0 sec  Urinalysis Basic - ( 20 Jan 2024 02:15 )    Color: x / Appearance: x / SG: x / pH: x  Gluc: 100 mg/dL / Ketone: x  / Bili: x / Urobili: x   Blood: x / Protein: x / Nitrite: x   Leuk Esterase: x / RBC: x / WBC x   Sq Epi: x / Non Sq Epi: x / Bacteria: x        01-19 @ 07:01 - 01-20 @ 07:00  --------------------------------------------------------  IN: 1740 mL / OUT: 2050 mL / NET: -310 mL    01-20 @ 07:01 - 01-20 @ 10:05  --------------------------------------------------------  IN: 506 mL / OUT: 370 mL / NET: 136 mL        RADIOLOGY & ADDITIONAL TESTS:  < from: CT Head No Cont (01.16.24 @ 10:57) >  IMPRESSION: Stable follow-up CT exam when compared with 1/14/2024.    --- End of Report ---    < end of copied text >

## 2024-01-20 NOTE — PROGRESS NOTE ADULT - ASSESSMENT
Assessment:   27yo M w/ hx of spina bifida VPS @ [facility] by Dr Dorsey and scoliosis/ no sensation distal to pubic line, wheelchair bound at baseline w no movement of his legs, self catheterizes bladder, presenting with abdominal pain, n/v. Consulted for evaluation of left VPS (nonprogrammable valve)    Plan:   - patient seen and examined with Dr. Richmond  - c/w Q4 neurochecks.   - IR unable to do LP due to hx  spinal fusion/spina bifida.  - low threshold to tap shunt at this time given concern of shunt surrounded by brain parenchyma . will follow patients neurological exam.  f/u with pan cx. f/u Blood cultures sent from 1/19.  - c/w with DVT prophylaxis Lovenox, SCDs. encourage incentive spirometer. Assessment:   29yo M w/ hx of spina bifida, left VPS @ [facility] by Dr Dorsey and scoliosis/ no sensation distal to pubic line, wheelchair bound at baseline w no movement of his legs, self catheterizes bladder, presenting with abdominal pain, n/v. Consulted for evaluation of left VPS (nonprogrammable valve)    Plan:   - patient seen and examined with Dr. Richmond  - c/w Q4 neurochecks.   - IR unable to do LP due to hx spinal fusion/spina bifida.  - high threshold to tap shunt at this time given concern of shunt surrounded by brain parenchyma . will follow patients neurological exam.  f/u with pan cx. f/u Blood cultures sent from 1/19.  - c/w with DVT prophylaxis Lovenox, SCDs. encourage incentive spirometer.

## 2024-01-20 NOTE — PROGRESS NOTE ADULT - CRITICAL CARE ATTENDING COMMENT
Patient remains critically ill.   Noted to have large volume melena w/ associated drop in H/H. Became hemodynamically unstable requiring blood products, IVF, and pressors to maintain MAPs>65. Responded appropriately w/ downtrending pressor requirements.   No further fevers no leukocytosis; but intermittently w/ ?rigors. Recent BCx NGTD. Patient's hemodynamic instability likely 2/2 blood loss/hypovolemia; however, abx escalated to meropenem (given recent fevers - last > 24hrs ago), will continue to monitor for now.   As part of work up, CT scan repeated; some slight increase in pneumoperitoneum - likely secondary to frequent bladder flushes + drains (in patient w/ recent bladder repair). Do not believe there is anastomotic leak given benign abdominal exams, no fevers for >24hrs, no leukocytosis. No obvious bladder leak per CT (w/ contrast in bladder).   NSG updated on clinical status (in setting of  shunt).   Urology updated, may require a larger caliber catheter to facilitate less frequent flushes, possibly better bladder drainage.   --Wean pressors as tolerated.   --Trend vitals, labs, I&Os.   --NPO for now.   --Hold AC.   --f/u NSG, Urology.

## 2024-01-20 NOTE — PROGRESS NOTE ADULT - SUBJECTIVE AND OBJECTIVE BOX
INTERVAL HPI/OVERNIGHT EVENTS/SUBJECTIVE: Nurse called me to bedside earlier this evening for large melena.  I came immediately to bedside to find pt with mild rigors, HR 150sand SBP 80s.  Pt CO being cold but denied CP, SOB, cough, Ab pain, NV, fever or other CO.     ICU Vital Signs Last 24 Hrs  T(C): 37.4 (20 Jan 2024 21:00), Max: 39.7 (20 Jan 2024 02:00)  T(F): 99.3 (20 Jan 2024 21:00), Max: 103.5 (20 Jan 2024 02:00)  HR: 126 (21 Jan 2024 00:15) (77 - 142)  BP: 109/71 (21 Jan 2024 00:00) (74/60 - 135/53)  BP(mean): 82 (21 Jan 2024 00:00) (58 - 92)  ABP: 98/51 (21 Jan 2024 00:15) (80/45 - 135/60)  ABP(mean): 67 (21 Jan 2024 00:15) (53 - 91)  RR: 18 (21 Jan 2024 00:15) (13 - 27)  SpO2: 98% (21 Jan 2024 00:15) (96% - 100%)    O2 Parameters below as of 20 Jan 2024 20:00  Patient On (Oxygen Delivery Method): room air            I&O's Detail    19 Jan 2024 07:01  -  20 Jan 2024 07:00  --------------------------------------------------------  IN:    dextrose 5% + lactated ringers: 420 mL    IV PiggyBack: 200 mL    multiple electrolytes Injection Type 1 Bolus: 1000 mL    Oral Fluid: 120 mL  Total IN: 1740 mL    OUT:    Indwelling Catheter - Urethral (mL): 850 mL    Rectal Tube (mL): 1200 mL  Total OUT: 2050 mL    Total NET: -310 mL      20 Jan 2024 07:01  -  21 Jan 2024 00:24  --------------------------------------------------------  IN:    Albumin 5%  - 500 mL: 750 mL    dextrose 5% + lactated ringers: 504 mL    IV PiggyBack: 581 mL    multiple electrolytes Injection Type 1.: 375 mL    Norepinephrine: 124.9 mL    Plasma: 390 mL    Platelets - Single Donor: 222 mL    PRBCs (Packed Red Blood Cells): 1335 mL  Total IN: 4281.9 mL    OUT:    Bulb (mL): 1870 mL    Indwelling Catheter - Urethral (mL): 135 mL  Total OUT: 2005 mL    Total NET: 2276.9 mL            ABG - ( 20 Jan 2024 20:00 )  pH, Arterial: 7.420 pH, Blood: x     /  pCO2: 26    /  pO2: 109   / HCO3: 17    / Base Excess: -7.6  /  SaO2: 99.4                MEDICATIONS  (STANDING):  chlorhexidine 2% Cloths 1 Application(s) Topical daily  enoxaparin Injectable 40 milliGRAM(s) SubCutaneous every 24 hours  magnesium sulfate  IVPB 2 Gram(s) IV Intermittent daily  meropenem Injectable 1000 milliGRAM(s) IV Push every 8 hours  metoclopramide  IVPB 10 milliGRAM(s) IV Intermittent every 6 hours  multiple electrolytes Injection Type 1 1000 milliLiter(s) (75 mL/Hr) IV Continuous <Continuous>  norepinephrine Infusion 0.01 MICROgram(s)/kG/Min (1.31 mL/Hr) IV Continuous <Continuous>  pantoprazole Infusion 8 mG/Hr (10 mL/Hr) IV Continuous <Continuous>  psyllium Powder 1 Packet(s) Oral two times a day    MEDICATIONS  (PRN):  acetaminophen   IVPB .. 1000 milliGRAM(s) IV Intermittent every 6 hours PRN Temp greater or equal to 38C (100.4F)  benzocaine 20% Spray 1 Spray(s) Topical every 6 hours PRN sore throat  benzocaine/menthol Lozenge 1 Lozenge Oral every 8 hours PRN Sore Throat      PHYSICAL EXAM:     Gen: No cyanosis, Pallor. Shivering.    Neurological: A&Ox3, GCS 15, No focal defficit.     Neck: Supple. NT AT, FROM no pain.  No JVD. No meningeal signs    Pulmonary: mild tachypnea, CTA, = BL .      Cardiovascular: Marked tachycardia. S1, S2, No Murmurs, rubs or gallops noted.    Gastrointestinal: Midline staple line CDI.  ND, Soft, NT. Inferior portion has dark, old blood appearing easily expressed when palpated.   Large amount of active liquid melena in pool in inguinal region.     Extremities: NT, AT, no edema, erythema or palpable cord noted.  FROM, = 2+ pulses throughout.          LABS:  CBC Full  -  ( 20 Jan 2024 20:15 )  WBC Count : 9.14 K/uL  RBC Count : 2.43 M/uL  Hemoglobin : 6.7 g/dL  Hematocrit : 20.6 %  Platelet Count - Automated : 217 K/uL  Mean Cell Volume : 84.8 fl  Mean Cell Hemoglobin : 27.6 pg  Mean Cell Hemoglobin Concentration : 32.5 gm/dL  Auto Neutrophil # : 8.10 K/uL  Auto Lymphocyte # : 0.80 K/uL  Auto Monocyte # : 0.16 K/uL  Auto Eosinophil # : 0.00 K/uL  Auto Basophil # : 0.00 K/uL  Auto Neutrophil % : 88.6 %  Auto Lymphocyte % : 8.8 %  Auto Monocyte % : 1.7 %  Auto Eosinophil % : 0.0 %  Auto Basophil % : 0.0 %    01-20    146<H>  |  115<H>  |  27.1<H>  ----------------------------<  90  4.3   |  16.0<L>  |  1.08    Ca    7.3<L>      20 Jan 2024 20:15  Phos  3.1     01-20  Mg     2.2     01-20    TPro  5.0<L>  /  Alb  2.8<L>  /  TBili  0.8  /  DBili  0.3  /  AST  13  /  ALT  10  /  AlkPhos  49  01-20    PT/INR - ( 20 Jan 2024 20:15 )   PT: 14.1 sec;   INR: 1.28 ratio         PTT - ( 20 Jan 2024 20:15 )  PTT:38.8 sec  Urinalysis Basic - ( 20 Jan 2024 20:15 )    Color: x / Appearance: x / SG: x / pH: x  Gluc: 90 mg/dL / Ketone: x  / Bili: x / Urobili: x   Blood: x / Protein: x / Nitrite: x   Leuk Esterase: x / RBC: x / WBC x   Sq Epi: x / Non Sq Epi: x / Bacteria: x      RECENT CULTURES:  01-19 .Blood Blood XXXX XXXX   No growth at 24 hours    01-19 .Blood Blood XXXX XXXX   No growth at 24 hours    01-14 Catheterized Catheterized Escherichia coli ESBL  Morganella morganii XXXX   50,000 - 99,000 CFU/mL Escherichia coli ESBL  50,000 - 99,000 CFU/mL Morganella morganii    01-14 .Blood Blood-Peripheral XXXX XXXX   No growth at 5 days    01-14 .Blood Blood-Peripheral XXXX XXXX   No growth at 5 days        LIVER FUNCTIONS - ( 20 Jan 2024 20:15 )  Alb: 2.8 g/dL / Pro: 5.0 g/dL / ALK PHOS: 49 U/L / ALT: 10 U/L / AST: 13 U/L / GGT: x               CAPILLARY BLOOD GLUCOSE      RADIOLOGY & ADDITIONAL STUDIES:    ASSESSMENT/PLAN:  28yMale presenting with: SBO, Bladder perforation, Septic shock of unclear etiology, Hemorrhagic shock from upper GI Bleed of unclear etiology. Acute blood loss anemia, metabolic acidosis    Neurological: No need for analgesics.     Pulmonary: Pt respirating well.     Cardiovascular: Pt hypotensive. Difficult POCUS.  No gross abnormality of heart but very limited.  IVC poorly seen.  IJ Collapsed. Likely mix of Hemorrhagic and septic shock.  Likely intra-abdominal process.  Potentially infected shunt.  Urine is not very impressive.   Will get better access for volume, give matched blood transfusion, correct any coagulopathy.  Levo only until more stable from blood transfusion.     Gastrointestinal: I have called GI.  Agrees with me getting CTA of Abdomen.  Will perform EGD if no sign of anastmotic bleed. Asks for NGT but pt refused multiple times even after risk of delay of life threatening DX without it and offered IV analgesics.     Genitourinary: CW Flushing Sharp. Very low urine out put noted throughout day shift and high FRITZ abdominal drain out put.  Bladder scanning shows <200cc in bladder almost each time. I am concerned with these findings as well as CT from today's findings of bladder perf and possible anastamotic leak.     Heme: DC Lovenox while bleeding.  Matched Transfusions.    ID: I have restarted abx and escalated to Tl.     Lines/ Tubes: Ax Art line.  Will place Introducer.     Dispo: Pt critically ill.  On Pressors, requiring large transfusion requirements.  I am supporting and manipulating multiple body systems.       CRITICAL CARE TIME SPENT: 68 minutes of bedside care, reviewing labs, imaging, discussing case with consults and SICU attending Dr Jacobsen as well as bedside nurse, placing above orders and writing note.   INTERVAL HPI/OVERNIGHT EVENTS/SUBJECTIVE: Nurse called me to bedside earlier this evening for large melena.  I came immediately to bedside to find pt with mild rigors, HR 150sand SBP 80s.  Pt CO being cold but denied CP, SOB, cough, Ab pain, NV, fever or other CO.     ICU Vital Signs Last 24 Hrs  T(C): 37.4 (20 Jan 2024 21:00), Max: 39.7 (20 Jan 2024 02:00)  T(F): 99.3 (20 Jan 2024 21:00), Max: 103.5 (20 Jan 2024 02:00)  HR: 126 (21 Jan 2024 00:15) (77 - 142)  BP: 109/71 (21 Jan 2024 00:00) (74/60 - 135/53)  BP(mean): 82 (21 Jan 2024 00:00) (58 - 92)  ABP: 98/51 (21 Jan 2024 00:15) (80/45 - 135/60)  ABP(mean): 67 (21 Jan 2024 00:15) (53 - 91)  RR: 18 (21 Jan 2024 00:15) (13 - 27)  SpO2: 98% (21 Jan 2024 00:15) (96% - 100%)    O2 Parameters below as of 20 Jan 2024 20:00  Patient On (Oxygen Delivery Method): room air            I&O's Detail    19 Jan 2024 07:01  -  20 Jan 2024 07:00  --------------------------------------------------------  IN:    dextrose 5% + lactated ringers: 420 mL    IV PiggyBack: 200 mL    multiple electrolytes Injection Type 1 Bolus: 1000 mL    Oral Fluid: 120 mL  Total IN: 1740 mL    OUT:    Indwelling Catheter - Urethral (mL): 850 mL    Rectal Tube (mL): 1200 mL  Total OUT: 2050 mL    Total NET: -310 mL      20 Jan 2024 07:01  -  21 Jan 2024 00:24  --------------------------------------------------------  IN:    Albumin 5%  - 500 mL: 750 mL    dextrose 5% + lactated ringers: 504 mL    IV PiggyBack: 581 mL    multiple electrolytes Injection Type 1.: 375 mL    Norepinephrine: 124.9 mL    Plasma: 390 mL    Platelets - Single Donor: 222 mL    PRBCs (Packed Red Blood Cells): 1335 mL  Total IN: 4281.9 mL    OUT:    Bulb (mL): 1870 mL    Indwelling Catheter - Urethral (mL): 135 mL  Total OUT: 2005 mL    Total NET: 2276.9 mL            ABG - ( 20 Jan 2024 20:00 )  pH, Arterial: 7.420 pH, Blood: x     /  pCO2: 26    /  pO2: 109   / HCO3: 17    / Base Excess: -7.6  /  SaO2: 99.4                MEDICATIONS  (STANDING):  chlorhexidine 2% Cloths 1 Application(s) Topical daily  enoxaparin Injectable 40 milliGRAM(s) SubCutaneous every 24 hours  magnesium sulfate  IVPB 2 Gram(s) IV Intermittent daily  meropenem Injectable 1000 milliGRAM(s) IV Push every 8 hours  metoclopramide  IVPB 10 milliGRAM(s) IV Intermittent every 6 hours  multiple electrolytes Injection Type 1 1000 milliLiter(s) (75 mL/Hr) IV Continuous <Continuous>  norepinephrine Infusion 0.01 MICROgram(s)/kG/Min (1.31 mL/Hr) IV Continuous <Continuous>  pantoprazole Infusion 8 mG/Hr (10 mL/Hr) IV Continuous <Continuous>  psyllium Powder 1 Packet(s) Oral two times a day    MEDICATIONS  (PRN):  acetaminophen   IVPB .. 1000 milliGRAM(s) IV Intermittent every 6 hours PRN Temp greater or equal to 38C (100.4F)  benzocaine 20% Spray 1 Spray(s) Topical every 6 hours PRN sore throat  benzocaine/menthol Lozenge 1 Lozenge Oral every 8 hours PRN Sore Throat      PHYSICAL EXAM:     Gen: No cyanosis, Pallor. Shivering.    Neurological: A&Ox3, GCS 15, No focal defficit.     Neck: Supple. NT AT, FROM no pain.  No JVD. No meningeal signs    Pulmonary: mild tachypnea, CTA, = BL .      Cardiovascular: Marked tachycardia. S1, S2, No Murmurs, rubs or gallops noted.    Gastrointestinal: Midline staple line CDI.  ND, Soft, NT. Inferior portion has dark, old blood appearing easily expressed when palpated.   Large amount of active liquid melena in pool in inguinal region.     Extremities: NT, AT, no edema, erythema or palpable cord noted.  FROM, = 2+ pulses throughout.          LABS:  CBC Full  -  ( 20 Jan 2024 20:15 )  WBC Count : 9.14 K/uL  RBC Count : 2.43 M/uL  Hemoglobin : 6.7 g/dL  Hematocrit : 20.6 %  Platelet Count - Automated : 217 K/uL  Mean Cell Volume : 84.8 fl  Mean Cell Hemoglobin : 27.6 pg  Mean Cell Hemoglobin Concentration : 32.5 gm/dL  Auto Neutrophil # : 8.10 K/uL  Auto Lymphocyte # : 0.80 K/uL  Auto Monocyte # : 0.16 K/uL  Auto Eosinophil # : 0.00 K/uL  Auto Basophil # : 0.00 K/uL  Auto Neutrophil % : 88.6 %  Auto Lymphocyte % : 8.8 %  Auto Monocyte % : 1.7 %  Auto Eosinophil % : 0.0 %  Auto Basophil % : 0.0 %    01-20    146<H>  |  115<H>  |  27.1<H>  ----------------------------<  90  4.3   |  16.0<L>  |  1.08    Ca    7.3<L>      20 Jan 2024 20:15  Phos  3.1     01-20  Mg     2.2     01-20    TPro  5.0<L>  /  Alb  2.8<L>  /  TBili  0.8  /  DBili  0.3  /  AST  13  /  ALT  10  /  AlkPhos  49  01-20    PT/INR - ( 20 Jan 2024 20:15 )   PT: 14.1 sec;   INR: 1.28 ratio         PTT - ( 20 Jan 2024 20:15 )  PTT:38.8 sec  Urinalysis Basic - ( 20 Jan 2024 20:15 )    Color: x / Appearance: x / SG: x / pH: x  Gluc: 90 mg/dL / Ketone: x  / Bili: x / Urobili: x   Blood: x / Protein: x / Nitrite: x   Leuk Esterase: x / RBC: x / WBC x   Sq Epi: x / Non Sq Epi: x / Bacteria: x      RECENT CULTURES:  01-19 .Blood Blood XXXX XXXX   No growth at 24 hours    01-19 .Blood Blood XXXX XXXX   No growth at 24 hours    01-14 Catheterized Catheterized Escherichia coli ESBL  Morganella morganii XXXX   50,000 - 99,000 CFU/mL Escherichia coli ESBL  50,000 - 99,000 CFU/mL Morganella morganii    01-14 .Blood Blood-Peripheral XXXX XXXX   No growth at 5 days    01-14 .Blood Blood-Peripheral XXXX XXXX   No growth at 5 days        LIVER FUNCTIONS - ( 20 Jan 2024 20:15 )  Alb: 2.8 g/dL / Pro: 5.0 g/dL / ALK PHOS: 49 U/L / ALT: 10 U/L / AST: 13 U/L / GGT: x               CAPILLARY BLOOD GLUCOSE      RADIOLOGY & ADDITIONAL STUDIES:    ASSESSMENT/PLAN:  28yMale presenting with: SBO, Bladder perforation, Septic shock of unclear etiology, Hemorrhagic shock from upper GI Bleed of unclear etiology. Acute blood loss anemia, metabolic acidosis    Neurological: No need for analgesics.     Pulmonary: Pt respirating well.     Cardiovascular: Pt hypotensive. Difficult POCUS.  No gross abnormality of heart but very limited.  IVC poorly seen.  IJ Collapsed. Likely mix of Hemorrhagic and septic shock. Potentially infected shunt.  Urine is not very impressive.   Will get better access for volume, give matched blood transfusion, correct any coagulopathy.  Levo only until more stable from blood transfusion.     Gastrointestinal: I have called GI.  Agrees with me getting CTA of Abdomen.  Will perform EGD if no sign of anastmotic bleed. Asks for NGT but pt refused multiple times even after risk of delay of life threatening DX without it and offered IV analgesics.     Genitourinary: CW Flushing Sharp. Very low urine out put noted throughout day shift and high FRITZ abdominal drain out put.  Bladder scanning shows <200cc in bladder almost each time.     Heme: DC Lovenox while bleeding.  Matched Transfusions.    ID: I have restarted abx and escalated to Tl.     Lines/ Tubes: Ax Art line.  Will place Introducer.     Dispo: Pt critically ill.  On Pressors, requiring large transfusion requirements.  I am supporting and manipulating multiple body systems.       CRITICAL CARE TIME SPENT: 68 minutes of bedside care, reviewing labs, imaging, discussing case with consults and SICU attending Dr Jacobsen as well as bedside nurse, placing above orders and writing note.

## 2024-01-20 NOTE — PROGRESS NOTE ADULT - ASSESSMENT
ASSESSMENT:  29yo male with PMHx of spina bifida (wheelchair bound at baseline), scoliosis,  Shunt BIBEMS 1/14 with abdominal pain, n/v, and lethargy, found to have severe SBO, metabolic acidosis, and to be in septic and hypovolemic shock. Patient was brought to the OR 1/14 for ex lap and small bowel resection, c/b bladder perf with primary repair and partial colectomy and admitted to SICU post-op for further management.      Plan:    Neuro:   #Pain control, antipyretic  - Multimodal pain regimen  - F/U Neuro for possible VPS tap  - Delirium precautions  - Optimize sleep/wake cycle     CV:   #Septic shock vs SIRs vs Hypovolemia  - Sinus tachycardia to 130s s/p 2L NS, 250 albumin x 2   - Low dose levo started for HD  - Maintain MAP > 65  - Lactate remains cleared  - Continue hemodynamic monitoring    Pulm:   #COVID  - Maintain sPO2 >92%  - F/U CXR  - Pulmonary toilet, IS, OOBTC    GI/Nutrition:   SBO s/p SBR, partial colectomy   - regular diet  - Will obtain CT Abdomen and Pelvis to RO intraabdominal cause of SIRs response  - Continue to monitor and replete lost fluid via rectal tube  - Bowel regimen  - Monitor bowel movements    /Renal:   #Hx of urethral stricture now with bladder injury s/p primary repair  - Monitor kidney fxn  - Monitor FRITZ output  - Monitor UOP  - Replete electrolytes PRN (Mg >2, Phos >3, K >4)  - Sharp for strict I&O - continue to irrigate and bladder scan as per urology       ID:  #Sepsis  - Source unknown - COVID +, + UCx (<50k colonies), RVP NEG. Possible intraabdominal infection? Possible VPS infection   - Monitor fever curve - APAP and NSAIDs for fever  - Monitor for leukocytosis  - Cefepime for UTI coverage, consider escalation pending CT A/P result  - Cultures sent 1/19    Endo:  - Maintain euglycemia  - Monitor blood glucose    Heme/DVT Prophylaxis:  - SCDs  - Monitor H/H  - Lovenox    Skin:  - Repositioning for DTI prevention while in bed    Lines:  - Peripheral IV's    Dispo:  - Care per SICU    CODE STATUS: FULL

## 2024-01-21 ENCOUNTER — TRANSCRIPTION ENCOUNTER (OUTPATIENT)
Age: 29
End: 2024-01-21

## 2024-01-21 LAB
ACETONE SERPL-MCNC: NEGATIVE — SIGNIFICANT CHANGE UP
ANION GAP SERPL CALC-SCNC: 15 MMOL/L — SIGNIFICANT CHANGE UP (ref 5–17)
ANION GAP SERPL CALC-SCNC: 17 MMOL/L — SIGNIFICANT CHANGE UP (ref 5–17)
ANION GAP SERPL CALC-SCNC: 20 MMOL/L — HIGH (ref 5–17)
ANISOCYTOSIS BLD QL: SLIGHT — SIGNIFICANT CHANGE UP
APPEARANCE CSF: CLEAR — SIGNIFICANT CHANGE UP
APPEARANCE SPUN FLD: COLORLESS — SIGNIFICANT CHANGE UP
APTT BLD: 31.8 SEC — SIGNIFICANT CHANGE UP (ref 24.5–35.6)
APTT BLD: 32 SEC — SIGNIFICANT CHANGE UP (ref 24.5–35.6)
BASOPHILS # BLD AUTO: 0 K/UL — SIGNIFICANT CHANGE UP (ref 0–0.2)
BASOPHILS NFR BLD AUTO: 0 % — SIGNIFICANT CHANGE UP (ref 0–2)
BUN SERPL-MCNC: 21.8 MG/DL — HIGH (ref 8–20)
BUN SERPL-MCNC: 26.1 MG/DL — HIGH (ref 8–20)
BUN SERPL-MCNC: 26.7 MG/DL — HIGH (ref 8–20)
BURR CELLS BLD QL SMEAR: PRESENT — SIGNIFICANT CHANGE UP
CALCIUM SERPL-MCNC: 7.2 MG/DL — LOW (ref 8.4–10.5)
CALCIUM SERPL-MCNC: 7.6 MG/DL — LOW (ref 8.4–10.5)
CALCIUM SERPL-MCNC: 7.7 MG/DL — LOW (ref 8.4–10.5)
CHLORIDE SERPL-SCNC: 116 MMOL/L — HIGH (ref 96–108)
CHLORIDE SERPL-SCNC: 116 MMOL/L — HIGH (ref 96–108)
CHLORIDE SERPL-SCNC: 117 MMOL/L — HIGH (ref 96–108)
CO2 SERPL-SCNC: 11 MMOL/L — LOW (ref 22–29)
CO2 SERPL-SCNC: 14 MMOL/L — LOW (ref 22–29)
CO2 SERPL-SCNC: 16 MMOL/L — LOW (ref 22–29)
COLOR CSF: SIGNIFICANT CHANGE UP
CREAT SERPL-MCNC: 1.12 MG/DL — SIGNIFICANT CHANGE UP (ref 0.5–1.3)
CREAT SERPL-MCNC: 1.21 MG/DL — SIGNIFICANT CHANGE UP (ref 0.5–1.3)
CREAT SERPL-MCNC: 1.41 MG/DL — HIGH (ref 0.5–1.3)
EGFR: 69 ML/MIN/1.73M2 — SIGNIFICANT CHANGE UP
EGFR: 83 ML/MIN/1.73M2 — SIGNIFICANT CHANGE UP
EGFR: 91 ML/MIN/1.73M2 — SIGNIFICANT CHANGE UP
EOSINOPHIL # BLD AUTO: 0 K/UL — SIGNIFICANT CHANGE UP (ref 0–0.5)
EOSINOPHIL NFR BLD AUTO: 0 % — SIGNIFICANT CHANGE UP (ref 0–6)
FIBRINOGEN PPP-MCNC: 401 MG/DL — SIGNIFICANT CHANGE UP (ref 200–450)
FIBRINOGEN PPP-MCNC: 461 MG/DL — HIGH (ref 200–450)
GAS PNL BLDA: SIGNIFICANT CHANGE UP
GAS PNL BLDA: SIGNIFICANT CHANGE UP
GIANT PLATELETS BLD QL SMEAR: PRESENT — SIGNIFICANT CHANGE UP
GLUCOSE BLDC GLUCOMTR-MCNC: 118 MG/DL — HIGH (ref 70–99)
GLUCOSE BLDC GLUCOMTR-MCNC: 65 MG/DL — LOW (ref 70–99)
GLUCOSE CSF-MCNC: 57 MG/DLG/24H — SIGNIFICANT CHANGE UP (ref 40–70)
GLUCOSE SERPL-MCNC: 105 MG/DL — HIGH (ref 70–99)
GLUCOSE SERPL-MCNC: 73 MG/DL — SIGNIFICANT CHANGE UP (ref 70–99)
GLUCOSE SERPL-MCNC: 78 MG/DL — SIGNIFICANT CHANGE UP (ref 70–99)
GRAM STN FLD: SIGNIFICANT CHANGE UP
GRAM STN FLD: SIGNIFICANT CHANGE UP
HCT VFR BLD CALC: 24.4 % — LOW (ref 39–50)
HCT VFR BLD CALC: 25.2 % — LOW (ref 39–50)
HCT VFR BLD CALC: 26.3 % — LOW (ref 39–50)
HGB BLD-MCNC: 8 G/DL — LOW (ref 13–17)
HGB BLD-MCNC: 8.8 G/DL — LOW (ref 13–17)
HGB BLD-MCNC: 8.9 G/DL — LOW (ref 13–17)
INR BLD: 1.29 RATIO — HIGH (ref 0.85–1.18)
INR BLD: 1.32 RATIO — HIGH (ref 0.85–1.18)
LYMPHOCYTES # BLD AUTO: 0.54 K/UL — LOW (ref 1–3.3)
LYMPHOCYTES # BLD AUTO: 5.4 % — LOW (ref 13–44)
MAGNESIUM SERPL-MCNC: 2.2 MG/DL — SIGNIFICANT CHANGE UP (ref 1.6–2.6)
MAGNESIUM SERPL-MCNC: 2.3 MG/DL — SIGNIFICANT CHANGE UP (ref 1.6–2.6)
MAGNESIUM SERPL-MCNC: 2.3 MG/DL — SIGNIFICANT CHANGE UP (ref 1.6–2.6)
MANUAL SMEAR VERIFICATION: SIGNIFICANT CHANGE UP
MCHC RBC-ENTMCNC: 27.4 PG — SIGNIFICANT CHANGE UP (ref 27–34)
MCHC RBC-ENTMCNC: 28.5 PG — SIGNIFICANT CHANGE UP (ref 27–34)
MCHC RBC-ENTMCNC: 29 PG — SIGNIFICANT CHANGE UP (ref 27–34)
MCHC RBC-ENTMCNC: 32.8 GM/DL — SIGNIFICANT CHANGE UP (ref 32–36)
MCHC RBC-ENTMCNC: 33.8 GM/DL — SIGNIFICANT CHANGE UP (ref 32–36)
MCHC RBC-ENTMCNC: 34.9 GM/DL — SIGNIFICANT CHANGE UP (ref 32–36)
MCV RBC AUTO: 83.2 FL — SIGNIFICANT CHANGE UP (ref 80–100)
MCV RBC AUTO: 83.6 FL — SIGNIFICANT CHANGE UP (ref 80–100)
MCV RBC AUTO: 84.3 FL — SIGNIFICANT CHANGE UP (ref 80–100)
MONOCYTES # BLD AUTO: 0.18 K/UL — SIGNIFICANT CHANGE UP (ref 0–0.9)
MONOCYTES NFR BLD AUTO: 1.8 % — LOW (ref 2–14)
MYELOCYTES NFR BLD: 0.9 % — HIGH (ref 0–0)
NEUTROPHILS # BLD AUTO: 9.07 K/UL — HIGH (ref 1.8–7.4)
NEUTROPHILS # CSF: SIGNIFICANT CHANGE UP % (ref 0–6)
NEUTROPHILS NFR BLD AUTO: 91 % — HIGH (ref 43–77)
NRBC NFR CSF: 2 /UL — SIGNIFICANT CHANGE UP (ref 0–5)
OVALOCYTES BLD QL SMEAR: SLIGHT — SIGNIFICANT CHANGE UP
PHOSPHATE SERPL-MCNC: 2.8 MG/DL — SIGNIFICANT CHANGE UP (ref 2.4–4.7)
PHOSPHATE SERPL-MCNC: 3 MG/DL — SIGNIFICANT CHANGE UP (ref 2.4–4.7)
PHOSPHATE SERPL-MCNC: 3.7 MG/DL — SIGNIFICANT CHANGE UP (ref 2.4–4.7)
PLAT MORPH BLD: NORMAL — SIGNIFICANT CHANGE UP
PLATELET # BLD AUTO: 235 K/UL — SIGNIFICANT CHANGE UP (ref 150–400)
PLATELET # BLD AUTO: 250 K/UL — SIGNIFICANT CHANGE UP (ref 150–400)
PLATELET # BLD AUTO: 307 K/UL — SIGNIFICANT CHANGE UP (ref 150–400)
POIKILOCYTOSIS BLD QL AUTO: SIGNIFICANT CHANGE UP
POLYCHROMASIA BLD QL SMEAR: SLIGHT — SIGNIFICANT CHANGE UP
POTASSIUM SERPL-MCNC: 3.6 MMOL/L — SIGNIFICANT CHANGE UP (ref 3.5–5.3)
POTASSIUM SERPL-MCNC: 4.5 MMOL/L — SIGNIFICANT CHANGE UP (ref 3.5–5.3)
POTASSIUM SERPL-MCNC: 5 MMOL/L — SIGNIFICANT CHANGE UP (ref 3.5–5.3)
POTASSIUM SERPL-SCNC: 3.6 MMOL/L — SIGNIFICANT CHANGE UP (ref 3.5–5.3)
POTASSIUM SERPL-SCNC: 4.5 MMOL/L — SIGNIFICANT CHANGE UP (ref 3.5–5.3)
POTASSIUM SERPL-SCNC: 5 MMOL/L — SIGNIFICANT CHANGE UP (ref 3.5–5.3)
PROT CSF-MCNC: 14 MG/DL — LOW (ref 15–45)
PROTHROM AB SERPL-ACNC: 14.2 SEC — HIGH (ref 9.5–13)
PROTHROM AB SERPL-ACNC: 14.5 SEC — HIGH (ref 9.5–13)
RBC # BLD: 2.92 M/UL — LOW (ref 4.2–5.8)
RBC # BLD: 3.03 M/UL — LOW (ref 4.2–5.8)
RBC # BLD: 3.12 M/UL — LOW (ref 4.2–5.8)
RBC # CSF: 3 /CMM — HIGH (ref 0–1)
RBC # FLD: 15.9 % — HIGH (ref 10.3–14.5)
RBC # FLD: 16.4 % — HIGH (ref 10.3–14.5)
RBC # FLD: 17 % — HIGH (ref 10.3–14.5)
RBC BLD AUTO: ABNORMAL
SCHISTOCYTES BLD QL AUTO: SLIGHT — SIGNIFICANT CHANGE UP
SODIUM SERPL-SCNC: 147 MMOL/L — HIGH (ref 135–145)
SODIUM SERPL-SCNC: 147 MMOL/L — HIGH (ref 135–145)
SODIUM SERPL-SCNC: 148 MMOL/L — HIGH (ref 135–145)
SPECIMEN SOURCE: SIGNIFICANT CHANGE UP
TUBE TYPE: SIGNIFICANT CHANGE UP
VARIANT LYMPHS # BLD: 0.9 % — SIGNIFICANT CHANGE UP (ref 0–6)
WBC # BLD: 7.29 K/UL — SIGNIFICANT CHANGE UP (ref 3.8–10.5)
WBC # BLD: 7.85 K/UL — SIGNIFICANT CHANGE UP (ref 3.8–10.5)
WBC # BLD: 9.97 K/UL — SIGNIFICANT CHANGE UP (ref 3.8–10.5)
WBC # FLD AUTO: 7.29 K/UL — SIGNIFICANT CHANGE UP (ref 3.8–10.5)
WBC # FLD AUTO: 7.85 K/UL — SIGNIFICANT CHANGE UP (ref 3.8–10.5)
WBC # FLD AUTO: 9.97 K/UL — SIGNIFICANT CHANGE UP (ref 3.8–10.5)

## 2024-01-21 PROCEDURE — 62230 REPLACE/REVISE BRAIN SHUNT: CPT

## 2024-01-21 PROCEDURE — 71045 X-RAY EXAM CHEST 1 VIEW: CPT | Mod: 26

## 2024-01-21 PROCEDURE — 99223 1ST HOSP IP/OBS HIGH 75: CPT

## 2024-01-21 PROCEDURE — 99233 SBSQ HOSP IP/OBS HIGH 50: CPT | Mod: 25,57

## 2024-01-21 PROCEDURE — 76937 US GUIDE VASCULAR ACCESS: CPT | Mod: 26

## 2024-01-21 PROCEDURE — 36556 INSERT NON-TUNNEL CV CATH: CPT

## 2024-01-21 PROCEDURE — 74178 CT ABD&PLV WO CNTR FLWD CNTR: CPT | Mod: 26

## 2024-01-21 PROCEDURE — 99233 SBSQ HOSP IP/OBS HIGH 50: CPT

## 2024-01-21 PROCEDURE — 99232 SBSQ HOSP IP/OBS MODERATE 35: CPT | Mod: 57

## 2024-01-21 PROCEDURE — 72192 CT PELVIS W/O DYE: CPT | Mod: 26,59

## 2024-01-21 RX ORDER — DEXTROSE 50 % IN WATER 50 %
25 SYRINGE (ML) INTRAVENOUS ONCE
Refills: 0 | Status: COMPLETED | OUTPATIENT
Start: 2024-01-21 | End: 2024-01-21

## 2024-01-21 RX ORDER — DIPHENHYDRAMINE HCL 50 MG
25 CAPSULE ORAL ONCE
Refills: 0 | Status: DISCONTINUED | OUTPATIENT
Start: 2024-01-21 | End: 2024-01-21

## 2024-01-21 RX ORDER — SODIUM CHLORIDE 9 MG/ML
1000 INJECTION, SOLUTION INTRAVENOUS
Refills: 0 | Status: DISCONTINUED | OUTPATIENT
Start: 2024-01-21 | End: 2024-01-22

## 2024-01-21 RX ORDER — SODIUM CHLORIDE 9 MG/ML
1000 INJECTION, SOLUTION INTRAVENOUS ONCE
Refills: 0 | Status: COMPLETED | OUTPATIENT
Start: 2024-01-21 | End: 2024-01-21

## 2024-01-21 RX ORDER — POTASSIUM PHOSPHATE, MONOBASIC POTASSIUM PHOSPHATE, DIBASIC 236; 224 MG/ML; MG/ML
30 INJECTION, SOLUTION INTRAVENOUS ONCE
Refills: 0 | Status: COMPLETED | OUTPATIENT
Start: 2024-01-21 | End: 2024-01-21

## 2024-01-21 RX ORDER — CALCIUM GLUCONATE 100 MG/ML
2 VIAL (ML) INTRAVENOUS ONCE
Refills: 0 | Status: COMPLETED | OUTPATIENT
Start: 2024-01-21 | End: 2024-01-21

## 2024-01-21 RX ORDER — LIDOCAINE HCL 20 MG/ML
5 VIAL (ML) INJECTION ONCE
Refills: 0 | Status: COMPLETED | OUTPATIENT
Start: 2024-01-21 | End: 2024-01-21

## 2024-01-21 RX ORDER — HYDROMORPHONE HYDROCHLORIDE 2 MG/ML
0.5 INJECTION INTRAMUSCULAR; INTRAVENOUS; SUBCUTANEOUS ONCE
Refills: 0 | Status: DISCONTINUED | OUTPATIENT
Start: 2024-01-21 | End: 2024-01-21

## 2024-01-21 RX ORDER — ALBUMIN HUMAN 25 %
250 VIAL (ML) INTRAVENOUS EVERY 4 HOURS
Refills: 0 | Status: COMPLETED | OUTPATIENT
Start: 2024-01-21 | End: 2024-01-21

## 2024-01-21 RX ORDER — POTASSIUM CHLORIDE 20 MEQ
20 PACKET (EA) ORAL ONCE
Refills: 0 | Status: COMPLETED | OUTPATIENT
Start: 2024-01-21 | End: 2024-01-21

## 2024-01-21 RX ADMIN — FENTANYL CITRATE 25 MICROGRAM(S): 50 INJECTION INTRAVENOUS at 05:25

## 2024-01-21 RX ADMIN — Medication 100 MILLIEQUIVALENT(S): at 05:15

## 2024-01-21 RX ADMIN — MEROPENEM 1000 MILLIGRAM(S): 1 INJECTION INTRAVENOUS at 21:18

## 2024-01-21 RX ADMIN — SODIUM CHLORIDE 6000 MILLILITER(S): 9 INJECTION, SOLUTION INTRAVENOUS at 15:52

## 2024-01-21 RX ADMIN — Medication 125 MILLILITER(S): at 20:21

## 2024-01-21 RX ADMIN — CHLORHEXIDINE GLUCONATE 1 APPLICATION(S): 213 SOLUTION TOPICAL at 13:32

## 2024-01-21 RX ADMIN — Medication 25 GRAM(S): at 13:31

## 2024-01-21 RX ADMIN — Medication 104 MILLIGRAM(S): at 17:02

## 2024-01-21 RX ADMIN — Medication 104 MILLIGRAM(S): at 06:43

## 2024-01-21 RX ADMIN — FENTANYL CITRATE 25 MICROGRAM(S): 50 INJECTION INTRAVENOUS at 00:03

## 2024-01-21 RX ADMIN — MEROPENEM 1000 MILLIGRAM(S): 1 INJECTION INTRAVENOUS at 13:31

## 2024-01-21 RX ADMIN — PANTOPRAZOLE SODIUM 10 MG/HR: 20 TABLET, DELAYED RELEASE ORAL at 21:17

## 2024-01-21 RX ADMIN — PANTOPRAZOLE SODIUM 10 MG/HR: 20 TABLET, DELAYED RELEASE ORAL at 08:17

## 2024-01-21 RX ADMIN — POTASSIUM PHOSPHATE, MONOBASIC POTASSIUM PHOSPHATE, DIBASIC 83.33 MILLIMOLE(S): 236; 224 INJECTION, SOLUTION INTRAVENOUS at 05:33

## 2024-01-21 RX ADMIN — Medication 125 MILLILITER(S): at 15:49

## 2024-01-21 RX ADMIN — FENTANYL CITRATE 25 MICROGRAM(S): 50 INJECTION INTRAVENOUS at 05:27

## 2024-01-21 RX ADMIN — Medication 0.5 MILLIGRAM(S): at 13:32

## 2024-01-21 RX ADMIN — Medication 125 MILLILITER(S): at 23:07

## 2024-01-21 RX ADMIN — Medication 104 MILLIGRAM(S): at 00:15

## 2024-01-21 RX ADMIN — HYDROMORPHONE HYDROCHLORIDE 0.5 MILLIGRAM(S): 2 INJECTION INTRAMUSCULAR; INTRAVENOUS; SUBCUTANEOUS at 13:44

## 2024-01-21 RX ADMIN — Medication 104 MILLIGRAM(S): at 23:07

## 2024-01-21 RX ADMIN — Medication 200 GRAM(S): at 05:15

## 2024-01-21 RX ADMIN — Medication 125 MILLILITER(S): at 10:29

## 2024-01-21 RX ADMIN — MEROPENEM 1000 MILLIGRAM(S): 1 INJECTION INTRAVENOUS at 05:15

## 2024-01-21 RX ADMIN — HYDROMORPHONE HYDROCHLORIDE 0.5 MILLIGRAM(S): 2 INJECTION INTRAMUSCULAR; INTRAVENOUS; SUBCUTANEOUS at 13:32

## 2024-01-21 RX ADMIN — Medication 1.31 MICROGRAM(S)/KG/MIN: at 08:17

## 2024-01-21 RX ADMIN — PANTOPRAZOLE SODIUM 10 MG/HR: 20 TABLET, DELAYED RELEASE ORAL at 02:21

## 2024-01-21 RX ADMIN — Medication 104 MILLIGRAM(S): at 13:31

## 2024-01-21 RX ADMIN — Medication 25 MILLILITER(S): at 18:30

## 2024-01-21 RX ADMIN — Medication 400 MILLIGRAM(S): at 23:30

## 2024-01-21 NOTE — PROGRESS NOTE ADULT - SUBJECTIVE AND OBJECTIVE BOX
CT cystogram with urine leak./  Discussed with urology and IR to divert urine from bladder repair  neurosurgery aware of colonized urine leak in to abdominal cavity.  plan for Urology to upsize cline vs suprapubic tube  Neurosurgery to exteriorizing ventriculo peritoneal shunt.  Above discussed with patient and parents, all questions answered tot heir satisfaction.  Cont meropenem  If no improvement of diversion of urine, might require reexploration ans SPT tube placement after repair. CT cystogram with urine leak./  Discussed with urology and IR to divert urine from bladder repair  neurosurgery aware of colonized urine leak in to abdominal cavity.  plan for Urology to upsize cline vs suprapubic tube  Neurosurgery to exteriorizing ventriculo peritoneal shunt under conscious sedation  Above discussed with patient and parents, all questions answered tot heir satisfaction.  Cont meropenem  If no improvement of diversion of urine, might require reexploration ans SPT tube placement after repair.

## 2024-01-21 NOTE — CONSULT NOTE ADULT - SUBJECTIVE AND OBJECTIVE BOX
HPI:  28-year-old man with a history of spina bifid, wheelchair bound, scoliosis,  shunt and was admitted on January 14 with abdominal pain nausea vomiting and lethargy and was noted to have SBO with metabolic acidosis with sepsis and hypovolemic shock.  The patient had expiratory laparotomy with small bowel resection and anastomosis in the ileum, bladder perforation with primary repair, partial colectomy.  The patient was noted to have melena and then hypotension with hypovolemic shock.  CT abdomen yesterday revealed concern for pneumoperitoneum with possible concern for anastomotic leak.  I was called at nighttime and recommended them to place a NG tube and repeat his CT as if there is a concern of anastomotic leak or anastomotic site bleeding which could not be addressed with endoscopy.  The patient was placed on PPI infusion however the patient refused NG tube placement.  Received multiple units of blood.  Repeat CT angio did not reveal any evidence of active GI bleeding.  There is worsening pneumoperitoneum.  I spoke to the surgical ICU team this morning.  They are consulting urology and interventional radiology.        PAST MEDICAL & SURGICAL HISTORY:  Spina bifida      Scoliosis          ROS:  No Heartburn, regurgitation, dysphagia, odynophagia.  No dyspepsia  No abdominal pain.    No Nausea, vomiting.  +Bleeding.  No hematemesis.   No diarrhea.    No hematochesia.  No weight loss, anorexia.  No edema.      MEDICATIONS  (STANDING):  albumin human  5% IVPB 250 milliLiter(s) IV Intermittent every 4 hours  chlorhexidine 2% Cloths 1 Application(s) Topical daily  magnesium sulfate  IVPB 2 Gram(s) IV Intermittent daily  meropenem Injectable 1000 milliGRAM(s) IV Push every 8 hours  metoclopramide  IVPB 10 milliGRAM(s) IV Intermittent every 6 hours  multiple electrolytes Injection Type 1 1000 milliLiter(s) (75 mL/Hr) IV Continuous <Continuous>  norepinephrine Infusion 0.01 MICROgram(s)/kG/Min (1.31 mL/Hr) IV Continuous <Continuous>  pantoprazole Infusion 8 mG/Hr (10 mL/Hr) IV Continuous <Continuous>  psyllium Powder 1 Packet(s) Oral two times a day    MEDICATIONS  (PRN):  acetaminophen   IVPB .. 1000 milliGRAM(s) IV Intermittent every 6 hours PRN Temp greater or equal to 38C (100.4F)  benzocaine 20% Spray 1 Spray(s) Topical every 6 hours PRN sore throat  benzocaine/menthol Lozenge 1 Lozenge Oral every 8 hours PRN Sore Throat      Allergies    No Known Allergies    Intolerances        SOCIAL HISTORY:    ENDOSCOPIC/GI HISTORY:    FAMILY HISTORY:      Vital Signs Last 24 Hrs  T(C): 36.9 (21 Jan 2024 08:14), Max: 37.4 (20 Jan 2024 21:00)  T(F): 98.4 (21 Jan 2024 08:14), Max: 99.3 (20 Jan 2024 21:00)  HR: 124 (21 Jan 2024 09:45) (75 - 142)  BP: 112/69 (21 Jan 2024 08:00) (74/60 - 135/53)  BP(mean): 81 (21 Jan 2024 08:00) (58 - 92)  RR: 19 (21 Jan 2024 09:45) (13 - 26)  SpO2: 100% (21 Jan 2024 09:45) (89% - 100%)    Parameters below as of 21 Jan 2024 04:00  Patient On (Oxygen Delivery Method): room air        PHYSICAL EXAM:    GENERAL: NAD, well-groomed, well-developed  HEAD:  Atraumatic, Normocephalic  EYES: EOMI, PERRLA, conjunctiva and sclera clear  ENMT: No tonsillar erythema, exudates, or enlargement; Moist mucous membranes, Good dentition, No lesions  NECK: Supple, No JVD, Normal thyroid  CHEST/LUNG: Clear to percussion bilaterally; No rales, rhonchi, wheezing, or rubs  HEART: Regular rate and rhythm; No murmurs, rubs, or gallops  ABDOMEN: Soft, Nontender, Nondistended; Bowel sounds present  EXTREMITIES:  2+ Peripheral Pulses, No clubbing, cyanosis, or edema  LYMPH: No lymphadenopathy noted  SKIN: No rashes or lesions      LABS:                        8.8    7.29  )-----------( 250      ( 21 Jan 2024 08:14 )             25.2     01-21    148<H>  |  117<H>  |  26.7<H>  ----------------------------<  78  5.0   |  14.0<L>  |  1.41<H>    Ca    7.7<L>      21 Jan 2024 08:14  Phos  3.7     01-21  Mg     2.2     01-21    TPro  5.0<L>  /  Alb  2.8<L>  /  TBili  0.8  /  DBili  0.3  /  AST  13  /  ALT  10  /  AlkPhos  49  01-20    PT/INR - ( 21 Jan 2024 08:14 )   PT: 14.2 sec;   INR: 1.29 ratio         PTT - ( 21 Jan 2024 08:14 )  PTT:32.0 sec   Urinalysis Basic - ( 21 Jan 2024 08:14 )    Color: x / Appearance: x / SG: x / pH: x  Gluc: 78 mg/dL / Ketone: x  / Bili: x / Urobili: x   Blood: x / Protein: x / Nitrite: x   Leuk Esterase: x / RBC: x / WBC x   Sq Epi: x / Non Sq Epi: x / Bacteria: x        LIVER FUNCTIONS - ( 20 Jan 2024 20:15 )  Alb: 2.8 g/dL / Pro: 5.0 g/dL / ALK PHOS: 49 U/L / ALT: 10 U/L / AST: 13 U/L / GGT: x               RADIOLOGY & ADDITIONAL STUDIES:  < from: CT Abdomen and Pelvis w/wo IV Cont (01.21.24 @ 01:50) >    ******PRELIMINARY REPORT******      ******PRELIMINARY REPORT******         ACC: [id number] EXAM:  CT ABDOMEN AND PELVIS WAW IC   ORDERED BY: JON RANGEL     PROCEDURE DATE:  01/21/2024    ******PRELIMINARY REPORT******      ******PRELIMINARYREPORT******           INTERPRETATION:  PRELIM:    Compared to the prior study performed 1 day prior, there appears to be   slightly increased pneumoperitoneum.  The differential diagnosis includes   perforated viscus, anastomotic leak or iatrogenic from surgical drains.    No intraluminal accumulation of IV contrast to indicate source of active   GI bleeding.  No vic perforated viscus identified.  No contrast leak of   excreted IV contrast to suggest bladder rupture.  Nonspecific thickening   of small bowel loops.  Redemonstration of thickened peritoneal walls with   complex fluid suggesting peritonitis.  Sharp balloon remains positioned   in prostatic urethra.    Findings discussed with clinician Rodney at 2:45 am on 1/21/24 with RBV.        ******PRELIMINARY REPORT******      ******PRELIMINARY REPORT******         ARTIE ALLEN MD; Attending Radiologist  This document is a PRELIMINARY interpretation and is pending final   attending approval. Jan 21 2024  2:46AM    < end of copied text >    < from: CT Abdomen and Pelvis w/ Oral Cont and w/ IV Cont (01.20.24 @ 09:59) >    ACC: [id number] EXAM:  CT ABDOMEN AND PELVIS OC IC   ORDERED BY: JON RANGEL     PROCEDURE DATE:  01/20/2024          INTERPRETATION:  CLINICAL INFORMATION: Fever Evaluate for anastomotic   leak, abscess. Status post exploratory laparotomy 1/14/2024 with small   bowel resection, bladder perforation with primary repair, partial   colectomy.    COMPARISON: CT 1/14/2024    CONTRAST/COMPLICATIONS:  IV Contrast: Omnipaque 350  90 cc administered   10 cc discarded  Oral Contrast: NONE  Complications: None reported at time of study completion    PROCEDURE:  CT of the Abdomen and Pelvis was performed.  Sagittal and coronal reformats were performed.    FINDINGS:  LOWER CHEST: Trace left pleural effusion with mild basilar atelectasis.   Elevated left hemidiaphragm.    LIVER: Within normal limits.  BILE DUCTS: Normal caliber.  GALLBLADDER: Cholelithiasis.  SPLEEN: Within normal limits.  PANCREAS: Within normal limits.  ADRENALS: Within normal limits.  KIDNEYS/URETERS: Bilateral renal cortical scarring with numerous   nonobstructing calculi. Mild right hydronephrosis with ureter and   urothelial thickening of the renal pelvis, question infection. The ureter   demonstrates segmental dilatation to the bladder.    BLADDER: Mildly distended with diffuse bladder wall thickening. A tract   is noted from the region of the bladder to the skin in the right lower   quadrant, question ostomy.  REPRODUCTIVE ORGANS: The Sharp catheter balloon is inflated in the   prostate.    BOWEL: Drainage catheter terminates in the left hemiabdomen. Rectal tube.   Oral contrast noted in the rectum, questionably from a prior study.   Several small and large bowel anastomoses. Diffuse small bowel thickening   in the left hemiabdomen. No bowel obstruction.  PERITONEUM:  shunt terminates in the left upper quadrant. Small amount   of anterior free air, from the upper to the lower abdomen. Small volume   ascites with peritoneal thickening in the left upper quadrant. Somewhat   focal right lower quadrant collection containing gas and air adjacent to   the bladder measures 4.9 x 3.9 cm (3, 91) adjacent to more free fluid.  VESSELS: Progressively decreased caliber of the abdominal aorta and   bilateral iliac arterial system.  RETROPERITONEUM/LYMPH NODES: No lymphadenopathy.  ABDOMINAL WALL: Within normal limits.  BONES: Spina bifida. Scoliosis with orthopedic hardware in the spine and   pelvis. Chronic hip deformities.    IMPRESSION:  Status post bowel surgery with small amount of ascites, and free air,   slightly greater than expected for 6 days after surgery, raising   possibility of anastomotic leak. Somewhat localized right lower quadrant   collection of gas and fluid noted, adjacent to the urinary bladder.   Otherwise, no discrete abscess.    Intraperitoneal enhancement and small bowel thickening the left   hemiabdomen, possibly related to peritoneal inflammation/peritonitis.    Bilateral renal cortical scarring with mild right hydronephrosis and   nonspecific urothelial thickening. Correlate for urinary tract infection.    The Sharp catheter is inflated within the prostatic urethra, with a   distended bladder. Repositioning should be considered.    I discussed the findings with HUAN Paul on 1/20/2024 at 10:20 AM.    --- End of Report ---            LUIS ENRIQUE ALLAN MD; Attending Radiologist  This document has been electronically signed. Jan 20 2024 10:24AM    < end of copied text >   HPI:  28-year-old man with a history of spina bifid, wheelchair bound, scoliosis,  shunt and was admitted on January 14 with abdominal pain nausea vomiting and lethargy and was noted to have SBO with metabolic acidosis with sepsis and hypovolemic shock.  The patient had expiratory laparotomy with small bowel resection and anastomosis in the ileum, bladder perforation with primary repair, partial colectomy.  The patient was noted to have melena and then hypotension with hypovolemic shock.  CT abdomen yesterday revealed concern for pneumoperitoneum with possible concern for anastomotic leak.  I was called at nighttime and recommended them to place a NG tube and repeat his CT as if there is a concern of anastomotic leak or anastomotic site bleeding which could not be addressed with endoscopy.  The patient was placed on PPI infusion however the patient refused NG tube placement.  Received multiple units of blood.  Repeat CT angio did not reveal any evidence of active GI bleeding.  There is worsening pneumoperitoneum.  I spoke to the surgical ICU team this morning.  They are consulting urology and interventional radiology. Patient was seen in radiology.         PAST MEDICAL & SURGICAL HISTORY:  Spina bifida      Scoliosis          ROS:  No Heartburn, regurgitation, dysphagia, odynophagia.  No dyspepsia  No abdominal pain.    No Nausea, vomiting.  +Bleeding.  No hematemesis.   No diarrhea.    No hematochesia.  No weight loss, anorexia.  No edema.      MEDICATIONS  (STANDING):  albumin human  5% IVPB 250 milliLiter(s) IV Intermittent every 4 hours  chlorhexidine 2% Cloths 1 Application(s) Topical daily  magnesium sulfate  IVPB 2 Gram(s) IV Intermittent daily  meropenem Injectable 1000 milliGRAM(s) IV Push every 8 hours  metoclopramide  IVPB 10 milliGRAM(s) IV Intermittent every 6 hours  multiple electrolytes Injection Type 1 1000 milliLiter(s) (75 mL/Hr) IV Continuous <Continuous>  norepinephrine Infusion 0.01 MICROgram(s)/kG/Min (1.31 mL/Hr) IV Continuous <Continuous>  pantoprazole Infusion 8 mG/Hr (10 mL/Hr) IV Continuous <Continuous>  psyllium Powder 1 Packet(s) Oral two times a day    MEDICATIONS  (PRN):  acetaminophen   IVPB .. 1000 milliGRAM(s) IV Intermittent every 6 hours PRN Temp greater or equal to 38C (100.4F)  benzocaine 20% Spray 1 Spray(s) Topical every 6 hours PRN sore throat  benzocaine/menthol Lozenge 1 Lozenge Oral every 8 hours PRN Sore Throat      Allergies    No Known Allergies    Intolerances        SOCIAL HISTORY:    ENDOSCOPIC/GI HISTORY:    FAMILY HISTORY:      Vital Signs Last 24 Hrs  T(C): 36.9 (21 Jan 2024 08:14), Max: 37.4 (20 Jan 2024 21:00)  T(F): 98.4 (21 Jan 2024 08:14), Max: 99.3 (20 Jan 2024 21:00)  HR: 124 (21 Jan 2024 09:45) (75 - 142)  BP: 112/69 (21 Jan 2024 08:00) (74/60 - 135/53)  BP(mean): 81 (21 Jan 2024 08:00) (58 - 92)  RR: 19 (21 Jan 2024 09:45) (13 - 26)  SpO2: 100% (21 Jan 2024 09:45) (89% - 100%)    Parameters below as of 21 Jan 2024 04:00  Patient On (Oxygen Delivery Method): room air        PHYSICAL EXAM:    GENERAL: NAD  HEAD:  Atraumatic, Normocephalic  EYES: EOMI, PERRLA, conjunctiva and sclera clear  ENMT: No tonsillar erythema, exudates, or enlargement; Moist mucous membranes, Good dentition, No lesions  NECK: Supple, No JVD, Normal thyroid  CHEST/LUNG: Clear to percussion bilaterally; No rales, rhonchi, wheezing, or rubs  HEART:Tachycardiac  ABDOMEN: Soft, Nontender, Nondistended; Bowel sounds present, FRITZ drain        LABS:                        8.8    7.29  )-----------( 250      ( 21 Jan 2024 08:14 )             25.2     01-21    148<H>  |  117<H>  |  26.7<H>  ----------------------------<  78  5.0   |  14.0<L>  |  1.41<H>    Ca    7.7<L>      21 Jan 2024 08:14  Phos  3.7     01-21  Mg     2.2     01-21    TPro  5.0<L>  /  Alb  2.8<L>  /  TBili  0.8  /  DBili  0.3  /  AST  13  /  ALT  10  /  AlkPhos  49  01-20    PT/INR - ( 21 Jan 2024 08:14 )   PT: 14.2 sec;   INR: 1.29 ratio         PTT - ( 21 Jan 2024 08:14 )  PTT:32.0 sec   Urinalysis Basic - ( 21 Jan 2024 08:14 )    Color: x / Appearance: x / SG: x / pH: x  Gluc: 78 mg/dL / Ketone: x  / Bili: x / Urobili: x   Blood: x / Protein: x / Nitrite: x   Leuk Esterase: x / RBC: x / WBC x   Sq Epi: x / Non Sq Epi: x / Bacteria: x        LIVER FUNCTIONS - ( 20 Jan 2024 20:15 )  Alb: 2.8 g/dL / Pro: 5.0 g/dL / ALK PHOS: 49 U/L / ALT: 10 U/L / AST: 13 U/L / GGT: x               RADIOLOGY & ADDITIONAL STUDIES:  < from: CT Abdomen and Pelvis w/wo IV Cont (01.21.24 @ 01:50) >    ******PRELIMINARY REPORT******      ******PRELIMINARY REPORT******         ACC: [id number] EXAM:  CT ABDOMEN AND PELVIS WAW IC   ORDERED BY: JON RANGEL     PROCEDURE DATE:  01/21/2024    ******PRELIMINARY REPORT******      ******PRELIMINARYREPORT******           INTERPRETATION:  PRELIM:    Compared to the prior study performed 1 day prior, there appears to be   slightly increased pneumoperitoneum.  The differential diagnosis includes   perforated viscus, anastomotic leak or iatrogenic from surgical drains.    No intraluminal accumulation of IV contrast to indicate source of active   GI bleeding.  No vic perforated viscus identified.  No contrast leak of   excreted IV contrast to suggest bladder rupture.  Nonspecific thickening   of small bowel loops.  Redemonstration of thickened peritoneal walls with   complex fluid suggesting peritonitis.  Sharp balloon remains positioned   in prostatic urethra.    Findings discussed with clinician Rodney at 2:45 am on 1/21/24 with RBV.        ******PRELIMINARY REPORT******      ******PRELIMINARY REPORT******         ARTIE ALLEN MD; Attending Radiologist  This document is a PRELIMINARY interpretation and is pending final   attending approval. Jan 21 2024  2:46AM    < end of copied text >    < from: CT Abdomen and Pelvis w/ Oral Cont and w/ IV Cont (01.20.24 @ 09:59) >    ACC: [id number] EXAM:  CT ABDOMEN AND PELVIS OC IC   ORDERED BY: JON RANGEL     PROCEDURE DATE:  01/20/2024          INTERPRETATION:  CLINICAL INFORMATION: Fever Evaluate for anastomotic   leak, abscess. Status post exploratory laparotomy 1/14/2024 with small   bowel resection, bladder perforation with primary repair, partial   colectomy.    COMPARISON: CT 1/14/2024    CONTRAST/COMPLICATIONS:  IV Contrast: Omnipaque 350  90 cc administered   10 cc discarded  Oral Contrast: NONE  Complications: None reported at time of study completion    PROCEDURE:  CT of the Abdomen and Pelvis was performed.  Sagittal and coronal reformats were performed.    FINDINGS:  LOWER CHEST: Trace left pleural effusion with mild basilar atelectasis.   Elevated left hemidiaphragm.    LIVER: Within normal limits.  BILE DUCTS: Normal caliber.  GALLBLADDER: Cholelithiasis.  SPLEEN: Within normal limits.  PANCREAS: Within normal limits.  ADRENALS: Within normal limits.  KIDNEYS/URETERS: Bilateral renal cortical scarring with numerous   nonobstructing calculi. Mild right hydronephrosis with ureter and   urothelial thickening of the renal pelvis, question infection. The ureter   demonstrates segmental dilatation to the bladder.    BLADDER: Mildly distended with diffuse bladder wall thickening. A tract   is noted from the region of the bladder to the skin in the right lower   quadrant, question ostomy.  REPRODUCTIVE ORGANS: The Sharp catheter balloon is inflated in the   prostate.    BOWEL: Drainage catheter terminates in the left hemiabdomen. Rectal tube.   Oral contrast noted in the rectum, questionably from a prior study.   Several small and large bowel anastomoses. Diffuse small bowel thickening   in the left hemiabdomen. No bowel obstruction.  PERITONEUM:  shunt terminates in the left upper quadrant. Small amount   of anterior free air, from the upper to the lower abdomen. Small volume   ascites with peritoneal thickening in the left upper quadrant. Somewhat   focal right lower quadrant collection containing gas and air adjacent to   the bladder measures 4.9 x 3.9 cm (3, 91) adjacent to more free fluid.  VESSELS: Progressively decreased caliber of the abdominal aorta and   bilateral iliac arterial system.  RETROPERITONEUM/LYMPH NODES: No lymphadenopathy.  ABDOMINAL WALL: Within normal limits.  BONES: Spina bifida. Scoliosis with orthopedic hardware in the spine and   pelvis. Chronic hip deformities.    IMPRESSION:  Status post bowel surgery with small amount of ascites, and free air,   slightly greater than expected for 6 days after surgery, raising   possibility of anastomotic leak. Somewhat localized right lower quadrant   collection of gas and fluid noted, adjacent to the urinary bladder.   Otherwise, no discrete abscess.    Intraperitoneal enhancement and small bowel thickening the left   hemiabdomen, possibly related to peritoneal inflammation/peritonitis.    Bilateral renal cortical scarring with mild right hydronephrosis and   nonspecific urothelial thickening. Correlate for urinary tract infection.    The Sharp catheter is inflated within the prostatic urethra, with a   distended bladder. Repositioning should be considered.    I discussed the findings with HUAN Paul on 1/20/2024 at 10:20 AM.    --- End of Report ---            LUIS ENRIQUE ALLAN MD; Attending Radiologist  This document has been electronically signed. Jan 20 2024 10:24AM    < end of copied text >

## 2024-01-21 NOTE — PROGRESS NOTE ADULT - SUBJECTIVE AND OBJECTIVE BOX
24h Events:  Patient with continued melena output. Spoke to GI, discussed possible UGI scope. Placed on protoxin and reglan. Decision made to order CT abomen/pelvis to R/O anastomotic leak. GI stated would scope patient if abdomen, pelvis CT was negative. Patient was offered an NGT as per GI but refused. Patient remains in a mixed shock state, likely septic due to possible intraabdominal infection vs UTI vs bladder injury. Empirically started patient on meropenum. Patient is hypovolemic due to melanotic output. Patient continues to put out large quantities of melena. Received 3PRBC/2FFP/1Plt on this shift. Placed cordis for better access. CT abdomen/pelvis showed slightly worsening pneumoperitoneum. Patient is not peritonitic on exam, not complaining of any pain. Patients levo requirements and tachycardia are slightly improving. Decision made to not take patient to OR urgently as patient is improving with resuscitation.  risks may outweigh benefits. Will continue to resuscitate and trend labs.       ICU Vital Signs Last 24 Hrs  T(C): 37.4 (20 Jan 2024 21:00), Max: 38.4 (20 Jan 2024 05:00)  T(F): 99.3 (20 Jan 2024 21:00), Max: 101.1 (20 Jan 2024 05:00)  HR: 107 (21 Jan 2024 04:30) (77 - 142)  BP: 98/55 (20 Jan 2024 16:45) (74/60 - 135/53)  BP(mean): 64 (20 Jan 2024 16:45) (58 - 92)  ABP: 84/49 (21 Jan 2024 04:30) (80/45 - 135/60)  ABP(mean): 63 (21 Jan 2024 04:30) (53 - 91)  RR: 19 (21 Jan 2024 04:30) (14 - 26)  SpO2: 99% (21 Jan 2024 04:30) (97% - 100%)    O2 Parameters below as of 21 Jan 2024 04:00  Patient On (Oxygen Delivery Method): room air            I&O's Detail    19 Jan 2024 07:01  -  20 Jan 2024 07:00  --------------------------------------------------------  IN:    dextrose 5% + lactated ringers: 420 mL    IV PiggyBack: 200 mL    multiple electrolytes Injection Type 1 Bolus: 1000 mL    Oral Fluid: 120 mL  Total IN: 1740 mL    OUT:    Indwelling Catheter - Urethral (mL): 850 mL    Rectal Tube (mL): 1200 mL  Total OUT: 2050 mL    Total NET: -310 mL      20 Jan 2024 07:01  -  21 Jan 2024 04:34  --------------------------------------------------------  IN:    Albumin 5%  - 500 mL: 750 mL    dextrose 5% + lactated ringers: 504 mL    IV PiggyBack: 581 mL    IV PiggyBack: 100 mL    multiple electrolytes Injection Type 1.: 675 mL    Norepinephrine: 147.4 mL    Pantoprazole: 30 mL    Plasma: 918 mL    Platelets - Single Donor: 222 mL    PRBCs (Packed Red Blood Cells): 1335 mL  Total IN: 5262.4 mL    OUT:    Bulb (mL): 2170 mL    Indwelling Catheter - Urethral (mL): 145 mL  Total OUT: 2315 mL    Total NET: 2947.4 mL          ABG - ( 21 Jan 2024 03:25 )  pH, Arterial: 7.380 pH, Blood: x     /  pCO2: 28    /  pO2: 82    / HCO3: 17    / Base Excess: -8.5  /  SaO2: 99.2                MEDICATIONS  (STANDING):  calcium gluconate IVPB 2 Gram(s) IV Intermittent once  chlorhexidine 2% Cloths 1 Application(s) Topical daily  enoxaparin Injectable 40 milliGRAM(s) SubCutaneous every 24 hours  magnesium sulfate  IVPB 2 Gram(s) IV Intermittent daily  meropenem Injectable 1000 milliGRAM(s) IV Push every 8 hours  metoclopramide  IVPB 10 milliGRAM(s) IV Intermittent every 6 hours  multiple electrolytes Injection Type 1 1000 milliLiter(s) (75 mL/Hr) IV Continuous <Continuous>  norepinephrine Infusion 0.01 MICROgram(s)/kG/Min (1.31 mL/Hr) IV Continuous <Continuous>  pantoprazole Infusion 8 mG/Hr (10 mL/Hr) IV Continuous <Continuous>  potassium chloride  20 mEq/100 mL IVPB 20 milliEquivalent(s) IV Intermittent once  potassium phosphate IVPB 30 milliMole(s) IV Intermittent once  psyllium Powder 1 Packet(s) Oral two times a day    MEDICATIONS  (PRN):  acetaminophen   IVPB .. 1000 milliGRAM(s) IV Intermittent every 6 hours PRN Temp greater or equal to 38C (100.4F)  benzocaine 20% Spray 1 Spray(s) Topical every 6 hours PRN sore throat  benzocaine/menthol Lozenge 1 Lozenge Oral every 8 hours PRN Sore Throat      Physical Exam:    Gen: Resting comfortably in bed    HEENT: PERRL, EOMI    Neurological: Alert and oriented x3 without focal deficit    Neck: Trachea midline, no evidence of JVD, FROM without pain, neck symmetric    Pulmonary: CTA B/L,  equal rise and fall of the chest, no increased WOB    Cardiovascular: regular rate and rhythm, S1/S2    Gastrointestinal: Soft, non-tender, non-distended    : Sharp in place draining clear yellow urine / Voiding    Extremities: Without clubbing/cyanosis/edema    Skin: Intact    Musculoskeletal: FROM without pain, no deformity or areas of tenderness    LABS:  CBC Full  -  ( 21 Jan 2024 03:25 )  WBC Count : 7.85 K/uL  RBC Count : 2.92 M/uL  Hemoglobin : 8.0 g/dL  Hematocrit : 24.4 %  Platelet Count - Automated : 235 K/uL  Mean Cell Volume : 83.6 fl  Mean Cell Hemoglobin : 27.4 pg  Mean Cell Hemoglobin Concentration : 32.8 gm/dL  Auto Neutrophil # : x  Auto Lymphocyte # : x  Auto Monocyte # : x  Auto Eosinophil # : x  Auto Basophil # : x  Auto Neutrophil % : x  Auto Lymphocyte % : x  Auto Monocyte % : x  Auto Eosinophil % : x  Auto Basophil % : x    01-21    147<H>  |  116<H>  |  26.1<H>  ----------------------------<  105<H>  3.6   |  16.0<L>  |  1.21    Ca    7.2<L>      21 Jan 2024 03:25  Phos  2.8     01-21  Mg     2.3     01-21    TPro  5.0<L>  /  Alb  2.8<L>  /  TBili  0.8  /  DBili  0.3  /  AST  13  /  ALT  10  /  AlkPhos  49  01-20    PT/INR - ( 21 Jan 2024 03:25 )   PT: 14.5 sec;   INR: 1.32 ratio         PTT - ( 21 Jan 2024 03:25 )  PTT:31.8 sec  Urinalysis Basic - ( 21 Jan 2024 03:25 )    Color: x / Appearance: x / SG: x / pH: x  Gluc: 105 mg/dL / Ketone: x  / Bili: x / Urobili: x   Blood: x / Protein: x / Nitrite: x   Leuk Esterase: x / RBC: x / WBC x   Sq Epi: x / Non Sq Epi: x / Bacteria: x      RECENT CULTURES:  01-19 .Blood Blood XXXX XXXX   No growth at 24 hours    01-19 .Blood Blood XXXX XXXX   No growth at 24 hours    01-14 Catheterized Catheterized Escherichia coli ESBL  Morganella morganii XXXX   50,000 - 99,000 CFU/mL Escherichia coli ESBL  50,000 - 99,000 CFU/mL Morganella morganii    01-14 .Blood Blood-Peripheral XXXX XXXX   No growth at 5 days    01-14 .Blood Blood-Peripheral XXXX XXXX   No growth at 5 days        LIVER FUNCTIONS - ( 20 Jan 2024 20:15 )  Alb: 2.8 g/dL / Pro: 5.0 g/dL / ALK PHOS: 49 U/L / ALT: 10 U/L / AST: 13 U/L / GGT: x                  24h Events:  Patient with continued melena output. Spoke to GI, discussed possible UGI scope. Placed on protoxin and reglan. Decision made to order CT abdomen/pelvis to R/O anastomotic leak. GI stated would scope patient if abdomen, pelvis CT was negative. Patient was offered an NGT as per GI but refused. Patient remains in a mixed shock state, likely septic due to possible intraabdominal infection vs UTI vs bladder injury. Empirically started patient on meropenum. Patient is hypovolemic due to melanotic output. Patient continues to put out large quantities of melena. Received 3PRBC/2FFP/1Plt on this shift. Placed cordis for better access. CT abdomen/pelvis showed slightly worsening pneumoperitoneum. Patient is not peritonitic on exam, not complaining of any pain. Patients levo requirements and tachycardia are slightly improving. Decision made to not take patient to OR urgently as patient is improving with resuscitation and risks may outweigh benefits. CT also showed that the patients cline catheter was in the prostate. Urology called, will attempt to reposition. FRITZ remains to put out large quantities. Will continue to resuscitate and trend labs.       ICU Vital Signs Last 24 Hrs  T(C): 37.4 (20 Jan 2024 21:00), Max: 38.4 (20 Jan 2024 05:00)  T(F): 99.3 (20 Jan 2024 21:00), Max: 101.1 (20 Jan 2024 05:00)  HR: 107 (21 Jan 2024 04:30) (77 - 142)  BP: 98/55 (20 Jan 2024 16:45) (74/60 - 135/53)  BP(mean): 64 (20 Jan 2024 16:45) (58 - 92)  ABP: 84/49 (21 Jan 2024 04:30) (80/45 - 135/60)  ABP(mean): 63 (21 Jan 2024 04:30) (53 - 91)  RR: 19 (21 Jan 2024 04:30) (14 - 26)  SpO2: 99% (21 Jan 2024 04:30) (97% - 100%)    O2 Parameters below as of 21 Jan 2024 04:00  Patient On (Oxygen Delivery Method): room air            I&O's Detail    19 Jan 2024 07:01  -  20 Jan 2024 07:00  --------------------------------------------------------  IN:    dextrose 5% + lactated ringers: 420 mL    IV PiggyBack: 200 mL    multiple electrolytes Injection Type 1 Bolus: 1000 mL    Oral Fluid: 120 mL  Total IN: 1740 mL    OUT:    Indwelling Catheter - Urethral (mL): 850 mL    Rectal Tube (mL): 1200 mL  Total OUT: 2050 mL    Total NET: -310 mL      20 Jan 2024 07:01  -  21 Jan 2024 04:34  --------------------------------------------------------  IN:    Albumin 5%  - 500 mL: 750 mL    dextrose 5% + lactated ringers: 504 mL    IV PiggyBack: 581 mL    IV PiggyBack: 100 mL    multiple electrolytes Injection Type 1.: 675 mL    Norepinephrine: 147.4 mL    Pantoprazole: 30 mL    Plasma: 918 mL    Platelets - Single Donor: 222 mL    PRBCs (Packed Red Blood Cells): 1335 mL  Total IN: 5262.4 mL    OUT:    Bulb (mL): 2170 mL    Indwelling Catheter - Urethral (mL): 145 mL  Total OUT: 2315 mL    Total NET: 2947.4 mL          ABG - ( 21 Jan 2024 03:25 )  pH, Arterial: 7.380 pH, Blood: x     /  pCO2: 28    /  pO2: 82    / HCO3: 17    / Base Excess: -8.5  /  SaO2: 99.2                MEDICATIONS  (STANDING):  calcium gluconate IVPB 2 Gram(s) IV Intermittent once  chlorhexidine 2% Cloths 1 Application(s) Topical daily  enoxaparin Injectable 40 milliGRAM(s) SubCutaneous every 24 hours  magnesium sulfate  IVPB 2 Gram(s) IV Intermittent daily  meropenem Injectable 1000 milliGRAM(s) IV Push every 8 hours  metoclopramide  IVPB 10 milliGRAM(s) IV Intermittent every 6 hours  multiple electrolytes Injection Type 1 1000 milliLiter(s) (75 mL/Hr) IV Continuous <Continuous>  norepinephrine Infusion 0.01 MICROgram(s)/kG/Min (1.31 mL/Hr) IV Continuous <Continuous>  pantoprazole Infusion 8 mG/Hr (10 mL/Hr) IV Continuous <Continuous>  potassium chloride  20 mEq/100 mL IVPB 20 milliEquivalent(s) IV Intermittent once  potassium phosphate IVPB 30 milliMole(s) IV Intermittent once  psyllium Powder 1 Packet(s) Oral two times a day    MEDICATIONS  (PRN):  acetaminophen   IVPB .. 1000 milliGRAM(s) IV Intermittent every 6 hours PRN Temp greater or equal to 38C (100.4F)  benzocaine 20% Spray 1 Spray(s) Topical every 6 hours PRN sore throat  benzocaine/menthol Lozenge 1 Lozenge Oral every 8 hours PRN Sore Throat      Physical Exam:    Neurological:  A and O x 3    HEENT: PERRL, EOMI     Respiratory: Unlabored, no accessory muscle use    Cardiovascular: Sinus tachycardia     Gastrointestinal: Softly distended, incision c/d/i, FRITZ w/ serosanginous/bilious output?, midline incision w/ fibrinouse/sanginous exudate- no erythema. Melanotic stools.    Extremities: +1 peripheral edema, legs contracted and shortened in frog leg position, b/l lower extremities baseline plegia.  B/l upper extremities moving spontaneously without limitations.     Skin: No rashess    LABS:  CBC Full  -  ( 21 Jan 2024 03:25 )  WBC Count : 7.85 K/uL  RBC Count : 2.92 M/uL  Hemoglobin : 8.0 g/dL  Hematocrit : 24.4 %  Platelet Count - Automated : 235 K/uL  Mean Cell Volume : 83.6 fl  Mean Cell Hemoglobin : 27.4 pg  Mean Cell Hemoglobin Concentration : 32.8 gm/dL  Auto Neutrophil # : x  Auto Lymphocyte # : x  Auto Monocyte # : x  Auto Eosinophil # : x  Auto Basophil # : x  Auto Neutrophil % : x  Auto Lymphocyte % : x  Auto Monocyte % : x  Auto Eosinophil % : x  Auto Basophil % : x    01-21    147<H>  |  116<H>  |  26.1<H>  ----------------------------<  105<H>  3.6   |  16.0<L>  |  1.21    Ca    7.2<L>      21 Jan 2024 03:25  Phos  2.8     01-21  Mg     2.3     01-21    TPro  5.0<L>  /  Alb  2.8<L>  /  TBili  0.8  /  DBili  0.3  /  AST  13  /  ALT  10  /  AlkPhos  49  01-20    PT/INR - ( 21 Jan 2024 03:25 )   PT: 14.5 sec;   INR: 1.32 ratio         PTT - ( 21 Jan 2024 03:25 )  PTT:31.8 sec  Urinalysis Basic - ( 21 Jan 2024 03:25 )    Color: x / Appearance: x / SG: x / pH: x  Gluc: 105 mg/dL / Ketone: x  / Bili: x / Urobili: x   Blood: x / Protein: x / Nitrite: x   Leuk Esterase: x / RBC: x / WBC x   Sq Epi: x / Non Sq Epi: x / Bacteria: x      RECENT CULTURES:  01-19 .Blood Blood XXXX XXXX   No growth at 24 hours    01-19 .Blood Blood XXXX XXXX   No growth at 24 hours    01-14 Catheterized Catheterized Escherichia coli ESBL  Morganella morganii XXXX   50,000 - 99,000 CFU/mL Escherichia coli ESBL  50,000 - 99,000 CFU/mL Morganella morganii    01-14 .Blood Blood-Peripheral XXXX XXXX   No growth at 5 days    01-14 .Blood Blood-Peripheral XXXX XXXX   No growth at 5 days        LIVER FUNCTIONS - ( 20 Jan 2024 20:15 )  Alb: 2.8 g/dL / Pro: 5.0 g/dL / ALK PHOS: 49 U/L / ALT: 10 U/L / AST: 13 U/L / GGT: x

## 2024-01-21 NOTE — PROGRESS NOTE ADULT - SUBJECTIVE AND OBJECTIVE BOX
INTERVAL HPI/OVERNIGHT EVENTS/SUBJECTIVE:  10F cline has been inadequately draining. Multiple attempts to flush the cline.  CT scan yesterday showed possible bladder leak from bladder repair. Cline was upsized at bedside by attending to 18F.     ICU Vital Signs Last 24 Hrs  T(C): 36.8 (21 Jan 2024 12:11), Max: 37.4 (20 Jan 2024 21:00)  T(F): 98.2 (21 Jan 2024 12:11), Max: 99.3 (20 Jan 2024 21:00)  HR: 117 (21 Jan 2024 15:00) (75 - 146)  BP: 100/76 (21 Jan 2024 14:30) (74/60 - 135/53)  BP(mean): 78 (21 Jan 2024 12:00) (64 - 92)  ABP: 104/61 (21 Jan 2024 15:00) (84/44 - 135/60)  ABP(mean): 79 (21 Jan 2024 15:00) (53 - 98)  RR: 20 (21 Jan 2024 15:00) (13 - 27)  SpO2: 100% (21 Jan 2024 15:00) (86% - 100%)    O2 Parameters below as of 21 Jan 2024 15:00  Patient On (Oxygen Delivery Method): nasal cannula  O2 Flow (L/min): 3  O2 Concentration (%): 24        I&O's Detail    20 Jan 2024 07:01  -  21 Jan 2024 07:00  --------------------------------------------------------  IN:    Albumin 5%  - 500 mL: 750 mL    dextrose 5% + lactated ringers: 504 mL    IV PiggyBack: 581 mL    IV PiggyBack: 100 mL    IV PiggyBack: 100 mL    IV PiggyBack: 249.9 mL    IV PiggyBack: 150 mL    multiple electrolytes Injection Type 1.: 900 mL    Norepinephrine: 159.2 mL    Pantoprazole: 60 mL    Plasma: 918 mL    Platelets - Single Donor: 222 mL    PRBCs (Packed Red Blood Cells): 1335 mL  Total IN: 6029.1 mL    OUT:    Bulb (mL): 2470 mL    Indwelling Catheter - Urethral (mL): 145 mL  Total OUT: 2615 mL    Total NET: 3414.1 mL      21 Jan 2024 07:01  -  21 Jan 2024 15:17  --------------------------------------------------------  IN:    IV PiggyBack: 415 mL    multiple electrolytes Injection Type 1 Bolus: 1000 mL    multiple electrolytes Injection Type 1.: 675 mL    Norepinephrine: 39 mL    Pantoprazole: 90 mL  Total IN: 2219 mL    OUT:    Bulb (mL): 2400 mL    External Ventricular Device (mL): 10 mL    Indwelling Catheter - Urethral (mL): 350 mL  Total OUT: 2760 mL    Total NET: -541 mL    ABG - ( 21 Jan 2024 04:50 )  pH, Arterial: 7.400 pH, Blood: x     /  pCO2: 29    /  pO2: 102   / HCO3: 18    / Base Excess: -6.8  /  SaO2: 99.2      MEDICATIONS  (STANDING):  albumin human  5% IVPB 250 milliLiter(s) IV Intermittent every 4 hours  chlorhexidine 2% Cloths 1 Application(s) Topical daily  lidocaine 2% Injectable 5 milliLiter(s) Local Injection once  magnesium sulfate  IVPB 2 Gram(s) IV Intermittent daily  meropenem Injectable 1000 milliGRAM(s) IV Push every 8 hours  metoclopramide  IVPB 10 milliGRAM(s) IV Intermittent every 6 hours  multiple electrolytes Injection Type 1 1000 milliLiter(s) (75 mL/Hr) IV Continuous <Continuous>  multiple electrolytes Injection Type 1 Bolus 1000 milliLiter(s) IV Bolus once  norepinephrine Infusion 0.01 MICROgram(s)/kG/Min (1.31 mL/Hr) IV Continuous <Continuous>  pantoprazole Infusion 8 mG/Hr (10 mL/Hr) IV Continuous <Continuous>  psyllium Powder 1 Packet(s) Oral two times a day    MEDICATIONS  (PRN):  acetaminophen   IVPB .. 1000 milliGRAM(s) IV Intermittent every 6 hours PRN Temp greater or equal to 38C (100.4F)  benzocaine 20% Spray 1 Spray(s) Topical every 6 hours PRN sore throat  benzocaine/menthol Lozenge 1 Lozenge Oral every 8 hours PRN Sore Throat    LABS:  CBC Full  -  ( 21 Jan 2024 15:06 )  WBC Count : 9.97 K/uL  RBC Count : 3.12 M/uL  Hemoglobin : 8.9 g/dL  Hematocrit : 26.3 %  Platelet Count - Automated : 307 K/uL  Mean Cell Volume : 84.3 fl  Mean Cell Hemoglobin : 28.5 pg  Mean Cell Hemoglobin Concentration : 33.8 gm/dL  Auto Neutrophil # : x  Auto Lymphocyte # : x  Auto Monocyte # : x  Auto Eosinophil # : x  Auto Basophil # : x  Auto Neutrophil % : x  Auto Lymphocyte % : x  Auto Monocyte % : x  Auto Eosinophil % : x  Auto Basophil % : x    01-21    148<H>  |  117<H>  |  26.7<H>  ----------------------------<  78  5.0   |  14.0<L>  |  1.41<H>    Ca    7.7<L>      21 Jan 2024 08:14  Phos  3.7     01-21  Mg     2.2     01-21    TPro  5.0<L>  /  Alb  2.8<L>  /  TBili  0.8  /  DBili  0.3  /  AST  13  /  ALT  10  /  AlkPhos  49  01-20    PT/INR - ( 21 Jan 2024 08:14 )   PT: 14.2 sec;   INR: 1.29 ratio         PTT - ( 21 Jan 2024 08:14 )  PTT:32.0 sec  Urinalysis Basic - ( 21 Jan 2024 08:14 )    Color: x / Appearance: x / SG: x / pH: x  Gluc: 78 mg/dL / Ketone: x  / Bili: x / Urobili: x   Blood: x / Protein: x / Nitrite: x   Leuk Esterase: x / RBC: x / WBC x   Sq Epi: x / Non Sq Epi: x / Bacteria: x      RECENT CULTURES:  01-19 .Blood Blood XXXX XXXX   No growth at 24 hours    01-19 .Blood Blood XXXX XXXX   No growth at 24 hours        LIVER FUNCTIONS - ( 20 Jan 2024 20:15 )  Alb: 2.8 g/dL / Pro: 5.0 g/dL / ALK PHOS: 49 U/L / ALT: 10 U/L / AST: 13 U/L / GGT: x           ASSESSMENT/PLAN:  28yMale with spina bifida, neurogenic bladder, admitted with SBO with bladder injury and repair. CT scan yesterday shows suspected bladder leak. Cline upsized at bedside by attending.   -keep 18F cline, will likely remain for about 4 weeks  -gently irrigate prn  -seen with attending

## 2024-01-21 NOTE — PROGRESS NOTE ADULT - SUBJECTIVE AND OBJECTIVE BOX
HPI: 27yo M w/ hx of spina bfida and scoliosis, wheelchair bound at baseline, left VPS presenting with abdominal pain, n/v. Pt states that the pain began yesterday morning and has been persistent. He had a bowel movement this morning but prior to that his last bowel movement was one week prior. Denies passing gas. Denies fevers at home. Has been vomiting and nauseous. At home he was lethargic and EMS was called. At that time he was hypotensive and tachycardic and he was BIBEMS to [facility] ED for further workup. On arrival he was tachycardic to the 130s and hypotensive to 80s/50s. Labs notable for K 3.4, bicarb 13, Cr 2.04. Actively vomiting in ED. CT scan showed dilated bowel and stomach consistent with severe SBO with transition point in mid abdomen. Surgery consulted for management.     INTERVAL HPI/OVERNIGHT EVENTS:  28M seen lying comfortably in bed. Pt has no acute complaints this AM. No fevers documented overnight.     Vital Signs Last 24 Hrs  T(C): 36.9 (21 Jan 2024 08:14), Max: 37.4 (20 Jan 2024 21:00)  T(F): 98.4 (21 Jan 2024 08:14), Max: 99.3 (20 Jan 2024 21:00)  HR: 119 (21 Jan 2024 08:30) (75 - 142)  BP: 112/69 (21 Jan 2024 08:00) (74/60 - 135/53)  BP(mean): 81 (21 Jan 2024 08:00) (58 - 92)  RR: 21 (21 Jan 2024 08:30) (14 - 26)  SpO2: 97% (21 Jan 2024 08:30) (89% - 100%)    Parameters below as of 21 Jan 2024 04:00  Patient On (Oxygen Delivery Method): room air      PHYSICAL EXAM:  GENERAL: NAD, well-groomed  HEAD:  Atraumatic, normocephalic  MENTAL STATUS: AAO x3; Opens eyes spontaneously; Appropriately conversant without aphasia; following simple commands  CRANIAL NERVES: PERRL. EOMI without nystagmus. Face symmetric w/ normal eye closure and smile, tongue midline.   MOTOR: B/L UE 5/5; B/L LE contracted & immobile         LABS:                        8.8    7.29  )-----------( 250      ( 21 Jan 2024 08:14 )             25.2     01-21    148<H>  |  117<H>  |  26.7<H>  ----------------------------<  78  5.0   |  14.0<L>  |  1.41<H>    Ca    7.7<L>      21 Jan 2024 08:14  Phos  3.7     01-21  Mg     2.2     01-21    TPro  5.0<L>  /  Alb  2.8<L>  /  TBili  0.8  /  DBili  0.3  /  AST  13  /  ALT  10  /  AlkPhos  49  01-20    PT/INR - ( 21 Jan 2024 08:14 )   PT: 14.2 sec;   INR: 1.29 ratio         PTT - ( 21 Jan 2024 08:14 )  PTT:32.0 sec  Urinalysis Basic - ( 21 Jan 2024 08:14 )    Color: x / Appearance: x / SG: x / pH: x  Gluc: 78 mg/dL / Ketone: x  / Bili: x / Urobili: x   Blood: x / Protein: x / Nitrite: x   Leuk Esterase: x / RBC: x / WBC x   Sq Epi: x / Non Sq Epi: x / Bacteria: x        01-20 @ 07:01 - 01-21 @ 07:00  --------------------------------------------------------  IN: 6029.1 mL / OUT: 2615 mL / NET: 3414.1 mL    01-21 @ 07:01  -  01-21 @ 09:02  --------------------------------------------------------  IN: 256 mL / OUT: 800 mL / NET: -544 mL        RADIOLOGY & ADDITIONAL TESTS:  CT Head No Cont 01.16.24:  IMPRESSION: Stable follow-up CT exam when compared with 1/14/2024.    --- End of Report ---    PRATIBHA LUTHER MD; Attending Radiologist  This document has been electronically signed. Jan 17 2024  7:57AM    < end of copied text >    CT Head No Cont 1.14.24:  IMPRESSION:  1.  No acute intracranial abnormality. No comparison examinations were   available at the time of interpretation. There is slitlike ventricular   caliber of indeterminate clinical significance. Clinical correlation is   advised. An addendum to this report can be made to assess for stability   of ventricular caliber once a comparison examination is made available.  2.  The 2.0 x 1.0 x 1.0 cm calcific mass in the left parietal lobe has a   broad differential. Comparison with prior examinations would be useful to   assess for stability. If these are not available, further evaluation with   MR brain without and with contrast would be recommended.    This report was sent via Teams to Linnea Sharri  at1/14/2024 3:34 PM.    --- End of Report ---            JONATAN WILSON MD; Attending Radiologist  This document has been electronically signed. Jan 14 2024  3:37PM    < end of copied text >

## 2024-01-21 NOTE — PROCEDURE NOTE - NSSEDATIONDETAIL_GEN_A_CORE
There was continuous monitoring of patient's oxygen saturation, respiratory rate, heart rate, blood pressure, level of consciousness, and physiological status./Oxygen therapy was applied./IV access was obtained./End tidal CO2 was monitored.

## 2024-01-21 NOTE — PROGRESS NOTE ADULT - NS ATTEND AMEND GEN_ALL_CORE FT
agree with assessment  discussed case with medical and surgical team  reviewed all images and discussed with ICU and IR as well  will need to further follow up as mentioned

## 2024-01-21 NOTE — CONSULT NOTE ADULT - ASSESSMENT
Patient with a complicated course at this time with SBO status post surgery, partial colectomy, bladder surgery and now with GI bleeding.  There is a concern of anastomotic leak.  Continue the PPI infusion.  Monitor CBC.  The patient is on vasopressors.  Discussed at length with the surgical ICU attending and if the patient needs endoscopy, we will perform.  Patient will require intubation at that time.  Will continue to discuss with the ICU team for the further comanagement planning Patient with a complicated course at this time with SBO status post surgery, partial colectomy, bladder surgery and now with GI bleeding.  There is a concern of anastomotic leak.  Continue the PPI infusion.  Monitor CBC.  The patient is on vasopressors.  Discussed at length with the surgical ICU attending and if the patient needs endoscopy, we will perform.  Patient will require intubation at that time.  Will continue to discuss with the ICU team for the further comanagement planning. Patient is being planned for suprapubic catheter.

## 2024-01-21 NOTE — PROGRESS NOTE ADULT - NS ATTEND AMEND GEN_ALL_CORE FT
I agree with the above. I personally examined and saw the patient. At neurological baseline. Afebrile overnight. No shunt tap at this time.

## 2024-01-21 NOTE — PROGRESS NOTE ADULT - ASSESSMENT
29yo M w/ hx of spina bifida, left VPS @ [facility] by Dr Dorsey and scoliosis/ no sensation distal to pubic line, wheelchair bound at baseline w no movement of his legs, self catheterizes bladder, presenting with abdominal pain, n/v. Consulted for evaluation of left VPS (nonprogrammable valve).  -Afebrile overnight    Plan:  -Continue q4h neuro checks   -No plan to tap shunt at this time as pt neuro exam is stable & has been afebrile overnight  -Continue SCDs & Lovenox for DVT PPX  -Pain control PRN  -Further medical management / supportive care per primary team  -Seen & examined with Dr. Richmond  27yo M w/ hx of spina bifida, left VPS @ [facility] by Dr Dorsey and scoliosis/ no sensation distal to pubic line, wheelchair bound at baseline w no movement of his legs, self catheterizes bladder, presenting with abdominal pain, n/v. Consulted for evaluation of left VPS (nonprogrammable valve).  -Afebrile overnight    Plan:  -Continue q4h neuro checks   -No plan to tap shunt at this time as pt neuro exam is stable & has been afebrile overnight  -Continue SCDs & Lovenox for DVT PPX  -Pain control PRN  -Blood cx results pending   -Further medical management / supportive care per primary team  -Seen & examined with Dr. Richmond

## 2024-01-21 NOTE — PROCEDURE NOTE - ADDITIONAL PROCEDURE DETAILS
Provided conscious sedation monitoring for neurosurgery team to perform externalization of  shunt at bedside in SICU.
Timeout was performed in accordance to hospital policy. Patient's shunt catheter was palpated over sternum, marked and prepped/cleaned with betadine scrub brush and duraprep x2. Duraprep was allowed to dry. The area was draped with 4 blue sterile towels. 10 ccs of lidocaine anesthetic was injected in the subcutaneous space. Conscious sedation was performed by Dr. Rogers, see his note.    A linear incision was made through the superficial skin with a 10blade perpendicular to the path of the shunt catheter. A crile forceps was used to dissect the subcutaneous tissue through scar tissue to isolate the white plastic tubing of the catheter. The catheter was pulled up distally through the incision and cut. Active but slow CSF drainage was seen from the catheter. 2 ccs of CSF was collected to be sent for gram stain/cultures and profile. The catheter was connected using a straight connector to a new bactiseal distal catheter. Both catheters were secured to the straight connector using silk ties. The new bactiseal distal catheter was connected to an external drainage system that was flushed with saline. The old distal catheter was pulled out of the abdomen and discarded. The skin incision was closed with staples with the catheter exiting from a corner of the incision. A 3-0 nylon was used to secure the catheter at the exit site. The catheter was looped around the incision, and further secured with steristrips. A chlorhexidine dressing was applied to the catheter exit site with additional tegaderms around the looped catheter. The catheter was confirmed to be patent at the end of the procedure. The patient tolerated the procedure well.     Externalized drain to be leveled at belly button to simulate ventriculoperitoneal shunt. Instructions given to nursing staff and SICU to not clamp given patient's congenital hydrocephalus.
Dx:  R57.8 Hemorrhagic shock  E87.20 Metabolic acidosis  N99.81 Intraoperative bladder injury

## 2024-01-21 NOTE — PROGRESS NOTE ADULT - ASSESSMENT
ASSESSMENT:  29yo male with PMHx of spina bifida (wheelchair bound at baseline), scoliosis,  Shunt BIBEMS 1/14 with abdominal pain, n/v, and lethargy, found to have severe SBO, metabolic acidosis, and to be in septic and hypovolemic shock. Patient was brought to the OR 1/14 for ex lap and small bowel resection, c/b bladder perf with primary repair and partial colectomy and admitted to SICU post-op for further management. Now with mixed septic/hypovolemic shock, pneumoperitoneum, suspected UGIB?      Plan:    Neuro:   #Pain control, antipyretic  - Multimodal pain regimen  - F/U Neuro for possible VPS tap s/p BCx results.  - Delirium precautions  - Optimize sleep/wake cycle     CV:   #Septic shock vs SIRs vs Hypovolemia  - Sinus tachycardia - improving with volume resuscitation  - Low dose levo - up to .2 yesterday, 0.05 this AM  - Maintain MAP > 65  - Lactate remains cleared, acidosis worsening - will send acetone for HAGMA w/o lactate production. Likely malperfusion given clinical picture.   - Continue hemodynamic monitoring    Pulm:   #COVID - resolved  - Maintain sPO2 >92%  - Pulmonary toilet, IS, OOBTC    GI/Nutrition:   SBO s/p SBR, partial colectomy, now with concern for anastomotic leak vs perf, melana UGIB?  - NPO  - s/p CT A/P - pending official read, increased pneumoperitoneum?  - Monitor FRITZ output  - Continue to monitor and replete lost fluid via melena  - Recontact GI for possible endoscopy if anastomosis not likely source of melena - on PPI gtt and reglan as per GI.  - Monitor bowel movements    /Renal:   #Hx of urethral stricture now with bladder injury s/p primary repair  - Oliguria - cline in prostate - urology will attempt to advance  - Monitor kidney fxn  - Monitor FRITZ output - replete loses  - Monitor UOP  - Replete electrolytes PRN (Mg >2, Phos >3, K >4)  - Cline for strict I&O - continue to irrigate and bladder scan as per urology     ID:  #Sepsis  - Source unknown - COVID +, + UCx (<50k colonies), RVP NEG. Possible intraabdominal infection? Possible VPS infection?   - Meropenum for ppx  - Monitor fever curve - APAP and NSAIDs for fever  - Monitor for leukocytosis  - Cultures sent 1/19    Endo:  - Maintain euglycemia  - Monitor blood glucose    Heme/DVT Prophylaxis:  - SCDs  - Monitor H/H, Monitor Coags  - S/P 5 PRBC/4FFP/1 PLT total  - Maintain Hgb >7.0  - Lovenox held in setting of GIB    Skin:  - Repositioning for DTI prevention while in bed    Lines:  - Peripheral IV's  - RIJ Cordis    Dispo:  - Care per SICU    CODE STATUS: FULL

## 2024-01-21 NOTE — CHART NOTE - NSCHARTNOTEFT_GEN_A_CORE
Isiah PRESSLEY called stating CT scan from 1/20 AM showed cline catheter inflated within prostatic urethra. During the day, there was an attempt to reposition cline. Repeat scans demonstrated that the cline catheter remains in the prostatic urethra. Another attempt was made overnight. Balloon was deflated, cline was pushed in to the hub without resistance, repositioned. There was minimal clear urine output. Initial bladder scan prior to repositioning measured 300cc, after repositioning bladder scan measured 200cc. Patient remains comfortable, in no distress. Dr Flores to see patient in the morning for further evaluation.

## 2024-01-21 NOTE — PROGRESS NOTE ADULT - NS ATTEND AMEND GEN_ALL_CORE FT
I have seen and examined the patient during SICU multidisciplinary rounds from 2783-7859 hrs.   Events noted.    Neuro: Awake, pain under control  CV: HD abnormal, weaning down Norepinephrine, MAP > 70 at time of rounds, perfusion is adequate, normal lactic acid. remains tachycardic  Pulm: SO2 adequate  GI: Soft, incisional pain, Brandon with serosanguinous fluid, >1.5 L in last hoys, there is similar fluid leaking from wound at periumbilical staples. no Blumberg sign.  : minimal flow from Cline large volume ascitic drainage form brandon creating free water deficit, GALEN elevation of creatinine and metabolic acidosis. electrolytes ok  ID: on meropenem, for  shunt and possible urine leak with colonized urine.  Heme: blood loss anemia, adequate response to 5PRBC/4FFP/1plts. no new melena. GI consulted, CT reviewed  Endo: Glycemia at target    Plan:  Torpid evolution after X lap for SBo requiring SBR 9for obstruction and ) repair of colon and bladder injury.  Aggregated by GI bleed, Upper vs anastomotic bleed, (melanotic stools have stopped)  Patient refused NGT and gastric lavage and has bot have any new over blood. GI willing to scop if bleeding resumes.  The concern is for bladder urine leak, increase brandon output and lower cline, the cline is 10F since is what he uses to string cath and team was unable to place larger.  There is similar fluid drainage from wound nonsuccus..  The CT evidence minimal but increase pneumoperitoneum the bladder is being irrigated to keep patency of cline.  Patient is not acting at this time as sepsis from anastomotic leak, it remains in the differential but as above, biggest concern is bladder leak.  I feel the patient need suprapubic tube to divert urine and allow repair to seal, the cline after urology repositioning but keeps migrating to prostate.  The CT done yesterday are NOT cystogram and the bladder was never inflated with contrast, only delayed, this could be the reason no constats is seen outside the urinary system  we have sent an fluids creating and planned to get CT cystogram before placing SPT ( IR and Urology are aware and in agreement with plan)  Patient is critically ill requiring my attention and critical care to prevent further and / or new organ failure. I have seen and examined the patient during SICU multidisciplinary rounds from 1995-2360 hrs.   Events noted.    Neuro: Awake, pain under control  CV: HD abnormal, weaning down Norepinephrine, MAP > 70 at time of rounds, perfusion is adequate, normal lactic acid. remains tachycardic  Pulm: SO2 adequate  GI: Soft, incisional pain, Brandon with serosanguinous fluid, >1.5 L in last hoys, there is similar fluid leaking from wound at periumbilical staples. no Blumberg sign.  : minimal flow from Cline large volume ascitic drainage form brandon creating free water deficit, GALEN elevation of creatinine and metabolic acidosis. electrolytes ok  ID: on meropenem, for  shunt and possible urine leak with colonized urine.  Heme: blood loss anemia, adequate response to 5PRBC/4FFP/1plts. no new melena. GI consulted, CT reviewed  Endo: Glycemia at target    Plan:  Torpid evolution after X lap for SBo requiring SBR 9for obstruction and ) repair of colon and bladder injury.  Aggregated by GI bleed, Upper vs anastomotic bleed, (melanotic stools have stopped)  Patient refused NGT and gastric lavage and has bot have any new over blood. GI willing to scop if bleeding resumes.  The concern is for bladder urine leak, increase brandon output and lower cline, the cline is 10F since is what he uses to string cath and team was unable to place larger.  There is similar fluid drainage from wound nonsuccus..  The CT evidence minimal but increase pneumoperitoneum the bladder is being irrigated to keep patency of cline.  Patient is not acting at this time as sepsis from anastomotic leak, it remains in the differential but as above, biggest concern is bladder leak.  I feel the patient need suprapubic tube to divert urine and allow repair to seal, the cline after urology repositioning but keeps migrating to prostate.  The CT done yesterday are NOT cystogram and the bladder was never inflated with contrast, only delayed, this could be the reason no constats is seen outside the urinary system  we have sent an fluids creating and planned to get CT cystogram before placing SPT ( IR and Urology are aware and in agreement with plan)  Patient is critically ill requiring my attention and critical care to prevent further and / or new organ failure.

## 2024-01-22 ENCOUNTER — TRANSCRIPTION ENCOUNTER (OUTPATIENT)
Age: 29
End: 2024-01-22

## 2024-01-22 ENCOUNTER — RESULT REVIEW (OUTPATIENT)
Age: 29
End: 2024-01-22

## 2024-01-22 LAB
ANION GAP SERPL CALC-SCNC: 12 MMOL/L — SIGNIFICANT CHANGE UP (ref 5–17)
ANION GAP SERPL CALC-SCNC: 16 MMOL/L — SIGNIFICANT CHANGE UP (ref 5–17)
APTT BLD: 30.2 SEC — SIGNIFICANT CHANGE UP (ref 24.5–35.6)
APTT BLD: 31.1 SEC — SIGNIFICANT CHANGE UP (ref 24.5–35.6)
BASOPHILS # BLD AUTO: 0 K/UL — SIGNIFICANT CHANGE UP (ref 0–0.2)
BASOPHILS NFR BLD AUTO: 0 % — SIGNIFICANT CHANGE UP (ref 0–2)
BLD GP AB SCN SERPL QL: SIGNIFICANT CHANGE UP
BUN SERPL-MCNC: 12.2 MG/DL — SIGNIFICANT CHANGE UP (ref 8–20)
BUN SERPL-MCNC: 9.2 MG/DL — SIGNIFICANT CHANGE UP (ref 8–20)
CALCIUM SERPL-MCNC: 6.9 MG/DL — LOW (ref 8.4–10.5)
CALCIUM SERPL-MCNC: 7.9 MG/DL — LOW (ref 8.4–10.5)
CHLORIDE SERPL-SCNC: 110 MMOL/L — HIGH (ref 96–108)
CHLORIDE SERPL-SCNC: 115 MMOL/L — HIGH (ref 96–108)
CO2 SERPL-SCNC: 16 MMOL/L — LOW (ref 22–29)
CO2 SERPL-SCNC: 19 MMOL/L — LOW (ref 22–29)
CREAT FLD-MCNC: 10.6 MG/DL — SIGNIFICANT CHANGE UP
CREAT FLD-MCNC: 7.06 MG/DL — SIGNIFICANT CHANGE UP
CREAT SERPL-MCNC: 0.69 MG/DL — SIGNIFICANT CHANGE UP (ref 0.5–1.3)
CREAT SERPL-MCNC: 0.77 MG/DL — SIGNIFICANT CHANGE UP (ref 0.5–1.3)
EGFR: 124 ML/MIN/1.73M2 — SIGNIFICANT CHANGE UP
EGFR: 128 ML/MIN/1.73M2 — SIGNIFICANT CHANGE UP
EOSINOPHIL # BLD AUTO: 0 K/UL — SIGNIFICANT CHANGE UP (ref 0–0.5)
EOSINOPHIL NFR BLD AUTO: 0 % — SIGNIFICANT CHANGE UP (ref 0–6)
GAS PNL BLDA: SIGNIFICANT CHANGE UP
GIANT PLATELETS BLD QL SMEAR: PRESENT — SIGNIFICANT CHANGE UP
GLUCOSE SERPL-MCNC: 118 MG/DL — HIGH (ref 70–99)
GLUCOSE SERPL-MCNC: 142 MG/DL — HIGH (ref 70–99)
HCT VFR BLD CALC: 23.4 % — LOW (ref 39–50)
HCT VFR BLD CALC: 33.3 % — LOW (ref 39–50)
HGB BLD-MCNC: 10.9 G/DL — LOW (ref 13–17)
HGB BLD-MCNC: 7.9 G/DL — LOW (ref 13–17)
INR BLD: 1.26 RATIO — HIGH (ref 0.85–1.18)
INR BLD: 1.44 RATIO — HIGH (ref 0.85–1.18)
LDH CSF L TO P-CCNC: 13 U/L — SIGNIFICANT CHANGE UP
LDH FLD-CCNC: 13 U/L — SIGNIFICANT CHANGE UP
LYMPHOCYTES # BLD AUTO: 0.22 K/UL — LOW (ref 1–3.3)
LYMPHOCYTES # BLD AUTO: 1.8 % — LOW (ref 13–44)
MAGNESIUM SERPL-MCNC: 1.7 MG/DL — SIGNIFICANT CHANGE UP (ref 1.6–2.6)
MAGNESIUM SERPL-MCNC: 2 MG/DL — SIGNIFICANT CHANGE UP (ref 1.6–2.6)
MANUAL SMEAR VERIFICATION: SIGNIFICANT CHANGE UP
MCHC RBC-ENTMCNC: 27.5 PG — SIGNIFICANT CHANGE UP (ref 27–34)
MCHC RBC-ENTMCNC: 28 PG — SIGNIFICANT CHANGE UP (ref 27–34)
MCHC RBC-ENTMCNC: 32.7 GM/DL — SIGNIFICANT CHANGE UP (ref 32–36)
MCHC RBC-ENTMCNC: 33.8 GM/DL — SIGNIFICANT CHANGE UP (ref 32–36)
MCV RBC AUTO: 83 FL — SIGNIFICANT CHANGE UP (ref 80–100)
MCV RBC AUTO: 83.9 FL — SIGNIFICANT CHANGE UP (ref 80–100)
MONOCYTES # BLD AUTO: 0.11 K/UL — SIGNIFICANT CHANGE UP (ref 0–0.9)
MONOCYTES NFR BLD AUTO: 0.9 % — LOW (ref 2–14)
NEUTROPHILS # BLD AUTO: 11.75 K/UL — HIGH (ref 1.8–7.4)
NEUTROPHILS NFR BLD AUTO: 95.5 % — HIGH (ref 43–77)
NIGHT BLUE STAIN TISS: SIGNIFICANT CHANGE UP
PHOSPHATE SERPL-MCNC: 2.1 MG/DL — LOW (ref 2.4–4.7)
PHOSPHATE SERPL-MCNC: 3.3 MG/DL — SIGNIFICANT CHANGE UP (ref 2.4–4.7)
PLAT MORPH BLD: NORMAL — SIGNIFICANT CHANGE UP
PLATELET # BLD AUTO: 272 K/UL — SIGNIFICANT CHANGE UP (ref 150–400)
PLATELET # BLD AUTO: 336 K/UL — SIGNIFICANT CHANGE UP (ref 150–400)
POIKILOCYTOSIS BLD QL AUTO: SLIGHT — SIGNIFICANT CHANGE UP
POLYCHROMASIA BLD QL SMEAR: SLIGHT — SIGNIFICANT CHANGE UP
POTASSIUM SERPL-MCNC: 4 MMOL/L — SIGNIFICANT CHANGE UP (ref 3.5–5.3)
POTASSIUM SERPL-MCNC: 4.2 MMOL/L — SIGNIFICANT CHANGE UP (ref 3.5–5.3)
POTASSIUM SERPL-SCNC: 4 MMOL/L — SIGNIFICANT CHANGE UP (ref 3.5–5.3)
POTASSIUM SERPL-SCNC: 4.2 MMOL/L — SIGNIFICANT CHANGE UP (ref 3.5–5.3)
PROTHROM AB SERPL-ACNC: 13.9 SEC — HIGH (ref 9.5–13)
PROTHROM AB SERPL-ACNC: 15.8 SEC — HIGH (ref 9.5–13)
RBC # BLD: 2.82 M/UL — LOW (ref 4.2–5.8)
RBC # BLD: 3.97 M/UL — LOW (ref 4.2–5.8)
RBC # FLD: 16.1 % — HIGH (ref 10.3–14.5)
RBC # FLD: 17.2 % — HIGH (ref 10.3–14.5)
RBC BLD AUTO: ABNORMAL
SODIUM SERPL-SCNC: 142 MMOL/L — SIGNIFICANT CHANGE UP (ref 135–145)
SODIUM SERPL-SCNC: 146 MMOL/L — HIGH (ref 135–145)
SPECIMEN SOURCE: SIGNIFICANT CHANGE UP
UNFRACTIONATED HEPARIN INTERPRETATION: SIGNIFICANT CHANGE UP
UNFRACTIONATED HEPARIN RESULT: NEGATIVE — SIGNIFICANT CHANGE UP
UNFRACTIONATED HEPARIN-HIGH DOSE: 2 % — SIGNIFICANT CHANGE UP
UNFRACTIONATED HEPARIN-LOW DOSE: 0 % — SIGNIFICANT CHANGE UP
VARIANT LYMPHS # BLD: 1.8 % — SIGNIFICANT CHANGE UP (ref 0–6)
WBC # BLD: 12.3 K/UL — HIGH (ref 3.8–10.5)
WBC # BLD: 7.24 K/UL — SIGNIFICANT CHANGE UP (ref 3.8–10.5)
WBC # FLD AUTO: 12.3 K/UL — HIGH (ref 3.8–10.5)
WBC # FLD AUTO: 7.24 K/UL — SIGNIFICANT CHANGE UP (ref 3.8–10.5)

## 2024-01-22 PROCEDURE — 99233 SBSQ HOSP IP/OBS HIGH 50: CPT | Mod: 25

## 2024-01-22 PROCEDURE — 43235 EGD DIAGNOSTIC BRUSH WASH: CPT

## 2024-01-22 PROCEDURE — 99233 SBSQ HOSP IP/OBS HIGH 50: CPT | Mod: 57

## 2024-01-22 PROCEDURE — 88307 TISSUE EXAM BY PATHOLOGIST: CPT | Mod: 26

## 2024-01-22 PROCEDURE — 51555 PARTIAL REMOVAL OF BLADDER: CPT

## 2024-01-22 PROCEDURE — 44143 PARTIAL REMOVAL OF COLON: CPT | Mod: 78

## 2024-01-22 PROCEDURE — 99024 POSTOP FOLLOW-UP VISIT: CPT

## 2024-01-22 DEVICE — STAPLER COVIDIEN ENDO GIA 80-3.8MM BLUE RELOAD: Type: IMPLANTABLE DEVICE | Status: FUNCTIONAL

## 2024-01-22 DEVICE — STAPLER COVIDIEN ENDO GIA 80-3.8MM BLUE: Type: IMPLANTABLE DEVICE | Status: FUNCTIONAL

## 2024-01-22 RX ORDER — PANTOPRAZOLE SODIUM 20 MG/1
40 TABLET, DELAYED RELEASE ORAL
Refills: 0 | Status: DISCONTINUED | OUTPATIENT
Start: 2024-01-22 | End: 2024-02-19

## 2024-01-22 RX ORDER — MAGNESIUM SULFATE 500 MG/ML
2 VIAL (ML) INJECTION ONCE
Refills: 0 | Status: COMPLETED | OUTPATIENT
Start: 2024-01-22 | End: 2024-01-22

## 2024-01-22 RX ORDER — HEPARIN SODIUM 5000 [USP'U]/ML
5000 INJECTION INTRAVENOUS; SUBCUTANEOUS EVERY 8 HOURS
Refills: 0 | Status: DISCONTINUED | OUTPATIENT
Start: 2024-01-22 | End: 2024-01-24

## 2024-01-22 RX ORDER — MEROPENEM 1 G/30ML
1000 INJECTION INTRAVENOUS ONCE
Refills: 0 | Status: COMPLETED | OUTPATIENT
Start: 2024-01-22 | End: 2024-01-22

## 2024-01-22 RX ORDER — NOREPINEPHRINE BITARTRATE/D5W 8 MG/250ML
0.01 PLASTIC BAG, INJECTION (ML) INTRAVENOUS
Qty: 8 | Refills: 0 | Status: DISCONTINUED | OUTPATIENT
Start: 2024-01-22 | End: 2024-01-23

## 2024-01-22 RX ORDER — CHLORHEXIDINE GLUCONATE 213 G/1000ML
1 SOLUTION TOPICAL DAILY
Refills: 0 | Status: DISCONTINUED | OUTPATIENT
Start: 2024-01-22 | End: 2024-02-19

## 2024-01-22 RX ORDER — SODIUM CHLORIDE 9 MG/ML
1000 INJECTION, SOLUTION INTRAVENOUS
Refills: 0 | Status: DISCONTINUED | OUTPATIENT
Start: 2024-01-22 | End: 2024-01-24

## 2024-01-22 RX ADMIN — MEROPENEM 1000 MILLIGRAM(S): 1 INJECTION INTRAVENOUS at 18:55

## 2024-01-22 RX ADMIN — Medication 85 MILLIMOLE(S): at 06:20

## 2024-01-22 RX ADMIN — PANTOPRAZOLE SODIUM 10 MG/HR: 20 TABLET, DELAYED RELEASE ORAL at 09:34

## 2024-01-22 RX ADMIN — MEROPENEM 1000 MILLIGRAM(S): 1 INJECTION INTRAVENOUS at 05:50

## 2024-01-22 RX ADMIN — Medication 104 MILLIGRAM(S): at 06:09

## 2024-01-22 RX ADMIN — Medication 25 GRAM(S): at 22:31

## 2024-01-22 RX ADMIN — HEPARIN SODIUM 5000 UNIT(S): 5000 INJECTION INTRAVENOUS; SUBCUTANEOUS at 22:31

## 2024-01-22 RX ADMIN — CHLORHEXIDINE GLUCONATE 1 APPLICATION(S): 213 SOLUTION TOPICAL at 20:00

## 2024-01-22 RX ADMIN — PANTOPRAZOLE SODIUM 40 MILLIGRAM(S): 20 TABLET, DELAYED RELEASE ORAL at 22:31

## 2024-01-22 RX ADMIN — Medication 1000 MILLIGRAM(S): at 01:11

## 2024-01-22 NOTE — BRIEF OPERATIVE NOTE - NSICDXBRIEFPOSTOP_GEN_ALL_CORE_FT
POST-OP DIAGNOSIS:  Large bowel anastomotic leak 22-Jan-2024 18:44:17  Temitope Feliz  Bladder injury 22-Jan-2024 17:30:28  Angie Crowder  
POST-OP DIAGNOSIS:  Bladder injury 22-Jan-2024 17:30:28  Angie Crowder  Large bowel anastomotic leak 22-Jan-2024 17:30:57  Angie Crowder  
POST-OP DIAGNOSIS:  Small bowel obstruction 14-Jan-2024 20:35:17  Jonatan Roach

## 2024-01-22 NOTE — PROGRESS NOTE ADULT - CRITICAL CARE ATTENDING COMMENT
I have seen and examined this patient with the SICU team.  No acute events overnight, however this morning patient started pouring bile from midline wound and from FRITZ drain.  Patient appears unchanged this morning.  Neuro: S/p  shunt externalization.  No deficits at this time.  Pain is well-controlled.  Card: Sinus tachycardia, no hypotension at this time.  Off Levophed GTT this morning.    Pulm: Saturating well on NC.  GI: NPO for now, concern for anastomotic leak, planned for OR today.  FRITZ drain with excessive bilious output.  Hgb drop and melena, concern for UGIB, will perform EGD in the OR.  : S/p repair of bladder, concern for cystotomy.  Will keep Sharp for now.    Heme: Trend Hgb, transfuse PRN.  S/p 1g drop in Hgb, concern for UGIB.  ID: Will continue Meropenem for now.  Lovenox held for now 2/2 bleeding risk.  Dispo: OR and continued SICU care.

## 2024-01-22 NOTE — PROGRESS NOTE ADULT - ASSESSMENT
28M with a history of spina bifida, here with SBO s/p ex lap now with acute blood loss anemia with suspected anastomotic bleed    Acute Blood Loss Anemia   EGD/Enteroscopy today   Procedure risks and benefits discussed with patient and family at bedside   Remainder of care per SICU

## 2024-01-22 NOTE — PROGRESS NOTE ADULT - ASSESSMENT
27yo M w/ hx of spina bifida, left VPS @ [facility] by Dr Dorsey and scoliosis/ no sensation distal to pubic line, wheelchair bound at baseline w no movement of his legs, self catheterizes bladder, presenting with abdominal pain, n/v. Consulted for evaluation of left VPS (nonprogrammable valve).  -Afebrile overnight    Plan:  -Continue q4h neuro checks   -No plan to tap shunt at this time as pt neuro exam is stable & has been afebrile overnight  -Continue SCDs & Lovenox for DVT PPX  -Pain control PRN  -Blood cx results pending   -Further medical management / supportive care per primary team  -Seen & examined with Dr. Harris

## 2024-01-22 NOTE — BRIEF OPERATIVE NOTE - NSICDXBRIEFPROCEDURE_GEN_ALL_CORE_FT
PROCEDURES:  Exploratory laparotomy 14-Jan-2024 20:33:06  Jonatan Roach  Bladder repair 14-Jan-2024 20:34:23  Jonatan Roach  Placement, suprapubic tube 22-Jan-2024 17:27:40  Angie Crowder  Complex partial cystectomy 22-Jan-2024 17:27:53  Angie Crowder  
PROCEDURES:  Exploratory laparotomy 14-Jan-2024 20:33:06  Jonatan Roach  Small bowel resection 14-Jan-2024 20:33:11  Jonatan Roach  Partial left colectomy 14-Jan-2024 20:33:54  Jonatan Roach  Bladder repair 14-Jan-2024 20:34:23  Jonatan Roach  
PROCEDURES:  Exploratory laparotomy 14-Jan-2024 20:33:06  Jonatan Roach  Small bowel resection 14-Jan-2024 20:33:11  Jonatan Roach  Akash colectomy 22-Jan-2024 18:42:20  Temitope Feliz  Open access colonoscopy 22-Jan-2024 18:45:55  Temitope Feliz

## 2024-01-22 NOTE — PROGRESS NOTE ADULT - SUBJECTIVE AND OBJECTIVE BOX
24 HOUR EVENTS:    CT scan from 1/20 with bladder leak from bladder repair and cline within prostatic urethra. Urology repositioned cline twice. Cline was upsized by Urology attending from 10F cline to 18F cline. Neurosurgery performed externalization of left  shunt at chest. Patient seen by GI due to melena, GI available for endoscopy if needed. Continuing melena output with 1.0 drop in Hg. Plan for GI to scope today. Continuing to wean from pressors. Resolving metabolic acidosis.       NEURO  Meds: acetaminophen   IVPB .. 1000 milliGRAM(s) IV Intermittent every 6 hours PRN Temp greater or equal to 38C (100.4F)  metoclopramide  IVPB 10 milliGRAM(s) IV Intermittent every 6 hours    RESPIRATORY  RR: 19 (01-22-24 @ 05:00) (0 - 27)  SpO2: 100% (01-22-24 @ 05:00) (86% - 100%)  Wt(kg): --  Exam: unlabored, clear to auscultation bilaterally  ABG - ( 21 Jan 2024 04:50 )  pH: 7.400 /  pCO2: 29    /  pO2: 102   / HCO3: 18    / Base Excess: -6.8  /  SaO2: 99.2    Lactate: x          CARDIOVASCULAR  HR: 82 (01-22-24 @ 05:00) (71 - 146)  BP: 100/76 (01-21-24 @ 14:30) (89/56 - 112/69)  BP(mean): 78 (01-21-24 @ 12:00) (78 - 81)  ABP: 92/56 (01-22-24 @ 05:00) (82/50 - 123/81)  ABP(mean): 72 (01-22-24 @ 05:00) (63 - 98)  Wt(kg): --  CVP(cm H2O): --    Meds: norepinephrine Infusion 0.01 MICROgram(s)/kG/Min IV Continuous <Continuous>      GI/NUTRITION  Exam:  Diet: NPO  Meds: pantoprazole Infusion 8 mG/Hr IV Continuous <Continuous>  psyllium Powder 1 Packet(s) Oral two times a day    GENITOURINARY  I&O's Detail    01-20 @ 07:01  -  01-21 @ 07:00  --------------------------------------------------------  IN:    Albumin 5%  - 500 mL: 750 mL    dextrose 5% + lactated ringers: 504 mL    IV PiggyBack: 581 mL    IV PiggyBack: 100 mL    IV PiggyBack: 100 mL    IV PiggyBack: 249.9 mL    IV PiggyBack: 150 mL    multiple electrolytes Injection Type 1.: 900 mL    Norepinephrine: 159.2 mL    Pantoprazole: 60 mL    Plasma: 918 mL    Platelets - Single Donor: 222 mL    PRBCs (Packed Red Blood Cells): 1335 mL  Total IN: 6029.1 mL    OUT:    Bulb (mL): 2470 mL    Indwelling Catheter - Urethral (mL): 145 mL  Total OUT: 2615 mL    Total NET: 3414.1 mL      01-21 @ 07:01 - 01-22 @ 05:31  --------------------------------------------------------  IN:    Albumin 5%  - 500 mL: 1000 mL    dextrose 5% + lactated ringers: 750 mL    IV PiggyBack: 415 mL    IV PiggyBack: 50 mL    multiple electrolytes Injection Type 1 Bolus: 1000 mL    multiple electrolytes Injection Type 1.: 900 mL    Norepinephrine: 124 mL    Pantoprazole: 220 mL  Total IN: 4459 mL    OUT:    Bulb (mL): 2460 mL    External Ventricular Device (mL): 125 mL    Indwelling Catheter - Urethral (mL): 1755 mL  Total OUT: 4340 mL    Total NET: 119 mL      01-22    146<H>  |  115<H>  |  12.2  ----------------------------<  118<H>  4.0   |  19.0<L>  |  0.77    Ca    7.9<L>      22 Jan 2024 03:20  Phos  2.1     01-22  Mg     2.0     01-22    TPro  5.0<L>  /  Alb  2.8<L>  /  TBili  0.8  /  DBili  0.3  /  AST  13  /  ALT  10  /  AlkPhos  49  01-20    Meds: dextrose 5% + lactated ringers. 1000 milliLiter(s) IV Continuous <Continuous>  magnesium sulfate  IVPB 2 Gram(s) IV Intermittent daily  sodium phosphate 30 milliMole(s)/500 mL IVPB 30 milliMole(s) IV Intermittent once      HEMATOLOGIC  Meds:   [x] VTE Prophylaxis                        7.9    7.24  )-----------( 272      ( 22 Jan 2024 03:20 )             23.4     PT/INR - ( 22 Jan 2024 03:20 )   PT: 15.8 sec;   INR: 1.44 ratio         PTT - ( 22 Jan 2024 03:20 )  PTT:31.1 sec  Transfusion     [ ] PRBC   [ ] Platelets   [ ] FFP   [ ] Cryoprecipitate      INFECTIOUS DISEASES  T(C): 37.1 (01-22-24 @ 04:45), Max: 37.8 (01-21-24 @ 23:45)  Wt(kg): --  WBC Count: 7.24 K/uL (01-22 @ 03:20)  WBC Count: 9.97 K/uL (01-21 @ 15:06)  WBC Count: 7.29 K/uL (01-21 @ 08:14)    Recent Cultures:  Specimen Source: .CSF CSF, 01-21 @ 15:07; Results --; Gram Stain:   No polymorphonuclear cells seen  No organisms seen  by cytocentrifuge; Organism: --  Specimen Source: .Blood Blood, 01-19 @ 13:40; Results   No growth at 48 Hours; Gram Stain: --; Organism: --  Specimen Source: .Blood Blood, 01-19 @ 13:39; Results   No growth at 48 Hours; Gram Stain: --; Organism: --    Meds: meropenem Injectable 1000 milliGRAM(s) IV Push every 8 hours    ENDOCRINE  Capillary Blood Glucose      OTHER MEDICATIONS:  benzocaine 20% Spray 1 Spray(s) Topical every 6 hours PRN  benzocaine/menthol Lozenge 1 Lozenge Oral every 8 hours PRN  chlorhexidine 2% Cloths 1 Application(s) Topical daily    Physical Exam:     Neurological:  A and O x 3     HEENT: PERRL, EOMI      Respiratory: Unlabored, no accessory muscle use     Cardiovascular: Sinus tachycardia      Gastrointestinal: Softly distended, incision site clean and dry, FRITZ in place, midline incision clean and dry - no erythema. Melanotic stools.     Extremities: +1 peripheral edema, legs contracted and shortened in frog leg position, b/l lower extremities baseline plegia.  B/l upper extremities moving spontaneously without limitations.      Skin: No rashes       IMAGING:    < from: CT Pelvis No Cont (01.21.24 @ 11:51) >  ACC: [id number] EXAM:  CT PELVIS ONLY   ORDERED BY: SHAHANA CASTAÑEDA     PROCEDURE DATE:  01/21/2024        IMPRESSION:  *  Intraperitoneal bladder perforation. Moderate to large amount of   extraluminal contrast surrounding small bowel. Moderate amount of   pneumoperitoneum. Extraluminal contrast and gas is also seen tracking   through the midline ventral abdominal wall incision.

## 2024-01-22 NOTE — BRIEF OPERATIVE NOTE - OPERATION/FINDINGS
Exploratory laparotomy. Injury to sigmoid colon requiring resection of 5cm and side to side colocolonic anastomosis with GA 80mm blue staple. During lysis of adhesions the bladder was injured and it was repaired in 2 layers, inner chromic and outer Vicryl. Small bowel was significantly dilated and ran from LT to TI. Approximately 30cm proximal from TI, a previous are of small bowel resection was identified with significant small bowel dilatation proximal to it, decision was made to resect previous anastomosis with 40cm if dilated atomic small bowel. Resection was performed with a GA 80mm blue stapler on a side to side fashion.
bilious fluid upon entering abdomen under prior incision and intraabdominal. Found to have sigmoid anastomosis leak, 3 holes found in bladder including prior repair.  Superior and posterior portion of bladder wall found to be necrotic, partial cystectomy performed with SPT placement, 24Fr catheter.  2 layer closure performed of bladder wall. Urethral cline catheter kept in place.
Ex lap reopened. Small bowel and bladder densely adherent with extensive fibrinous exudates. Blunt dissection carried out to free small bowel. Multiple holes in anterior bladder with grossly necrotic tissue. Sigmoid anastomosis break down discovered with gross spillage. Small bowel anastomosis intact. Sigmoid anastomosis resected, end colostomy brought up. Rectal stump oversewn. 19Fr Ramsey placed at bladder repair and in L colic gutter.    Urology assisted intraop with management of bladder injury. Refer to dedicated brief op note.

## 2024-01-22 NOTE — PROGRESS NOTE ADULT - SUBJECTIVE AND OBJECTIVE BOX
HPI: 27yo M w/ hx of spina bfida and scoliosis, wheelchair bound at baseline, left VPS presenting with abdominal pain, n/v. Pt states that the pain began yesterday morning and has been persistent. He had a bowel movement this morning but prior to that his last bowel movement was one week prior. Denies passing gas. Denies fevers at home. Has been vomiting and nauseous. At home he was lethargic and EMS was called. At that time he was hypotensive and tachycardic and he was BIBEMS to [facility] ED for further workup. On arrival he was tachycardic to the 130s and hypotensive to 80s/50s. Labs notable for K 3.4, bicarb 13, Cr 2.04. Actively vomiting in ED. CT scan showed dilated bowel and stomach consistent with severe SBO with transition point in mid abdomen. Surgery consulted for management.     INTERVAL HPI/OVERNIGHT EVENTS:  28M seen lying comfortably in bed. Pt has no acute complaints this AM. No fevers documented overnight.     Vital Signs Last 24 Hrs  Vital Signs Last 24 Hrs  T(C): 36.8 (22 Jan 2024 07:15), Max: 37.8 (21 Jan 2024 23:45)  T(F): 98.3 (22 Jan 2024 07:15), Max: 100 (21 Jan 2024 23:45)  HR: 134 (22 Jan 2024 10:00) (71 - 145)  BP: 100/76 (21 Jan 2024 14:30) (89/56 - 102/70)  BP(mean): --  RR: 21 (22 Jan 2024 10:00) (0 - 27)  SpO2: 100% (22 Jan 2024 10:00) (93% - 100%)    Parameters below as of 22 Jan 2024 04:00  Patient On (Oxygen Delivery Method): room air          PHYSICAL EXAM:  GENERAL: NAD, well-groomed  HEAD:  Atraumatic, normocephalic  MENTAL STATUS: AAO x3; Opens eyes spontaneously; Appropriately conversant without aphasia; following simple commands  CRANIAL NERVES: PERRL. EOMI without nystagmus. Face symmetric w/ normal eye closure and smile, tongue midline.   MOTOR: B/L UE 5/5; B/L LE contracted & immobile         LABS:                      LABS:  cret                        7.9    7.24  )-----------( 272      ( 22 Jan 2024 03:20 )             23.4     01-22    146<H>  |  115<H>  |  12.2  ----------------------------<  118<H>  4.0   |  19.0<L>  |  0.77    Ca    7.9<L>      22 Jan 2024 03:20  Phos  2.1     01-22  Mg     2.0     01-22    TPro  5.0<L>  /  Alb  2.8<L>  /  TBili  0.8  /  DBili  0.3  /  AST  13  /  ALT  10  /  AlkPhos  49  01-20    PT/INR - ( 22 Jan 2024 03:20 )   PT: 15.8 sec;   INR: 1.44 ratio         PTT - ( 22 Jan 2024 03:20 )  PTT:31.1 sec          RADIOLOGY & ADDITIONAL TESTS:  CT Head No Cont 01.16.24:  IMPRESSION: Stable follow-up CT exam when compared with 1/14/2024.    --- End of Report ---    PRATIBHA LUTHER MD; Attending Radiologist  This document has been electronically signed. Jan 17 2024  7:57AM    < end of copied text >    CT Head No Cont 1.14.24:  IMPRESSION:  1.  No acute intracranial abnormality. No comparison examinations were   available at the time of interpretation. There is slitlike ventricular   caliber of indeterminate clinical significance. Clinical correlation is   advised. An addendum to this report can be made to assess for stability   of ventricular caliber once a comparison examination is made available.  2.  The 2.0 x 1.0 x 1.0 cm calcific mass in the left parietal lobe has a   broad differential. Comparison with prior examinations would be useful to   assess for stability. If these are not available, further evaluation with   MR brain without and with contrast would be recommended.    This report was sent via Teams to Linnea Harrell  at1/14/2024 3:34 PM.    --- End of Report ---            JONATAN WILSON MD; Attending Radiologist  This document has been electronically signed. Jan 14 2024  3:37PM    < end of copied text >

## 2024-01-22 NOTE — PROGRESS NOTE ADULT - ASSESSMENT
ASSESSMENT:  27yo male with PMHx of spina bifida (wheelchair bound at baseline), scoliosis,  Shunt BIBEMS 1/14 with abdominal pain, n/v, and lethargy, found to have severe SBO, metabolic acidosis, and to be in septic and hypovolemic shock. Patient was brought to the OR 1/14 for ex lap and small bowel resection, c/b bladder perf with primary repair and partial colectomy and admitted to SICU post-op for further management. Now with mixed septic/hypovolemic shock, pneumoperitoneum, suspected UGIB?     Plan:     Neuro:    #Pain control, antipyretic   - Multimodal pain regimen   - Delirium precautions   - Optimize sleep/wake cycle    - Externalization of left  shunt 1/21   - Continue q4 neuro checks      CV:    #Septic shock vs SIRs vs Hypovolemia   - Sinus tachycardia - improving with volume resuscitation   - Low dose levo - up to .03 this AM, continue to wean   - Maintain MAP > 65   - Worsening HAGMA w/o lactate production. Acetone WNL.   - Continue hemodynamic monitoring       Pulm:    #COVID - resolved   - Maintain SPO2 >92%   - Pulmonary toilet, IS, OOBTC       GI/Nutrition:    SBO s/p SBR, partial colectomy, now with concern for anastomotic leak vs perf, melana UGIB?   - NPO   - s/p CT A/P 1/21 - Intraperitoneal bladder perforation w/ mod/large extraluminal contrast, moderate amount of pneumoperitoneum    - Monitor FRITZ output   - Continue to monitor and replete lost fluid via melena   - PPI gtt and reglan as per GI, reconsulted by GI 1/21 iso melena, available for endoscopy if needed   - Plan for endoscopy 1/22  - Monitor bowel movements       /Renal:    #Hx of urethral stricture now with bladder injury s/p primary repair   - 1/21 - Oliguria - cline in prostate - urology repositioned x 2. Cline upsized to 18F from 10F cline by urology.   - Monitor kidney fxn   - Monitor FRITZ output - replete loses   - Monitor UOP   - Replete electrolytes PRN (Mg >2, Phos >3, K >4)   - Cline for strict I&O - continue to irrigate and bladder scan as per urology      ID:   #Sepsis   - Source unknown - COVID +, + UCx (<50k colonies), RVP NEG. Possible intraabdominal infection? Possible VPS infection?    - Meropenum for ppx   - Monitor fever curve - APAP and NSAIDs for fever   - Monitor for leukocytosis   - Blood cx 1/19 NGTD   - CSF cx 1/21 no growth        Endo:   - Maintain euglycemia   - Monitor blood glucose       Heme/DVT Prophylaxis:   - SCDs   - Monitor H/H, Monitor Coags   - S/P 5 PRBC/4FFP/1 PLT total iso melena   - Maintain Hgb >7.0   - Lovenox held in setting of GIB     Skin:   - Repositioning for DTI prevention while in bed     Lines:   - Peripheral IV's   - RIJ Cordis  - Left axillary artery a-line     Dispo:   - Care per SICU     CODE STATUS: FULL

## 2024-01-22 NOTE — BRIEF OPERATIVE NOTE - NSICDXBRIEFPREOP_GEN_ALL_CORE_FT
PRE-OP DIAGNOSIS:  Bladder injury 22-Jan-2024 17:29:18  Angie Crowder  Large bowel anastomotic leak 22-Jan-2024 18:44:07  Temitope Feliz  
PRE-OP DIAGNOSIS:  Small bowel obstruction 14-Jan-2024 20:35:08  Jonatan Roach  
PRE-OP DIAGNOSIS:  Bladder injury 22-Jan-2024 17:29:18  Angie Crowder  Perforated bowel 22-Jan-2024 17:30:02  Angie Crowder

## 2024-01-22 NOTE — PROGRESS NOTE ADULT - ASSESSMENT
27 yo malePOD#8 s/p exlap, SBR, partial L colectomy, extensive MUKUL, closure of cystotomy, placement of cline catheter for SBO.  Recent CT showed leak from bladder, urethra was dilated and cline upsized over a wire to 18Fr cline catheter.  Neurosx externalized  shunt, melana  - cont cline for bladder leak  - monitor UO  - monitor isma output  - Urology available if needed if pt taken to OR by general surgery

## 2024-01-22 NOTE — PROGRESS NOTE ADULT - SUBJECTIVE AND OBJECTIVE BOX
Subjective:28yMale POD#8 s/p exlap, SBR, partial L colectomy, extensive MUKUL, closure of cystotomy, placement of cline catheter for SBO.  Recent CT showed leak from bladder, urethra was dilated and cline upsized over a wire to 18Fr cline catheter.  Neurosx externalized  shunt. Cline draining well.  Pt has had bilious output form FRITZ drain as well, pt was on pressors, now off.  Pt has few c/o abdominal pain.    Cline: yellow, slight greenish tinge    Vital Signs Last 24 Hrs  T(C): 36.8 (22 Jan 2024 07:15), Max: 37.8 (21 Jan 2024 23:45)  T(F): 98.3 (22 Jan 2024 07:15), Max: 100 (21 Jan 2024 23:45)  HR: 121 (22 Jan 2024 08:00) (71 - 146)  BP: 100/76 (21 Jan 2024 14:30) (89/56 - 109/69)  BP(mean): 78 (21 Jan 2024 12:00) (78 - 78)  RR: 16 (22 Jan 2024 08:00) (0 - 27)  SpO2: 100% (22 Jan 2024 08:00) (86% - 100%)    Parameters below as of 22 Jan 2024 04:00  Patient On (Oxygen Delivery Method): room air      I&O's Detail    21 Jan 2024 07:01  -  22 Jan 2024 07:00  --------------------------------------------------------  IN:    Albumin 5%  - 500 mL: 1000 mL    dextrose 5% + lactated ringers: 900 mL    IV PiggyBack: 415 mL    IV PiggyBack: 100 mL    IV PiggyBack: 166.6 mL    multiple electrolytes Injection Type 1 Bolus: 1000 mL    multiple electrolytes Injection Type 1.: 900 mL    Norepinephrine: 127.9 mL    Pantoprazole: 240 mL  Total IN: 4849.5 mL    OUT:    Bulb (mL): 2470 mL    External Ventricular Device (mL): 157 mL    Indwelling Catheter - Urethral (mL): 1915 mL  Total OUT: 4542 mL    Total NET: 307.5 mL          Labs:                        7.9    7.24  )-----------( 272      ( 22 Jan 2024 03:20 )             23.4     01-22    146<H>  |  115<H>  |  12.2  ----------------------------<  118<H>  4.0   |  19.0<L>  |  0.77    Ca    7.9<L>      22 Jan 2024 03:20  Phos  2.1     01-22  Mg     2.0     01-22    TPro  5.0<L>  /  Alb  2.8<L>  /  TBili  0.8  /  DBili  0.3  /  AST  13  /  ALT  10  /  AlkPhos  49  01-20    PT/INR - ( 22 Jan 2024 03:20 )   PT: 15.8 sec;   INR: 1.44 ratio         PTT - ( 22 Jan 2024 03:20 )  PTT:31.1 sec      Culture - CSF with Gram Stain (collected 21 Jan 2024 15:07)  Source: .CSF CSF  Gram Stain (21 Jan 2024 18:37):    No polymorphonuclear cells seen    No organisms seen    by cytocentrifuge    Culture - Blood (collected 19 Jan 2024 13:40)  Source: .Blood Blood  Preliminary Report (21 Jan 2024 20:01):    No growth at 48 Hours    Culture - Blood (collected 19 Jan 2024 13:39)  Source: .Blood Blood  Preliminary Report (21 Jan 2024 20:01):    No growth at 48 Hours

## 2024-01-22 NOTE — PROGRESS NOTE ADULT - SUBJECTIVE AND OBJECTIVE BOX
Chief Complaint:  Patient is a 28y old  Male who presents with a chief complaint of small bowel obstruction (22 Jan 2024 08:20)      HPI/ 24 hr events: Patient seen and examined at bedside. Anemia, abdominal pain increased drain output  Discussed with surgery , plan for OR today will proceed with EGD intraoperative      MEDICATIONS:   MEDICATIONS  (STANDING):    MEDICATIONS  (PRN):  acetaminophen   IVPB .. 1000 milliGRAM(s) IV Intermittent every 6 hours PRN Temp greater or equal to 38C (100.4F)      ALLERGIES:   Allergies    No Known Allergies    Intolerances        VITAL SIGNS:   Vital Signs Last 24 Hrs  T(C): 36.8 (22 Jan 2024 07:15), Max: 37.8 (21 Jan 2024 23:45)  T(F): 98.3 (22 Jan 2024 07:15), Max: 100 (21 Jan 2024 23:45)  HR: 134 (22 Jan 2024 10:00) (71 - 146)  BP: 100/76 (21 Jan 2024 14:30) (89/56 - 102/70)  BP(mean): --  RR: 21 (22 Jan 2024 10:00) (0 - 27)  SpO2: 100% (22 Jan 2024 10:00) (86% - 100%)    Parameters below as of 22 Jan 2024 04:00  Patient On (Oxygen Delivery Method): room air      I&O's Summary    21 Jan 2024 07:01  -  22 Jan 2024 07:00  --------------------------------------------------------  IN: 4849.5 mL / OUT: 4542 mL / NET: 307.5 mL    22 Jan 2024 07:01  -  22 Jan 2024 12:03  --------------------------------------------------------  IN: 672 mL / OUT: 135 mL / NET: 537 mL        PHYSICAL EXAM:   GENERAL:  No acute distress  HEENT:  NC/AT, conjunctiva clear, sclera anicteric  CHEST:  No increased effort, breath sounds clear  HEART:  tachycardic  ABDOMEN:  Soft, tender, distended, normoactive bowel sounds, no rebound or guarding  EXTREMITIES: No edema  SKIN:  Warm, dry  NEURO:  Calm, cooperative    LABS:                        7.9    7.24  )-----------( 272      ( 22 Jan 2024 03:20 )             23.4     01-22    146<H>  |  115<H>  |  12.2  ----------------------------<  118<H>  4.0   |  19.0<L>  |  0.77    Ca    7.9<L>      22 Jan 2024 03:20  Phos  2.1     01-22  Mg     2.0     01-22    TPro  5.0<L>  /  Alb  2.8<L>  /  TBili  0.8  /  DBili  0.3  /  AST  13  /  ALT  10  /  AlkPhos  49  01-20    LIVER FUNCTIONS - ( 20 Jan 2024 20:15 )  Alb: 2.8 g/dL / Pro: 5.0 g/dL / ALK PHOS: 49 U/L / ALT: 10 U/L / AST: 13 U/L / GGT: x           PT/INR - ( 22 Jan 2024 03:20 )   PT: 15.8 sec;   INR: 1.44 ratio         PTT - ( 22 Jan 2024 03:20 )  PTT:31.1 sec  Urinalysis Basic - ( 22 Jan 2024 03:20 )    Color: x / Appearance: x / SG: x / pH: x  Gluc: 118 mg/dL / Ketone: x  / Bili: x / Urobili: x   Blood: x / Protein: x / Nitrite: x   Leuk Esterase: x / RBC: x / WBC x   Sq Epi: x / Non Sq Epi: x / Bacteria: x        Culture - Acid Fast - CSF (collected 21 Jan 2024 15:08)  Source: .CSF CSF    Culture - CSF with Gram Stain (collected 21 Jan 2024 15:07)  Source: .CSF CSF  Gram Stain (21 Jan 2024 18:37):    No polymorphonuclear cells seen    No organisms seen    by cytocentrifuge    Culture - Blood (collected 19 Jan 2024 13:40)  Source: .Blood Blood  Preliminary Report (21 Jan 2024 20:01):    No growth at 48 Hours    Culture - Blood (collected 19 Jan 2024 13:39)  Source: .Blood Blood  Preliminary Report (21 Jan 2024 20:01):    No growth at 48 Hours                              Culture - Acid Fast - CSF (collected 01-21-24 @ 15:08)  Source: .CSF CSF    Culture - CSF with Gram Stain (collected 01-21-24 @ 15:07)  Source: .CSF CSF  Gram Stain (01-21-24 @ 18:37):    No polymorphonuclear cells seen    No organisms seen    by cytocentrifuge          RADIOLOGY & ADDITIONAL STUDIES:      ACC: [id number] EXAM:  CT ABDOMEN AND PELVIS WAW IC   ORDERED BY: JON RANGEL     PROCEDURE DATE:  01/21/2024          INTERPRETATION:  CLINICAL INFORMATION: Status post exploratory laparotomy   1/14/2024 with small bowel resection, bladder perforation with primary   repair, partial colectomy    COMPARISON: 1/20/2024    CONTRAST/COMPLICATIONS:  IV Contrast: Omnipaque 350  96 cc administered   4 cc discarded  Oral Contrast: NONE  Complications: None reported at time of study completion    PROCEDURE:  CT of the Abdomen and Pelvis was performed.  Precontrast, Arterial and Delayed phases were acquired.  Sagittal and coronal reformats were performed.    FINDINGS:  LOWER CHEST: Within normal limits.    LIVER: Within normal limits.  BILE DUCTS: Normal caliber.  GALLBLADDER: Cholelithiasis.  SPLEEN: Within normal limits.  PANCREAS: Within normal limits.  ADRENALS: Within normal limits.  KIDNEYS/URETERS: Residual contrast in collecting systems and ureters,   questionable bilateral hydronephrosis. Bilateral cortical scarring.    BLADDER: Asymmetrical bladder wall thickening on the right, again seen is   a tract extending from the right bladder to the right lower quadrant   abdominal wall  REPRODUCTIVE ORGANS: Sharp catheter balloon again noted to be inflated in   the prostate    BOWEL: No bowel obstruction. Nonspecific thickening of small bowel loops.   Postsurgical changes.  PERITONEUM: Pneumoperitoneum consistent with recent postoperative changes   but it is increased from prior study.  differential diagnosis includes   perforated viscus, anastomotic leak or iatrogenic from surgical drains.    Small amount of complex ascites which could represent peritonitis.  VESSELS: Within normal limits.  RETROPERITONEUM/LYMPH NODES: No lymphadenopathy.  ABDOMINAL WALL: Postsurgical changes.  BONES: Dysplastic changes of the hips and posterior kirk and screw   fixation of the thoracic and lumbar spine    IMPRESSION:    Questionable bilateral hydronephrosis    Asymmetrical bladder wall thickening of the right, tract extending from   the right bladder to the right lower quadrant abdominal wall    Sharp catheter balloon again noted to be inflated in the prostate    Increased pneumoperitoneum which may be due to perforated viscus,   anastomotic leak or from surgical drains.    Small amount of complex ascites which could represent peritonitis.    Preliminary findings were discussed with clinician Rodney by Dr. Rizwana Lynch at 2:45 am on 1/21/24 with read back.    --- End of Report---            SWAPNIL BRYANT MD; Attending Radiologist  This document has been electronically signed. Jan 21 2024 12:48PM  01-21-24 @ 01:50    ACC: [id number] EXAM:  CT ABDOMEN AND PELVIS OC IC   ORDERED BY: JON RANGEL     PROCEDURE DATE:  01/20/2024          INTERPRETATION:  CLINICAL INFORMATION: Fever Evaluate for anastomotic   leak, abscess. Status post exploratory laparotomy 1/14/2024 with small   bowel resection, bladder perforation with primary repair, partial   colectomy.    COMPARISON: CT 1/14/2024    CONTRAST/COMPLICATIONS:  IV Contrast: Omnipaque 350  90 cc administered   10 cc discarded  Oral Contrast: NONE  Complications: None reported at time of study completion    PROCEDURE:  CT of the Abdomen and Pelvis was performed.  Sagittal and coronal reformats were performed.    FINDINGS:  LOWER CHEST: Trace left pleural effusion with mild basilar atelectasis.   Elevated left hemidiaphragm.    LIVER: Within normal limits.  BILE DUCTS: Normal caliber.  GALLBLADDER: Cholelithiasis.  SPLEEN: Within normal limits.  PANCREAS: Within normal limits.  ADRENALS: Within normal limits.  KIDNEYS/URETERS: Bilateral renal cortical scarring with numerous   nonobstructing calculi. Mild right hydronephrosis with ureter and   urothelial thickening of the renal pelvis, question infection. The ureter   demonstrates segmental dilatation to the bladder.    BLADDER: Mildly distended with diffuse bladder wall thickening. A tract   is noted from the region of the bladder to the skin in the right lower   quadrant, question ostomy.  REPRODUCTIVE ORGANS: The Sharp catheter balloon is inflated in the   prostate.    BOWEL: Drainage catheter terminates in the left hemiabdomen. Rectal tube.   Oral contrast noted in the rectum, questionably from a prior study.   Several small and large bowel anastomoses. Diffuse small bowel thickening   in the left hemiabdomen. No bowel obstruction.  PERITONEUM:  shunt terminates in the left upper quadrant. Small amount   of anterior free air, from the upper to the lower abdomen. Small volume   ascites with peritoneal thickening in the left upper quadrant. Somewhat   focal right lower quadrant collection containing gas and air adjacent to   the bladder measures 4.9 x 3.9 cm (3, 91) adjacent to more free fluid.  VESSELS: Progressively decreased caliber of the abdominal aorta and   bilateral iliac arterial system.  RETROPERITONEUM/LYMPH NODES: No lymphadenopathy.  ABDOMINAL WALL: Within normal limits.  BONES: Spina bifida. Scoliosis with orthopedic hardware in the spine and   pelvis. Chronic hip deformities.    IMPRESSION:  Status post bowel surgery with small amount of ascites, and free air,   slightly greater than expected for 6 days after surgery, raising   possibility of anastomotic leak. Somewhat localized right lower quadrant   collection of gas and fluid noted, adjacent to the urinary bladder.   Otherwise, no discrete abscess.    Intraperitoneal enhancement and small bowel thickening the left   hemiabdomen, possibly related to peritoneal inflammation/peritonitis.    Bilateral renal cortical scarring with mild right hydronephrosis and   nonspecific urothelial thickening. Correlate for urinary tract infection.    The Sharp catheter is inflated within the prostatic urethra, with a   distended bladder. Repositioning should be considered.    I discussed the findings with HUAN Paul on 1/20/2024 at 10:20 AM.    --- End of Report ---            LUIS ENRIQUE ALLAN MD; Attending Radiologist  This document has been electronically signed. Jan 20 2024 10:24AM  01-20-24 @ 09:59  CT reviewed pneumoperitoneum? peritonitis

## 2024-01-23 LAB
ANION GAP SERPL CALC-SCNC: 12 MMOL/L — SIGNIFICANT CHANGE UP (ref 5–17)
APTT BLD: 30.9 SEC — SIGNIFICANT CHANGE UP (ref 24.5–35.6)
BASOPHILS # BLD AUTO: 0.03 K/UL — SIGNIFICANT CHANGE UP (ref 0–0.2)
BASOPHILS NFR BLD AUTO: 0.2 % — SIGNIFICANT CHANGE UP (ref 0–2)
BUN SERPL-MCNC: 9.7 MG/DL — SIGNIFICANT CHANGE UP (ref 8–20)
CALCIUM SERPL-MCNC: 7.1 MG/DL — LOW (ref 8.4–10.5)
CHLORIDE SERPL-SCNC: 113 MMOL/L — HIGH (ref 96–108)
CO2 SERPL-SCNC: 19 MMOL/L — LOW (ref 22–29)
CREAT SERPL-MCNC: 0.66 MG/DL — SIGNIFICANT CHANGE UP (ref 0.5–1.3)
EGFR: 129 ML/MIN/1.73M2 — SIGNIFICANT CHANGE UP
EOSINOPHIL # BLD AUTO: 0.01 K/UL — SIGNIFICANT CHANGE UP (ref 0–0.5)
EOSINOPHIL NFR BLD AUTO: 0.1 % — SIGNIFICANT CHANGE UP (ref 0–6)
GLUCOSE BLDC GLUCOMTR-MCNC: 108 MG/DL — HIGH (ref 70–99)
GLUCOSE BLDC GLUCOMTR-MCNC: 109 MG/DL — HIGH (ref 70–99)
GLUCOSE BLDC GLUCOMTR-MCNC: 125 MG/DL — HIGH (ref 70–99)
GLUCOSE BLDC GLUCOMTR-MCNC: 143 MG/DL — HIGH (ref 70–99)
GLUCOSE SERPL-MCNC: 152 MG/DL — HIGH (ref 70–99)
HCT VFR BLD CALC: 34.6 % — LOW (ref 39–50)
HGB BLD-MCNC: 11.9 G/DL — LOW (ref 13–17)
IMM GRANULOCYTES NFR BLD AUTO: 0.8 % — SIGNIFICANT CHANGE UP (ref 0–0.9)
INR BLD: 1.1 RATIO — SIGNIFICANT CHANGE UP (ref 0.85–1.18)
LYMPHOCYTES # BLD AUTO: 1.21 K/UL — SIGNIFICANT CHANGE UP (ref 1–3.3)
LYMPHOCYTES # BLD AUTO: 9.7 % — LOW (ref 13–44)
MAGNESIUM SERPL-MCNC: 2.6 MG/DL — SIGNIFICANT CHANGE UP (ref 1.6–2.6)
MCHC RBC-ENTMCNC: 28.2 PG — SIGNIFICANT CHANGE UP (ref 27–34)
MCHC RBC-ENTMCNC: 34.4 GM/DL — SIGNIFICANT CHANGE UP (ref 32–36)
MCV RBC AUTO: 82 FL — SIGNIFICANT CHANGE UP (ref 80–100)
MONOCYTES # BLD AUTO: 0.54 K/UL — SIGNIFICANT CHANGE UP (ref 0–0.9)
MONOCYTES NFR BLD AUTO: 4.3 % — SIGNIFICANT CHANGE UP (ref 2–14)
NEUTROPHILS # BLD AUTO: 10.64 K/UL — HIGH (ref 1.8–7.4)
NEUTROPHILS NFR BLD AUTO: 84.9 % — HIGH (ref 43–77)
PHOSPHATE SERPL-MCNC: 2.3 MG/DL — LOW (ref 2.4–4.7)
PLATELET # BLD AUTO: 396 K/UL — SIGNIFICANT CHANGE UP (ref 150–400)
POTASSIUM SERPL-MCNC: 4.1 MMOL/L — SIGNIFICANT CHANGE UP (ref 3.5–5.3)
POTASSIUM SERPL-SCNC: 4.1 MMOL/L — SIGNIFICANT CHANGE UP (ref 3.5–5.3)
PROTHROM AB SERPL-ACNC: 12.2 SEC — SIGNIFICANT CHANGE UP (ref 9.5–13)
RBC # BLD: 4.22 M/UL — SIGNIFICANT CHANGE UP (ref 4.2–5.8)
RBC # FLD: 16.5 % — HIGH (ref 10.3–14.5)
SODIUM SERPL-SCNC: 144 MMOL/L — SIGNIFICANT CHANGE UP (ref 135–145)
SURGICAL PATHOLOGY STUDY: SIGNIFICANT CHANGE UP
WBC # BLD: 12.53 K/UL — HIGH (ref 3.8–10.5)
WBC # FLD AUTO: 12.53 K/UL — HIGH (ref 3.8–10.5)

## 2024-01-23 PROCEDURE — 99291 CRITICAL CARE FIRST HOUR: CPT

## 2024-01-23 PROCEDURE — 99232 SBSQ HOSP IP/OBS MODERATE 35: CPT

## 2024-01-23 RX ORDER — BENZOCAINE 10 %
1 GEL (GRAM) MUCOUS MEMBRANE EVERY 6 HOURS
Refills: 0 | Status: DISCONTINUED | OUTPATIENT
Start: 2024-01-23 | End: 2024-01-23

## 2024-01-23 RX ORDER — MEROPENEM 1 G/30ML
1000 INJECTION INTRAVENOUS EVERY 8 HOURS
Refills: 0 | Status: COMPLETED | OUTPATIENT
Start: 2024-01-23 | End: 2024-01-27

## 2024-01-23 RX ORDER — BENZOCAINE AND MENTHOL 5; 1 G/100ML; G/100ML
1 LIQUID ORAL EVERY 4 HOURS
Refills: 0 | Status: DISCONTINUED | OUTPATIENT
Start: 2024-01-23 | End: 2024-01-23

## 2024-01-23 RX ORDER — MEROPENEM 1 G/30ML
1000 INJECTION INTRAVENOUS EVERY 8 HOURS
Refills: 0 | Status: DISCONTINUED | OUTPATIENT
Start: 2024-01-23 | End: 2024-01-23

## 2024-01-23 RX ADMIN — SODIUM CHLORIDE 75 MILLILITER(S): 9 INJECTION, SOLUTION INTRAVENOUS at 03:30

## 2024-01-23 RX ADMIN — HEPARIN SODIUM 5000 UNIT(S): 5000 INJECTION INTRAVENOUS; SUBCUTANEOUS at 05:52

## 2024-01-23 RX ADMIN — BENZOCAINE AND MENTHOL 1 LOZENGE: 5; 1 LIQUID ORAL at 13:52

## 2024-01-23 RX ADMIN — HEPARIN SODIUM 5000 UNIT(S): 5000 INJECTION INTRAVENOUS; SUBCUTANEOUS at 22:40

## 2024-01-23 RX ADMIN — Medication 1 SPRAY(S): at 17:42

## 2024-01-23 RX ADMIN — CHLORHEXIDINE GLUCONATE 1 APPLICATION(S): 213 SOLUTION TOPICAL at 11:28

## 2024-01-23 RX ADMIN — HEPARIN SODIUM 5000 UNIT(S): 5000 INJECTION INTRAVENOUS; SUBCUTANEOUS at 13:52

## 2024-01-23 RX ADMIN — MEROPENEM 1000 MILLIGRAM(S): 1 INJECTION INTRAVENOUS at 11:39

## 2024-01-23 RX ADMIN — MEROPENEM 1000 MILLIGRAM(S): 1 INJECTION INTRAVENOUS at 22:40

## 2024-01-23 RX ADMIN — PANTOPRAZOLE SODIUM 40 MILLIGRAM(S): 20 TABLET, DELAYED RELEASE ORAL at 17:42

## 2024-01-23 RX ADMIN — Medication 85 MILLIMOLE(S): at 06:03

## 2024-01-23 RX ADMIN — SODIUM CHLORIDE 75 MILLILITER(S): 9 INJECTION, SOLUTION INTRAVENOUS at 16:46

## 2024-01-23 RX ADMIN — PANTOPRAZOLE SODIUM 40 MILLIGRAM(S): 20 TABLET, DELAYED RELEASE ORAL at 05:52

## 2024-01-23 NOTE — PROGRESS NOTE ADULT - CRITICAL CARE ATTENDING COMMENT
I have seen and examined this patient with the SICU team.  No acute events overnight.  Patient appears well this morning.  Neuro:  shunt externalization, appropriate output.  No deficits at this time.  Pain is well-controlled.  Card: Sinus tachycardia, improved.  No hypotension at this time.  Was on Levophed GTT, now off Levophed GTT this morning.    Pulm: Saturating well on NC.  GI: NPO for now.  S/p Akash's procedure.  NGT in place, no gas/stool from ostomy.  No UGIB, only clean-based peptic ulcer.    : S/p repair of bladder with partial cystectomy, Sharp and SPC in place, UOP 1300cc/24 hours.   Heme: Trend Hgb, transfuse PRN.   ID: Will continue Meropenem for now.  Lovenox held for now 2/2 bleeding risk, will restart.    Dispo: Continued SICU care.

## 2024-01-23 NOTE — CHART NOTE - NSCHARTNOTEFT_GEN_A_CORE
PROCEDURE NOTE:  Central Line removal      Right IJ cordis no longer indicated as patient has been off vasopressors all day, no longer requiring blood transfusions.   Patient placed in Trendelenburg. Catheter removed and tip intact.   Pressure applied for 10 mins, no hematoma or bleeding noted. Patient tolerated procedure well. Clean gauze applied, RN aware. PROCEDURE NOTE:  Central Line removal      Right IJ cordis no longer indicated as patient has been off vasopressors all day, no longer requiring blood transfusions.     Patient placed in Trendelenburg. Patient instructed to inspire and hold breath while catheter was removed. Catheter removed and tip intact.   Pressure applied for 10 mins, no hematoma or bleeding noted. Patient tolerated procedure well. Clean gauze applied, RN aware.

## 2024-01-23 NOTE — PROGRESS NOTE ADULT - ASSESSMENT
ASSESSMENT:  29yo male with PMHx of spina bifida (wheelchair bound at baseline), scoliosis,  Shunt BIBEMS 1/14 with abdominal pain, n/v, and lethargy, found to have severe SBO, metabolic acidosis, and to be in septic and hypovolemic shock. Patient was brought to the OR 1/14 for ex lap and small bowel resection, c/b bladder perf with primary repair and partial colectomy and admitted to SICU post-op for further management. Now with mixed septic/hypovolemic shock, pneumoperitoneum, suspected UGIB. Brought to OR 1/22 for bladder repair, castellano’s and EGD.         Plan:        Neuro:     #Pain control, antipyretic    - Multimodal pain regimen    - Delirium precautions    - Optimize sleep/wake cycle     - Externalization of left  shunt 1/21    - Continue q4 neuro checks       CV:     #Septic shock vs SIRs vs Hypovolemia    - Sinus tachycardia – now improved with volume resuscitation    - Levo 0.01 following OR 1/22   - Maintain MAP > 65    - HAGMA w/o lactate production. Acetone WNL. Now improved.   - Continue hemodynamic monitoring      Pulm:     #COVID - resolved    - Maintain SPO2 >92%    - Pulmonary toilet, IS, OOBTC      GI/Nutrition:     #SBO s/p SBR, partial colectomy, concern for anastomotic leak vs perf, melena UGIB?    - s/p CT A/P 1/21 - Intraperitoneal bladder perforation w/ mod/large extraluminal contrast, moderate amount of pneumoperitoneum     - OR 1/22: bladder repair, Hatman’s and EGD. Multiple holes found within bladder with grossly necrotic tissue and sigmoid anastomosis breakdown.  - EGD 1/22: clean base ulcer and gastritis with no acute intervention   - Monitor FRITZ output and replete loses    - Protonix 40 mg BID    - Monitor bowel movements for melena and replete lost fluid     /Renal:     #Hx of urethral stricture now with bladder injury s/p repair x 2   - 1/21 - Pt with oliguria and cline in prostate. Urology repositioned x 2. Cline upsized to 18F from 10F cline by urology.    - SPT placed 1/22   - Do not flush cline or suprapubic tube, contact urology for drop in output    -D5LR at 75 mL/hr while NPO   - Monitor kidney fxn    - Monitor UOP    - Replete electrolytes PRN (Mg >2, Phos >3, K >4)    - Cline for strict I&O      ID:    #Sepsis    - Source unknown - COVID +, + UCx (<50k colonies), RVP NEG. Possible intraabdominal infection or VPS infection?     - Meropenum for ppx    - Monitor fever curve - APAP and NSAIDs for fever    - Monitor for leukocytosis    - Blood cx 1/19 NGTD    - CSF cx 1/21 no growth       Endo:    - Maintain euglycemia    - Monitor blood glucose      Heme/DVT Prophylaxis:   - SCDs    - Monitor H/H, Monitor Coags    - Recieved 2 units PRBC intra-operatively 1/22   - Maintain Hgb >7.0    - Heparin SQ q8     Skin:    - Repositioning for DTI prevention while in bed      Lines:    - Peripheral IV's    - RIJ Cordis   - Left axillary artery a-line      Dispo:    - Care per SICU      CODE STATUS: FULL

## 2024-01-23 NOTE — PROGRESS NOTE ADULT - SUBJECTIVE AND OBJECTIVE BOX
INTERVAL HPI/OVERNIGHT EVENTS:    24 HOUR:     Patient pouring bile from FRITZ and midline incision this AM as well as continuing melena. Patient to OR today 1/22 for bladder repair, Hatman’s and EGD. Multiple holes found within bladder with grossly necrotic tissue and sigmoid anastomosis breakdown. On gwen during procedure, s/p 2 units PRBC and 2.5 L crystalloid intraoperatively. Started on levo post-operatively for soft pressures. EGD: clean base ulcer and gastritis, no intervention needed at this time.      SUBJECTIVE: No acute complaints.    OBJECTIVE:    VITAL SIGNS:  ICU Vital Signs Last 24 Hrs  T(C): 36.6 (23 Jan 2024 04:45), Max: 37 (22 Jan 2024 21:36)  T(F): 97.8 (23 Jan 2024 04:45), Max: 98.6 (22 Jan 2024 21:36)  HR: 100 (23 Jan 2024 05:15) (76 - 134)  BP: --  BP(mean): --  ABP: 97/60 (23 Jan 2024 05:15) (83/45 - 116/75)  ABP(mean): 77 (23 Jan 2024 05:15) (60 - 94)  RR: 17 (23 Jan 2024 05:15) (6 - 24)  SpO2: 98% (23 Jan 2024 05:15) (95% - 100%)    O2 Parameters below as of 23 Jan 2024 04:00  Patient On (Oxygen Delivery Method): room air        01-21 @ 07:01 - 01-22 @ 07:00  --------------------------------------------------------  IN: 4849.5 mL / OUT: 4542 mL / NET: 307.5 mL    01-22 @ 07:01  -  01-23 @ 05:31  --------------------------------------------------------  IN: 1579.6 mL / OUT: 1295 mL / NET: 284.6 mL      Physical Exam:      Neurological:  A and O x 3      HEENT: PERRL, EOMI, NGT in place     Respiratory: Unlabored, no accessory muscle use      Cardiovascular: Sinus tachycardia       Gastrointestinal: Softly distended, right FRITZ in place with serosanguinous output, FRITZ dressing c/d/i, SPC in place rt below FRITZ, midline incision dressing c/d/i, colostomy site pink with mucous      Extremities: legs contracted and shortened in frog leg position, b/l lower extremities baseline plegia.  B/l upper extremities moving spontaneously without limitations.       Skin: No rashes          MEDICATIONS:  MEDICATIONS  (STANDING):  chlorhexidine 2% Cloths 1 Application(s) Topical daily  dextrose 5% + lactated ringers. 1000 milliLiter(s) (75 mL/Hr) IV Continuous <Continuous>  heparin   Injectable 5000 Unit(s) SubCutaneous every 8 hours  norepinephrine Infusion 0.01 MICROgram(s)/kG/Min (1.31 mL/Hr) IV Continuous <Continuous>  pantoprazole  Injectable 40 milliGRAM(s) IV Push two times a day  sodium phosphate 30 milliMole(s)/500 mL IVPB 30 milliMole(s) IV Intermittent once    MEDICATIONS  (PRN):  acetaminophen   IVPB .. 1000 milliGRAM(s) IV Intermittent every 6 hours PRN Temp greater or equal to 38C (100.4F)      ALLERGIES:  Allergies    No Known Allergies    Intolerances        LABS:                        11.9   12.53 )-----------( 396      ( 23 Jan 2024 03:20 )             34.6     01-23    144  |  113<H>  |  9.7  ----------------------------<  152<H>  4.1   |  19.0<L>  |  0.66    Ca    7.1<L>      23 Jan 2024 03:20  Phos  2.3     01-23  Mg     2.6     01-23      PT/INR - ( 23 Jan 2024 03:20 )   PT: 12.2 sec;   INR: 1.10 ratio         PTT - ( 23 Jan 2024 03:20 )  PTT:30.9 sec  Urinalysis Basic - ( 23 Jan 2024 03:20 )    Color: x / Appearance: x / SG: x / pH: x  Gluc: 152 mg/dL / Ketone: x  / Bili: x / Urobili: x   Blood: x / Protein: x / Nitrite: x   Leuk Esterase: x / RBC: x / WBC x   Sq Epi: x / Non Sq Epi: x / Bacteria: x      CAPILLARY BLOOD GLUCOSE      POCT Blood Glucose.: 143 mg/dL (23 Jan 2024 01:38)      RADIOLOGY & ADDITIONAL TESTS: Reviewed.

## 2024-01-23 NOTE — PROGRESS NOTE ADULT - SUBJECTIVE AND OBJECTIVE BOX
Chief Complaint:  Patient is a 28y old  Male who presents with a chief complaint of small bowel obstruction (23 Jan 2024 10:09)      HPI/ 24 hr events: Patient seen and examined at bedside. He wants the NGT removed due to discomfort. He was hypotensive overnight and levophed was started. He is on Meropenem. Yesterday an EGD performed showed normal esophagus, normal duodenum, single cratered non-bleeding ulcer stomach body, erosive gastritis.       REVIEW OF SYSTEMS:   General: Negative  HEENT: Negative  CV: Negative  Respiratory: Negative  GI: See HPI  : Negative  MSK: Negative  Hematologic: Negative  Skin: Negative    MEDICATIONS:   MEDICATIONS  (STANDING):  chlorhexidine 2% Cloths 1 Application(s) Topical daily  dextrose 5% + lactated ringers. 1000 milliLiter(s) (75 mL/Hr) IV Continuous <Continuous>  heparin   Injectable 5000 Unit(s) SubCutaneous every 8 hours  meropenem  IVPB 1000 milliGRAM(s) IV Intermittent every 8 hours  norepinephrine Infusion 0.01 MICROgram(s)/kG/Min (1.31 mL/Hr) IV Continuous <Continuous>  pantoprazole  Injectable 40 milliGRAM(s) IV Push two times a day    MEDICATIONS  (PRN):  acetaminophen   IVPB .. 1000 milliGRAM(s) IV Intermittent every 6 hours PRN Temp greater or equal to 38C (100.4F)  benzocaine/menthol Lozenge 1 Lozenge Oral every 4 hours PRN Sore Throat      DIET:  Diet, NPO (01-22-24 @ 19:07) [Active]          ALLERGIES:   Allergies    No Known Allergies    Intolerances        VITAL SIGNS:   Vital Signs Last 24 Hrs  T(C): 36.6 (23 Jan 2024 07:36), Max: 37 (22 Jan 2024 21:36)  T(F): 97.8 (23 Jan 2024 07:36), Max: 98.6 (22 Jan 2024 21:36)  HR: 96 (23 Jan 2024 10:00) (76 - 105)  BP: --  BP(mean): --  RR: 19 (23 Jan 2024 10:00) (6 - 21)  SpO2: 99% (23 Jan 2024 10:00) (95% - 99%)    Parameters below as of 23 Jan 2024 04:00  Patient On (Oxygen Delivery Method): room air      I&O's Summary    22 Jan 2024 07:01  -  23 Jan 2024 07:00  --------------------------------------------------------  IN: 1896.2 mL / OUT: 1920 mL / NET: -23.8 mL    23 Jan 2024 07:01  -  23 Jan 2024 10:32  --------------------------------------------------------  IN: 558.2 mL / OUT: 239 mL / NET: 319.2 mL        PHYSICAL EXAM:   GENERAL:  No acute distress  HEENT:  NC/AT, NGT in place   CHEST:  No increased effort  HEART:  Regular rate  ABDOMEN:  Soft, mild distension, right FRITZ drain, left colostomy   NEURO:  Calm, cooperative    LABS:                        11.9   12.53 )-----------( 396      ( 23 Jan 2024 03:20 )             34.6     Hemoglobin: 11.9 g/dL (01-23-24 @ 03:20)  Hemoglobin: 10.9 g/dL (01-22-24 @ 19:40)  Hemoglobin: 7.9 g/dL (01-22-24 @ 03:20)  Hemoglobin: 8.9 g/dL (01-21-24 @ 15:06)  Hemoglobin: 8.8 g/dL (01-21-24 @ 08:14)  Hemoglobin: 8.0 g/dL (01-21-24 @ 03:25)  Hemoglobin: 6.7 g/dL (01-20-24 @ 20:15)  Hemoglobin: 6.0 g/dL (01-20-24 @ 12:38)    01-23    144  |  113<H>  |  9.7  ----------------------------<  152<H>  4.1   |  19.0<L>  |  0.66    Ca    7.1<L>      23 Jan 2024 03:20  Phos  2.3     01-23  Mg     2.6     01-23          PT/INR - ( 23 Jan 2024 03:20 )   PT: 12.2 sec;   INR: 1.10 ratio         PTT - ( 23 Jan 2024 03:20 )  PTT:30.9 sec                                          RADIOLOGY & ADDITIONAL STUDIES:                  EGD Report    Date: 1/22/2024 12:30 PM            Findings:    Esophagus Normal esophagus.    Stomach Excavated lesions A single cratered non-bleeding ulcer was found in the    stomach body.    Mucosa Diffuse erosions of the mucosa with no bleeding was noted in the stomach    body and antrum. These findings are compatible with erosive gastritis.    Duodenum Normal duodenum.        Impressions:    Normal esophagus.    Normal duodenum.    Ulcer in the stomach body.    Erosions in the stomach body and antrum compatible with erosive gastritis.        Plan:    Procedure performed intraoperatively with surgical team    PPI BID    Avoid nsaids    Advance diet per primary team    Check stool h pylori antigen and treat with quadruple therapy once acute issues    resolved if positive      Repeat EGD In 12 weeks to assess for resolution of gastric ulcer              Marti Murillo        Version 1, Electronically signed on 1/22/2024 1:58:44 PM by Marti Murillo

## 2024-01-23 NOTE — CHART NOTE - NSCHARTNOTEFT_GEN_A_CORE
Source: Patient [ ]  Family [ ]   other [x] EMR    Current Diet: Diet, NPO (01-22-24 @ 19:07)    Current Weight:  1/23 57.8 kg   1/16 62.8 kg  % Weight Change: 11 lbs (7.9%) weight loss x 1 week    Pertinent Medications: MEDICATIONS  (STANDING):  dextrose 5% + lactated ringers. 1000 milliLiter(s) (75 mL/Hr) IV Continuous <Continuous>  meropenem Injectable 1000 milliGRAM(s) IV Push every 8 hours  norepinephrine Infusion 0.01 MICROgram(s)/kG/Min (1.31 mL/Hr) IV Continuous <Continuous>  pantoprazole  Injectable 40 milliGRAM(s) IV Push two times a day    Pertinent Labs: 01-23 Na144 mmol/L Glu 152 mg/dL<H> K+ 4.1 mmol/L Cr  0.66 mg/dL BUN 9.7 mg/dL Phos 2.3 mg/dL<L>    Skin: abdomen midline surgical incision    Nutrition focused physical exam not conducted at this time - found signs of malnutrition [ ]absent [ ]present    Subcutaneous fat loss: [ ] Orbital fat pads region, [ ]Buccal fat region, [ ]Triceps region,  [ ]Ribs region    Muscle wasting: [ ]Temples region, [ ]Clavicle region, [ ]Shoulder region, [ ]Scapula region, [ ]Interosseous region,  [ ]thigh region, [ ]Calf region    Estimated Needs:   [x] no change since previous assessment  [ ] recalculated:     Hospital Course: 29yo male with PMHx of spina bifida (wheelchair bound at baseline), scoliosis,  Shunt BIBEMS 1/14 with abdominal pain, n/v, and lethargy, found to have severe SBO, metabolic acidosis, and to be in septic and hypovolemic shock. Patient was brought to the OR 1/14 for ex lap and small bowel resection, c/b bladder perf with primary repair and partial colectomy and admitted to SICU post-op for further management. Now with mixed septic/hypovolemic shock, pneumoperitoneum, suspected UGIB. S/p CT A/P 1/21 - Intraperitoneal bladder perforation w/ mod/large extraluminal contrast, moderate amount of pneumoperitoneum. OR 1/22: bladder repair, Hatman’s and EGD. Multiple holes found within bladder with grossly necrotic tissue and sigmoid anastomosis breakdown. EGD 1/22: clean base ulcer and gastritis with no acute intervention, Protonix 40 mg BID.      Current Nutrition Diagnosis: Pt remains a high nutrition risk secondary to newly identified Malnutrition (acute, severe) related to inability to consume adequate energy/protein intake in setting of SBO s/p SBR s/p large bowel anastomotic leak now s/p annabel's procedure as evidenced by pt meeting < 50% est needs > 5 days, 7.9% weight loss x 1 week.    Pt unavailable at time of attempted visit earlier today. Chart reviewed, events noted above. Pt remains NPO at this time, receiving IVF. +Colostomy - no output yet per chart. Per documentation, pt wants NGT removed - 490 cc output today per I/Os. Pt's weight on a downward trend likely secondary to interruptions in providing adequate nutrition secondary to complicated clinical course. RD remains available. Recommendations below:    Recommendations:   1. Diet advancement per MD's discretion when medically feasible; if unable to advance to oral/enteral diet, consider parenteral nutrition   2. Rx: MVI daily to optimize nutrition   3. Monitor electrolytes, replete prn  4. Trend weights, BMs, and I/Os    Monitoring and Evaluation:   [ ] PO intake [ ] Tolerance to diet prescription [X] Weights  [X] Follow up per protocol [X] Labs: chem 8, phos, mg

## 2024-01-23 NOTE — PROGRESS NOTE ADULT - NS ATTEND AMEND GEN_ALL_CORE FT
I agree with assessment and plan as above. Pt admitted with SBO s/p ex lap with suspected anastomotic bleed. S/p EGD 1/22/24 - normal esophagus, normal duodenum, single cratered non-bleeding ulcer stomach body, erosive gastritis. Continue PPI BID on discharge. Follow up with GI post discharge, he will need repeat EGD to assess for healing in 12 wks. GI signing off, please call back as needed.

## 2024-01-23 NOTE — PROGRESS NOTE ADULT - ASSESSMENT
28y Male PMH spina bifida, scoliosis, no sensation distal to pubic line, wheelchair bound at baseline, hydrocephalus s/p  shunt placement with multiple revisions followed by Dr. Dorsey at [facility], now presents to [facility] 1/14/24 with concern for small bowel obstruction, s/p exploratory laparotomy with small bowel resection, partial left colectomy, bladder perforation s/p repair on 1/14/24, s/p externalization of shunt 1/21/24.  - Afebrile overnight. WBC 12.5 this AM. CSF cultures no growth to date    PLAN:  - Will d/w attending  - Q4 neuro checks  - CSF cultures no growth to date. Keep CSF system level with umbilicus. DO NOT CLAMP  - Required levophed gtt overnight, now currently off  - Meropenem started by SICU   - On heparin 5000U Q8 for DVT ppx   - Ultimately may need internalization of shunt to pleura vs atria vs endoscopic third ventriculostomy; no finalized plan yet-- will continue to monitor final CSF cultures/fever curve/leukocytosis trend  - Patient expressing desire to be transferred to [facility] given history of  shunt revisions with Dr. Dorsey there-- will depend on if medically stable per SICU and if there is an accepting general surgeon there  - Supportive care/further medical management per SICU

## 2024-01-23 NOTE — PROGRESS NOTE ADULT - ASSESSMENT
27 y/o M with PMHx spina bifida, here with SBO s/p ex lap now with acute blood loss anemia with suspected anastomotic bleed    #Acute Blood Loss Anemia   EGD 1/22/24- normal esophagus, normal duodenum, single cratered non-bleeding ulcer stomach body, erosive gastritis    - Trend CBC, transfuse as needed. Monitor for signs of bleeding.   - Avoid NSAIDs  - IV PPI BID for GI mucosal cytoprotection  - Check H. Pylori stool antigen, if positive treat with quadruple therapy once acute issues have resolved  - Repeat EGD in 12 weeks to assess for resolution of gastric ulcer  _________________________________________________________________  Assessment and recommendations are final when note is signed by the attending physician.  29 y/o M with PMHx spina bifida, here with SBO s/p ex lap now with acute blood loss anemia with suspected anastomotic bleed    #Acute Blood Loss Anemia   EGD 1/22/24- normal esophagus, normal duodenum, single cratered non-bleeding ulcer stomach body, erosive gastritis    - Trend CBC, transfuse as needed. Monitor for signs of bleeding.   - Avoid NSAIDs  - IV PPI BID for GI mucosal cytoprotection, discharge with PPI po BID 30 min before breakfast and dinner for at least 8 wks.   - Patient should follow up with GI after discharge. He can have Hpylori stool Ag checked once off PPI for at least 2 wks.   - Repeat EGD in 12 weeks to assess for resolution of gastric ulcer  - GI signing off, please call back as needed  _________________________________________________________________  Assessment and recommendations are final when note is signed by the attending physician.

## 2024-01-23 NOTE — PROGRESS NOTE ADULT - SUBJECTIVE AND OBJECTIVE BOX
UROLOGY F/U:  Above notes noted     POD# 1 s/p repair bladder leak and large bowel anastomotic leak .    Patient seen this am awake, responsive in no acute distress    Vital Signs Last 24 Hrs  T(C): 36.6 (23 Jan 2024 07:36), Max: 37 (22 Jan 2024 21:36)  T(F): 97.8 (23 Jan 2024 07:36), Max: 98.6 (22 Jan 2024 21:36)  HR: 101 (23 Jan 2024 09:00) (76 - 134)  BP: --  BP(mean): --  RR: 18 (23 Jan 2024 09:00) (6 - 21)  SpO2: 99% (23 Jan 2024 09:00) (95% - 100%)    Parameters below as of 23 Jan 2024 04:00  Patient On (Oxygen Delivery Method): room air    Abdomen:  mildly distended, with mild surgical discomfort ;  mid-line dress dry and intact.  Ostomy mucosa pink viable no function yet ;  FRITZ in place with serosangenous drainage;  SPT ( RLQ ) draining well yellow/very light pink    : cline in place and draining well . Yellow very light pink.     I&O's Detail   23 Jan 2024 07:00    OUT: this am:     Bulb (mL): 5 mL    Colostomy (mL): 0 mL    External Ventricular Device (mL): 11 mL    Indwelling Catheter - Suprapubic (mL): 55 mL    Indwelling Catheter - Urethral (mL): 50 mL    Nasogastric/Oral tube (mL): 40 mL    Labs:  CBC:                        11.9   12.53 )-----------( 396      ( 23 Jan 2024 03:20 )             34.6     Chemistry:  01-23    144  |  113<H>  |  9.7  ----------------------------<  152<H>  4.1   |  19.0<L>  |  0.66      Impression:   stable, SPT and cline in place stable and functioning well   Generally patient tolerated surgery well awake, mild post operative discomfort   Discuss with Surgeon present situation and continued care and management  Plan:  Continue post op care and management ; SPT and cline with general care as per Surgery- and will continue to follow with you.

## 2024-01-23 NOTE — PROGRESS NOTE ADULT - SUBJECTIVE AND OBJECTIVE BOX
INTERVAL HPI/OVERNIGHT EVENTS:  28y Male PMH spina bifida, scoliosis, no sensation distal to pubic line, wheelchair bound at baseline, hydrocephalus s/p  shunt placement with multiple revisions followed by Dr. Dorsey at [facility], now presents to [facility] 1/14/24 with concern for small bowel obstruction, s/p exploratory laparotomy with small bowel resection, partial left colectomy, bladder perforation s/p repair on 1/14/24, s/p externalization of shunt 1/21/24. Patient seen this AM, c/o discomfort from nasogastric tube. Afebrile overnight. WBC 12.5 this AM. CSF cultures no growth to date. Hypotensive overnight requiring levophed gtt. Meropenem started by SICU this AM    Vital Signs Last 24 Hrs  T(C): 36.6 (23 Jan 2024 07:36), Max: 37 (22 Jan 2024 21:36)  T(F): 97.8 (23 Jan 2024 07:36), Max: 98.6 (22 Jan 2024 21:36)  HR: 96 (23 Jan 2024 10:00) (76 - 105)  BP: --  BP(mean): --  RR: 19 (23 Jan 2024 10:00) (6 - 21)  SpO2: 99% (23 Jan 2024 10:00) (95% - 99%)    Parameters below as of 23 Jan 2024 04:00  Patient On (Oxygen Delivery Method): room air    PHYSICAL EXAM:  GENERAL: In discomfort from NGT  MIA COMA SCORE: E- 4 V- 5 M- 6=15  MENTAL STATUS: AAO x3 (requiring choices, having pain with talking therefore minimally verbal today and nodding/shaking head today appropriately to communicate); following commands  CRANIAL NERVES: PERRL. EOMI without nystagmus. Facial sensation intact. Face symmetric, tongue midline. Hearing grossly intact  MOTOR: strength 5/5 b/l upper extremities; b/l LE 0/5 at baseline  SENSATION: grossly intact to light touch b/l UE  CHEST: shunt externalization site c/d/i. EVD bag with clear CSF    LABS:                        11.9   12.53 )-----------( 396      ( 23 Jan 2024 03:20 )             34.6     01-23    144  |  113<H>  |  9.7  ----------------------------<  152<H>  4.1   |  19.0<L>  |  0.66    Ca    7.1<L>      23 Jan 2024 03:20  Phos  2.3     01-23  Mg     2.6     01-23    PT/INR - ( 23 Jan 2024 03:20 )   PT: 12.2 sec;   INR: 1.10 ratio       PTT - ( 23 Jan 2024 03:20 )  PTT:30.9 sec  Urinalysis Basic - ( 23 Jan 2024 03:20 )    Color: x / Appearance: x / SG: x / pH: x  Gluc: 152 mg/dL / Ketone: x  / Bili: x / Urobili: x   Blood: x / Protein: x / Nitrite: x   Leuk Esterase: x / RBC: x / WBC x   Sq Epi: x / Non Sq Epi: x / Bacteria: x    01-22 @ 07:01 - 01-23 @ 07:00  --------------------------------------------------------  IN: 1896.2 mL / OUT: 1920 mL / NET: -23.8 mL    01-23 @ 07:01 - 01-23 @ 10:09  --------------------------------------------------------  IN: 558.2 mL / OUT: 239 mL / NET: 319.2 mL    RADIOLOGY & ADDITIONAL TESTS:  CT Abdomen and Pelvis w/wo IV Cont (01.21.24 @ 01:50)  IMPRESSION:  Questionable bilateral hydronephrosis  Asymmetrical bladder wall thickening of the right, tract extending from   the right bladder to the right lower quadrant abdominal wall  Sharp catheter balloon again noted to be inflated in the prostate  Increased pneumoperitoneum which may be due to perforated viscus,   anastomotic leak or from surgical drains.  Small amount of complex ascites which could represent peritonitis.  Preliminary findings were discussed with clinician Rodney by Dr. Rizwana Lynch at 2:45 am on 1/21/24 with read back.    CT Head No Cont (01.16.24 @ 10:57)  IMPRESSION: Stable follow-up CT exam when compared with 1/14/2024.

## 2024-01-24 LAB
ANION GAP SERPL CALC-SCNC: 11 MMOL/L — SIGNIFICANT CHANGE UP (ref 5–17)
APTT BLD: 29.8 SEC — SIGNIFICANT CHANGE UP (ref 24.5–35.6)
BASOPHILS # BLD AUTO: 0 K/UL — SIGNIFICANT CHANGE UP (ref 0–0.2)
BASOPHILS NFR BLD AUTO: 0 % — SIGNIFICANT CHANGE UP (ref 0–2)
BUN SERPL-MCNC: 10.2 MG/DL — SIGNIFICANT CHANGE UP (ref 8–20)
CALCIUM SERPL-MCNC: 7.1 MG/DL — LOW (ref 8.4–10.5)
CHLORIDE SERPL-SCNC: 112 MMOL/L — HIGH (ref 96–108)
CO2 SERPL-SCNC: 21 MMOL/L — LOW (ref 22–29)
CREAT SERPL-MCNC: 0.56 MG/DL — SIGNIFICANT CHANGE UP (ref 0.5–1.3)
CULTURE RESULTS: SIGNIFICANT CHANGE UP
CULTURE RESULTS: SIGNIFICANT CHANGE UP
EGFR: 136 ML/MIN/1.73M2 — SIGNIFICANT CHANGE UP
EOSINOPHIL # BLD AUTO: 0.07 K/UL — SIGNIFICANT CHANGE UP (ref 0–0.5)
EOSINOPHIL NFR BLD AUTO: 0.9 % — SIGNIFICANT CHANGE UP (ref 0–6)
GIANT PLATELETS BLD QL SMEAR: PRESENT — SIGNIFICANT CHANGE UP
GLUCOSE BLDC GLUCOMTR-MCNC: 84 MG/DL — SIGNIFICANT CHANGE UP (ref 70–99)
GLUCOSE BLDC GLUCOMTR-MCNC: 84 MG/DL — SIGNIFICANT CHANGE UP (ref 70–99)
GLUCOSE BLDC GLUCOMTR-MCNC: 93 MG/DL — SIGNIFICANT CHANGE UP (ref 70–99)
GLUCOSE BLDC GLUCOMTR-MCNC: 97 MG/DL — SIGNIFICANT CHANGE UP (ref 70–99)
GLUCOSE SERPL-MCNC: 96 MG/DL — SIGNIFICANT CHANGE UP (ref 70–99)
HCT VFR BLD CALC: 32 % — LOW (ref 39–50)
HGB BLD-MCNC: 10.4 G/DL — LOW (ref 13–17)
INR BLD: 1.34 RATIO — HIGH (ref 0.85–1.18)
LYMPHOCYTES # BLD AUTO: 1.41 K/UL — SIGNIFICANT CHANGE UP (ref 1–3.3)
LYMPHOCYTES # BLD AUTO: 18.8 % — SIGNIFICANT CHANGE UP (ref 13–44)
MAGNESIUM SERPL-MCNC: 2 MG/DL — SIGNIFICANT CHANGE UP (ref 1.6–2.6)
MANUAL SMEAR VERIFICATION: SIGNIFICANT CHANGE UP
MCHC RBC-ENTMCNC: 27.2 PG — SIGNIFICANT CHANGE UP (ref 27–34)
MCHC RBC-ENTMCNC: 32.5 GM/DL — SIGNIFICANT CHANGE UP (ref 32–36)
MCV RBC AUTO: 83.6 FL — SIGNIFICANT CHANGE UP (ref 80–100)
MONOCYTES # BLD AUTO: 0.6 K/UL — SIGNIFICANT CHANGE UP (ref 0–0.9)
MONOCYTES NFR BLD AUTO: 8 % — SIGNIFICANT CHANGE UP (ref 2–14)
NEUTROPHILS # BLD AUTO: 5.3 K/UL — SIGNIFICANT CHANGE UP (ref 1.8–7.4)
NEUTROPHILS NFR BLD AUTO: 70.5 % — SIGNIFICANT CHANGE UP (ref 43–77)
OVALOCYTES BLD QL SMEAR: SLIGHT — SIGNIFICANT CHANGE UP
PHOSPHATE SERPL-MCNC: 1.6 MG/DL — LOW (ref 2.4–4.7)
PLAT MORPH BLD: NORMAL — SIGNIFICANT CHANGE UP
PLATELET # BLD AUTO: 417 K/UL — HIGH (ref 150–400)
POIKILOCYTOSIS BLD QL AUTO: SLIGHT — SIGNIFICANT CHANGE UP
POLYCHROMASIA BLD QL SMEAR: SLIGHT — SIGNIFICANT CHANGE UP
POTASSIUM SERPL-MCNC: 3.4 MMOL/L — LOW (ref 3.5–5.3)
POTASSIUM SERPL-SCNC: 3.4 MMOL/L — LOW (ref 3.5–5.3)
PROTHROM AB SERPL-ACNC: 14.7 SEC — HIGH (ref 9.5–13)
RBC # BLD: 3.83 M/UL — LOW (ref 4.2–5.8)
RBC # FLD: 16.3 % — HIGH (ref 10.3–14.5)
RBC BLD AUTO: ABNORMAL
SMUDGE CELLS # BLD: PRESENT — SIGNIFICANT CHANGE UP
SODIUM SERPL-SCNC: 144 MMOL/L — SIGNIFICANT CHANGE UP (ref 135–145)
SPECIMEN SOURCE: SIGNIFICANT CHANGE UP
SPECIMEN SOURCE: SIGNIFICANT CHANGE UP
VARIANT LYMPHS # BLD: 1.8 % — SIGNIFICANT CHANGE UP (ref 0–6)
WBC # BLD: 7.52 K/UL — SIGNIFICANT CHANGE UP (ref 3.8–10.5)
WBC # FLD AUTO: 7.52 K/UL — SIGNIFICANT CHANGE UP (ref 3.8–10.5)

## 2024-01-24 PROCEDURE — 99291 CRITICAL CARE FIRST HOUR: CPT

## 2024-01-24 RX ORDER — POLYETHYLENE GLYCOL 3350 17 G/17G
17 POWDER, FOR SOLUTION ORAL DAILY
Refills: 0 | Status: DISCONTINUED | OUTPATIENT
Start: 2024-01-24 | End: 2024-01-24

## 2024-01-24 RX ORDER — POTASSIUM PHOSPHATE, MONOBASIC POTASSIUM PHOSPHATE, DIBASIC 236; 224 MG/ML; MG/ML
30 INJECTION, SOLUTION INTRAVENOUS ONCE
Refills: 0 | Status: COMPLETED | OUTPATIENT
Start: 2024-01-24 | End: 2024-01-24

## 2024-01-24 RX ORDER — ENOXAPARIN SODIUM 100 MG/ML
40 INJECTION SUBCUTANEOUS EVERY 24 HOURS
Refills: 0 | Status: DISCONTINUED | OUTPATIENT
Start: 2024-01-24 | End: 2024-02-19

## 2024-01-24 RX ORDER — MAGNESIUM HYDROXIDE 400 MG/1
30 TABLET, CHEWABLE ORAL DAILY
Refills: 0 | Status: DISCONTINUED | OUTPATIENT
Start: 2024-01-24 | End: 2024-01-24

## 2024-01-24 RX ADMIN — CHLORHEXIDINE GLUCONATE 1 APPLICATION(S): 213 SOLUTION TOPICAL at 13:33

## 2024-01-24 RX ADMIN — POTASSIUM PHOSPHATE, MONOBASIC POTASSIUM PHOSPHATE, DIBASIC 83.33 MILLIMOLE(S): 236; 224 INJECTION, SOLUTION INTRAVENOUS at 06:17

## 2024-01-24 RX ADMIN — HEPARIN SODIUM 5000 UNIT(S): 5000 INJECTION INTRAVENOUS; SUBCUTANEOUS at 06:16

## 2024-01-24 RX ADMIN — PANTOPRAZOLE SODIUM 40 MILLIGRAM(S): 20 TABLET, DELAYED RELEASE ORAL at 17:38

## 2024-01-24 RX ADMIN — ENOXAPARIN SODIUM 40 MILLIGRAM(S): 100 INJECTION SUBCUTANEOUS at 17:38

## 2024-01-24 RX ADMIN — MEROPENEM 1000 MILLIGRAM(S): 1 INJECTION INTRAVENOUS at 22:38

## 2024-01-24 RX ADMIN — PANTOPRAZOLE SODIUM 40 MILLIGRAM(S): 20 TABLET, DELAYED RELEASE ORAL at 06:17

## 2024-01-24 RX ADMIN — MEROPENEM 1000 MILLIGRAM(S): 1 INJECTION INTRAVENOUS at 13:29

## 2024-01-24 RX ADMIN — MEROPENEM 1000 MILLIGRAM(S): 1 INJECTION INTRAVENOUS at 06:16

## 2024-01-24 NOTE — PROGRESS NOTE ADULT - SUBJECTIVE AND OBJECTIVE BOX
HPI: 28y Male PMH spina bifida, scoliosis, no sensation distal to pubic line, wheelchair bound at baseline, hydrocephalus s/p  shunt placement with multiple revisions followed by Dr. Dorsey at [facility], now presents to [facility] 1/14/24 with concern for small bowel obstruction, s/p exploratory laparotomy with small bowel resection, partial left colectomy, bladder perforation s/p repair on 1/14/24, s/p externalization of shunt 1/21/24.     INTERVAL HPI/OVERNIGHT EVENTS:  28y male s/p shunt externalization POD 3, seen lying comfortably in bed. Afebrile and normotensive overnight. WBC 7.5 this AM. CSF cultures no growth to date. Tolerating diet. Passing gas/BM. Voiding. Sharp in with clear urine. Denies headache, n/v/d, fevers, chills.    Vital Signs Last 24 Hrs  T(C): 36.7 (24 Jan 2024 07:28), Max: 37.1 (23 Jan 2024 19:38)  T(F): 98 (24 Jan 2024 07:28), Max: 98.8 (23 Jan 2024 19:38)  HR: 95 (24 Jan 2024 08:00) (78 - 107)  BP: --  BP(mean): --  RR: 18 (24 Jan 2024 08:00) (12 - 22)  SpO2: 100% (24 Jan 2024 08:00) (95% - 100%)    Parameters below as of 24 Jan 2024 08:00  Patient On (Oxygen Delivery Method): room air    PHYSICAL EXAM:  GENERAL: AAOx3, NAD  MIA COMA SCORE: E- 4 V- 5 M- 6= 15  MENTAL STATUS: AAO x3 (requiring choices, having pain with talking therefore minimally verbal today and nodding/shaking head today appropriately to communicate); following commands  CRANIAL NERVES: PERRL. EOMI without nystagmus. Facial sensation intact. Face symmetric, tongue midline. Hearing grossly intact  MOTOR: strength 5/5 b/l upper extremities; b/l LE 0/5 at baseline  SENSATION: grossly intact to light touch b/l UE  CHEST: shunt externalization site c/d/i. EVD bag with clear CSF    LABS:                        10.4   7.52  )-----------( 417      ( 24 Jan 2024 03:00 )             32.0     01-24    144  |  112<H>  |  10.2  ----------------------------<  96  3.4<L>   |  21.0<L>  |  0.56    Ca    7.1<L>      24 Jan 2024 03:00  Phos  1.6     01-24  Mg     2.0     01-24      PT/INR - ( 24 Jan 2024 03:00 )   PT: 14.7 sec;   INR: 1.34 ratio         PTT - ( 24 Jan 2024 03:00 )  PTT:29.8 sec  Urinalysis Basic - ( 24 Jan 2024 03:00 )    Color: x / Appearance: x / SG: x / pH: x  Gluc: 96 mg/dL / Ketone: x  / Bili: x / Urobili: x   Blood: x / Protein: x / Nitrite: x   Leuk Esterase: x / RBC: x / WBC x   Sq Epi: x / Non Sq Epi: x / Bacteria: x        01-23 @ 07:01 - 01-24 @ 07:00  --------------------------------------------------------  IN: 2299.8 mL / OUT: 2399 mL / NET: -99.2 mL    01-24 @ 07:01  - 01-24 @ 09:09  --------------------------------------------------------  IN: 390 mL / OUT: 232 mL / NET: 158 mL        RADIOLOGY & ADDITIONAL TESTS:    < from: CT Abdomen and Pelvis w/wo IV Cont (01.21.24 @ 01:50) >  IMPRESSION:    Questionable bilateral hydronephrosis    Asymmetrical bladder wall thickening of the right, tract extending from   the right bladder to the right lower quadrant abdominal wall    Sharp catheter balloon again noted to be inflated in the prostate    Increased pneumoperitoneum which may be due to perforated viscus,   anastomotic leak or from surgical drains.    Small amount of complex ascites which could represent peritonitis.    < from: Xray Chest 1 View- PORTABLE-Urgent (Xray Chest 1 View- PORTABLE-Urgent .) (01.21.24 @ 01:05) >  IMPRESSION:  Left-sided  shunt is seen. There are also fragments from an old left    shunt. Left axillary midline. Right sided central line is in the region   of the superior vena cava.

## 2024-01-24 NOTE — PROGRESS NOTE ADULT - ASSESSMENT
28y Male PMH spina bifida, scoliosis, no sensation distal to pubic line, wheelchair bound at baseline, hydrocephalus s/p  shunt placement with multiple revisions followed by Dr. Dorsey at [facility], now presents to [facility] 1/14/24 with concern for small bowel obstruction, s/p exploratory laparotomy with small bowel resection, partial left colectomy, bladder perforation s/p repair on 1/14/24, s/p externalization of shunt 1/21/24.  - Afebrile overnight. WBC 7.5 this AM. CSF cultures no growth to date.    PLAN:  - Q4 neuro checks  - CSF cultures no growth to date. Keep CSF system level with umbilicus. DO NOT CLAMP  - Off levophed  - On meropenem  - On heparin 5000U Q8 for DVT ppx   - Ultimately may need internalization of shunt to pleura vs atria vs endoscopic third ventriculostomy; no finalized plan yet-- will continue to monitor final CSF cultures/fever curve/leukocytosis trend  - Patient expressing desire to be transferred to [facility] given history of  shunt revisions with Dr. Dorsey there-- will depend on if medically stable per SICU and if there is an accepting general surgeon there  - Supportive care/further medical management per SICU  - D/w Dr. Richmond and Dr. Harris

## 2024-01-24 NOTE — PROGRESS NOTE ADULT - SUBJECTIVE AND OBJECTIVE BOX
INTERVAL HPI/OVERNIGHT EVENTS: Patient states he has no abdominal pain. States that his throat pain is improved since removal of NGT. Patient denies nausea, vomiting.  Patient has +sweat in colostomy however, no gas or output yet.  Patient's FRITZ with serosanginous output. EVD draining adequately. SPT and cline with adequate output. Remains hemodynamically well, tachycardia resolved.       PAST MEDICAL & SURGICAL HISTORY:  Spina bifida      Scoliosis          MEDICATIONS  (STANDING):  chlorhexidine 2% Cloths 1 Application(s) Topical daily  dextrose 5% + lactated ringers. 1000 milliLiter(s) (75 mL/Hr) IV Continuous <Continuous>  heparin   Injectable 5000 Unit(s) SubCutaneous every 8 hours  meropenem Injectable 1000 milliGRAM(s) IV Push every 8 hours  pantoprazole  Injectable 40 milliGRAM(s) IV Push two times a day    MEDICATIONS  (PRN):  acetaminophen   IVPB .. 1000 milliGRAM(s) IV Intermittent every 6 hours PRN Temp greater or equal to 38C (100.4F)      ICU Vital Signs Last 24 Hrs  T(C): 36.9 (23 Jan 2024 23:32), Max: 37.1 (23 Jan 2024 19:38)  T(F): 98.5 (23 Jan 2024 23:32), Max: 98.8 (23 Jan 2024 19:38)  HR: 81 (24 Jan 2024 01:00) (81 - 107)  BP: --  BP(mean): --  ABP: 91/54 (24 Jan 2024 01:00) (90/55 - 121/79)  ABP(mean): 70 (24 Jan 2024 01:00) (68 - 99)  RR: 17 (24 Jan 2024 01:00) (8 - 22)  SpO2: 97% (24 Jan 2024 01:00) (95% - 100%)    O2 Parameters below as of 24 Jan 2024 00:00  Patient On (Oxygen Delivery Method): room air            Drug Dosing Weight  Height (cm): 132.1 (14 Jan 2024 09:30)  Weight (kg): 70 (14 Jan 2024 07:48)  BMI (kg/m2): 40.1 (14 Jan 2024 09:30)  BSA (m2): 1.51 (14 Jan 2024 09:30)    CENTRAL LINE: [ ] YES [x ] NO  LOCATION:   DATE INSERTED:  REMOVE: [ ] YES [ ] NO  EXPLAIN:    CLINE: [x ] YES [ ] NO    DATE INSERTED:  REMOVE: [ ] YES [ ] NO  EXPLAIN:    A-LINE: [x ] YES [ ] NO  LOCATION:   DATE INSERTED:  REMOVE: [ ] YES [ ] NO  EXPLAIN:    ABG - ( 22 Jan 2024 06:05 )  pH, Arterial: 7.410 pH, Blood: x     /  pCO2: 29    /  pO2: 103   / HCO3: 18    / Base Excess: -6.2  /  SaO2: 99.7                I&O's Detail    22 Jan 2024 07:01  -  23 Jan 2024 07:00  --------------------------------------------------------  IN:    dextrose 5% + lactated ringers: 300 mL    dextrose 5% + lactated ringers: 975 mL    IV PiggyBack: 332 mL    IV PiggyBack: 50 mL    IV PiggyBack: 166.6 mL    Norepinephrine: 32.6 mL    Pantoprazole: 40 mL  Total IN: 1896.2 mL    OUT:    Bulb (mL): 55 mL    Colostomy (mL): 0 mL    External Ventricular Device (mL): 115 mL    Indwelling Catheter - Suprapubic (mL): 790 mL    Indwelling Catheter - Urethral (mL): 510 mL    Nasogastric/Oral tube (mL): 450 mL    Norepinephrine: 0 mL  Total OUT: 1920 mL    Total NET: -23.8 mL      23 Jan 2024 07:01  -  24 Jan 2024 02:19  --------------------------------------------------------  IN:    dextrose 5% + lactated ringers: 1350 mL    IV PiggyBack: 333.2 mL  Total IN: 1683.2 mL    OUT:    Bulb (mL): 45 mL    Colostomy (mL): 75 mL    External Ventricular Device (mL): 192 mL    Indwelling Catheter - Suprapubic (mL): 440 mL    Indwelling Catheter - Urethral (mL): 635 mL    Nasogastric/Oral tube (mL): 425 mL    Norepinephrine: 0 mL  Total OUT: 1812 mL    Total NET: -128.8 mL              Physical Exam:  Cons: NAD, resting comfortably     Neurological:  GCS  15    CAM  +    RASS 0   b/l lower extremities with plegia, decreased sensation T8 down (baseline)    HEENT:  EOMI     Respiratory: Unlabored, poor inspiratory effort, no accessory muscle use    Cardiovascular: Regular rate & rhythm    Gastrointestinal: Softly distended, nttp, FRITZ with serosanginous output, colostomy pink, viable, +sweat    : SPT in place draining clear yellow urine, cline in place with clear yellow urine     Extremities: Mild peripheral edema of b/l feet, shorted/ contracted (baseline)     Vascular: Equal and normal pulses: 2+ peripheral pulses throughout    Skin: No rashes    LABS:  CBC Full  -  ( 23 Jan 2024 03:20 )  WBC Count : 12.53 K/uL  RBC Count : 4.22 M/uL  Hemoglobin : 11.9 g/dL  Hematocrit : 34.6 %  Platelet Count - Automated : 396 K/uL  Mean Cell Volume : 82.0 fl  Mean Cell Hemoglobin : 28.2 pg  Mean Cell Hemoglobin Concentration : 34.4 gm/dL  Auto Neutrophil # : 10.64 K/uL  Auto Lymphocyte # : 1.21 K/uL  Auto Monocyte # : 0.54 K/uL  Auto Eosinophil # : 0.01 K/uL  Auto Basophil # : 0.03 K/uL  Auto Neutrophil % : 84.9 %  Auto Lymphocyte % : 9.7 %  Auto Monocyte % : 4.3 %  Auto Eosinophil % : 0.1 %  Auto Basophil % : 0.2 %    01-23    144  |  113<H>  |  9.7  ----------------------------<  152<H>  4.1   |  19.0<L>  |  0.66    Ca    7.1<L>      23 Jan 2024 03:20  Phos  2.3     01-23  Mg     2.6     01-23      PT/INR - ( 23 Jan 2024 03:20 )   PT: 12.2 sec;   INR: 1.10 ratio         PTT - ( 23 Jan 2024 03:20 )  PTT:30.9 sec  Urinalysis Basic - ( 23 Jan 2024 03:20 )    Color: x / Appearance: x / SG: x / pH: x  Gluc: 152 mg/dL / Ketone: x  / Bili: x / Urobili: x   Blood: x / Protein: x / Nitrite: x   Leuk Esterase: x / RBC: x / WBC x   Sq Epi: x / Non Sq Epi: x / Bacteria: x

## 2024-01-24 NOTE — PROGRESS NOTE ADULT - NS ATTEND AMEND GEN_ALL_CORE FT
I have seen and examined this patient with the SICU team.  No acute events overnight.  Patient appears well this morning.  Neuro:  shunt externalization, appropriate output.  No deficits at this time.  Pain is well-controlled.  Plan to re-internalize at [place]. Behavioral health consult for depression.  Card: Sinus tachycardia resolved, now NSR. No hypotension at this time.    Pulm: Saturating well on room air.  GI: Now on CLD and tolerating.  Ostomy with 200cc output. IVF off.  FRITZ with minimal serosanguinous output.    : S/p repair of bladder with partial cystectomy, Sharp and SPC in place, UOP 125408pm/24 hours.   Heme: Trend Hgb, transfuse PRN.   ID: Will continue Meropenem for now, stop date 1/27.    Lovenox for DVT PPx.  DC ERIC. line.    Dispo: Continued SICU care.

## 2024-01-24 NOTE — PROGRESS NOTE ADULT - ASSESSMENT
Assessment and Plan:     29yo male with PMHx of spina bifida (wheelchair bound at baseline), scoliosis,  Shunt BIBEMS 1/14 with abdominal pain, n/v, and lethargy, found to have severe SBO, metabolic acidosis, and to be in septic and hypovolemic shock. Patient was brought to the OR 1/14 for ex lap and small bowel resection, c/b bladder perf with primary repair and partial colectomy and admitted to SICU post-op for further management. Now with mixed septic/hypovolemic shock, pneumoperitoneum, suspected UGIB. RTOR 1/22 for bladder repair, castellano’s and EGD.         Neuro:   C/w pain control, tylenol prn. Patient requires minimal medication.  Multimodal pain regimen    Hx of  shunt now externalized - open to drain, monitor output.      CV:   Septic shock and sinus tachycardia- resolved.    Maintain MAP > 65.  HAGMA w/o lactate production. Acetone WNL- possibly secondary to large volume resuscitation crystalloid improving slowly     Pulm:   COVID - resolved, sating well on RA.   Will continue pulmonary toilet, IS, OOBTC      GI/Nutrition:   SBO s/p SBR, hartmans and bladder repair - NGT removed, keep NPO w/ sips, awaiting flatus from colostomy, possible advance diet today with bariatric clears.  Will d/w surgeon.   S/p EGD found to have gastritis - continue protonix bid   Monitor FRITZ quality and quantity and colostomy.     /Renal:   Hx of urethral stricture now with bladder injury s/p repair x 2   - 1/21 - Pt with oliguria and cline in prostate. Urology repositioned x 2. Cline upsized to 18F from 10F cline by urology.    - SPT placed 1/22   - Do not flush cline or suprapubic tube, contact urology for drop in output    -D5LR at 75 mL/hr while NPO   - Monitor kidney fxn    - Monitor UOP    - Replete electrolytes PRN (Mg >2, Phos >3, K >4)    - Cline for strict I&O      ID:    #Sepsis    - Source unknown - COVID +, + UCx (<50k colonies), RVP NEG. Possible intraabdominal infection or VPS infection?     - Meropenum for ppx    - Monitor fever curve - APAP and NSAIDs for fever    - Monitor for leukocytosis    - Blood cx 1/19 NGTD    - CSF cx 1/21 no growth       Endo:    - Maintain euglycemia    - Monitor blood glucose      Heme/DVT Prophylaxis:   - SCDs    - Monitor H/H, Monitor Coags    - Recieved 2 units PRBC intra-operatively 1/22   - Maintain Hgb >7.0    - Heparin SQ q8     Skin:    - Repositioning for DTI prevention while in bed      Lines:    - Peripheral IV's    - RIJ Cordis   - Left axillary artery a-line      Dispo:    - Care per SICU      CODE STATUS: FULL        Assessment and Plan:     29yo male with PMHx of spina bifida (wheelchair bound at baseline), scoliosis,  Shunt BIBEMS 1/14 with abdominal pain, n/v, and lethargy, found to have severe SBO, metabolic acidosis, and to be in septic and hypovolemic shock. Patient was brought to the OR 1/14 for ex lap and small bowel resection, c/b bladder perf with primary repair and partial colectomy and admitted to SICU post-op for further management. Now with mixed septic/hypovolemic shock, pneumoperitoneum, suspected UGIB. RTOR 1/22 for bladder repair, castellano’s and EGD.         Neuro:   C/w pain control, tylenol prn. Patient requires minimal medication.  Multimodal pain regimen    Hx of  shunt now externalized - open to drain, monitor output.      CV:   Septic shock and sinus tachycardia- resolved.    Maintain MAP > 65.  HAGMA w/o lactate production. Acetone WNL- possibly secondary to large volume resuscitation crystalloid improving slowly     Pulm:   COVID - resolved, sating well on RA.   Will continue pulmonary toilet, IS, OOBTC      GI/Nutrition:   SBO s/p SBR, hartmans and bladder repair - NGT removed, keep NPO w/ sips, awaiting flatus from colostomy, possible advance diet today with bariatric clears.  Will d/w surgeon.   S/p EGD found to have gastritis - continue protonix bid   Monitor FRITZ quality and quantity and colostomy.     /Renal:   Hx of urethral stricture now with bladder injury s/p repair x 2, SPT tube placement and cline 18F.  Patient must remain decompressed until bladder injury has healed.  Continue to monitor UOP and i/o's. GALEN resolved.     Endo:  Maintain euglycemia 120-180.     ID:  Sepsis secondary to intraabdominal source. Now with source control, continue meropenum. F/u finalization of all cultures, NGTD.   Blood cx 1/19 NGTD,  CSF cx 1/21 no growth     Urine likely colonized however, given  shunt will treat.       Heme/DVT Prophylaxis: SCDs, Monitor H/H, Monitor Coags    SQH.     Skin:  Repositioning for DTI prevention while in bed      Lines:   Peripheral IV's, Left axillary artery a-line      Dispo:   SICU      CODE STATUS: FULL

## 2024-01-24 NOTE — PROGRESS NOTE ADULT - SUBJECTIVE AND OBJECTIVE BOX
Urology f/u:  Above notes noted:    POD# 2 s/p   Patient seen and examined this am.  Awake and responsive in bed - in no acute distress.    Vital Signs Last 24 Hrs  T(C): 36.7 (24 Jan 2024 07:28), Max: 37.1 (23 Jan 2024 19:38)  T(F): 98 (24 Jan 2024 07:28), Max: 98.8 (23 Jan 2024 19:38)  HR: 91 (24 Jan 2024 09:00) (78 - 107)  BP: --  BP(mean): --  RR: 17 (24 Jan 2024 09:00) (12 - 22)  SpO2: 100% (24 Jan 2024 09:00) (95% - 100%)    Parameters below as of 24 Jan 2024 08:00  Patient On (Oxygen Delivery Method): room air    Abdomen:  flat, dressing stable and intact, denies pain on palpation, RLQ  SPT in place and functioning well with light pink output.  : cline catheter in place with good output noted urine yellow.     I&O's Detail    23 Jan 2024 07:01  -  24 Jan 2024 07:00  OUT:    Indwelling Catheter - Suprapubic (mL): 640 mL    Indwelling Catheter - Urethral (mL): 825 mL       24 Jan 2024 07:01  -  24 Jan 2024 09:59  --------------------------------------------------------  OUT: this am     Indwelling Catheter - Suprapubic (mL): 80 mL    Indwelling Catheter - Urethral (mL): 225 mL      Labs:                        10.4   7.52  )-----------( 417      ( 24 Jan 2024 03:00 )             32.0     01-24    144  |  112<H>  |  10.2  ----------------------------<  96  3.4<L>   |  21.0<L>  |  0.56      Impression:  Stable POD# 2, tolerated surgery well, improved.  Good urine output.  ( SPT, cline stable )   Plan:  Continue present care and management as per primary team ; SPT and cline to remain at present for continued decompression of bladder as continues to improve to consider capping SPT near future.  We will continue to follow q2-3 days while in hospital.  thank you.              Urology f/u:  Above notes noted:    POD# 2 s/p   Patient seen and examined this am.  Awake and responsive in bed - in no acute distress.    Vital Signs Last 24 Hrs  T(C): 36.7 (24 Jan 2024 07:28), Max: 37.1 (23 Jan 2024 19:38)  T(F): 98 (24 Jan 2024 07:28), Max: 98.8 (23 Jan 2024 19:38)  HR: 91 (24 Jan 2024 09:00) (78 - 107)  BP: --  BP(mean): --  RR: 17 (24 Jan 2024 09:00) (12 - 22)  SpO2: 100% (24 Jan 2024 09:00) (95% - 100%)    Parameters below as of 24 Jan 2024 08:00  Patient On (Oxygen Delivery Method): room air    Abdomen:  flat, dressing stable and intact, denies pain on palpation, RLQ  SPT in place and functioning well with light pink output.  : cline catheter in place with good output noted urine yellow.     I&O's Detail    23 Jan 2024 07:01  -  24 Jan 2024 07:00  OUT:    Indwelling Catheter - Suprapubic (mL): 640 mL    Indwelling Catheter - Urethral (mL): 825 mL       24 Jan 2024 07:01  -  24 Jan 2024 09:59  --------------------------------------------------------  OUT: this am     Indwelling Catheter - Suprapubic (mL): 80 mL    Indwelling Catheter - Urethral (mL): 225 mL      Labs:                        10.4   7.52  )-----------( 417      ( 24 Jan 2024 03:00 )             32.0     01-24    144  |  112<H>  |  10.2  ----------------------------<  96  3.4<L>   |  21.0<L>  |  0.56      Impression:  Stable POD# 2, tolerated surgery well, improved.  Good urine output.  ( SPT, cline stable )   Plan:  Continue present care and management as per primary team ; SPT and cline to remain at present for continued decompression of bladder as continues to improve to consider capping SPT near future.  We will continue to follow q2-3/ week  while in hospital.  thank you.

## 2024-01-25 DIAGNOSIS — N99.72 ACCIDENTAL PUNCTURE AND LACERATION OF A GENITOURINARY SYSTEM ORGAN OR STRUCTURE DURING OTHER PROCEDURE: ICD-10-CM

## 2024-01-25 LAB
ANION GAP SERPL CALC-SCNC: 12 MMOL/L — SIGNIFICANT CHANGE UP (ref 5–17)
ANISOCYTOSIS BLD QL: SLIGHT — SIGNIFICANT CHANGE UP
BASOPHILS # BLD AUTO: 0.14 K/UL — SIGNIFICANT CHANGE UP (ref 0–0.2)
BASOPHILS NFR BLD AUTO: 1.7 % — SIGNIFICANT CHANGE UP (ref 0–2)
BLD GP AB SCN SERPL QL: SIGNIFICANT CHANGE UP
BUN SERPL-MCNC: 7.6 MG/DL — LOW (ref 8–20)
CALCIUM SERPL-MCNC: 7.5 MG/DL — LOW (ref 8.4–10.5)
CHLORIDE SERPL-SCNC: 110 MMOL/L — HIGH (ref 96–108)
CO2 SERPL-SCNC: 22 MMOL/L — SIGNIFICANT CHANGE UP (ref 22–29)
CREAT SERPL-MCNC: 0.48 MG/DL — LOW (ref 0.5–1.3)
EGFR: 142 ML/MIN/1.73M2 — SIGNIFICANT CHANGE UP
EOSINOPHIL # BLD AUTO: 0.08 K/UL — SIGNIFICANT CHANGE UP (ref 0–0.5)
EOSINOPHIL NFR BLD AUTO: 0.9 % — SIGNIFICANT CHANGE UP (ref 0–6)
GIANT PLATELETS BLD QL SMEAR: PRESENT — SIGNIFICANT CHANGE UP
GLUCOSE BLDC GLUCOMTR-MCNC: 101 MG/DL — HIGH (ref 70–99)
GLUCOSE BLDC GLUCOMTR-MCNC: 117 MG/DL — HIGH (ref 70–99)
GLUCOSE BLDC GLUCOMTR-MCNC: 127 MG/DL — HIGH (ref 70–99)
GLUCOSE BLDC GLUCOMTR-MCNC: 84 MG/DL — SIGNIFICANT CHANGE UP (ref 70–99)
GLUCOSE SERPL-MCNC: 103 MG/DL — HIGH (ref 70–99)
HCT VFR BLD CALC: 31.4 % — LOW (ref 39–50)
HGB BLD-MCNC: 10.2 G/DL — LOW (ref 13–17)
LYMPHOCYTES # BLD AUTO: 0.87 K/UL — LOW (ref 1–3.3)
LYMPHOCYTES # BLD AUTO: 10.4 % — LOW (ref 13–44)
MAGNESIUM SERPL-MCNC: 1.8 MG/DL — SIGNIFICANT CHANGE UP (ref 1.6–2.6)
MANUAL SMEAR VERIFICATION: SIGNIFICANT CHANGE UP
MCHC RBC-ENTMCNC: 27.3 PG — SIGNIFICANT CHANGE UP (ref 27–34)
MCHC RBC-ENTMCNC: 32.5 GM/DL — SIGNIFICANT CHANGE UP (ref 32–36)
MCV RBC AUTO: 84.2 FL — SIGNIFICANT CHANGE UP (ref 80–100)
MONOCYTES # BLD AUTO: 0.37 K/UL — SIGNIFICANT CHANGE UP (ref 0–0.9)
MONOCYTES NFR BLD AUTO: 4.4 % — SIGNIFICANT CHANGE UP (ref 2–14)
MYELOCYTES NFR BLD: 1.7 % — HIGH (ref 0–0)
NEUTROPHILS # BLD AUTO: 6.7 K/UL — SIGNIFICANT CHANGE UP (ref 1.8–7.4)
NEUTROPHILS NFR BLD AUTO: 79.1 % — HIGH (ref 43–77)
NEUTS BAND # BLD: 0.9 % — SIGNIFICANT CHANGE UP (ref 0–8)
OVALOCYTES BLD QL SMEAR: SLIGHT — SIGNIFICANT CHANGE UP
PHOSPHATE SERPL-MCNC: 1.6 MG/DL — LOW (ref 2.4–4.7)
PLAT MORPH BLD: NORMAL — SIGNIFICANT CHANGE UP
PLATELET # BLD AUTO: 441 K/UL — HIGH (ref 150–400)
POIKILOCYTOSIS BLD QL AUTO: SLIGHT — SIGNIFICANT CHANGE UP
POLYCHROMASIA BLD QL SMEAR: SLIGHT — SIGNIFICANT CHANGE UP
POTASSIUM SERPL-MCNC: 3.6 MMOL/L — SIGNIFICANT CHANGE UP (ref 3.5–5.3)
POTASSIUM SERPL-SCNC: 3.6 MMOL/L — SIGNIFICANT CHANGE UP (ref 3.5–5.3)
RBC # BLD: 3.73 M/UL — LOW (ref 4.2–5.8)
RBC # FLD: 16.1 % — HIGH (ref 10.3–14.5)
RBC BLD AUTO: ABNORMAL
SMUDGE CELLS # BLD: PRESENT — SIGNIFICANT CHANGE UP
SODIUM SERPL-SCNC: 144 MMOL/L — SIGNIFICANT CHANGE UP (ref 135–145)
VARIANT LYMPHS # BLD: 0.9 % — SIGNIFICANT CHANGE UP (ref 0–6)
WBC # BLD: 8.38 K/UL — SIGNIFICANT CHANGE UP (ref 3.8–10.5)
WBC # FLD AUTO: 8.38 K/UL — SIGNIFICANT CHANGE UP (ref 3.8–10.5)

## 2024-01-25 PROCEDURE — 99291 CRITICAL CARE FIRST HOUR: CPT

## 2024-01-25 PROCEDURE — 99024 POSTOP FOLLOW-UP VISIT: CPT

## 2024-01-25 RX ORDER — SODIUM CHLORIDE 9 MG/ML
500 INJECTION, SOLUTION INTRAVENOUS ONCE
Refills: 0 | Status: COMPLETED | OUTPATIENT
Start: 2024-01-25 | End: 2024-01-25

## 2024-01-25 RX ORDER — POTASSIUM PHOSPHATE, MONOBASIC POTASSIUM PHOSPHATE, DIBASIC 236; 224 MG/ML; MG/ML
30 INJECTION, SOLUTION INTRAVENOUS ONCE
Refills: 0 | Status: COMPLETED | OUTPATIENT
Start: 2024-01-25 | End: 2024-01-25

## 2024-01-25 RX ORDER — ENOXAPARIN SODIUM 100 MG/ML
40 INJECTION SUBCUTANEOUS
Qty: 1 | Refills: 0
Start: 2024-01-25

## 2024-01-25 RX ORDER — MAGNESIUM SULFATE 500 MG/ML
2 VIAL (ML) INJECTION ONCE
Refills: 0 | Status: COMPLETED | OUTPATIENT
Start: 2024-01-25 | End: 2024-01-25

## 2024-01-25 RX ORDER — MEROPENEM 1 G/30ML
1000 INJECTION INTRAVENOUS
Qty: 0 | Refills: 0 | DISCHARGE
Start: 2024-01-25

## 2024-01-25 RX ORDER — PANTOPRAZOLE SODIUM 20 MG/1
40 TABLET, DELAYED RELEASE ORAL
Qty: 0 | Refills: 0 | DISCHARGE
Start: 2024-01-25

## 2024-01-25 RX ORDER — SODIUM CHLORIDE 9 MG/ML
1000 INJECTION, SOLUTION INTRAVENOUS
Refills: 0 | Status: DISCONTINUED | OUTPATIENT
Start: 2024-01-25 | End: 2024-01-27

## 2024-01-25 RX ORDER — SODIUM CHLORIDE 9 MG/ML
1000 INJECTION, SOLUTION INTRAVENOUS ONCE
Refills: 0 | Status: COMPLETED | OUTPATIENT
Start: 2024-01-25 | End: 2024-01-25

## 2024-01-25 RX ORDER — ACETAMINOPHEN 500 MG
100 TABLET ORAL
Qty: 0 | Refills: 0 | DISCHARGE
Start: 2024-01-25

## 2024-01-25 RX ORDER — CHLORHEXIDINE GLUCONATE 213 G/1000ML
1 SOLUTION TOPICAL
Qty: 0 | Refills: 0 | DISCHARGE
Start: 2024-01-25

## 2024-01-25 RX ORDER — PSYLLIUM SEED (WITH DEXTROSE)
1 POWDER (GRAM) ORAL DAILY
Refills: 0 | Status: DISCONTINUED | OUTPATIENT
Start: 2024-01-25 | End: 2024-02-11

## 2024-01-25 RX ADMIN — SODIUM CHLORIDE 500 MILLILITER(S): 9 INJECTION, SOLUTION INTRAVENOUS at 09:58

## 2024-01-25 RX ADMIN — SODIUM CHLORIDE 75 MILLILITER(S): 9 INJECTION, SOLUTION INTRAVENOUS at 18:46

## 2024-01-25 RX ADMIN — PANTOPRAZOLE SODIUM 40 MILLIGRAM(S): 20 TABLET, DELAYED RELEASE ORAL at 05:47

## 2024-01-25 RX ADMIN — Medication 25 GRAM(S): at 05:47

## 2024-01-25 RX ADMIN — SODIUM CHLORIDE 2000 MILLILITER(S): 9 INJECTION, SOLUTION INTRAVENOUS at 03:02

## 2024-01-25 RX ADMIN — CHLORHEXIDINE GLUCONATE 1 APPLICATION(S): 213 SOLUTION TOPICAL at 13:03

## 2024-01-25 RX ADMIN — SODIUM CHLORIDE 1000 MILLILITER(S): 9 INJECTION, SOLUTION INTRAVENOUS at 21:40

## 2024-01-25 RX ADMIN — SODIUM CHLORIDE 500 MILLILITER(S): 9 INJECTION, SOLUTION INTRAVENOUS at 14:25

## 2024-01-25 RX ADMIN — MEROPENEM 1000 MILLIGRAM(S): 1 INJECTION INTRAVENOUS at 13:02

## 2024-01-25 RX ADMIN — PANTOPRAZOLE SODIUM 40 MILLIGRAM(S): 20 TABLET, DELAYED RELEASE ORAL at 17:30

## 2024-01-25 RX ADMIN — MEROPENEM 1000 MILLIGRAM(S): 1 INJECTION INTRAVENOUS at 05:47

## 2024-01-25 RX ADMIN — ENOXAPARIN SODIUM 40 MILLIGRAM(S): 100 INJECTION SUBCUTANEOUS at 17:27

## 2024-01-25 RX ADMIN — MEROPENEM 1000 MILLIGRAM(S): 1 INJECTION INTRAVENOUS at 21:37

## 2024-01-25 RX ADMIN — POTASSIUM PHOSPHATE, MONOBASIC POTASSIUM PHOSPHATE, DIBASIC 83.33 MILLIMOLE(S): 236; 224 INJECTION, SOLUTION INTRAVENOUS at 05:47

## 2024-01-25 NOTE — DISCHARGE NOTE PROVIDER - DETAILS OF MALNUTRITION DIAGNOSIS/DIAGNOSES
This patient has been assessed with a concern for Malnutrition and was treated during this hospitalization for the following Nutrition diagnosis/diagnoses:     -  01/23/2024: Severe protein-calorie malnutrition

## 2024-01-25 NOTE — DISCHARGE NOTE PROVIDER - NSFOLLOWUPCLINICS_GEN_ALL_ED_FT
[facility] Acute Care Surgery  Acute Care Surgery  [address] [place] [address]  Phone: [phone]  Fax:

## 2024-01-25 NOTE — DISCHARGE NOTE PROVIDER - NSDCMRMEDTOKEN_GEN_ALL_CORE_FT
acetaminophen 10 mg/mL intravenous solution: 100 milliliter(s) intravenous every 6 hours As needed Temp greater or equal to 38C (100.4F)  chlorhexidine 2% topical pad: 1 Apply topically to affected area once a day  meropenem 1000 mg intravenous injection: 1000 milligram(s) intravenous every 8 hours  pantoprazole 40 mg intravenous injection: 40 milligram(s) intravenous 2 times a day

## 2024-01-25 NOTE — DISCHARGE NOTE PROVIDER - YES NO FOR MLM POSITIVE OR NEGATIVE COVID RESULT
"Chief Complaint   Patient presents with     Musculoskeletal Problem       Initial /80  Pulse 81  Temp 98.1  F (36.7  C) (Oral)  Ht 5' 10.5\" (1.791 m)  Wt 219 lb (99.3 kg)  SpO2 97%  BMI 30.98 kg/m2 Estimated body mass index is 30.98 kg/(m^2) as calculated from the following:    Height as of this encounter: 5' 10.5\" (1.791 m).    Weight as of this encounter: 219 lb (99.3 kg).  Medication Reconciliation: complete  "
.

## 2024-01-25 NOTE — DISCHARGE NOTE PROVIDER - NSDCCPCAREPLAN_GEN_ALL_CORE_FT
PRINCIPAL DISCHARGE DIAGNOSIS  Diagnosis: SBO (small bowel obstruction)  Assessment and Plan of Treatment: s/p Kirkpatrick's 1/22/24.   Follow up with ACS clinic 2 weeks after discharge. Please call to make an appointment. Also, please call to make an appointment with your primary care physician as per your usual schedule.   Diet: May continue regular diet with fiber supplementation to bulk stool.   Continue to monitor ostomy output and quality. Replete volume as needed.      SECONDARY DISCHARGE DIAGNOSES  Diagnosis: Presence of programmable ventriculoperitoneal shunt  Assessment and Plan of Treatment:  shunt now externalized and atached to EVD set to continuously drain. DO NOT clamp EVD. To be internalized at the discretion of the primary neruosurgical team at [facility].    Diagnosis: H/O spina bifida  Assessment and Plan of Treatment:     Diagnosis: SBO (small bowel obstruction)  Assessment and Plan of Treatment:     Diagnosis: Septic shock  Assessment and Plan of Treatment: Septic shock resolved with source control and IV ABX. Continue Meropenam through 1/27/23 (4 days post-operatively). Pt now hemodynamically stable, off all pressors since 1/23/24. Coninue to closely monitor vital signs. Maintain MAPs >65. Continue to monitor Urine output from both intraurethral cline catheter as well as supra-pubic catheter.

## 2024-01-25 NOTE — PROGRESS NOTE ADULT - NS ATTEND AMEND GEN_ALL_CORE FT
NSGY Attg:    see above    patient seen and examined    agree with above    plan of care determined for externalized  shunt  continue CSF drainage  patient wishes to return to [facility] for additional shunt intervention  recommend transfer to [facility] when stable from ACS perspective

## 2024-01-25 NOTE — CHART NOTE - NSCHARTNOTEFT_GEN_A_CORE
Arterial Line Removal Note    PROCEDURE NOTE:  Arterial Line Removal    Site:    [L]    Axillary Arterial Line    Explained the procedure. Dressing taken down, sutures removed, catheter removed and tip intact. Pressure applied for greater than 5 mins, no hematoma or bleeding noted. Patient tolerated procedure well. A dry sterile dressing applied.

## 2024-01-25 NOTE — DISCHARGE NOTE PROVIDER - CARE PROVIDER_API CALL
Charlie Abbott  Urology  [address], [address]  [place] [address]  Phone: [phone]  Fax: [phone]  Established Patient  Follow Up Time: Routine

## 2024-01-25 NOTE — PROGRESS NOTE ADULT - NS ATTEND AMEND GEN_ALL_CORE FT
I have seen and examined this patient with the SICU team.  No acute events overnight.  Patient appears well this morning.  Neuro:  shunt externalization, appropriate output.  No deficits at this time.  Pain is well-controlled.  Plan to re-internalize at [place]. Behavioral health consult for depression, awaiting recs.  Card: Intermittent sinus tachycardia, likely from hypovolemia.  Responds appropriately with IVF bolus.  No hypotension at this time.    Pulm: Saturating well on room air.  GI: Now on CLD and tolerating.  Will ADAT to regular diet and add fiber supplements.  Ostomy with >1000cc output, bolused to replete.  FRITZ with minimal serosanguinous output, will DC FRITZ.   : S/p repair of bladder with partial cystectomy, Sharp and SPC in place, UOP 2500cc/24 hours.   Heme: Trend Hgb, transfuse PRN.   ID: Will continue Meropenem for now, stop date 1/27.    Lovenox for DVT PPx.  DC A. line.    Dispo: Continued SICU care, transfer to [facility]

## 2024-01-25 NOTE — PROGRESS NOTE ADULT - SUBJECTIVE AND OBJECTIVE BOX
INTERVAL HPI/OVERNIGHT EVENTS:  Patient appears well. Patient states he has no abdominal pain. Patient denies nausea, vomiting.  Patient has +sweat in colostomy however, no gas or output yet. Patient's FRITZ with serosanginous output. EVD draining adequately. SPT and cline with adequate output. Remains hemodynamically well, tachycardia resolved. Patient's dressing changed, ostomy dark liquid output.    PAST MEDICAL & SURGICAL HISTORY:  Spina bifida  Scoliosis    MEDICATIONS  (STANDING):  chlorhexidine 2% Cloths 1 Application(s) Topical daily  enoxaparin Injectable 40 milliGRAM(s) SubCutaneous every 24 hours  meropenem Injectable 1000 milliGRAM(s) IV Push every 8 hours  pantoprazole  Injectable 40 milliGRAM(s) IV Push two times a day    MEDICATIONS  (PRN):  acetaminophen   IVPB .. 1000 milliGRAM(s) IV Intermittent every 6 hours PRN Temp greater or equal to 38C (100.4F)    ICU Vital Signs Last 24 Hrs  T(C): 37.2 (24 Jan 2024 19:06), Max: 37.2 (24 Jan 2024 15:18)  T(F): 98.9 (24 Jan 2024 19:06), Max: 98.9 (24 Jan 2024 15:18)  HR: 104 (24 Jan 2024 22:00) (78 - 119)  BP: --  BP(mean): --  ABP: 102/58 (24 Jan 2024 22:00) (91/54 - 117/72)  ABP(mean): 75 (24 Jan 2024 22:00) (67 - 92)  RR: 16 (24 Jan 2024 22:00) (12 - 21)  SpO2: 97% (24 Jan 2024 22:00) (97% - 100%)    O2 Parameters below as of 24 Jan 2024 20:00  Patient On (Oxygen Delivery Method): room air    Drug Dosing Weight  Height (cm): 132.1 (14 Jan 2024 09:30)  Weight (kg): 70 (14 Jan 2024 07:48)  BMI (kg/m2): 40.1 (14 Jan 2024 09:30)  BSA (m2): 1.51 (14 Jan 2024 09:30)    CENTRAL LINE: [ ] YES [ ] NO  LOCATION:   DATE INSERTED:  REMOVE: [ ] YES [ ] NO  EXPLAIN:    CLINE: [ ] YES [ ] NO    DATE INSERTED:  REMOVE: [ ] YES [ ] NO  EXPLAIN:    A-LINE: [ ] YES [ ] NO  LOCATION:   DATE INSERTED:  REMOVE: [ ] YES [ ] NO  EXPLAIN:    I&O's Detail    23 Jan 2024 07:01  -  24 Jan 2024 07:00  --------------------------------------------------------  IN:    dextrose 5% + lactated ringers: 1800 mL    IV PiggyBack: 333.2 mL    IV PiggyBack: 166.6 mL  Total IN: 2299.8 mL    OUT:    Bulb (mL): 60 mL    Colostomy (mL): 200 mL    External Ventricular Device (mL): 249 mL    Indwelling Catheter - Suprapubic (mL): 640 mL    Indwelling Catheter - Urethral (mL): 825 mL    Nasogastric/Oral tube (mL): 425 mL    Norepinephrine: 0 mL  Total OUT: 2399 mL    Total NET: -99.2 mL    24 Jan 2024 07:01  -  24 Jan 2024 22:24  --------------------------------------------------------  IN:    dextrose 5% + lactated ringers: 300 mL    IV PiggyBack: 416.5 mL    Oral Fluid: 1080 mL  Total IN: 1796.5 mL    OUT:    Bulb (mL): 15 mL    Colostomy (mL): 800 mL    External Ventricular Device (mL): 138 mL    Indwelling Catheter - Suprapubic (mL): 570 mL    Indwelling Catheter - Urethral (mL): 1450 mL  Total OUT: 2973 mL    Total NET: -1176.5 mL    Physical Exam:  Cons: NAD, resting comfortably     Neurological:  GCS  15   CAM  +    RASS 0   b/l lower extremities with plegia, decreased sensation T8 down (baseline)    HEENT:  EOMI     Respiratory: Unlabored, poor inspiratory effort, no accessory muscle use    Cardiovascular: Regular rate & rhythm    Gastrointestinal: Softly distended, nttp, FRITZ with serosanginous output, colostomy pink, viable, +sweat    : SPT in place draining clear yellow urine, cline in place with clear yellow urine     Extremities: Mild peripheral edema of b/l feet, shorted/ contracted (baseline)     Vascular: Equal and normal pulses: 2+ peripheral pulses throughout    Skin: No rashes. Dressing changed.      LABS:  CBC Full  -  ( 24 Jan 2024 03:00 )  WBC Count : 7.52 K/uL  RBC Count : 3.83 M/uL  Hemoglobin : 10.4 g/dL  Hematocrit : 32.0 %  Platelet Count - Automated : 417 K/uL  Mean Cell Volume : 83.6 fl  Mean Cell Hemoglobin : 27.2 pg  Mean Cell Hemoglobin Concentration : 32.5 gm/dL  Auto Neutrophil # : 5.30 K/uL  Auto Lymphocyte # : 1.41 K/uL  Auto Monocyte # : 0.60 K/uL  Auto Eosinophil # : 0.07 K/uL  Auto Basophil # : 0.00 K/uL  Auto Neutrophil % : 70.5 %  Auto Lymphocyte % : 18.8 %  Auto Monocyte % : 8.0 %  Auto Eosinophil % : 0.9 %  Auto Basophil % : 0.0 %    01-24    144  |  112<H>  |  10.2  ----------------------------<  96  3.4<L>   |  21.0<L>  |  0.56    Ca    7.1<L>      24 Jan 2024 03:00  Phos  1.6     01-24  Mg     2.0     01-24      PT/INR - ( 24 Jan 2024 03:00 )   PT: 14.7 sec;   INR: 1.34 ratio         PTT - ( 24 Jan 2024 03:00 )  PTT:29.8 sec  Urinalysis Basic - ( 24 Jan 2024 03:00 )    Color: x / Appearance: x / SG: x / pH: x  Gluc: 96 mg/dL / Ketone: x  / Bili: x / Urobili: x   Blood: x / Protein: x / Nitrite: x   Leuk Esterase: x / RBC: x / WBC x   Sq Epi: x / Non Sq Epi: x / Bacteria: x         INTERVAL HPI/OVERNIGHT EVENTS:  Patient appears well. Patient states he has no abdominal pain. Patient denies nausea, vomiting. Patient clear liquid diet.  Patient has high colostomy output which is bilious. Output >1L yesterday. Patient negative 1.6L. Patient with sinus tachycardia and MAPs 60-65. Given 1L bolus with improvement in vitals.    Patient's FRITZ continues with serosanginous output. EVD open to drain. Remains hemodynamically well.  SPT and cline also with adequate output.  Patient's dressing changed.     PAST MEDICAL & SURGICAL HISTORY:  Spina bifida  Scoliosis    MEDICATIONS  (STANDING):  chlorhexidine 2% Cloths 1 Application(s) Topical daily  enoxaparin Injectable 40 milliGRAM(s) SubCutaneous every 24 hours  meropenem Injectable 1000 milliGRAM(s) IV Push every 8 hours  pantoprazole  Injectable 40 milliGRAM(s) IV Push two times a day    MEDICATIONS  (PRN):  acetaminophen   IVPB .. 1000 milliGRAM(s) IV Intermittent every 6 hours PRN Temp greater or equal to 38C (100.4F)    ICU Vital Signs Last 24 Hrs  T(C): 37.2 (24 Jan 2024 19:06), Max: 37.2 (24 Jan 2024 15:18)  T(F): 98.9 (24 Jan 2024 19:06), Max: 98.9 (24 Jan 2024 15:18)  HR: 104 (24 Jan 2024 22:00) (78 - 119)  BP: --  BP(mean): --  ABP: 102/58 (24 Jan 2024 22:00) (91/54 - 117/72)  ABP(mean): 75 (24 Jan 2024 22:00) (67 - 92)  RR: 16 (24 Jan 2024 22:00) (12 - 21)  SpO2: 97% (24 Jan 2024 22:00) (97% - 100%)    O2 Parameters below as of 24 Jan 2024 20:00  Patient On (Oxygen Delivery Method): room air    Drug Dosing Weight  Height (cm): 132.1 (14 Jan 2024 09:30)  Weight (kg): 70 (14 Jan 2024 07:48)  BMI (kg/m2): 40.1 (14 Jan 2024 09:30)  BSA (m2): 1.51 (14 Jan 2024 09:30)    CENTRAL LINE: [ ] YES [ ] NO  LOCATION:   DATE INSERTED:  REMOVE: [ ] YES [ ] NO  EXPLAIN:    CLINE: [ ] YES [ ] NO    DATE INSERTED:  REMOVE: [ ] YES [ ] NO  EXPLAIN:    A-LINE: [ ] YES [ ] NO  LOCATION:   DATE INSERTED:  REMOVE: [ ] YES [ ] NO  EXPLAIN:    I&O's Detail    23 Jan 2024 07:01  -  24 Jan 2024 07:00  --------------------------------------------------------  IN:    dextrose 5% + lactated ringers: 1800 mL    IV PiggyBack: 333.2 mL    IV PiggyBack: 166.6 mL  Total IN: 2299.8 mL    OUT:    Bulb (mL): 60 mL    Colostomy (mL): 200 mL    External Ventricular Device (mL): 249 mL    Indwelling Catheter - Suprapubic (mL): 640 mL    Indwelling Catheter - Urethral (mL): 825 mL    Nasogastric/Oral tube (mL): 425 mL    Norepinephrine: 0 mL  Total OUT: 2399 mL    Total NET: -99.2 mL    24 Jan 2024 07:01  -  24 Jan 2024 22:24  --------------------------------------------------------  IN:    dextrose 5% + lactated ringers: 300 mL    IV PiggyBack: 416.5 mL    Oral Fluid: 1080 mL  Total IN: 1796.5 mL    OUT:    Bulb (mL): 15 mL    Colostomy (mL): 800 mL    External Ventricular Device (mL): 138 mL    Indwelling Catheter - Suprapubic (mL): 570 mL    Indwelling Catheter - Urethral (mL): 1450 mL  Total OUT: 2973 mL    Total NET: -1176.5 mL    Physical Exam:  Cons: NAD, resting comfortably     Neurological:  GCS  15   CAM  negative    RASS 0   b/l lower extremities with plegia, decreased sensation T8 down (baseline)    HEENT:  EOMI     Respiratory: Unlabored, poor inspiratory effort, no accessory muscle use    Cardiovascular: Regular rate & rhythm    Gastrointestinal: Softly distended, nttp, FRITZ with serosanginous output, colostomy pink, viable, +sweat, + bilious liquid output     : SPT in place draining clear yellow urine, cline in place with clear yellow urine     Extremities: Mild peripheral edema of b/l feet, shorted/ contracted (baseline)     Vascular: Equal and normal pulses: 2+ peripheral pulses throughout    Skin: No rashes. Dressing changed.      LABS:  CBC Full  -  ( 24 Jan 2024 03:00 )  WBC Count : 7.52 K/uL  RBC Count : 3.83 M/uL  Hemoglobin : 10.4 g/dL  Hematocrit : 32.0 %  Platelet Count - Automated : 417 K/uL  Mean Cell Volume : 83.6 fl  Mean Cell Hemoglobin : 27.2 pg  Mean Cell Hemoglobin Concentration : 32.5 gm/dL  Auto Neutrophil # : 5.30 K/uL  Auto Lymphocyte # : 1.41 K/uL  Auto Monocyte # : 0.60 K/uL  Auto Eosinophil # : 0.07 K/uL  Auto Basophil # : 0.00 K/uL  Auto Neutrophil % : 70.5 %  Auto Lymphocyte % : 18.8 %  Auto Monocyte % : 8.0 %  Auto Eosinophil % : 0.9 %  Auto Basophil % : 0.0 %    01-24    144  |  112<H>  |  10.2  ----------------------------<  96  3.4<L>   |  21.0<L>  |  0.56    Ca    7.1<L>      24 Jan 2024 03:00  Phos  1.6     01-24  Mg     2.0     01-24      PT/INR - ( 24 Jan 2024 03:00 )   PT: 14.7 sec;   INR: 1.34 ratio         PTT - ( 24 Jan 2024 03:00 )  PTT:29.8 sec  Urinalysis Basic - ( 24 Jan 2024 03:00 )    Color: x / Appearance: x / SG: x / pH: x  Gluc: 96 mg/dL / Ketone: x  / Bili: x / Urobili: x   Blood: x / Protein: x / Nitrite: x   Leuk Esterase: x / RBC: x / WBC x   Sq Epi: x / Non Sq Epi: x / Bacteria: x

## 2024-01-25 NOTE — DISCHARGE NOTE PROVIDER - HOSPITAL COURSE
28 year old male with PMH of spina bifida and scoliosis (wheelchair bound at baseline) and prior SBO with SBR presented to the ED on 1/14/24 with nausea, vomiting, abdominal pain, and lethargy. On arrival pt was hypotensive, tachycardic and had a severe metabolic acidosis. CT A/P showed Severe small bowel obstruction with a transition point in the right mid abdomen. Pt was brought to the SICU for pre-op resuscitation and went to the OR later on 1/14 and was found to have a SBO at the level of the prior SBR anastomosis. Pt had an Ex lap, small bowel resection, and primary 2 layer repair of the anterior bladder after an iatrogenic perforation. Sigmoid colon was tethered to abdominal wall and iatrogenic sigmoid injury when entering abdomen. 5 cm of sigmoid colon was resected. A 19 brandon drain sitting on top of the bladder near repair was left in place. The  shunt was not externalized at this time as there was no gross spillage intra-op. Urology consulted pre-op to place 10F cline (pt straight caths with 10F out patient). Pt returned to the SICU post-op intubated and sedated with fentanyl. Pt received volume resuscitation and was on levo and vaso. Metabolic acidosis began to improve. Pt was intermittently febrile and tachycardic over the next few days. Blood cultures remained negative. [facility] neuro ICU and IR were unable to perform an LP due to the patients complex anatomy. LE duplex negative on 1/19. Pt had intermittent diarrhea. C. diff stool cultures sent an were negative on 1/20 but stool was occult positive. A repeat CT A/P was performed for concern for anastomotic leak. Pt began having increased FRITZ drain output, free air seen on CT, and developed melanotic stool requiring transfusion. Bilious drainage noted from FRITZ drain and Pt returned to the OR on 1/22 for an Ex lap. Pt found to have partially necrotic bowel with perorations as well as a sigmoid perforation at anastomosis. Pt underwent a Kirkpatrick's and partial cystectomy. Suprapubic catheter and 18F cline placed for bladder decompression. Intra-op EGD showed gastritis and non-bleeding ulcer. Pt was on gwen intra-op and briefly post-op.eratively. EVD was externalized and hooked up to an EVD and set to continuously drain. Over the last 24-36 hours pt has had high outputs from his ostomy as well as increased urine output requiring volume repletions with 1.5 liters of plasmalyte. Pt now hemodynamically stable requesting transfer to [facility] for internalization of  shunt. Pt tolerated liquid diet yesterday and diet advanced to regular die with fiber supplementation today. Pain well controlled. 28 year old male with PMH of spina bifida and scoliosis (wheelchair bound at baseline) and prior SBO with SBR presented to the ED on 1/14/24 with nausea, vomiting, abdominal pain, and lethargy. On arrival pt was hypotensive, tachycardic and had a severe metabolic acidosis. CT A/P showed Severe small bowel obstruction with a transition point in the right mid abdomen. Pt was brought to the SICU for pre-op resuscitation and went to the OR later on 1/14 and was found to have a SBO at the level of the prior SBR anastomosis. Pt had an Ex lap, small bowel resection, and primary 2 layer repair of the anterior bladder after an iatrogenic perforation. Sigmoid colon was tethered to abdominal wall and iatrogenic sigmoid injury when entering abdomen. 5 cm of sigmoid colon was resected. A 19 brandon drain sitting on top of the bladder near repair was left in place. The  shunt was not externalized at this time as there was no gross spillage intra-op. Urology consulted pre-op to place 10F cline (pt straight caths with 10F out patient). Pt returned to the SICU post-op intubated and sedated with fentanyl. Pt received volume resuscitation and was on levo and vaso. Metabolic acidosis began to improve. Pt was intermittently febrile and tachycardic over the next few days. Blood cultures remained negative. Both neuro ICU and IR were unable to perform an LP due to the patients complex anatomy. LE duplex negative on 1/19. Pt had intermittent diarrhea. C. diff stool cultures sent an were negative on 1/20 but stool was occult positive. A repeat CT A/P was performed for concern for anastomotic leak. Pt began having increased FRITZ drain output, free air seen on CT, and developed melanotic stool requiring transfusion. Bilious drainage noted from FRITZ drain and Pt returned to the OR on 1/22 for an Ex lap. Pt found to have partially necrotic bowel with perorations as well as a sigmoid perforation at anastomosis. Pt underwent a Kirkpatrick's and partial cystectomy. Suprapubic catheter and 18F cline placed for bladder decompression. Intra-op EGD showed gastritis and non-bleeding ulcer. Pt was on gwen intra-op and briefly post-op.eratively. EVD was externalized and hooked up to an EVD and set to continuously drain. Over the last 24-36 hours pt has had high outputs from his ostomy as well as increased urine output requiring volume repletions with 1.5 liters of plasmalyte. Pt now hemodynamically stable requesting transfer to [facility] for internalization of  shunt. Pt tolerated liquid diet yesterday and diet advanced to regular die with fiber supplementation today. Pain well controlled.

## 2024-01-25 NOTE — PROGRESS NOTE ADULT - ASSESSMENT
28y Male PMH spina bifida, scoliosis, no sensation distal to pubic line, wheelchair bound at baseline, hydrocephalus s/p  shunt placement with multiple revisions followed by Dr. Dorsey at [facility], now presents to [facility] 1/14/24 with concern for small bowel obstruction, s/p exploratory laparotomy with small bowel resection, partial left colectomy, bladder perforation s/p repair on 1/14/24, s/p externalization of shunt 1/21/24.  - Afebrile overnight. WBC 8.38 this AM. CSF cultures no growth to date.    PLAN:    - Q4 neuro checks  - CSF cultures no growth to date. Keep CSF system level with umbilicus. DO NOT CLAMP  - Off levophed  - On meropenem  - On heparin 5000U Q8 for DVT ppx   - Ultimately may need internalization of shunt to pleura vs atria vs endoscopic third ventriculostomy; no finalized plan yet-- will continue to monitor final CSF cultures/fever curve/leukocytosis trend  - Patient expressing desire to be transferred to [facility] given history of  shunt revisions with Dr. Dorsey there-- will depend on if medically stable per SICU and if there is an accepting general surgeon there  - Supportive care/further medical management per SICU  - D/w Dr. Richmond and Dr. Harris   28y Male PMH spina bifida, scoliosis, no sensation distal to pubic line, wheelchair bound at baseline, hydrocephalus s/p  shunt placement with multiple revisions followed by Dr. Dorsey at [facility], now presents to [facility] 1/14/24 with concern for small bowel obstruction, s/p exploratory laparotomy with small bowel resection, partial left colectomy, bladder perforation s/p repair on 1/14/24, s/p externalization of shunt 1/21/24.  - Afebrile overnight. WBC 8.38 this AM. CSF cultures no growth to date.    PLAN:    - Q4 neuro checks  - CSF cultures no growth to date. Keep CSF system level with umbilicus. DO NOT CLAMP  - On meropenem  - On lovenox for DVT ppx   - Ultimately, once cleared from general surgery standpoint, may need internalization of shunt to pleura vs atria vs transfer to [facility] to Dr. Zhao Knight for endoscopic third ventriculostomy; plan pending pt preference (may go back to [place] where previous care was received with Dr. Dorsey) and final CSF cultures--will continue to monitor fever curve/leukocytosis trend  - Supportive care/further medical management per SICU  - Discussed case w/ Dr. Harris

## 2024-01-25 NOTE — PROGRESS NOTE ADULT - ASSESSMENT
29 yo male with spina bifida, SBO on admission, POD# 11 s/p ex-lap, MUKUL, SB resection, partial left colectomy, closure of cystotomy. POD# 3 s/p ex-lap, colostomy, partial cystectomy, SPT placement  - cline and SPT draining yellow urine, draining well  - keep tubes in place- do not remove  - isma remains serosanguinous  - cont care as per SICU team  - will follow

## 2024-01-25 NOTE — PROGRESS NOTE ADULT - SUBJECTIVE AND OBJECTIVE BOX
Subjective:28yMale POD# 11 s/p ex-lap, MUKUL, SB resection, partial left colectomy, closure of cystotomy. POD# 3 s/p ex-lap, colostomy, partial cystectomy, SPT placement.  pt awake, alert, no c/o pain at this time.    Sharp: yellow  SPT: yellow  FRITZ: serosanguinous    Vital Signs Last 24 Hrs  T(C): 37.4 (24 Jan 2024 23:00), Max: 37.4 (24 Jan 2024 23:00)  T(F): 99.4 (24 Jan 2024 23:00), Max: 99.4 (24 Jan 2024 23:00)  HR: 113 (25 Jan 2024 07:00) (80 - 119)  BP: --  BP(mean): --  RR: 23 (25 Jan 2024 07:00) (14 - 23)  SpO2: 100% (25 Jan 2024 07:00) (94% - 100%)    Parameters below as of 25 Jan 2024 04:00  Patient On (Oxygen Delivery Method): room air      I&O's Detail    24 Jan 2024 07:01  -  25 Jan 2024 07:00  --------------------------------------------------------  IN:    dextrose 5% + lactated ringers: 300 mL    IV PiggyBack: 666.4 mL    IV PiggyBack: 50 mL    multiple electrolytes Injection Type 1 Bolus: 1000 mL    Oral Fluid: 1330 mL  Total IN: 3346.4 mL    OUT:    Bulb (mL): 60 mL    Colostomy (mL): 1150 mL    External Ventricular Device (mL): 234 mL    Indwelling Catheter - Suprapubic (mL): 900 mL    Indwelling Catheter - Urethral (mL): 1850 mL  Total OUT: 4194 mL    Total NET: -847.6 mL          Labs:                        10.2   8.38  )-----------( 441      ( 25 Jan 2024 03:30 )             31.4     01-25    144  |  110<H>  |  7.6<L>  ----------------------------<  103<H>  3.6   |  22.0  |  0.48<L>    Ca    7.5<L>      25 Jan 2024 03:30  Phos  1.6     01-25  Mg     1.8     01-25      PT/INR - ( 24 Jan 2024 03:00 )   PT: 14.7 sec;   INR: 1.34 ratio         PTT - ( 24 Jan 2024 03:00 )  PTT:29.8 sec

## 2024-01-25 NOTE — PROGRESS NOTE ADULT - ASSESSMENT
Assessment and Plan:  27 yo male with PMHx of spina bifida (wheelchair bound at baseline), scoliosis,  Shunt BIBEMS 1/14 with abdominal pain, n/v, and lethargy, found to have severe SBO, metabolic acidosis, and to be in septic and hypovolemic shock. Patient was brought to the OR 1/14 for ex lap and small bowel resection, c/b bladder perf with primary repair and partial colectomy and admitted to SICU post-op for further management. Now with mixed septic/hypovolemic shock, pneumoperitoneum, suspected UGIB. RTOR 1/22 for bladder repair, castellano’s and EGD.       Neuro:   C/w pain control, tylenol prn. Patient requires minimal medication.  Multimodal pain regimen    Hx of  shunt now externalized - open to drain, monitor output.      CV:   Septic shock and sinus tachycardia- resolved.    Maintain MAP > 65.  HAGMA w/o lactate production. Acetone WNL- possibly secondary to large volume resuscitation crystalloid improving slowly     Pulm:   COVID - resolved, sating well on RA.  Will continue pulmonary toilet, IS, OOBTC      GI/Nutrition:  SBO s/p SBR, hartmans and bladder repair - NGT removed, keep NPO w/ sips, awaiting flatus from colostomy, possible advance diet today with bariatric clears. Will d/w surgeon.   S/p EGD found to have gastritis - continue protonix bid   Monitor FRITZ quality and quantity and colostomy.     /Renal:  Hx of urethral stricture now with bladder injury s/p repair x 3, SPT tube placement and cline 18F.  Patient must remain decompressed until bladder injury has healed.  Continue to monitor UOP and i/o's. GALEN resolved.     Endo:  Maintain euglycemia 120-180.     ID:  Sepsis secondary to intraabdominal source. Now with source control, continue meropenum. F/u finalization of all cultures, NGTD.   Blood cx 1/19 NGTD,  CSF cx 1/21 no growth     Urine likely colonized however, given  shunt will treat.    Heme/DVT Prophylaxis: SCDs, Monitor H/H, Monitor Coags    SQH.     Skin:  Repositioning for DTI prevention while in bed. Dressing changed      Lines:   Peripheral IV's, Left axillary artery a-line      Dispo:   SICU      CODE STATUS: FULL           Assessment and Plan:  27 yo male with PMHx of spina bifida (wheelchair bound at baseline), scoliosis,  Shunt BIBEMS 1/14 with abdominal pain, n/v, and lethargy, found to have severe SBO, metabolic acidosis, and to be in septic and hypovolemic shock. Patient was brought to the OR 1/14 for ex lap and small bowel resection, c/b bladder perf with primary repair and partial colectomy and admitted to SICU post-op for further management. Now with mixed septic/hypovolemic shock, pneumoperitoneum, suspected UGIB. RTOR 1/22 for bladder repair, castellano’s and EGD.       Neuro:   C/w pain control, tylenol prn.     Hx of  shunt now externalized - open to drain, monitor output.  At some point with require internalization of drain, when patient stable will discuss possible transfer back to [place].     CV:   Sinus tachycardia likely secondary to hypovolemia improved after fluid expansion.  Continue to monitor.     Pulm:   COVID - resolved, sating well on RA.  Will continue pulmonary toilet, IS, OOBTC      GI/Nutrition:  SBO s/p SBR, hartmans and bladder repair - advanced diet to CLD, patient tolerating. Likely will advance further today.   S/p EGD found to have gastritis - continue protonix bid   Monitor FRITZ quality and quantity and colostomy.     /Renal:  Hx of urethral stricture now with bladder injury s/p repair x 3, SPT tube placement and cline 18F.  Patient must remain decompressed until bladder injury has healed.  Continue to monitor UOP and i/o's. GALEN resolved.     Endo:  Maintain euglycemia 120-180.     ID: Meropneum until (1/27) x 4days from source control. F/u finalization of all cultures, NGTD.   Blood cx 1/19 NGTD,  CSF cx 1/21 no growth    Heme/DVT Prophylaxis: SCDs, Monitor H/H, Monitor Coags    SQH.     Skin:  Repositioning for DTI prevention while in bed. Dressing changed      Lines:   Peripheral IV's, Left axillary artery a-line      Dispo:   SICU      CODE STATUS: FULL

## 2024-01-25 NOTE — PROGRESS NOTE ADULT - SUBJECTIVE AND OBJECTIVE BOX
HPI: 28y Male PMH spina bifida, scoliosis, no sensation distal to pubic line, wheelchair bound at baseline, hydrocephalus s/p  shunt placement with multiple revisions followed by Dr. Dorsey at [facility], now presents to [facility] 1/14/24 with concern for small bowel obstruction, s/p exploratory laparotomy with small bowel resection, partial left colectomy, bladder perforation s/p repair on 1/14/24, s/p externalization of shunt 1/21/24.     INTERVAL HPI/OVERNIGHT EVENTS:  28y male s/p shunt externalization post procedure day 4, seen lying comfortably in bed. Has remained afebrile, however, ongoing sinus tachycardia.     Vital Signs Last 24 Hrs  T(C): 37.4 (01-24-24 @ 23:00), Max: 37.4 (01-24-24 @ 23:00)  T(F): 99.4 (01-24-24 @ 23:00), Max: 99.4 (01-24-24 @ 23:00)  HR: 120 (01-25-24 @ 10:00) (80 - 120)  BP: --  BP(mean): --  RR: 17 (01-25-24 @ 10:00) (14 - 23)  SpO2: 100% (01-25-24 @ 10:00) (94% - 100%)    PHYSICAL EXAM:  GENERAL: AAOx3, NAD  MIA COMA SCORE: E- 4 V- 5 M- 6= 15  MENTAL STATUS: AAO x3; appropriately conversant w/o aphasia, following commands  CRANIAL NERVES: PERRL. EOMI without nystagmus. Facial sensation intact. Face symmetric, tongue midline. Hearing grossly intact  MOTOR: strength 5/5 b/l upper extremities; b/l LE 0/5 at baseline  SENSATION: grossly intact to light touch b/l UE  CHEST: shunt externalization site c/d/i. EVD bag with clear CSF    LABS:                          10.2   8.38  )-----------( 441      ( 25 Jan 2024 03:30 )             31.4   01-25    144  |  110<H>  |  7.6<L>  ----------------------------<  103<H>  3.6   |  22.0  |  0.48<L>    Ca    7.5<L>      25 Jan 2024 03:30  Phos  1.6     01-25  Mg     1.8     01-25      I&O's Summary    24 Jan 2024 07:01  -  25 Jan 2024 07:00  --------------------------------------------------------  IN: 3346.4 mL / OUT: 4194 mL / NET: -847.6 mL    25 Jan 2024 07:01  -  25 Jan 2024 10:57  --------------------------------------------------------  IN: 1193.2 mL / OUT: 768 mL / NET: 425.2 mL        RADIOLOGY & ADDITIONAL TESTS:    CT Abdomen and Pelvis w/wo IV Cont (01.21.24 @ 01:50)   IMPRESSION:    Questionable bilateral hydronephrosis    Asymmetrical bladder wall thickening of the right, tract extending from   the right bladder to the right lower quadrant abdominal wall    Sharp catheter balloon again noted to be inflated in the prostate    Increased pneumoperitoneum which may be due to perforated viscus,   anastomotic leak or from surgical drains.    Small amount of complex ascites which could represent peritonitis.    Xray Chest 1 View- PORTABLE-Urgent (Xray Chest 1 View- PORTABLE-Urgent .) (01.21.24 @ 01:05)   IMPRESSION:  Left-sided  shunt is seen. There are also fragments from an old left    shunt. Left axillary midline. Right sided central line is in the region   of the superior vena cava.    CT Head No Cont (01.16.24 @ 10:57)     IMPRESSION: Stable follow-up CT exam when compared with 1/14/2024.

## 2024-01-26 LAB
ANION GAP SERPL CALC-SCNC: 13 MMOL/L — SIGNIFICANT CHANGE UP (ref 5–17)
BASOPHILS # BLD AUTO: 0 K/UL — SIGNIFICANT CHANGE UP (ref 0–0.2)
BASOPHILS NFR BLD AUTO: 0 % — SIGNIFICANT CHANGE UP (ref 0–2)
BUN SERPL-MCNC: 6.6 MG/DL — LOW (ref 8–20)
CALCIUM SERPL-MCNC: 7.9 MG/DL — LOW (ref 8.4–10.5)
CHLORIDE SERPL-SCNC: 104 MMOL/L — SIGNIFICANT CHANGE UP (ref 96–108)
CO2 SERPL-SCNC: 21 MMOL/L — LOW (ref 22–29)
CREAT SERPL-MCNC: 0.49 MG/DL — LOW (ref 0.5–1.3)
CULTURE RESULTS: NO GROWTH — SIGNIFICANT CHANGE UP
EGFR: 142 ML/MIN/1.73M2 — SIGNIFICANT CHANGE UP
EOSINOPHIL # BLD AUTO: 0.07 K/UL — SIGNIFICANT CHANGE UP (ref 0–0.5)
EOSINOPHIL NFR BLD AUTO: 0.9 % — SIGNIFICANT CHANGE UP (ref 0–6)
GLUCOSE SERPL-MCNC: 97 MG/DL — SIGNIFICANT CHANGE UP (ref 70–99)
HCT VFR BLD CALC: 37.9 % — LOW (ref 39–50)
HGB BLD-MCNC: 12 G/DL — LOW (ref 13–17)
HYPOCHROMIA BLD QL: SLIGHT — SIGNIFICANT CHANGE UP
LYMPHOCYTES # BLD AUTO: 1.39 K/UL — SIGNIFICANT CHANGE UP (ref 1–3.3)
LYMPHOCYTES # BLD AUTO: 16.8 % — SIGNIFICANT CHANGE UP (ref 13–44)
MAGNESIUM SERPL-MCNC: 2 MG/DL — SIGNIFICANT CHANGE UP (ref 1.6–2.6)
MANUAL SMEAR VERIFICATION: SIGNIFICANT CHANGE UP
MCHC RBC-ENTMCNC: 27 PG — SIGNIFICANT CHANGE UP (ref 27–34)
MCHC RBC-ENTMCNC: 31.7 GM/DL — LOW (ref 32–36)
MCV RBC AUTO: 85.2 FL — SIGNIFICANT CHANGE UP (ref 80–100)
MONOCYTES # BLD AUTO: 0.73 K/UL — SIGNIFICANT CHANGE UP (ref 0–0.9)
MONOCYTES NFR BLD AUTO: 8.8 % — SIGNIFICANT CHANGE UP (ref 2–14)
MYELOCYTES NFR BLD: 0.9 % — HIGH (ref 0–0)
NEUTROPHILS # BLD AUTO: 5.71 K/UL — SIGNIFICANT CHANGE UP (ref 1.8–7.4)
NEUTROPHILS NFR BLD AUTO: 67.3 % — SIGNIFICANT CHANGE UP (ref 43–77)
NEUTS BAND # BLD: 1.8 % — SIGNIFICANT CHANGE UP (ref 0–8)
PHOSPHATE SERPL-MCNC: 1.6 MG/DL — LOW (ref 2.4–4.7)
PLAT MORPH BLD: NORMAL — SIGNIFICANT CHANGE UP
PLATELET # BLD AUTO: 421 K/UL — HIGH (ref 150–400)
POLYCHROMASIA BLD QL SMEAR: SLIGHT — SIGNIFICANT CHANGE UP
POTASSIUM SERPL-MCNC: 4.8 MMOL/L — SIGNIFICANT CHANGE UP (ref 3.5–5.3)
POTASSIUM SERPL-SCNC: 4.8 MMOL/L — SIGNIFICANT CHANGE UP (ref 3.5–5.3)
RBC # BLD: 4.45 M/UL — SIGNIFICANT CHANGE UP (ref 4.2–5.8)
RBC # FLD: 15.8 % — HIGH (ref 10.3–14.5)
RBC BLD AUTO: ABNORMAL
SODIUM SERPL-SCNC: 138 MMOL/L — SIGNIFICANT CHANGE UP (ref 135–145)
SPECIMEN SOURCE: SIGNIFICANT CHANGE UP
VARIANT LYMPHS # BLD: 3.5 % — SIGNIFICANT CHANGE UP (ref 0–6)
WBC # BLD: 8.26 K/UL — SIGNIFICANT CHANGE UP (ref 3.8–10.5)
WBC # FLD AUTO: 8.26 K/UL — SIGNIFICANT CHANGE UP (ref 3.8–10.5)

## 2024-01-26 PROCEDURE — 99024 POSTOP FOLLOW-UP VISIT: CPT

## 2024-01-26 PROCEDURE — 99231 SBSQ HOSP IP/OBS SF/LOW 25: CPT

## 2024-01-26 PROCEDURE — 99232 SBSQ HOSP IP/OBS MODERATE 35: CPT | Mod: 24

## 2024-01-26 PROCEDURE — 93970 EXTREMITY STUDY: CPT | Mod: 26

## 2024-01-26 RX ORDER — SODIUM CHLORIDE 9 MG/ML
1000 INJECTION, SOLUTION INTRAVENOUS ONCE
Refills: 0 | Status: COMPLETED | OUTPATIENT
Start: 2024-01-26 | End: 2024-01-26

## 2024-01-26 RX ORDER — ALBUMIN HUMAN 25 %
250 VIAL (ML) INTRAVENOUS
Refills: 0 | Status: COMPLETED | OUTPATIENT
Start: 2024-01-26 | End: 2024-01-26

## 2024-01-26 RX ADMIN — PANTOPRAZOLE SODIUM 40 MILLIGRAM(S): 20 TABLET, DELAYED RELEASE ORAL at 05:52

## 2024-01-26 RX ADMIN — Medication 85 MILLIMOLE(S): at 05:52

## 2024-01-26 RX ADMIN — Medication 125 MILLILITER(S): at 20:22

## 2024-01-26 RX ADMIN — SODIUM CHLORIDE 75 MILLILITER(S): 9 INJECTION, SOLUTION INTRAVENOUS at 11:18

## 2024-01-26 RX ADMIN — MEROPENEM 1000 MILLIGRAM(S): 1 INJECTION INTRAVENOUS at 05:51

## 2024-01-26 RX ADMIN — Medication 1 PACKET(S): at 13:07

## 2024-01-26 RX ADMIN — Medication 125 MILLILITER(S): at 17:20

## 2024-01-26 RX ADMIN — SODIUM CHLORIDE 1000 MILLILITER(S): 9 INJECTION, SOLUTION INTRAVENOUS at 10:03

## 2024-01-26 RX ADMIN — ENOXAPARIN SODIUM 40 MILLIGRAM(S): 100 INJECTION SUBCUTANEOUS at 17:19

## 2024-01-26 RX ADMIN — MEROPENEM 1000 MILLIGRAM(S): 1 INJECTION INTRAVENOUS at 21:29

## 2024-01-26 RX ADMIN — MEROPENEM 1000 MILLIGRAM(S): 1 INJECTION INTRAVENOUS at 13:07

## 2024-01-26 RX ADMIN — SODIUM CHLORIDE 75 MILLILITER(S): 9 INJECTION, SOLUTION INTRAVENOUS at 00:56

## 2024-01-26 RX ADMIN — CHLORHEXIDINE GLUCONATE 1 APPLICATION(S): 213 SOLUTION TOPICAL at 13:07

## 2024-01-26 RX ADMIN — PANTOPRAZOLE SODIUM 40 MILLIGRAM(S): 20 TABLET, DELAYED RELEASE ORAL at 17:20

## 2024-01-26 NOTE — DIETITIAN NUTRITION RISK NOTIFICATION - TREATMENT: THE FOLLOWING DIET HAS BEEN RECOMMENDED
Diet, Regular:   High Fiber (HIFIBER) (01-25-24 @ 10:08) [Active]      
Diet, JUNE (01-22-24 @ 19:07) [Active]

## 2024-01-26 NOTE — PROGRESS NOTE ADULT - SUBJECTIVE AND OBJECTIVE BOX
Urology f/u:    Above notes noted  POD# 4 s/p return to OR - repair bladder and anastomotic leak ( bowel )   Patient seen and examined at bedside.  Awake, alert, responsive lying in bed in no acute distress.    Vital Signs Last 24 Hrs  T(C): 36.8 (26 Jan 2024 07:18), Max: 37.7 (25 Jan 2024 15:23)  T(F): 98.3 (26 Jan 2024 07:18), Max: 99.9 (25 Jan 2024 15:23)  HR: 110 (26 Jan 2024 10:00) (92 - 125)  BP: 120/78 (26 Jan 2024 10:00) (113/72 - 142/88)  BP(mean): 90 (26 Jan 2024 10:00) (83 - 103)  RR: 22 (26 Jan 2024 10:00) (16 - 23)  SpO2: 98% (26 Jan 2024 10:00) (96% - 100%)    Parameters below as of 26 Jan 2024 08:00  Patient On (Oxygen Delivery Method): room air    Abdomen: soft n/d, FRITZ discontinued yesterday.  Ostomy: active .   SPT in RLQ region stable good output yellow urine ( 750cc last shift/ 1265cc 24 hrs.  :  cline catheter in place draining well, urine yellow. ( 420cc last shift/ 1015cc 24 hrs )     I&O's Detail    25 Jan 2024 07:01  -  26 Jan 2024 07:00  --------------------------------------------------------  OUT:    Colostomy (mL): 2150 mL    External Ventricular Device (mL): 242 mL    Indwelling Catheter - Suprapubic (mL): 1265 mL    Indwelling Catheter - Urethral (mL): 1015 mL  Total OUT: 4672 mL    Total NET: -122.2 mL      26 Jan 2024 07:01  -  26 Jan 2024 10:10  ----------------------------------------------------  OUT:    External Ventricular Device (mL): 28 mL    Indwelling Catheter - Suprapubic (mL): 105 mL    Indwelling Catheter - Urethral (mL): 240 mL  Total OUT: 373 mL        Labs:  CBC:                        12.0   8.26  )-----------( 421      ( 26 Jan 2024 02:15 )             37.9     Chemistry:  01-26    138  |  104  | BUN:  6.6<L>  ----------------------------<  97  4.8   |  21.0<L>  | CREAT.:  0.49<L>      Impression:  stable post op slowly improving.  SPT and cline draining well.   Plan: Continue post op care and management - continue to monitor SPT? cline catheters.  General care and management as per Surgery.

## 2024-01-26 NOTE — PROGRESS NOTE ADULT - SUBJECTIVE AND OBJECTIVE BOX
INTERVAL HPI/OVERNIGHT EVENTS:    HPI:  29 yo M w/ hx of spina bfida and scoliosis, wheelchair bound at baseline, presenting with abdominal pain, n/v. Pt states that the pain began yesterday morning and has been persistent. He had a bowel movement this morning but prior to that his last bowel movement was one week prior. Denies passing gas. Denies fevers at home. Has been vomiting and nauseous. At home he was lethargic and EMS was called. At that time he was hypotensive and tachycardic and he was BIBEMS to [facility] ED for further workup. On arrival he was tachycardic to the 130s and hypotensive to 80s/50s. Labs notable for K 3.4, bicarb 13, Cr 2.04. Actively vomiting in ED. CT scan showed dilated bowel and stomach consistent with severe SBO with transition point in mid abdomen. Surgery consulted for management.     24 HOUR:    Patient with sinus tachycardia to 110s-120s with MAP 80s-90s. Pt was given a 1 L bolus of plasma-lyte during the day with a transient improvement in vitals. Given another 500 cc bolus of plasma-lyte with no improvement. Remains hemodynamically stable, afebrile and well-appearing. SPT and cline continuing to have good output. Patient's dressing changed.     OBJECTIVE:    VITAL SIGNS:  ICU Vital Signs Last 24 Hrs  T(C): 36.7 (25 Jan 2024 23:33), Max: 37.7 (25 Jan 2024 15:23)  T(F): 98.1 (25 Jan 2024 23:33), Max: 99.9 (25 Jan 2024 15:23)  HR: 104 (26 Jan 2024 00:00) (80 - 125)  BP: 113/72 (26 Jan 2024 00:00) (113/72 - 142/88)  BP(mean): 85 (26 Jan 2024 00:00) (85 - 103)  ABP: 112/67 (25 Jan 2024 11:00) (94/55 - 120/77)  ABP(mean): 84 (25 Jan 2024 11:00) (69 - 93)  RR: 16 (26 Jan 2024 00:00) (14 - 23)  SpO2: 99% (26 Jan 2024 00:00) (94% - 100%)    O2 Parameters below as of 26 Jan 2024 00:00  Patient On (Oxygen Delivery Method): room air    I&O's Detail    24 Jan 2024 07:01  -  25 Jan 2024 07:00  --------------------------------------------------------  IN:    dextrose 5% + lactated ringers: 300 mL    IV PiggyBack: 666.4 mL    IV PiggyBack: 50 mL    multiple electrolytes Injection Type 1 Bolus: 1000 mL    Oral Fluid: 1330 mL  Total IN: 3346.4 mL    OUT:    Bulb (mL): 60 mL    Colostomy (mL): 1150 mL    External Ventricular Device (mL): 234 mL    Indwelling Catheter - Suprapubic (mL): 900 mL    Indwelling Catheter - Urethral (mL): 1850 mL  Total OUT: 4194 mL    Total NET: -847.6 mL    PHYSICAL EXAM:    General: NAD, well-appearing, resting comfortably in bed  Neurological:  GCS  15, CAM  negative RASS 0, B/L lower extremities with plegia, decreased sensation T8 down (baseline)  HEENT:  EOMI   Respiratory: Unlabored, no accessory muscle use  Cardiovascular: Regular rhythm, tachycardic  Gastrointestinal: Softly distended, non-tender to palpation, colostomy pink with bilious liquid output   : SPT in place draining clear yellow urine, cline in place with clear yellow urine   Extremities: shorted/ contracted (baseline)   Vascular: Equal and normal pulses: 2+ peripheral pulses throughout  Skin: No rashes. Dressing changed.           MEDICATIONS:  MEDICATIONS  (STANDING):  chlorhexidine 2% Cloths 1 Application(s) Topical daily  enoxaparin Injectable 40 milliGRAM(s) SubCutaneous every 24 hours  meropenem Injectable 1000 milliGRAM(s) IV Push every 8 hours  multiple electrolytes Injection Type 1 1000 milliLiter(s) (75 mL/Hr) IV Continuous <Continuous>  pantoprazole  Injectable 40 milliGRAM(s) IV Push two times a day  psyllium Powder 1 Packet(s) Oral daily    MEDICATIONS  (PRN):  acetaminophen   IVPB .. 1000 milliGRAM(s) IV Intermittent every 6 hours PRN Temp greater or equal to 38C (100.4F)      ALLERGIES:  Allergies    No Known Allergies    Intolerances        LABS:    AM labs pending.                        10.2   8.38  )-----------( 441      ( 25 Jan 2024 03:30 )             31.4     01-25    144  |  110<H>  |  7.6<L>  ----------------------------<  103<H>  3.6   |  22.0  |  0.48<L>    Ca    7.5<L>      25 Jan 2024 03:30  Phos  1.6     01-25  Mg     1.8     01-25      PT/INR - ( 24 Jan 2024 03:00 )   PT: 14.7 sec;   INR: 1.34 ratio         PTT - ( 24 Jan 2024 03:00 )  PTT:29.8 sec  Urinalysis Basic - ( 25 Jan 2024 03:30 )    Color: x / Appearance: x / SG: x / pH: x  Gluc: 103 mg/dL / Ketone: x  / Bili: x / Urobili: x   Blood: x / Protein: x / Nitrite: x   Leuk Esterase: x / RBC: x / WBC x   Sq Epi: x / Non Sq Epi: x / Bacteria: x        CAPILLARY BLOOD GLUCOSE      POCT Blood Glucose.: 117 mg/dL (25 Jan 2024 17:27)      RADIOLOGY & ADDITIONAL TESTS: Reviewed.     HPI:  27 yo M w/ hx of spina bfida and scoliosis, wheelchair bound at baseline, presenting with abdominal pain, n/v. Pt states that the pain began yesterday morning and has been persistent. He had a bowel movement this morning but prior to that his last bowel movement was one week prior. Denies passing gas. Denies fevers at home. Has been vomiting and nauseous. At home he was lethargic and EMS was called. At that time he was hypotensive and tachycardic and he was BIBEMS to [facility] ED for further workup. On arrival he was tachycardic to the 130s and hypotensive to 80s/50s. Labs notable for K 3.4, bicarb 13, Cr 2.04. Actively vomiting in ED. CT scan showed dilated bowel and stomach consistent with severe SBO with transition point in mid abdomen. Surgery consulted for management.     24 HOUR:    Patient with sinus tachycardia to 110s-120s with MAP 80s-90s. Pt was given a 1 L bolus of plasma-lyte during the day with a transient improvement in vitals. POC US with difficult visualization of IVC, LV NOT hyperdynamic, no B lines, No RV dilation. Given another 500 cc bolus of plasma-lyte with no improvement. Remains hemodynamically stable, afebrile and well-appearing. SPT and cline continuing to have good output. Patient's dressing changed. ECG reveals continues sinus tachycardia, duplex of b/l LE ordered.     OBJECTIVE:    VITAL SIGNS:  ICU Vital Signs Last 24 Hrs  T(C): 36.7 (25 Jan 2024 23:33), Max: 37.7 (25 Jan 2024 15:23)  T(F): 98.1 (25 Jan 2024 23:33), Max: 99.9 (25 Jan 2024 15:23)  HR: 104 (26 Jan 2024 00:00) (80 - 125)  BP: 113/72 (26 Jan 2024 00:00) (113/72 - 142/88)  BP(mean): 85 (26 Jan 2024 00:00) (85 - 103)  ABP: 112/67 (25 Jan 2024 11:00) (94/55 - 120/77)  ABP(mean): 84 (25 Jan 2024 11:00) (69 - 93)  RR: 16 (26 Jan 2024 00:00) (14 - 23)  SpO2: 99% (26 Jan 2024 00:00) (94% - 100%)    O2 Parameters below as of 26 Jan 2024 00:00  Patient On (Oxygen Delivery Method): room air    I&O's Detail    24 Jan 2024 07:01  -  25 Jan 2024 07:00  --------------------------------------------------------  IN:    dextrose 5% + lactated ringers: 300 mL    IV PiggyBack: 666.4 mL    IV PiggyBack: 50 mL    multiple electrolytes Injection Type 1 Bolus: 1000 mL    Oral Fluid: 1330 mL  Total IN: 3346.4 mL    OUT:    Bulb (mL): 60 mL    Colostomy (mL): 1150 mL    External Ventricular Device (mL): 234 mL    Indwelling Catheter - Suprapubic (mL): 900 mL    Indwelling Catheter - Urethral (mL): 1850 mL  Total OUT: 4194 mL    Total NET: -847.6 mL    PHYSICAL EXAM:    General: NAD, well-appearing, resting comfortably in bed  Neurological:  GCS  15, CAM  negative RASS 0, B/L lower extremities with plegia, decreased sensation T8 down (baseline)  HEENT:  EOMI   Respiratory: Unlabored, no accessory muscle use  Cardiovascular: Regular rhythm, sinus tachycardia   Gastrointestinal: Softly distended, non-tender to palpation, colostomy pink with bilious liquid output   : SPT in place draining clear yellow urine, cline in place with clear yellow urine   Extremities: shorted/ contracted (baseline)   Vascular: Equal and normal pulses: 2+ peripheral pulses throughout  Skin: No rashes. Dressing changed.           MEDICATIONS:  MEDICATIONS  (STANDING):  chlorhexidine 2% Cloths 1 Application(s) Topical daily  enoxaparin Injectable 40 milliGRAM(s) SubCutaneous every 24 hours  meropenem Injectable 1000 milliGRAM(s) IV Push every 8 hours  multiple electrolytes Injection Type 1 1000 milliLiter(s) (75 mL/Hr) IV Continuous <Continuous>  pantoprazole  Injectable 40 milliGRAM(s) IV Push two times a day  psyllium Powder 1 Packet(s) Oral daily    MEDICATIONS  (PRN):  acetaminophen   IVPB .. 1000 milliGRAM(s) IV Intermittent every 6 hours PRN Temp greater or equal to 38C (100.4F)      ALLERGIES:  Allergies    No Known Allergies    Intolerances        LABS:    AM labs pending.                        10.2   8.38  )-----------( 441      ( 25 Jan 2024 03:30 )             31.4     01-25    144  |  110<H>  |  7.6<L>  ----------------------------<  103<H>  3.6   |  22.0  |  0.48<L>    Ca    7.5<L>      25 Jan 2024 03:30  Phos  1.6     01-25  Mg     1.8     01-25      PT/INR - ( 24 Jan 2024 03:00 )   PT: 14.7 sec;   INR: 1.34 ratio         PTT - ( 24 Jan 2024 03:00 )  PTT:29.8 sec  Urinalysis Basic - ( 25 Jan 2024 03:30 )    Color: x / Appearance: x / SG: x / pH: x  Gluc: 103 mg/dL / Ketone: x  / Bili: x / Urobili: x   Blood: x / Protein: x / Nitrite: x   Leuk Esterase: x / RBC: x / WBC x   Sq Epi: x / Non Sq Epi: x / Bacteria: x        CAPILLARY BLOOD GLUCOSE      POCT Blood Glucose.: 117 mg/dL (25 Jan 2024 17:27)      RADIOLOGY & ADDITIONAL TESTS: Reviewed.     HPI:  29 yo M w/ hx of spina bfida and scoliosis, wheelchair bound at baseline, presenting with abdominal pain, n/v. Pt states that the pain began yesterday morning and has been persistent. He had a bowel movement this morning but prior to that his last bowel movement was one week prior. Denies passing gas. Denies fevers at home. Has been vomiting and nauseous. At home he was lethargic and EMS was called. At that time he was hypotensive and tachycardic and he was BIBEMS to [facility] ED for further workup. On arrival he was tachycardic to the 130s and hypotensive to 80s/50s. Labs notable for K 3.4, bicarb 13, Cr 2.04. Actively vomiting in ED. CT scan showed dilated bowel and stomach consistent with severe SBO with transition point in mid abdomen. Surgery consulted for management.     24 HOUR:    Patient with sinus tachycardia to 110s-120s with MAP 80s-90s. Pt was given a 1 L bolus of plasma-lyte during the day with a transient improvement in vitals. POC US with difficult visualization of IVC, LV NOT hyperdynamic, no B lines, No RV dilation. Given another 500 cc bolus of plasma-lyte with no improvement. Remains hemodynamically stable, afebrile and well-appearing. SPT and cline continuing to have good output. Patient's dressing changed. ECG reveals continues sinus tachycardia, duplex of b/l LE ordered. Transfer process initiated.      OBJECTIVE:    VITAL SIGNS:  ICU Vital Signs Last 24 Hrs  T(C): 36.7 (25 Jan 2024 23:33), Max: 37.7 (25 Jan 2024 15:23)  T(F): 98.1 (25 Jan 2024 23:33), Max: 99.9 (25 Jan 2024 15:23)  HR: 104 (26 Jan 2024 00:00) (80 - 125)  BP: 113/72 (26 Jan 2024 00:00) (113/72 - 142/88)  BP(mean): 85 (26 Jan 2024 00:00) (85 - 103)  ABP: 112/67 (25 Jan 2024 11:00) (94/55 - 120/77)  ABP(mean): 84 (25 Jan 2024 11:00) (69 - 93)  RR: 16 (26 Jan 2024 00:00) (14 - 23)  SpO2: 99% (26 Jan 2024 00:00) (94% - 100%)    O2 Parameters below as of 26 Jan 2024 00:00  Patient On (Oxygen Delivery Method): room air    I&O's Detail    24 Jan 2024 07:01  -  25 Jan 2024 07:00  --------------------------------------------------------  IN:    dextrose 5% + lactated ringers: 300 mL    IV PiggyBack: 666.4 mL    IV PiggyBack: 50 mL    multiple electrolytes Injection Type 1 Bolus: 1000 mL    Oral Fluid: 1330 mL  Total IN: 3346.4 mL    OUT:    Bulb (mL): 60 mL    Colostomy (mL): 1150 mL    External Ventricular Device (mL): 234 mL    Indwelling Catheter - Suprapubic (mL): 900 mL    Indwelling Catheter - Urethral (mL): 1850 mL  Total OUT: 4194 mL    Total NET: -847.6 mL    PHYSICAL EXAM:    General: NAD, well-appearing, resting comfortably in bed  Neurological:  GCS  15, CAM  negative RASS 0, B/L lower extremities with plegia, decreased sensation T8 down (baseline)  HEENT:  EOMI   Respiratory: Unlabored, no accessory muscle use  Cardiovascular: Regular rhythm, sinus tachycardia   Gastrointestinal: Softly distended, non-tender to palpation, colostomy pink with bilious liquid output   : SPT in place draining clear yellow urine, cline in place with clear yellow urine   Extremities: shorted/ contracted (baseline)   Vascular: Equal and normal pulses: 2+ peripheral pulses throughout  Skin: No rashes. Dressing changed.           MEDICATIONS:  MEDICATIONS  (STANDING):  chlorhexidine 2% Cloths 1 Application(s) Topical daily  enoxaparin Injectable 40 milliGRAM(s) SubCutaneous every 24 hours  meropenem Injectable 1000 milliGRAM(s) IV Push every 8 hours  multiple electrolytes Injection Type 1 1000 milliLiter(s) (75 mL/Hr) IV Continuous <Continuous>  pantoprazole  Injectable 40 milliGRAM(s) IV Push two times a day  psyllium Powder 1 Packet(s) Oral daily    MEDICATIONS  (PRN):  acetaminophen   IVPB .. 1000 milliGRAM(s) IV Intermittent every 6 hours PRN Temp greater or equal to 38C (100.4F)      ALLERGIES:  Allergies    No Known Allergies    Intolerances        LABS:    AM labs pending.                        10.2   8.38  )-----------( 441      ( 25 Jan 2024 03:30 )             31.4     01-25    144  |  110<H>  |  7.6<L>  ----------------------------<  103<H>  3.6   |  22.0  |  0.48<L>    Ca    7.5<L>      25 Jan 2024 03:30  Phos  1.6     01-25  Mg     1.8     01-25      PT/INR - ( 24 Jan 2024 03:00 )   PT: 14.7 sec;   INR: 1.34 ratio         PTT - ( 24 Jan 2024 03:00 )  PTT:29.8 sec  Urinalysis Basic - ( 25 Jan 2024 03:30 )    Color: x / Appearance: x / SG: x / pH: x  Gluc: 103 mg/dL / Ketone: x  / Bili: x / Urobili: x   Blood: x / Protein: x / Nitrite: x   Leuk Esterase: x / RBC: x / WBC x   Sq Epi: x / Non Sq Epi: x / Bacteria: x        CAPILLARY BLOOD GLUCOSE      POCT Blood Glucose.: 117 mg/dL (25 Jan 2024 17:27)      RADIOLOGY & ADDITIONAL TESTS: Reviewed.

## 2024-01-26 NOTE — PROGRESS NOTE ADULT - ASSESSMENT
Assessment and Plan:  28 year old male with PMHx of spina bifida (wheelchair bound at baseline), scoliosis,  Shunt BIBEMS 1/14 with abdominal pain, n/v, and lethargy, found to have severe SBO, metabolic acidosis, and to be in septic and hypovolemic shock. Patient was brought to the OR 1/14 for ex lap and small bowel resection, c/b bladder perf with primary repair and partial colectomy and admitted to SICU post-op for further management. Now with mixed septic/hypovolemic shock, pneumoperitoneum, suspected UGIB. RTOR 1/22 for bladder repair, castellano’s and EGD.       NEURO:  - A&O x 3 at baseline   - Continue with pain control, tylenol PRN    #Hx of  shunt   -  shunt externalized  - Open to drain, monitor output.   - At some point will require internalization of drain    CV:     #Sinus tachycardia   - Transiently responsive to fluid, no improvement with most recent 500 cc bolus of plasma-lyte  - Pt well appearing, afebrile, without leukocytosis and with stable Hg/Hct  - F/u B/L LE venous duplex   - Continue to monitor on telemetry    PULM:  #COVID  - Resolved  - Currently satting well on RA  - Continue pulmonary toilet, IS, OOBTC    - Maintain SpO2 > 92%    GI/Nutrition:    #SBO s/p SBR, hartmans and bladder repair  - Diet: Advanced diet to regular high fiber  - s/p EGD, found to have gastritis - continue protonix BID  - Monitor colostomy.     /Renal:   #Hx of urethral stricture now with bladder injury s/p repair x 3  - SPT tube placement and cline 18F.   - Patient must remain decompressed until bladder injury has healed  - Continue to monitor UOP and I&Os  #GALEN, resolved     ENDO:  - Maintain euglycemia 120-180.     ID:   - Remains afebrile without leukocytosis  - Meropenum until (1/27) x 4 days for source control.   - Blood cx (1/19) GTD  - CSF cx (1/21) with no growth     HEME:  - SCDs  - Monitor H/H  - Monitor Coags    - SCDs, lovenox to resume today, 1/26     SKIN:  - Repositioning for DTI prevention while in bed. Dressing changed      Lines: Peripheral IV's    Dispo:   SICU      CODE STATUS: FULL  Assessment and Plan:  28 year old male with PMHx of spina bifida (wheelchair bound at baseline), scoliosis,  Shunt BIBEMS 1/14 with abdominal pain, n/v, and lethargy, found to have severe SBO, metabolic acidosis, and to be in septic and hypovolemic shock. Patient was brought to the OR 1/14 for ex lap and small bowel resection, c/b bladder perf with primary repair and partial colectomy and admitted to SICU post-op for further management. Now with mixed septic/hypovolemic shock, pneumoperitoneum, suspected UGIB. RTOR 1/22 for bladder repair, castellano’s and EGD.       NEURO:  - A&O x 3 at baseline   - Continue with pain control, tylenol PRN    #Hx of  shunt   -  shunt externalized  - Open to drain, monitor output.   - Per NS, will require internalization of drain pleura vs atria vs transfer to [facility] to Dr. Zhao Knight for endoscopic third ventriculostomy; plan pending pt preference (may go back to [place] where previous care was received with Dr. Dorsey)    CV:     #Sinus tachycardia   - Transiently responsive to fluid, no improvement with most recent 500 cc bolus of plasma-lyte  - Pt well appearing, afebrile, without leukocytosis and with stable Hg/Hct  - F/u B/L LE venous duplex   - Continue to monitor on telemetry    PULM:  #COVID  - Resolved  - Currently satting well on RA  - Continue pulmonary toilet, IS, OOBTC    - Maintain SpO2 > 92%    GI/Nutrition:    #SBO s/p SBR, hartmans and bladder repair  - Diet: Advanced diet to regular high fiber  - s/p EGD, found to have gastritis - continue protonix BID  - Monitor colostomy.     /Renal:   #Hx of urethral stricture now with bladder injury s/p repair x 3  - SPT tube placement and cline 18F.   - Patient must remain decompressed until bladder injury has healed  - Continue to monitor UOP and I&Os  #GALEN, resolved     ENDO:  - Maintain euglycemia 120-180.     ID:   - Remains afebrile without leukocytosis  - Meropenum until (1/27) x 4 days from source control.   - Blood cx (1/19) NGTD  - CSF cx (1/21) NGTD     HEME:  - SCDs  - Monitor H/H  - Monitor Coags    - SCDs, lovenox to resume today, 1/26     SKIN:  - Repositioning for DTI prevention while in bed. Dressing changed      Lines: Peripheral IV's    Dispo:   SICU      CODE STATUS: FULL  Assessment and Plan:  28 year old male with PMHx of spina bifida (wheelchair bound at baseline), scoliosis,  Shunt BIBEMS 1/14 with abdominal pain, n/v, and lethargy, found to have severe SBO, metabolic acidosis, and to be in septic and hypovolemic shock. Patient was brought to the OR 1/14 for ex lap and small bowel resection, c/b bladder perf with primary repair and partial colectomy and admitted to SICU post-op for further management. Now with mixed septic/hypovolemic shock, pneumoperitoneum, suspected UGIB. RTOR 1/22 for bladder repair, Kirkpatrick's and EGD.       NEURO:  - A&O x 3 at baseline   - Continue with pain control, tylenol PRN    #Hx of  shunt   -  shunt externalized  - Open to drain, monitor output.   - Per NS, will require internalization of drain pleura vs atria vs transfer to [facility] to Dr. Zhao Knight for endoscopic third ventriculostomy; plan pending pt preference (may go back to [place] where previous care was received with Dr. Dorsey)    CV:     #Sinus tachycardia - unclear etiology at this time  - Transiently responsive to fluid, no improvement with most recent 500 cc bolus of plasma-lyte  - Pt well appearing, afebrile, without leukocytosis and with stable Hg/Hct  - F/u B/L LE venous duplex. Team will discuss CT angio to r/o PE today.    - Continue to monitor on telemetry    PULM:  #COVID  - Resolved  - Currently satting well on RA  - Continue pulmonary toilet, IS, OOBTC    - Maintain SpO2 > 92%  -Consider ABG to r/o AA gradient     GI/Nutrition:    #SBO s/p SBR, hartmans and bladder repair  - Diet: Advanced diet to regular high fiber  -Severe protein calorie malnutrition- encourage high protein diet as well, added ensures to patient's tray   - s/p EGD, found to have gastritis - continue protonix BID  - Monitor colostomy output      /Renal:   #Hx of urethral stricture now with bladder injury s/p repair x 3  - SPT tube placement and cline 18F.   - Patient must remain decompressed until bladder injury has healed  - Continue to monitor UOP and I&Os  #GALEN, resolved     ENDO:  - Maintain euglycemia 120-180.     ID:   - Remains afebrile without leukocytosis  - Meropenum until (1/27) x 4 days from source control.   - Blood cx (1/19) NGTD  - CSF cx (1/21) NGTD     HEME:  - SCDs  - Monitor H/H  - Monitor Coags    - SCDs, lovenox     SKIN:  - Repositioning for DTI prevention while in bed. Dressing changed      Lines: Peripheral IV's    Dispo:   SICU      CODE STATUS: FULL

## 2024-01-26 NOTE — DIETITIAN NUTRITION RISK NOTIFICATION - MALNUTRITION EVALUATION AS DEMONSTRATED BY (ADULTS > 20 YEARS OF AGE)
Weight loss.../Inadequate energy intake...
Weight loss.../Inadequate energy intake.../Loss of subcutaneous fat.../Loss of muscle.../Fluid accumulation...

## 2024-01-26 NOTE — DIETITIAN NUTRITION RISK NOTIFICATION - ADDITIONAL COMMENTS/DIETITIAN RECOMMENDATIONS
1. Diet advancement per MD's discretion when medically feasible; if unable to advance to oral/enteral diet, consider parenteral nutrition   2. Rx: MVI daily to optimize nutrition   3. Monitor electrolytes, replete prn  4. Trend weights, BMs, and I/Os  
1. Consider changing diet to low fiber/low residue to reduce high ostomy output  2. Monitor I/O, BM trends  3. Monitor electrolytes, replete prn  4. Provide Magic Cup BID (280 kcals, 9 g pro each)  5. Rx: MVI daily to optimize nutrition  6. Trend weights

## 2024-01-26 NOTE — PROGRESS NOTE ADULT - SUBJECTIVE AND OBJECTIVE BOX
MSSN NSx PA Note  HD#12  1/21/24 Shunt externalized     HPI  28y Male PMH spina bifida, scoliosis, no sensation distal to pubic line, wheelchair bound at baseline, hydrocephalus s/p  shunt placement with multiple revisions followed by Dr. Dorsey at [facility], now presents to [facility] 1/14/24 with concern for small bowel obstruction, s/p exploratory laparotomy with small bowel resection, partial left colectomy, bladder perforation s/p repair on 1/14/24, s/p externalization of shunt 1/21/24.    Interval/Overnight Events   Patient seen bedside. No complaints. Denies headache, dizziness, change in vision, n/v, new weakness. Patient thinks he is having BMs, but unsure. WC bound at baseline. HR tachy. LE dopplers ordered- negative. Transfer initiated.     MEDICATIONS  (STANDING):  chlorhexidine 2% Cloths 1 Application(s) Topical daily  enoxaparin Injectable 40 milliGRAM(s) SubCutaneous every 24 hours  meropenem Injectable 1000 milliGRAM(s) IV Push every 8 hours  multiple electrolytes Injection Type 1 1000 milliLiter(s) (75 mL/Hr) IV Continuous <Continuous>  pantoprazole  Injectable 40 milliGRAM(s) IV Push two times a day  psyllium Powder 1 Packet(s) Oral daily    MEDICATIONS  (PRN):  acetaminophen   IVPB .. 1000 milliGRAM(s) IV Intermittent every 6 hours PRN Temp greater or equal to 38C (100.4F)      Vital Signs Last 24 Hrs  T(C): 36.8 (26 Jan 2024 07:18), Max: 37.7 (25 Jan 2024 15:23)  T(F): 98.3 (26 Jan 2024 07:18), Max: 99.9 (25 Jan 2024 15:23)  HR: 110 (26 Jan 2024 10:00) (92 - 125)  BP: 120/78 (26 Jan 2024 10:00) (113/72 - 142/88)  BP(mean): 90 (26 Jan 2024 10:00) (83 - 103)  RR: 22 (26 Jan 2024 10:00) (16 - 23)  SpO2: 98% (26 Jan 2024 10:00) (96% - 100%)    Parameters below as of 26 Jan 2024 08:00  Patient On (Oxygen Delivery Method): room air    Externalized EVD- 242cc/24hrs, 118cc overnight     Neuro- Awake, alert, orientedx3  speech clear and appropriate  follows commands  pupils equal and reactive, EOMI, slight beating horizontal nystagmus  tongue ML, face symmetric    slight L drift  b/l UEs 5/5   B/L LEs paretic 2/2 spina bifida  calves soft, unable to palpate DP pulse   Shunt- externalized, draining clear to very slightly straw colored CSF, no purulence noted                           12.0   8.26  )-----------( 421      ( 26 Jan 2024 02:15 )             37.9   01-26    138  |  104  |  6.6<L>  ----------------------------<  97  4.8   |  21.0<L>  |  0.49<L>    Ca    7.9<L>      26 Jan 2024 02:15  Phos  1.6     01-26  Mg     2.0     01-26    CSF 1/21 - NGTD     Plan:  NS stable  CW externalization of EVD, measure output hourly and document, notify neurosx of any change in quality of output  F/U final CSF cultures, NGTD  Q4 hour neuro checks   CW abx per primary team  Ultimately, once cleared from general surgery standpoint, may need internalization of shunt to pleura vs atria vs transfer to [facility] to Dr. Zhao Knight for endoscopic third ventriculostomy; plan pending pt preference (may go back to [place] where previous care was received with Dr. Dorsey) and final CSF cultures--will continue to monitor fever curve/leukocytosis trend  CW supportive care  Incentive judson   Lovenox and SCDs   Dr. Harris to see

## 2024-01-26 NOTE — PROGRESS NOTE ADULT - CRITICAL CARE ATTENDING COMMENT
28 year old male with pmhx of spina bifida and scoliosis who presented with SBO and underwent exploratory laparotomy, SBR, repair of bladder injury, and colon resection and primary anastomosis with postoperative course notable for anastomotic leak s/p exlap, Akash, partial cystectomy, EGD and colonoscopy     NEURO;  #Hx of hydrocephalus s/p  shunt   - s/p shunt externalization   - monitor drainage hourly   - f/u cultures   - Q4 neurochecks   - will ultimately need transfer for shunt internalization of shunt vs ventriculostomy   #Analgesia: tylenol     CARDIO:   #Sinus tachycardia   - potentially secondary to volume depletion   - fluid bolus and monitor   - US duplex LE r/o DVT     PULM:  On RA   #Aspiration precautions: head of bed elevation     GI:   #SBO s/p multiple ex laps with SBR and Akash procedure, high output ostomy   - High fiber diet   - monitor ostomy output   - daily dressing/packing change   - replete ostomy output 1:2   #Bowel regimen: held.   #Gastritis, clean base ulcer: PPI BID     RENAL:   #bladder injury s/p partial cystectomy with SPC tube and cline in place   - monitor outputs    HEME:   Thromboprophylaxis: SCD + Lovenox     ID:   #Anastomotic leak s/p resection   - complete course of antibiotics     ENDO:       LINES/SKIN:     CODE STATUS:    DISPOSITION: SICU 28 year old male with pmhx of spina bifida and scoliosis who presented with SBO and underwent exploratory laparotomy, SBR, repair of bladder injury, and colon resection and primary anastomosis with postoperative course notable for anastomotic leak s/p exlap, Akash, partial cystectomy, EGD and colonoscopy     NEURO;  #Hx of hydrocephalus s/p  shunt   - s/p shunt externalization   - monitor drainage hourly   - f/u cultures   - Q4 neurochecks   - will ultimately need transfer for shunt internalization of shunt vs ventriculostomy   #Analgesia: Tylenol     CARDIO:   #Sinus tachycardia   - potentially secondary to volume depletion   - fluid bolus and monitor   - US duplex LE r/o DVT     PULM:  On RA   #Aspiration precautions: head of bed elevation     GI:   #SBO s/p multiple ex laps with SBR and Akash procedure, high output ostomy   - High fiber diet   - monitor ostomy output   - daily dressing/packing change   - replete ostomy output 1:2   #Bowel regimen: held.   #Gastritis, clean base ulcer: PPI BID     RENAL:   #bladder injury s/p partial cystectomy with SPC tube and cline in place   - monitor outputs    #metabolic acidosis: mild. monitor   #hypophosphatemia: trend and replete.     HEME:   Thromboprophylaxis: SCD + Lovenox     ID:   #Anastomotic leak s/p resection   - complete course of antibiotics     ENDO:   - euglycemia     LINES/SKIN: PIV, EVD, SPC, Cline, ostomy      CODE STATUS: Full     DISPOSITION: SICU 28 year old male with pmhx of spina bifida and scoliosis who presented with SBO and underwent exploratory laparotomy, SBR, repair of bladder injury, and colon resection and primary anastomosis with postoperative course notable for anastomotic leak s/p exlap, Akash, partial cystectomy, EGD and colonoscopy     NEURO;  #Hx of hydrocephalus s/p  shunt   - s/p shunt externalization   - monitor drainage hourly   - f/u cultures   - Q4 neurochecks   - will ultimately need transfer for shunt internalization of shunt vs ventriculostomy   #Analgesia: Tylenol     CARDIO:   #Sinus tachycardia   - potentially secondary to volume depletion   - fluid bolus and monitor   - US duplex LE r/o DVT     PULM:  On RA   #Aspiration precautions: head of bed elevation     GI:   #SBO s/p multiple ex laps with SBR and Akash procedure, high output ostomy, severe protein calorie malnutrition  - High fiber diet   - monitor ostomy output   - daily dressing/packing change   - replete ostomy output 1:2   #Bowel regimen: held.   #Gastritis, clean base ulcer: PPI BID     RENAL:   #urethral stricture, bladder injury s/p partial cystectomy with SPC tube and cline in place   - monitor outputs    #metabolic acidosis: mild. monitor   #hypophosphatemia: trend and replete.     HEME:   Thromboprophylaxis: SCD + Lovenox     ID:   #Anastomotic leak s/p resection   - complete course of antibiotics     ENDO:   - euglycemia     LINES/SKIN: PIV, EVD, SPC, Cline, ostomy      CODE STATUS: Full     DISPOSITION: SICU

## 2024-01-26 NOTE — CHART NOTE - NSCHARTNOTEFT_GEN_A_CORE
Source: Patient [x]  Family [ ]   other [ ]    Current Diet: Diet, Regular:   High Fiber (HIFIBER) (01-25-24 @ 10:08)    PO intake:  < 50% [x]   50-75%  [x]   %  [ ]  other: po fluctuating < 50%-50%    Source for PO intake [x] Patient [ ] family [ ] chart [ ] staff [ ] other    Current Weight:   1/16 62.8 kg  1/23 57.8 kg  +1 dependent edema; 11 lbs (8%) weight loss x 1 week    Pertinent Medications: MEDICATIONS  (STANDING):  meropenem Injectable 1000 milliGRAM(s) IV Push every 8 hours  multiple electrolytes Injection Type 1 1000 milliLiter(s) (75 mL/Hr) IV Continuous <Continuous>  pantoprazole  Injectable 40 milliGRAM(s) IV Push two times a day  psyllium Powder 1 Packet(s) Oral daily    Pertinent Labs: 01-26 Na138 mmol/L Glu 97 mg/dL K+ 4.8 mmol/L Cr  0.49 mg/dL<L> BUN 6.6 mg/dL<L> Phos 1.6 mg/dL<L>    Skin: IAD midline surgical incision    Nutrition focused physical exam conducted - found signs of malnutrition [ ]absent [x]present    Subcutaneous fat loss: [x] Orbital fat pads region, [x]Buccal fat region, [x]Triceps region,  [ ]Ribs region    Muscle wasting: [x]Temples region, [x]Clavicle region, [x]Shoulder region, [ ]Scapula region, [ ]Interosseous region,  [ ]thigh region, [ ]Calf region    Estimated Needs:   [x] no change since previous assessment  [ ] recalculated:     Current Nutrition Diagnosis: Pt meets criteria for chronic-severe malnutrition related to inadequate energy/protein intake, spina bifida,  shunt as evidenced by pt likely meeting <75% est needs >1mo, +1 edema, severe muscle/fat wasting, now with 8% wt loss x 1 week.  Met with pt this morning, breakfast tray observed at bedside ~50% consumed, pt reports okay appetite/po intake. Tolerating diet so far. States his UBW is 128 lbs, weight 1/23 127 lbs, appears with severe muscle/fat wasting. Offered protein supplement, pt declined at this time, made aware RD remains available. Pt s/p hartmans colostomy, output on 1/25 - ~2L, output so far today 300 ml; receiving metamucil. Pt ordered for high fiber diet. RD remains available. recommendations below:    Recommendations:   1. Consider changing diet to low fiber/low residue to reduce high ostomy output  2. Monitor I/O, BM trends  3. Monitor electrolytes, replete prn  4. Provide Magic Cup BID (280 kcals, 9 g pro each)  5. Rx: MVI daily to optimize nutrition  6. Trend weights    Monitoring and Evaluation:   [x] PO intake [x] Tolerance to diet prescription [X] Weights  [X] Follow up per protocol [X] Labs: chem 8, phos, mg

## 2024-01-27 LAB
ANION GAP SERPL CALC-SCNC: 11 MMOL/L — SIGNIFICANT CHANGE UP (ref 5–17)
BASOPHILS # BLD AUTO: 0.04 K/UL — SIGNIFICANT CHANGE UP (ref 0–0.2)
BASOPHILS NFR BLD AUTO: 0.5 % — SIGNIFICANT CHANGE UP (ref 0–2)
BUN SERPL-MCNC: 5.1 MG/DL — LOW (ref 8–20)
CALCIUM SERPL-MCNC: 8.3 MG/DL — LOW (ref 8.4–10.5)
CHLORIDE SERPL-SCNC: 101 MMOL/L — SIGNIFICANT CHANGE UP (ref 96–108)
CO2 SERPL-SCNC: 25 MMOL/L — SIGNIFICANT CHANGE UP (ref 22–29)
CREAT SERPL-MCNC: 0.45 MG/DL — LOW (ref 0.5–1.3)
EGFR: 145 ML/MIN/1.73M2 — SIGNIFICANT CHANGE UP
EOSINOPHIL # BLD AUTO: 0.09 K/UL — SIGNIFICANT CHANGE UP (ref 0–0.5)
EOSINOPHIL NFR BLD AUTO: 1 % — SIGNIFICANT CHANGE UP (ref 0–6)
GLUCOSE SERPL-MCNC: 90 MG/DL — SIGNIFICANT CHANGE UP (ref 70–99)
HCT VFR BLD CALC: 35.5 % — LOW (ref 39–50)
HGB BLD-MCNC: 11.6 G/DL — LOW (ref 13–17)
IMM GRANULOCYTES NFR BLD AUTO: 1.5 % — HIGH (ref 0–0.9)
LYMPHOCYTES # BLD AUTO: 1.71 K/UL — SIGNIFICANT CHANGE UP (ref 1–3.3)
LYMPHOCYTES # BLD AUTO: 19.3 % — SIGNIFICANT CHANGE UP (ref 13–44)
MAGNESIUM SERPL-MCNC: 2 MG/DL — SIGNIFICANT CHANGE UP (ref 1.6–2.6)
MCHC RBC-ENTMCNC: 28.1 PG — SIGNIFICANT CHANGE UP (ref 27–34)
MCHC RBC-ENTMCNC: 32.7 GM/DL — SIGNIFICANT CHANGE UP (ref 32–36)
MCV RBC AUTO: 86 FL — SIGNIFICANT CHANGE UP (ref 80–100)
MONOCYTES # BLD AUTO: 1.06 K/UL — HIGH (ref 0–0.9)
MONOCYTES NFR BLD AUTO: 12 % — SIGNIFICANT CHANGE UP (ref 2–14)
NEUTROPHILS # BLD AUTO: 5.82 K/UL — SIGNIFICANT CHANGE UP (ref 1.8–7.4)
NEUTROPHILS NFR BLD AUTO: 65.7 % — SIGNIFICANT CHANGE UP (ref 43–77)
PHOSPHATE SERPL-MCNC: 1.8 MG/DL — LOW (ref 2.4–4.7)
PLATELET # BLD AUTO: 532 K/UL — HIGH (ref 150–400)
POTASSIUM SERPL-MCNC: 4 MMOL/L — SIGNIFICANT CHANGE UP (ref 3.5–5.3)
POTASSIUM SERPL-SCNC: 4 MMOL/L — SIGNIFICANT CHANGE UP (ref 3.5–5.3)
RBC # BLD: 4.13 M/UL — LOW (ref 4.2–5.8)
RBC # FLD: 15.3 % — HIGH (ref 10.3–14.5)
SODIUM SERPL-SCNC: 137 MMOL/L — SIGNIFICANT CHANGE UP (ref 135–145)
WBC # BLD: 8.85 K/UL — SIGNIFICANT CHANGE UP (ref 3.8–10.5)
WBC # FLD AUTO: 8.85 K/UL — SIGNIFICANT CHANGE UP (ref 3.8–10.5)

## 2024-01-27 PROCEDURE — ZZZZZ: CPT

## 2024-01-27 PROCEDURE — 99024 POSTOP FOLLOW-UP VISIT: CPT

## 2024-01-27 PROCEDURE — 93010 ELECTROCARDIOGRAM REPORT: CPT

## 2024-01-27 RX ORDER — SODIUM CHLORIDE 9 MG/ML
1000 INJECTION, SOLUTION INTRAVENOUS
Refills: 0 | Status: DISCONTINUED | OUTPATIENT
Start: 2024-01-27 | End: 2024-01-29

## 2024-01-27 RX ORDER — SODIUM CHLORIDE 9 MG/ML
1000 INJECTION, SOLUTION INTRAVENOUS ONCE
Refills: 0 | Status: COMPLETED | OUTPATIENT
Start: 2024-01-27 | End: 2024-01-27

## 2024-01-27 RX ORDER — SODIUM CHLORIDE 9 MG/ML
1000 INJECTION INTRAMUSCULAR; INTRAVENOUS; SUBCUTANEOUS ONCE
Refills: 0 | Status: DISCONTINUED | OUTPATIENT
Start: 2024-01-27 | End: 2024-01-27

## 2024-01-27 RX ORDER — ALBUMIN HUMAN 25 %
250 VIAL (ML) INTRAVENOUS ONCE
Refills: 0 | Status: COMPLETED | OUTPATIENT
Start: 2024-01-27 | End: 2024-01-27

## 2024-01-27 RX ADMIN — Medication 125 MILLILITER(S): at 09:50

## 2024-01-27 RX ADMIN — MEROPENEM 1000 MILLIGRAM(S): 1 INJECTION INTRAVENOUS at 05:14

## 2024-01-27 RX ADMIN — SODIUM CHLORIDE 1000 MILLILITER(S): 9 INJECTION, SOLUTION INTRAVENOUS at 18:07

## 2024-01-27 RX ADMIN — Medication 85 MILLIMOLE(S): at 09:02

## 2024-01-27 RX ADMIN — CHLORHEXIDINE GLUCONATE 1 APPLICATION(S): 213 SOLUTION TOPICAL at 12:37

## 2024-01-27 RX ADMIN — PANTOPRAZOLE SODIUM 40 MILLIGRAM(S): 20 TABLET, DELAYED RELEASE ORAL at 05:14

## 2024-01-27 RX ADMIN — Medication 1 TABLET(S): at 11:11

## 2024-01-27 RX ADMIN — PANTOPRAZOLE SODIUM 40 MILLIGRAM(S): 20 TABLET, DELAYED RELEASE ORAL at 17:26

## 2024-01-27 RX ADMIN — Medication 1 PACKET(S): at 11:10

## 2024-01-27 RX ADMIN — ENOXAPARIN SODIUM 40 MILLIGRAM(S): 100 INJECTION SUBCUTANEOUS at 17:26

## 2024-01-27 RX ADMIN — SODIUM CHLORIDE 50 MILLILITER(S): 9 INJECTION, SOLUTION INTRAVENOUS at 11:10

## 2024-01-27 NOTE — PROGRESS NOTE ADULT - SUBJECTIVE AND OBJECTIVE BOX
NEUROSURGERY PROGRESS NOTE:    Porsha PRESSLEY Note  HD#13  1/21/24 Shunt externalized, POD #6    HPI: 28y Male PMH spina bifida, scoliosis, no sensation distal to pubic line, wheelchair bound at baseline, hydrocephalus s/p  shunt placement with multiple revisions followed by Dr. Dorsey at [facility], now presents to [facility] 1/14/24 with concern for small bowel obstruction, s/p exploratory laparotomy with small bowel resection, partial left colectomy, bladder perforation s/p repair on 1/14/24, s/p externalization of shunt 1/21/24.    Interval/Overnight Events   Patient seen bedside. No complaints. Denies headache, dizziness, change in vision, n/v, new weakness.  WC bound at baseline.  LE dopplers ordered- negative.   Transfer initiated to [facility]        MEDICATIONS  (STANDING):  chlorhexidine 2% Cloths 1 Application(s) Topical daily  enoxaparin Injectable 40 milliGRAM(s) SubCutaneous every 24 hours  multiple electrolytes Injection Type 1 1000 milliLiter(s) (50 mL/Hr) IV Continuous <Continuous>  multivitamin 1 Tablet(s) Oral daily  pantoprazole  Injectable 40 milliGRAM(s) IV Push two times a day  psyllium Powder 1 Packet(s) Oral daily    MEDICATIONS  (PRN):  acetaminophen   IVPB .. 1000 milliGRAM(s) IV Intermittent every 6 hours PRN Temp greater or equal to 38C (100.4F)    Allergies    No Known Allergies    Intolerances      Vital Signs Last 24 Hrs  T(C): 36.7 (27 Jan 2024 08:00), Max: 37.4 (27 Jan 2024 03:32)  T(F): 98 (27 Jan 2024 08:00), Max: 99.4 (27 Jan 2024 03:32)  HR: 112 (27 Jan 2024 11:00) (95 - 120)  BP: 121/71 (27 Jan 2024 11:00) (113/75 - 137/97)  BP(mean): 84 (27 Jan 2024 11:00) (82 - 107)  RR: 19 (27 Jan 2024 11:00) (8 - 24)  SpO2: 97% (27 Jan 2024 11:00) (97% - 100%)    Parameters below as of 27 Jan 2024 08:00  Patient On (Oxygen Delivery Method): room air        PHYSICAL EXAM:   Neuro- Awake, alert, orientedx3  speech clear and appropriate  follows commands  pupils equal and reactive, EOMI, slight beating horizontal nystagmus  tongue ML, face symmetric    slight L drift  b/l UEs 5/5   B/L LEs paretic 2/2 spina bifida  calves soft, unable to palpate DP pulse   Shunt- externalized, draining clear to very slightly straw colored CSF, no purulence noted        LABS:                        11.6   8.85  )-----------( 532      ( 27 Jan 2024 03:30 )             35.5     01-27    137  |  101  |  5.1<L>  ----------------------------<  90  4.0   |  25.0  |  0.45<L>    Ca    8.3<L>      27 Jan 2024 03:30  Phos  1.8     01-27  Mg     2.0     01-27        Urinalysis Basic - ( 27 Jan 2024 03:30 )    Color: x / Appearance: x / SG: x / pH: x  Gluc: 90 mg/dL / Ketone: x  / Bili: x / Urobili: x   Blood: x / Protein: x / Nitrite: x   Leuk Esterase: x / RBC: x / WBC x   Sq Epi: x / Non Sq Epi: x / Bacteria: x    CSF:   Culture - Acid Fast - CSF (01.21.24 @ 15:08)    Specimen Source: .CSF CSF   Acid Fast Bacilli Smear: Less than 5 cc of CSF received, unable to perform AFB smear.   Culture Results: Culture is being performed.  Culture - CSF with Gram Stain . (01.21.24 @ 15:07)    Gram Stain: No polymorphonuclear cells seen   No organisms seen by cytocentrifuge   Specimen Source: .CSF CSF   Culture Results: No growth  Lactate Dehydrogenase, CSF (01.21.24 @ 15:07)    Lactate Dehydrogenase, CSF: 13: Test Repeated  Reference Ranges have NOT been established for CSF LDH.  The  has not determined the efficacy of this test when performed on CSF specimens. The performance characteristics of this test were determined by [facility]. U/L  Protein, CSF (01.21.24 @ 15:07)    Protein, CSF: 14 mg/dL  Glucose, CSF (01.21.24 @ 15:07)    Glucose, CSF: 57 mg/dLG/24h  Cerebrospinal Fluid Cell Count-1 (01.21.24 @ 15:06)    Tube Type: Tube 3   CSF Color: No Color   RBC Count - Spinal Fluid: 3 /cmm   CSF Appearance: Clear   CSF Neutrophils: na %   Appearance Spun: Colorless   Total Nucleated Cell Count, CSF: 2 /uL        RADIOLOGY & ADDITIONAL TESTS:  no new NSx images available for review     < from: US Duplex Venous Lower Ext Complete, Bilateral (01.26.24 @ 10:24) >  IMPRESSION: No evidence of deep venous thrombosis in either lower extremity.  < end of copied text >      I spent a total time of 25 mins with the patient at bedside of which more than 50% of time was spent on counseling/coordination of care

## 2024-01-27 NOTE — PROGRESS NOTE ADULT - SUBJECTIVE AND OBJECTIVE BOX
Subjective:  No acute events overnight. No new complaints per patient. Patient status improving per SICU satff. pending transfer to [facility]   POD# 5 s/p return to OR - repair bladder and anastomotic leak ( bowel )   Patient seen and examined at bedside.  Awake, alert, responsive lying in bed in no acute distress.        MEDICATIONS  (STANDING):  chlorhexidine 2% Cloths 1 Application(s) Topical daily  enoxaparin Injectable 40 milliGRAM(s) SubCutaneous every 24 hours  multiple electrolytes Injection Type 1 1000 milliLiter(s) (50 mL/Hr) IV Continuous <Continuous>  multivitamin 1 Tablet(s) Oral daily  pantoprazole  Injectable 40 milliGRAM(s) IV Push two times a day  psyllium Powder 1 Packet(s) Oral daily    MEDICATIONS  (PRN):  acetaminophen   IVPB .. 1000 milliGRAM(s) IV Intermittent every 6 hours PRN Temp greater or equal to 38C (100.4F)      Vital Signs Last 24 Hrs  T(C): 36.7 (27 Jan 2024 08:00), Max: 37.4 (27 Jan 2024 03:32)  T(F): 98 (27 Jan 2024 08:00), Max: 99.4 (27 Jan 2024 03:32)  HR: 112 (27 Jan 2024 11:00) (94 - 120)  BP: 121/71 (27 Jan 2024 11:00) (113/75 - 137/97)  BP(mean): 84 (27 Jan 2024 11:00) (82 - 107)  RR: 19 (27 Jan 2024 11:00) (8 - 24)  SpO2: 97% (27 Jan 2024 11:00) (97% - 100%)    Parameters below as of 27 Jan 2024 08:00  Patient On (Oxygen Delivery Method): room air        Physical Exam:    Abdomen: soft n/d, FRITZ discontinued yesterday.  Ostomy: active .   SPT in RLQ region stable good output yellow urine   :  cline catheter in place draining well, urine yellow.           LABS:                        11.6   8.85  )-----------( 532      ( 27 Jan 2024 03:30 )             35.5     01-27    137  |  101  |  5.1<L>  ----------------------------<  90  4.0   |  25.0  |  0.45<L>    Ca    8.3<L>      27 Jan 2024 03:30  Phos  1.8     01-27  Mg     2.0     01-27        Urinalysis Basic - ( 27 Jan 2024 03:30 )    Color: x / Appearance: x / SG: x / pH: x  Gluc: 90 mg/dL / Ketone: x  / Bili: x / Urobili: x   Blood: x / Protein: x / Nitrite: x   Leuk Esterase: x / RBC: x / WBC x   Sq Epi: x / Non Sq Epi: x / Bacteria: x

## 2024-01-27 NOTE — PROGRESS NOTE ADULT - NS ATTEND AMEND GEN_ALL_CORE FT
Patient seen and examined on am rounds. No complaints, still mild tachycardia. Ileostomy output thicker, slowed down. midline incision c/d/i, repacked on rounds. Will place on Q4 vitals. Stable for transfer to [facility] pending bed availabliilty.

## 2024-01-27 NOTE — PROGRESS NOTE ADULT - ASSESSMENT
28y Male PMH spina bifida, scoliosis, no sensation distal to pubic line, wheelchair bound at baseline, hydrocephalus s/p  shunt placement with multiple revisions followed by Dr. Dorsey at [facility], now presents to [facility] 1/14/24 with concern for small bowel obstruction, s/p exploratory laparotomy with small bowel resection, partial left colectomy, bladder perforation s/p repair on 1/14/24, s/p externalization of shunt 1/21/24.    from a urology standpoint SPT functioning well with straw color urine, cline in place for decompression as well   bladder repair high risk for breakdown given integrity of tissue, would not place any stress on bladder repair for 4 weeks post op, no retrograde studies or distention to bladder   care may be continued at [facility] urology and reach out to Dr Abbott with any questions or concerns

## 2024-01-27 NOTE — PROGRESS NOTE ADULT - ASSESSMENT
Assessment and Plan:  28 year old male with PMHx of spina bifida (wheelchair bound at baseline), scoliosis,  Shunt BIBEMS 1/14 with abdominal pain, n/v, and lethargy, found to have severe SBO, metabolic acidosis, and to be in septic and hypovolemic shock. Patient was brought to the OR 1/14 for ex lap and small bowel resection, c/b bladder perf with primary repair and partial colectomy and admitted to SICU post-op for further management. Now with mixed septic/hypovolemic shock, pneumoperitoneum, suspected UGIB. RTOR 1/22 for bladder repair, Kirkpatrick's and EGD.       NEURO:  - A&O x 3 at baseline   - Continue with pain control, tylenol PRN    #Hx of  shunt   -  shunt externalized  - Open to drain, monitor output.   - Per NS, will require internalization of drain pleura vs atria vs transfer to [place] to Dr. Zhao Knight for endoscopic third ventriculostomy; currently plan to transfer to [facility] as he has had previous care with Dr. Dorsey, pending bed availability     CV:     #Sinus tachycardia   - S/p 1 L LR and 250 mL albumin x 2 with improvement in HR to high 90s  - Pt well appearing, afebrile, without leukocytosis and with stable Hg/Hct  - B/L LE venous duplex negative  - Continue to monitor on telemetry    PULM:  #COVID  - Resolved  - Currently satting well on RA  - Continue pulmonary toilet, IS, OOBTC    - Maintain SpO2 > 92%  - Consider ABG to r/o AA gradient     GI/Nutrition:    #SBO s/p SBR, hartmans and bladder repair  - Diet: Regular high fiber  - MVI daily   - Psyllium   - Severe protein calorie malnutrition- encourage high protein diet as well, added ensures to patient's tray   - s/p EGD, found to have gastritis - continue protonix BID  - Monitor colostomy output      /Renal:   #Hx of urethral stricture now with bladder injury s/p repair x 3  - SPT tube placement and cline 18F.   - Do not flush SPT or cline  - Patient must remain decompressed until bladder injury has healed  - Continue to monitor UOP and I&Os  #GALEN, resolved     ENDO:  - Maintain euglycemia 120-180.     ID:   - Remains afebrile without leukocytosis  - Meropenum until (1/27) x 4 days from source control.   - Blood cx (1/19) NGTD  - CSF cx (1/21) NGTD     HEME:  - Monitor H/H  - Monitor Coags    - SCDs, lovenox     SKIN:  - Repositioning for DTI prevention while in bed. Dressing changed      Lines: Peripheral IV's    Dispo:   SICU      CODE STATUS: FULL  Assessment and Plan:  28 year old male with PMHx of spina bifida (wheelchair bound at baseline), scoliosis,  Shunt BIBEMS 1/14 with abdominal pain, n/v, and lethargy, found to have severe SBO, metabolic acidosis, and to be in septic and hypovolemic shock. Patient was brought to the OR 1/14 for ex lap and small bowel resection, c/b bladder perf with primary repair and partial colectomy and admitted to SICU post-op for further management. Now with mixed septic/hypovolemic shock, pneumoperitoneum, suspected UGIB. RTOR 1/22 for bladder repair, Kirkpatrick's and EGD.       NEURO:  - A&O x 3 at baseline   - Continue with pain control, tylenol PRN    #Hx of  shunt   -  shunt externalized  - Open to drain, monitor output.   - Per NS, will require internalization of drain pleura vs atria vs transfer to [place] to Dr. Zhao Knight for endoscopic third ventriculostomy; currently plan to transfer to [facility] as he has had previous care with Dr. Dorsey, pending bed availability     CV:     #Sinus tachycardia   - S/p 1 L LR and 250 mL albumin x 2 with improvement in HR to high 90s  - Pt well appearing, afebrile, without leukocytosis and with stable Hg/Hct  - B/L LE venous duplex negative  - Continue to monitor on telemetry    PULM:  #COVID  - Resolved  - Currently satting well on RA  - Continue pulmonary toilet, IS, OOBTC    - Maintain SpO2 > 92%  - Consider ABG to r/o AA gradient     GI/Nutrition:    #SBO s/p SBR, hartmans and bladder repair  - Diet: Regular high fiber  - MVI daily   - Psyllium   - Severe protein calorie malnutrition- encourage high protein diet as well, added ensures to patient's tray   - s/p EGD, found to have gastritis - continue protonix BID  - Monitor colostomy output      /Renal:   #Hx of urethral stricture now with bladder injury s/p repair x 3  - SPT tube placement and cline 18F.   - Do not flush SPT or cline  - Patient must remain decompressed until bladder injury has healed  - Continue to monitor UOP and I&Os  - Plasma-lyte at 75 mL/hr, poor PO intake   #GALEN, resolved     ENDO:  - Maintain euglycemia 120-180.     ID:   - Remains afebrile without leukocytosis  - Meropenum until (1/27) x 4 days from source control.   - Blood cx (1/19) NGTD  - CSF cx (1/21) NGTD     HEME:  - Monitor H/H  - Monitor Coags    - SCDs, lovenox     SKIN:  - Repositioning for DTI prevention while in bed. Dressing changed      Lines: Peripheral IV's    Dispo:   SICU      CODE STATUS: FULL

## 2024-01-27 NOTE — PROGRESS NOTE ADULT - SUBJECTIVE AND OBJECTIVE BOX
INTERVAL HPI/OVERNIGHT EVENTS:    HPI:  29 yo M w/ hx of spina bfida and scoliosis, wheelchair bound at baseline, presenting with abdominal pain, n/v. Pt states that the pain began yesterday morning and has been persistent. He had a bowel movement this morning but prior to that his last bowel movement was one week prior. Denies passing gas. Denies fevers at home. Has been vomiting and nauseous. At home he was lethargic and EMS was called. At that time he was hypotensive and tachycardic and he was BIBEMS to [facility] ED for further workup. On arrival he was tachycardic to the 130s and hypotensive to 80s/50s. Labs notable for K 3.4, bicarb 13, Cr 2.04. Actively vomiting in ED. CT scan showed dilated bowel and stomach consistent with severe SBO with transition point in mid abdomen. Surgery consulted for management.     24 HOUR:     Patient continued to be tachycardic to 120's. He received a 1 L bolus of LR and 250 mL of albumin x 2. Improvement noted in tachycardia with HR now to high 90's-100's. High output from ostomy, reported to be approximately 1.2 L throughout the day. Output from ostomy now slowed to 300 mL currently ON. SPT and cline continuing to drain well. Remains hemodynamically stable and well-appearing. B/L LE Duplex negative. Dressing changed. Possible transfer to [facility] pending bed availability.     SUBJECTIVE: Patient with no acute complaints.    OBJECTIVE:    VITAL SIGNS:  ICU Vital Signs Last 24 Hrs  T(C): 36.8 (27 Jan 2024 00:09), Max: 37.3 (26 Jan 2024 20:16)  T(F): 98.2 (27 Jan 2024 00:09), Max: 99.2 (26 Jan 2024 20:16)  HR: 103 (27 Jan 2024 01:00) (92 - 120)  BP: 130/87 (27 Jan 2024 01:00) (113/75 - 137/97)  BP(mean): 100 (27 Jan 2024 01:00) (83 - 107)  ABP: --  ABP(mean): --  RR: 18 (27 Jan 2024 01:00) (8 - 24)  SpO2: 100% (27 Jan 2024 01:00) (96% - 100%)    O2 Parameters below as of 27 Jan 2024 00:00  Patient On (Oxygen Delivery Method): room air      I&O's Detail    25 Jan 2024 07:01  -  26 Jan 2024 07:00  --------------------------------------------------------  IN:    IV PiggyBack: 333.2 mL    IV PiggyBack: 166.6 mL    multiple electrolytes Injection Type 1 Bolus: 1500 mL    multiple electrolytes Injection Type 1.: 1050 mL    Oral Fluid: 1500 mL  Total IN: 4549.8 mL    OUT:    Colostomy (mL): 2150 mL    External Ventricular Device (mL): 242 mL    Indwelling Catheter - Suprapubic (mL): 1265 mL    Indwelling Catheter - Urethral (mL): 1015 mL  Total OUT: 4672 mL    Total NET: -122.2 mL    PHYSICAL EXAM:    General: NAD, well-appearing, resting comfortably in bed  Neurological:  GCS  15, CAM  negative RASS 0, B/L lower extremities with plegia, decreased sensation T8 down (baseline)  HEENT:  EOMI   Respiratory: Unlabored, no accessory muscle use  Cardiovascular: Regular rhythm, sinus tachycardia   Gastrointestinal: Softly distended, non-tender to palpation, colostomy pink with bilious liquid output   : SPT in place draining clear yellow urine, cline in place with clear yellow urine   Extremities: shorted/ contracted (baseline)   Vascular: Equal and normal pulses: 2+ peripheral pulses throughout  Skin: No rashes. Dressing changed.      MEDICATIONS:  MEDICATIONS  (STANDING):  chlorhexidine 2% Cloths 1 Application(s) Topical daily  enoxaparin Injectable 40 milliGRAM(s) SubCutaneous every 24 hours  meropenem Injectable 1000 milliGRAM(s) IV Push every 8 hours  multiple electrolytes Injection Type 1 1000 milliLiter(s) (75 mL/Hr) IV Continuous <Continuous>  pantoprazole  Injectable 40 milliGRAM(s) IV Push two times a day  psyllium Powder 1 Packet(s) Oral daily    MEDICATIONS  (PRN):  acetaminophen   IVPB .. 1000 milliGRAM(s) IV Intermittent every 6 hours PRN Temp greater or equal to 38C (100.4F)      ALLERGIES:  Allergies    No Known Allergies    Intolerances        LABS:    AM labs pending.                        12.0   8.26  )-----------( 421      ( 26 Jan 2024 02:15 )             37.9     01-26    138  |  104  |  6.6<L>  ----------------------------<  97  4.8   |  21.0<L>  |  0.49<L>    Ca    7.9<L>      26 Jan 2024 02:15  Phos  1.6     01-26  Mg     2.0     01-26        Urinalysis Basic - ( 26 Jan 2024 02:15 )    Color: x / Appearance: x / SG: x / pH: x  Gluc: 97 mg/dL / Ketone: x  / Bili: x / Urobili: x   Blood: x / Protein: x / Nitrite: x   Leuk Esterase: x / RBC: x / WBC x   Sq Epi: x / Non Sq Epi: x / Bacteria: x        CAPILLARY BLOOD GLUCOSE      POCT Blood Glucose.: 117 mg/dL (25 Jan 2024 17:27)      RADIOLOGY & ADDITIONAL TESTS: Reviewed.

## 2024-01-27 NOTE — PROGRESS NOTE ADULT - ASSESSMENT
28y Male PMH spina bifida, scoliosis, no sensation distal to pubic line, wheelchair bound at baseline, hydrocephalus s/p  shunt placement with multiple revisions followed by Dr. Dorsey at [facility], now presents to [facility] 1/14/24 with concern for small bowel obstruction, s/p exploratory laparotomy with small bowel resection, partial left colectomy, bladder perforation s/p repair on 1/14/24 1/21/24 Shunt externalized, POD #6, C/D/I    NS stable  CW externalization of EVD, measure output hourly and document, notify neurosx of any change in quality of output  F/U final CSF cultures, NGTD and Acid Fast in progress   Q4 hour neuro checks   CW abx per primary team  Ultimately, once cleared from general surgery standpoint, may need internalization of shunt to pleura vs atria vs transfer to [facility] to Dr. Zhao Knight for endoscopic third ventriculostomy; plan pending pt preference (may go back to [place] where previous care was received with Dr. Dorsey) and final CSF cultures--will continue to monitor fever curve/leukocytosis trend  CW supportive care  Incentive judson   Lovenox and SCDs

## 2024-01-27 NOTE — PROGRESS NOTE ADULT - NS ATTEND AMEND GEN_ALL_CORE FT
Patient seen and examined at bedside. Agree with above. Imaging reviewed. Discussed with patient. I spent 35 minutes.

## 2024-01-28 LAB
ANION GAP SERPL CALC-SCNC: 9 MMOL/L — SIGNIFICANT CHANGE UP (ref 5–17)
ANISOCYTOSIS BLD QL: SLIGHT — SIGNIFICANT CHANGE UP
BASOPHILS # BLD AUTO: 0 K/UL — SIGNIFICANT CHANGE UP (ref 0–0.2)
BASOPHILS NFR BLD AUTO: 0 % — SIGNIFICANT CHANGE UP (ref 0–2)
BUN SERPL-MCNC: 7.3 MG/DL — LOW (ref 8–20)
CALCIUM SERPL-MCNC: 8.2 MG/DL — LOW (ref 8.4–10.5)
CHLORIDE SERPL-SCNC: 104 MMOL/L — SIGNIFICANT CHANGE UP (ref 96–108)
CO2 SERPL-SCNC: 25 MMOL/L — SIGNIFICANT CHANGE UP (ref 22–29)
CREAT SERPL-MCNC: 0.5 MG/DL — SIGNIFICANT CHANGE UP (ref 0.5–1.3)
EGFR: 141 ML/MIN/1.73M2 — SIGNIFICANT CHANGE UP
ELLIPTOCYTES BLD QL SMEAR: SLIGHT — SIGNIFICANT CHANGE UP
EOSINOPHIL # BLD AUTO: 0 K/UL — SIGNIFICANT CHANGE UP (ref 0–0.5)
EOSINOPHIL NFR BLD AUTO: 0 % — SIGNIFICANT CHANGE UP (ref 0–6)
GIANT PLATELETS BLD QL SMEAR: PRESENT — SIGNIFICANT CHANGE UP
GLUCOSE SERPL-MCNC: 92 MG/DL — SIGNIFICANT CHANGE UP (ref 70–99)
HCT VFR BLD CALC: 32.1 % — LOW (ref 39–50)
HGB BLD-MCNC: 10.7 G/DL — LOW (ref 13–17)
HYPOCHROMIA BLD QL: SLIGHT — SIGNIFICANT CHANGE UP
LYMPHOCYTES # BLD AUTO: 1.38 K/UL — SIGNIFICANT CHANGE UP (ref 1–3.3)
LYMPHOCYTES # BLD AUTO: 16.8 % — SIGNIFICANT CHANGE UP (ref 13–44)
MACROCYTES BLD QL: SLIGHT — SIGNIFICANT CHANGE UP
MAGNESIUM SERPL-MCNC: 2 MG/DL — SIGNIFICANT CHANGE UP (ref 1.8–2.6)
MANUAL SMEAR VERIFICATION: SIGNIFICANT CHANGE UP
MCHC RBC-ENTMCNC: 28.4 PG — SIGNIFICANT CHANGE UP (ref 27–34)
MCHC RBC-ENTMCNC: 33.3 GM/DL — SIGNIFICANT CHANGE UP (ref 32–36)
MCV RBC AUTO: 85.1 FL — SIGNIFICANT CHANGE UP (ref 80–100)
MONOCYTES # BLD AUTO: 0.72 K/UL — SIGNIFICANT CHANGE UP (ref 0–0.9)
MONOCYTES NFR BLD AUTO: 8.8 % — SIGNIFICANT CHANGE UP (ref 2–14)
NEUTROPHILS # BLD AUTO: 5.89 K/UL — SIGNIFICANT CHANGE UP (ref 1.8–7.4)
NEUTROPHILS NFR BLD AUTO: 71.7 % — SIGNIFICANT CHANGE UP (ref 43–77)
OVALOCYTES BLD QL SMEAR: SLIGHT — SIGNIFICANT CHANGE UP
PHOSPHATE SERPL-MCNC: 2.3 MG/DL — LOW (ref 2.4–4.7)
PLAT MORPH BLD: NORMAL — SIGNIFICANT CHANGE UP
PLATELET # BLD AUTO: 660 K/UL — HIGH (ref 150–400)
POIKILOCYTOSIS BLD QL AUTO: SLIGHT — SIGNIFICANT CHANGE UP
POLYCHROMASIA BLD QL SMEAR: SLIGHT — SIGNIFICANT CHANGE UP
POTASSIUM SERPL-MCNC: 4 MMOL/L — SIGNIFICANT CHANGE UP (ref 3.5–5.3)
POTASSIUM SERPL-SCNC: 4 MMOL/L — SIGNIFICANT CHANGE UP (ref 3.5–5.3)
RBC # BLD: 3.77 M/UL — LOW (ref 4.2–5.8)
RBC # FLD: 15.5 % — HIGH (ref 10.3–14.5)
RBC BLD AUTO: ABNORMAL
SMUDGE CELLS # BLD: PRESENT — SIGNIFICANT CHANGE UP
SODIUM SERPL-SCNC: 138 MMOL/L — SIGNIFICANT CHANGE UP (ref 135–145)
T3 SERPL-MCNC: 69 NG/DL — LOW (ref 80–200)
T4 AB SER-ACNC: 7.8 UG/DL — SIGNIFICANT CHANGE UP (ref 4.5–12)
TSH SERPL-MCNC: 4.13 UIU/ML — SIGNIFICANT CHANGE UP (ref 0.27–4.2)
VARIANT LYMPHS # BLD: 2.7 % — SIGNIFICANT CHANGE UP (ref 0–6)
WBC # BLD: 8.21 K/UL — SIGNIFICANT CHANGE UP (ref 3.8–10.5)
WBC # FLD AUTO: 8.21 K/UL — SIGNIFICANT CHANGE UP (ref 3.8–10.5)

## 2024-01-28 PROCEDURE — ZZZZZ: CPT

## 2024-01-28 PROCEDURE — 99024 POSTOP FOLLOW-UP VISIT: CPT

## 2024-01-28 PROCEDURE — 99232 SBSQ HOSP IP/OBS MODERATE 35: CPT | Mod: 24

## 2024-01-28 RX ADMIN — ENOXAPARIN SODIUM 40 MILLIGRAM(S): 100 INJECTION SUBCUTANEOUS at 17:15

## 2024-01-28 RX ADMIN — Medication 1 TABLET(S): at 11:05

## 2024-01-28 RX ADMIN — Medication 1 PACKET(S): at 11:05

## 2024-01-28 RX ADMIN — CHLORHEXIDINE GLUCONATE 1 APPLICATION(S): 213 SOLUTION TOPICAL at 17:15

## 2024-01-28 RX ADMIN — PANTOPRAZOLE SODIUM 40 MILLIGRAM(S): 20 TABLET, DELAYED RELEASE ORAL at 06:22

## 2024-01-28 RX ADMIN — SODIUM CHLORIDE 50 MILLILITER(S): 9 INJECTION, SOLUTION INTRAVENOUS at 06:23

## 2024-01-28 RX ADMIN — Medication 85 MILLIMOLE(S): at 06:23

## 2024-01-28 RX ADMIN — SODIUM CHLORIDE 50 MILLILITER(S): 9 INJECTION, SOLUTION INTRAVENOUS at 11:05

## 2024-01-28 RX ADMIN — PANTOPRAZOLE SODIUM 40 MILLIGRAM(S): 20 TABLET, DELAYED RELEASE ORAL at 17:12

## 2024-01-28 NOTE — PROGRESS NOTE ADULT - NS ATTEND AMEND GEN_ALL_CORE FT
NEURO: History of spina bifida and hydrocephalus s/p  shunt. Now externalized.   -Monitor drainage and continue Q4 neurochecks.    -Plan to transfers to [place] for internalization of shunt and further management pending bed availability.   -Pain controlled on Ofirmev     RESP: Stable on room air      CVS: Remains hemodynamically stable      GI: Tolerating regular diet. Ostomy with 1L output over 24 hours.  -Continue Psyllium bulking agent.     : S/P partial cystectomy with both SPT and Sharp in place. Monitor urine output.    -Grossly net even volume status over the last 24 hours with adequate urine output.   -Continue plasmalyte 50 for elevated ostomy output.      ID: Completed four day course of meropenem after second operation. Remains afebrile without leukocytosis.      HEME: No evidence of active bleeding or coagulopathy.   Lovenox for DVT ppx.      ENDO: Glucose controlled

## 2024-01-28 NOTE — PROGRESS NOTE ADULT - ASSESSMENT
28y Male PMH spina bifida, scoliosis, no sensation distal to pubic line, wheelchair bound at baseline, hydrocephalus s/p  shunt placement with multiple revisions followed by Dr. Dorsey at [facility], now presents to [facility] 1/14/24 with concern for small bowel obstruction, s/p exploratory laparotomy with small bowel resection, partial left colectomy, bladder perforation s/p repair on 1/14/24 1/21/24 Shunt externalized, POD #7, C/D/I, TMax: 99.5 (28 Jan 2024 04:00)  pt seen in AM w Dr Parker    NS stable  CW externalization of EVD, measure output hourly and document, notify neurosx of any change in quality of output  F/U final CSF cultures, NGTD and Acid Fast in progress   Q4 hour neuro checks   CW abx per primary team  Ultimately, once cleared from general surgery standpoint, may need internalization of shunt to pleura vs atria vs transfer to [facility] to Dr. Zhao Knight for endoscopic third ventriculostomy; plan pending pt preference (may go back to [place] where previous care was received with Dr. Dorsey) and final CSF cultures--will continue to monitor fever curve/leukocytosis trend  CW supportive care  Incentive judson   Lovenox and SCDs

## 2024-01-28 NOTE — PROGRESS NOTE ADULT - SUBJECTIVE AND OBJECTIVE BOX
INTERVAL HPI/OVERNIGHT EVENTS:    HPI:  29 yo M w/ hx of spina bfida and scoliosis, wheelchair bound at baseline, presenting with abdominal pain, n/v. Pt states that the pain began yesterday morning and has been persistent. He had a bowel movement this morning but prior to that his last bowel movement was one week prior. Denies passing gas. Denies fevers at home. Has been vomiting and nauseous. At home he was lethargic and EMS was called. At that time he was hypotensive and tachycardic and he was BIBEMS to [facility] ED for further workup. On arrival he was tachycardic to the 130s and hypotensive to 80s/50s. Labs notable for K 3.4, bicarb 13, Cr 2.04. Actively vomiting in ED. CT scan showed dilated bowel and stomach consistent with severe SBO with transition point in mid abdomen. Surgery consulted for management.     24 HOUR:     Patient remained tachycardic from 110s – 120s. He received 1 L bolus of plasma-lyte and 250 mL albumin with transient improvement in HR. Maintenance fluids decreased to 50 mL/hr. SPT and cline continuing to drain well. Remains hemodynamically stable and well-appearing. Dressing over midline incision changed multiple times throughout the day. Awaiting transfer to [facility] pending bed availability.      SUBJECTIVE: No acute complaints.    OBJECTIVE:    VITAL SIGNS:  ICU Vital Signs Last 24 Hrs  T(C): 37.5 (28 Jan 2024 04:00), Max: 37.6 (27 Jan 2024 16:04)  T(F): 99.5 (28 Jan 2024 04:00), Max: 99.7 (27 Jan 2024 16:04)  HR: 112 (28 Jan 2024 05:00) (100 - 128)  BP: 112/67 (28 Jan 2024 05:00) (89/74 - 128/85)  BP(mean): 81 (28 Jan 2024 05:00) (74 - 96)  ABP: --  ABP(mean): --  RR: 18 (28 Jan 2024 05:00) (10 - 24)  SpO2: 99% (28 Jan 2024 05:00) (97% - 100%)    O2 Parameters below as of 28 Jan 2024 04:00  Patient On (Oxygen Delivery Method): room air        I&O's Detail    26 Jan 2024 07:01  -  27 Jan 2024 07:00  --------------------------------------------------------  IN:    Albumin 5%  - 500 mL: 500 mL    Lactated Ringers Bolus: 1000 mL    multiple electrolytes Injection Type 1.: 1725 mL  Total IN: 3225 mL    OUT:    Colostomy (mL): 1300 mL    External Ventricular Device (mL): 236 mL    Indwelling Catheter - Suprapubic (mL): 905 mL    Indwelling Catheter - Urethral (mL): 2730 mL  Total OUT: 5171 mL    Total NET: -1946 mL           PHYSICAL EXAM:    General: NAD, well-appearing, resting comfortably in bed  Neurological:  GCS  15, CAM  negative RASS 0, B/L lower extremities with plegia, decreased sensation T8 down (baseline)  HEENT:  EOMI   Respiratory: Unlabored, no accessory muscle use  Cardiovascular: Regular rhythm, sinus tachycardia   Gastrointestinal: Softly distended, non-tender to palpation, colostomy pink with bilious liquid output   : SPT in place draining clear yellow urine, cline in place with clear yellow urine   Extremities: shorted/ contracted (baseline)   Vascular: Equal and normal pulses: 2+ peripheral pulses throughout  Skin: No rashes. Dressings changed.       MEDICATIONS:  MEDICATIONS  (STANDING):  chlorhexidine 2% Cloths 1 Application(s) Topical daily  enoxaparin Injectable 40 milliGRAM(s) SubCutaneous every 24 hours  multiple electrolytes Injection Type 1 1000 milliLiter(s) (50 mL/Hr) IV Continuous <Continuous>  multivitamin 1 Tablet(s) Oral daily  pantoprazole  Injectable 40 milliGRAM(s) IV Push two times a day  psyllium Powder 1 Packet(s) Oral daily  sodium phosphate 30 milliMole(s)/500 mL IVPB 30 milliMole(s) IV Intermittent once    MEDICATIONS  (PRN):  acetaminophen   IVPB .. 1000 milliGRAM(s) IV Intermittent every 6 hours PRN Temp greater or equal to 38C (100.4F)      ALLERGIES:  Allergies    No Known Allergies    Intolerances        LABS:                        10.7   8.21  )-----------( 660      ( 28 Jan 2024 04:30 )             32.1     01-28    138  |  104  |  7.3<L>  ----------------------------<  92  4.0   |  25.0  |  0.50    Ca    8.2<L>      28 Jan 2024 04:30  Phos  2.3     01-28  Mg     2.0     01-28    Thyroid Stimulating Hormone, Serum (01.28.24 @ 04:30)   Thyroid Stimulating Hormone, Serum: 4.13 uIU/mLT4, Serum (01.28.24 @ 04:30)     T4, Serum: 7.8 ug/dLTriiodothyronine, Total (T3 Total) (01.28.24 @ 04:30)     Triiodothyronine, Total (T3 Total): 69 ng/dL    Urinalysis Basic - ( 28 Jan 2024 04:30 )    Color: x / Appearance: x / SG: x / pH: x  Gluc: 92 mg/dL / Ketone: x  / Bili: x / Urobili: x   Blood: x / Protein: x / Nitrite: x   Leuk Esterase: x / RBC: x / WBC x   Sq Epi: x / Non Sq Epi: x / Bacteria: x        CAPILLARY BLOOD GLUCOSE          RADIOLOGY & ADDITIONAL TESTS: Reviewed.

## 2024-01-28 NOTE — PROGRESS NOTE ADULT - SUBJECTIVE AND OBJECTIVE BOX
NEUROSURGERY PROGRESS NOTE:    NSx PA Note  HD#14  1/21/24 Shunt externalized, POD #7    HPI: 28y Male PMH spina bifida, scoliosis, no sensation distal to pubic line, wheelchair bound at baseline, hydrocephalus s/p  shunt placement with multiple revisions followed by Dr. Dorsey at [facility], now presents to [facility] 1/14/24 with concern for small bowel obstruction, s/p exploratory laparotomy with small bowel resection, partial left colectomy, bladder perforation s/p repair on 1/14/24, s/p externalization of shunt 1/21/24.    Interval/Overnight Events   Patient seen bedside. No complaints. Denies headache, dizziness, change in vision, n/v, new weakness.  pt seen and states is at baseline, denied any new or worsening sensorimotor changes  WC bound at baseline.  LE dopplers ordered- negative.   Transfer initiated to [facility]        MEDICATIONS  (STANDING):  chlorhexidine 2% Cloths 1 Application(s) Topical daily  enoxaparin Injectable 40 milliGRAM(s) SubCutaneous every 24 hours  multiple electrolytes Injection Type 1 1000 milliLiter(s) (50 mL/Hr) IV Continuous <Continuous>  multivitamin 1 Tablet(s) Oral daily  pantoprazole  Injectable 40 milliGRAM(s) IV Push two times a day  psyllium Powder 1 Packet(s) Oral daily    MEDICATIONS  (PRN):  acetaminophen   IVPB .. 1000 milliGRAM(s) IV Intermittent every 6 hours PRN Temp greater or equal to 38C (100.4F)    Allergies    No Known Allergies    Intolerances      Vital Signs Last 24 Hrs  T(C): 36.9 (28 Jan 2024 15:50), Max: 37.5 (28 Jan 2024 04:00)  T(F): 98.5 (28 Jan 2024 15:50), Max: 99.5 (28 Jan 2024 04:00)  HR: 115 (28 Jan 2024 16:00) (100 - 128)  BP: 105/64 (28 Jan 2024 16:00) (89/74 - 138/91)  BP(mean): 76 (28 Jan 2024 16:00) (74 - 104)  RR: 24 (28 Jan 2024 16:00) (10 - 27)  SpO2: 99% (28 Jan 2024 16:00) (97% - 100%)    Parameters below as of 28 Jan 2024 16:00  Patient On (Oxygen Delivery Method): room air          PHYSICAL EXAM:  Neuro- Awake, alert, orientedx3  speech clear and appropriate  follows commands  pupils equal and reactive, EOMI, slight beating horizontal nystagmus  tongue ML, face symmetric    slight L drift  b/l UEs 5/5   B/L LEs paretic 2/2 spina bifida  calves soft, unable to palpate DP pulse   Shunt- externalized, draining clear to very slightly straw colored CSF, no purulence noted      LABS:                        10.7   8.21  )-----------( 660      ( 28 Jan 2024 04:30 )             32.1     01-28    138  |  104  |  7.3<L>  ----------------------------<  92  4.0   |  25.0  |  0.50    Ca    8.2<L>      28 Jan 2024 04:30  Phos  2.3     01-28  Mg     2.0     01-28        Urinalysis Basic - ( 28 Jan 2024 04:30 )    Color: x / Appearance: x / SG: x / pH: x  Gluc: 92 mg/dL / Ketone: x  / Bili: x / Urobili: x   Blood: x / Protein: x / Nitrite: x   Leuk Esterase: x / RBC: x / WBC x   Sq Epi: x / Non Sq Epi: x / Bacteria: x    CSF:  Culture - Acid Fast - CSF (01.21.24 @ 15:08)    Specimen Source: .CSF CSF   Acid Fast Bacilli Smear: Less than 5 cc of CSF received, unable to perform AFB smear.   Culture Results: Culture is being performed.    Culture - CSF with Gram Stain . (01.21.24 @ 15:07)    Gram Stain: No polymorphonuclear cells seen  No organisms seen by cytocentrifuge   Specimen Source: .CSF CSF   Culture Results: No growth    Cerebrospinal Fluid Cell Count-1 (01.21.24 @ 15:06)    Tube Type: Tube 3   CSF Color: No Color   RBC Count - Spinal Fluid: 3 /cmm   CSF Appearance: Clear   CSF Neutrophils: na %   Appearance Spun: Colorless   Total Nucleated Cell Count, CSF: 2 /uL      RADIOLOGY & ADDITIONAL TESTS:  no new NSx images available for review     < from: US Duplex Venous Lower Ext Complete, Bilateral (01.26.24 @ 10:24) >  IMPRESSION: No evidence of deep venous thrombosis in either lower extremity.  < end of copied text >      I spent a total time of 15 mins with the patient at bedside of which more than 50% of time was spent on counseling/coordination of care

## 2024-01-28 NOTE — PROGRESS NOTE ADULT - ASSESSMENT
Assessment and Plan:     28 year old male with PMHx of spina bifida (wheelchair bound at baseline), scoliosis,  Shunt BIBEMS 1/14 with abdominal pain, n/v, and lethargy, found to have severe SBO, metabolic acidosis, and to be in septic and hypovolemic shock. Patient was brought to the OR 1/14 for ex lap and small bowel resection, c/b bladder perf with primary repair and partial colectomy and admitted to SICU post-op for further management. Now with mixed septic/hypovolemic shock, pneumoperitoneum, suspected UGIB. RTOR 1/22 for bladder repair, Kirkpatrick's and EGD.            NEURO:   - A&O x 3 at baseline    - Continue with pain control, tylenol PRN     #Hx of  shunt    -  shunt externalized   - Open to drain, monitor output.    - Per NS, will require internalization of drain pleura vs atria vs transfer to [place] to Dr. Zhao Knight for endoscopic third ventriculostomy; currently plan to transfer to [facility] as he has had previous care with Dr. Dorsey, pending bed availability      CV:      #Sinus tachycardia    - 1/27 S/p 1 L plasma-lyte and 250 mL albumin with transient improvement in HR   - Pt well appearing, afebrile, without leukocytosis and with stable Hg/Hct   - B/L LE venous duplex negative   - Continue to monitor on telemetry     PULM:   #COVID, resolved   - Currently satting well on RA   - Continue pulmonary toilet, IS, OOBTC     - Maintain SpO2 > 92%   - Consider ABG to r/o AA gradient      GI/Nutrition:     #SBO s/p SBR, hartmans and bladder repair   - Diet: Regular high fiber   - MVI daily    - Psyllium    - Severe protein calorie malnutrition- encourage high protein diet as well, added ensures to patient's tray    - s/p EGD, found to have gastritis - continue protonix BID   - Monitor colostomy output       /Renal:    #Hx of urethral stricture now with bladder injury s/p repair x 3   - SPT tube placement and cline 18F.    - Do not flush SPT or cline   - Patient must remain decompressed until bladder injury has healed   - Continue to monitor UOP and I&Os   - Plasma-lyte at 50 mL/hr   #GALEN, resolved      ENDO:   - TSH WNL  - Maintain euglycemia 120-180.        ID:    - Remains afebrile without leukocytosis   - Completed course of meropenum    - Blood cx (1/19) NGTD   - CSF cx (1/21) NGTD      HEME:   - Monitor H/H   - Monitor Coags     - SCDs, lovenox      SKIN:   - Repositioning for DTI prevention while in bed. Dressing changed       Lines: Peripheral IV's        Dispo:   SICU          CODE STATUS: FULL

## 2024-01-29 LAB
ANION GAP SERPL CALC-SCNC: 9 MMOL/L — SIGNIFICANT CHANGE UP (ref 5–17)
BASOPHILS # BLD AUTO: 0.04 K/UL — SIGNIFICANT CHANGE UP (ref 0–0.2)
BASOPHILS NFR BLD AUTO: 0.5 % — SIGNIFICANT CHANGE UP (ref 0–2)
BUN SERPL-MCNC: 8.5 MG/DL — SIGNIFICANT CHANGE UP (ref 8–20)
CALCIUM SERPL-MCNC: 8.3 MG/DL — LOW (ref 8.4–10.5)
CHLORIDE SERPL-SCNC: 102 MMOL/L — SIGNIFICANT CHANGE UP (ref 96–108)
CO2 SERPL-SCNC: 26 MMOL/L — SIGNIFICANT CHANGE UP (ref 22–29)
CREAT SERPL-MCNC: 0.52 MG/DL — SIGNIFICANT CHANGE UP (ref 0.5–1.3)
EGFR: 139 ML/MIN/1.73M2 — SIGNIFICANT CHANGE UP
EOSINOPHIL # BLD AUTO: 0.09 K/UL — SIGNIFICANT CHANGE UP (ref 0–0.5)
EOSINOPHIL NFR BLD AUTO: 1.1 % — SIGNIFICANT CHANGE UP (ref 0–6)
GLUCOSE SERPL-MCNC: 87 MG/DL — SIGNIFICANT CHANGE UP (ref 70–99)
HCT VFR BLD CALC: 34.1 % — LOW (ref 39–50)
HGB BLD-MCNC: 10.7 G/DL — LOW (ref 13–17)
IMM GRANULOCYTES NFR BLD AUTO: 1.3 % — HIGH (ref 0–0.9)
LYMPHOCYTES # BLD AUTO: 1.83 K/UL — SIGNIFICANT CHANGE UP (ref 1–3.3)
LYMPHOCYTES # BLD AUTO: 23.3 % — SIGNIFICANT CHANGE UP (ref 13–44)
MAGNESIUM SERPL-MCNC: 1.9 MG/DL — SIGNIFICANT CHANGE UP (ref 1.6–2.6)
MCHC RBC-ENTMCNC: 26.8 PG — LOW (ref 27–34)
MCHC RBC-ENTMCNC: 31.4 GM/DL — LOW (ref 32–36)
MCV RBC AUTO: 85.5 FL — SIGNIFICANT CHANGE UP (ref 80–100)
MONOCYTES # BLD AUTO: 1.02 K/UL — HIGH (ref 0–0.9)
MONOCYTES NFR BLD AUTO: 13 % — SIGNIFICANT CHANGE UP (ref 2–14)
NEUTROPHILS # BLD AUTO: 4.79 K/UL — SIGNIFICANT CHANGE UP (ref 1.8–7.4)
NEUTROPHILS NFR BLD AUTO: 60.8 % — SIGNIFICANT CHANGE UP (ref 43–77)
PHOSPHATE SERPL-MCNC: 2.4 MG/DL — SIGNIFICANT CHANGE UP (ref 2.4–4.7)
PLATELET # BLD AUTO: 694 K/UL — HIGH (ref 150–400)
POTASSIUM SERPL-MCNC: 4.2 MMOL/L — SIGNIFICANT CHANGE UP (ref 3.5–5.3)
POTASSIUM SERPL-SCNC: 4.2 MMOL/L — SIGNIFICANT CHANGE UP (ref 3.5–5.3)
RBC # BLD: 3.99 M/UL — LOW (ref 4.2–5.8)
RBC # FLD: 15.1 % — HIGH (ref 10.3–14.5)
SODIUM SERPL-SCNC: 137 MMOL/L — SIGNIFICANT CHANGE UP (ref 135–145)
SURGICAL PATHOLOGY STUDY: SIGNIFICANT CHANGE UP
WBC # BLD: 7.87 K/UL — SIGNIFICANT CHANGE UP (ref 3.8–10.5)
WBC # FLD AUTO: 7.87 K/UL — SIGNIFICANT CHANGE UP (ref 3.8–10.5)

## 2024-01-29 PROCEDURE — 99024 POSTOP FOLLOW-UP VISIT: CPT

## 2024-01-29 RX ORDER — MAGNESIUM SULFATE 500 MG/ML
1 VIAL (ML) INJECTION ONCE
Refills: 0 | Status: COMPLETED | OUTPATIENT
Start: 2024-01-29 | End: 2024-01-29

## 2024-01-29 RX ADMIN — Medication 63.75 MILLIMOLE(S): at 05:03

## 2024-01-29 RX ADMIN — SODIUM CHLORIDE 50 MILLILITER(S): 9 INJECTION, SOLUTION INTRAVENOUS at 05:03

## 2024-01-29 RX ADMIN — PANTOPRAZOLE SODIUM 40 MILLIGRAM(S): 20 TABLET, DELAYED RELEASE ORAL at 17:08

## 2024-01-29 RX ADMIN — PANTOPRAZOLE SODIUM 40 MILLIGRAM(S): 20 TABLET, DELAYED RELEASE ORAL at 05:04

## 2024-01-29 RX ADMIN — ENOXAPARIN SODIUM 40 MILLIGRAM(S): 100 INJECTION SUBCUTANEOUS at 17:08

## 2024-01-29 RX ADMIN — Medication 1 TABLET(S): at 11:03

## 2024-01-29 RX ADMIN — Medication 1 PACKET(S): at 11:03

## 2024-01-29 RX ADMIN — CHLORHEXIDINE GLUCONATE 1 APPLICATION(S): 213 SOLUTION TOPICAL at 17:09

## 2024-01-29 RX ADMIN — Medication 100 GRAM(S): at 09:04

## 2024-01-29 NOTE — PROGRESS NOTE ADULT - ASSESSMENT
ASSESSMENT  28M PMH spina bifida (wheelchair bound), scoliosis, hydrocephalus s/p  shunt placement with multiple revisions followed by Dr. Dorsey at [facility], presented to [facility] with concern for SBO s/p ex lap, partial colectomy ccb bladder perforation with repair on 1/14, shunt externalization on 1/21.       PLAN  - case d/w team  - no acute neurosurgical intervention indicated at this time, plan for transfer to [facility] for further management/eventual internalization as patient follows with Dr. Dorsey there  - con't neuro checks q4  - con't monitor CSF, fever w/u negative, csf NGTD. afebrile, no leukocytosis  - pain control as needed, avoid over sedation  - further medical care per primary team  - will continue to follow

## 2024-01-29 NOTE — PROGRESS NOTE ADULT - ASSESSMENT
Assessment: 28 year old male with PMHx of spina bifida (wheelchair bound at baseline), scoliosis,  Shunt BIBEMS 1/14 with abdominal pain, n/v, and lethargy, found to have severe SBO, metabolic acidosis, and to be in septic and hypovolemic shock. Patient was brought to the OR 1/14 for ex lap and small bowel resection, c/b bladder perf with primary repair and partial colectomy and admitted to SICU post-op for further management. Now with mixed septic/hypovolemic shock, pneumoperitoneum, suspected UGIB. RTOR 1/22 for bladder repair, Kirkpatrick's and EGD.         Plan:       NEURO:    - A&O x 3 at baseline     - Continue with pain control, Tylenol PRN      #Hx of  shunt     -  shunt externalized    - Open to drain, monitor output.     - Per NS, will require internalization of EVD likely with endoscopic third ventriculostomy. Currently plan to transfer to [facility] once stable for discharge as he has had previous care with Dr. Dorsey.     CV:      #Sinus tachycardia     - 1/27 S/p 1 L plasma-lyte and 250 mL albumin with transient improvement in HR    - Pt well appearing, afebrile, without leukocytosis and with stable Hg/Hct    - B/L LE venous duplex negative    - Continue to monitor on telemetry      PULM:    #COVID, resolved    - Currently saturating well on RA    - Continue pulmonary toilet, IS, OOBTC      - Maintain SpO2 > 92%      GI/Nutrition:      #SBO s/p SBR, Ikrkpatrick's and bladder repair    - Diet: Regular high fiber    - MVI daily     - Psyllium - 1 packet daily   - Severe protein calorie malnutrition- encourage high protein diet as well, added ensures to patient's tray     - s/p EGD, found to have gastritis - continue protonix BID    - Monitor colostomy output        /Renal:     #Hx of urethral stricture now with bladder injury s/p repair x 3    - SPT tube placement and cline 18F.     - Do not flush SPT or cline    - Patient must remain decompressed until bladder injury has healed    - Continue to monitor UOP and I&Os    - Plasma-lyte at 50 mL/hr    #GALEN, resolved       ENDO:    - TSH WNL   - Maintain euglycemia 120-180.       ID:     - Remains afebrile without leukocytosis    - Completed course of Meropenem     - Blood cx (1/19) NGTD    - CSF cx (1/21) NGTD      HEME:    - Monitor H/H    - Monitor Coags      - SCDs, lovenox 40 daily      SKIN:    - Repositioning for DTI prevention while in bed. Dressing changed        Lines: Peripheral IV's      Dispo: SICU due to externalized EVD drain. Will begin re-initiating transfer to [facility] now that patient is stable for discharge except for EVD drain.

## 2024-01-29 NOTE — PROGRESS NOTE ADULT - SUBJECTIVE AND OBJECTIVE BOX
24h Events: Ostomy output continues to thicken and decrease in volume. Remains on P-lyte @ 50 CC/hr for ongoing volume repletion. Continues to make adequate UOP.      ICU Vital Signs Last 24 Hrs  T(C): 36.6 (29 Jan 2024 03:00), Max: 37.4 (28 Jan 2024 19:36)  T(F): 97.9 (29 Jan 2024 03:00), Max: 99.4 (28 Jan 2024 19:36)  HR: 99 (29 Jan 2024 04:00) (87 - 125)  BP: 105/64 (29 Jan 2024 04:00) (99/63 - 138/91)  BP(mean): 76 (29 Jan 2024 04:00) (75 - 104)  ABP: --  ABP(mean): --  RR: 20 (29 Jan 2024 04:00) (10 - 27)  SpO2: 100% (29 Jan 2024 04:00) (97% - 100%)    O2 Parameters below as of 29 Jan 2024 04:00  Patient On (Oxygen Delivery Method): room air      I&O's Detail    27 Jan 2024 07:01  -  28 Jan 2024 07:00  --------------------------------------------------------  IN:    IV PiggyBack: 250 mL    IV PiggyBack: 499.8 mL    IV PiggyBack: 500 mL    multiple electrolytes Injection Type 1 Bolus: 1000 mL    multiple electrolytes Injection Type 1.: 225 mL    multiple electrolytes Injection Type 1.: 1050 mL    Oral Fluid: 560 mL  Total IN: 4084.8 mL    OUT:    Colostomy (mL): 1060 mL    External Ventricular Device (mL): 265 mL    Indwelling Catheter - Suprapubic (mL): 767 mL    Indwelling Catheter - Urethral (mL): 1258 mL  Total OUT: 3350 mL    Total NET: 734.8 mL      28 Jan 2024 07:01  -  29 Jan 2024 04:43  --------------------------------------------------------  IN:    multiple electrolytes Injection Type 1.: 1000 mL    Oral Fluid: 1200 mL  Total IN: 2200 mL    OUT:    Colostomy (mL): 725 mL    External Ventricular Device (mL): 212 mL    Indwelling Catheter - Suprapubic (mL): 1930 mL    Indwelling Catheter - Urethral (mL): 330 mL  Total OUT: 3197 mL    Total NET: -997 mL        MEDICATIONS  (STANDING):  chlorhexidine 2% Cloths 1 Application(s) Topical daily  enoxaparin Injectable 40 milliGRAM(s) SubCutaneous every 24 hours  magnesium sulfate  IVPB 1 Gram(s) IV Intermittent once  multiple electrolytes Injection Type 1 1000 milliLiter(s) (50 mL/Hr) IV Continuous <Continuous>  multivitamin 1 Tablet(s) Oral daily  pantoprazole  Injectable 40 milliGRAM(s) IV Push two times a day  psyllium Powder 1 Packet(s) Oral daily  sodium phosphate 15 milliMole(s)/250 mL IVPB 15 milliMole(s) IV Intermittent once    MEDICATIONS  (PRN):  acetaminophen   IVPB .. 1000 milliGRAM(s) IV Intermittent every 6 hours PRN Temp greater or equal to 38C (100.4F)      Physical Exam:    General: NAD, well-appearing, resting comfortably in bed     Neurological:  GCS  15, CAM negative, B/L lower extremities with plegia, decreased sensation T8 down (baseline)     HEENT:  EOMI      Respiratory: Unlabored, no accessory muscle use     Cardiovascular: Regular rhythm, sinus tachycardia      Gastrointestinal: Softly distended, non-tender to palpation, midline incision with iodoform packing and island dressing, colostomy pink with output that is starting to become more formed.      : SPT in place draining clear yellow urine, cline in place with clear yellow urine      Extremities: shorted/ contracted (baseline)      Vascular: Equal and normal pulses: 2+ peripheral pulses throughout     Skin: No rashes. Dressings changed.        LABS:  CBC Full  -  ( 29 Jan 2024 03:15 )  WBC Count : 7.87 K/uL  RBC Count : 3.99 M/uL  Hemoglobin : 10.7 g/dL  Hematocrit : 34.1 %  Platelet Count - Automated : 694 K/uL  Mean Cell Volume : 85.5 fl  Mean Cell Hemoglobin : 26.8 pg  Mean Cell Hemoglobin Concentration : 31.4 gm/dL  Auto Neutrophil # : 4.79 K/uL  Auto Lymphocyte # : 1.83 K/uL  Auto Monocyte # : 1.02 K/uL  Auto Eosinophil # : 0.09 K/uL  Auto Basophil # : 0.04 K/uL  Auto Neutrophil % : 60.8 %  Auto Lymphocyte % : 23.3 %  Auto Monocyte % : 13.0 %  Auto Eosinophil % : 1.1 %  Auto Basophil % : 0.5 %    01-29    137  |  102  |  8.5  ----------------------------<  87  4.2   |  26.0  |  0.52    Ca    8.3<L>      29 Jan 2024 03:15  Phos  2.4     01-29  Mg     1.9     01-29        Urinalysis Basic - ( 29 Jan 2024 03:15 )    Color: x / Appearance: x / SG: x / pH: x  Gluc: 87 mg/dL / Ketone: x  / Bili: x / Urobili: x   Blood: x / Protein: x / Nitrite: x   Leuk Esterase: x / RBC: x / WBC x   Sq Epi: x / Non Sq Epi: x / Bacteria: x      RECENT CULTURES:

## 2024-01-29 NOTE — PROGRESS NOTE ADULT - NS ATTEND AMEND GEN_ALL_CORE FT
28 year old male with pmhx of spina bifida and scoliosis who presented with SBO and underwent exploratory laparotomy, SBR, repair of bladder injury, and colon resection and primary anastomosis with postoperative course notable for anastomotic leak s/p exlap, Akash, partial cystectomy, EGD and colonoscopy     NEURO;  #Hx of hydrocephalus s/p  shunt   - s/p shunt externalization   - monitor drainage   - f/u cultures; NTD   - Q4 neurochecks   - [place] transferred center called from initiation of transfer for shunt internalization     #Analgesia: Tylenol     CARDIO:   #Sinus tachycardia   - improved with volume resuscitation   - monitor I/Os and volume status.  Replete PRN   - US duplex LE: negative      PULM:  On RA   #Aspiration precautions: head of bed elevation     GI:   #SBO s/p multiple ex laps with SBR and Akash procedure, high output ostomy, severe protein calorie malnutrition  - High fiber diet   - monitor ostomy output   - daily dressing change   - replete ostomy output PRN      #Bowel regimen: held.     #Gastritis, clean base ulcer: PPI BID x8 weeks post op. F/u with GI for repeat EGD 12 weeks post op     RENAL:   #urethral stricture, bladder injury s/p partial cystectomy with SPC tube and cline in place   - monitor outputs  - maintain catheters. f/u with urology     #hypophosphatemia: improved with IV phos     HEME:   Thromboprophylaxis: SCD + Lovenox     ID:   #Anastomotic leak s/p resection   - complete course of antibiotics     ENDO:   - euglycemia     LINES/SKIN: Midline, EVD, SPC, Cline, ostomy      CODE STATUS: Full     DISPOSITION: Transfer planning to [place]

## 2024-01-29 NOTE — PROGRESS NOTE ADULT - NS ATTEND AMEND GEN_ALL_CORE FT
NSGY Attg:    see above    patient seen and examined      agree with exam and plan as above  awaiting transfer to [facility] for further  shunt intervention per patient request

## 2024-01-29 NOTE — PROGRESS NOTE ADULT - SUBJECTIVE AND OBJECTIVE BOX
HPI: SUBJECTIVE: 27yo M w/ hx of spina bfida and scoliosis, wheelchair bound at baseline, presenting with abdominal pain, n/v. Pt states that the pain began yesterday morning and has been persistent. He had a bowel movement this morning but prior to that his last bowel movement was one week prior. Denies passing gas. Denies fevers at home. Has been vomiting and nauseous. At home he was lethargic and EMS was called. At that time he was hypotensive and tachycardic and he was BIBEMS to [facility] ED for further workup. On arrival he was tachycardic to the 130s and hypotensive to 80s/50s. Labs notable for K 3.4, bicarb 13, Cr 2.04. Actively vomiting in ED. CT scan showed dilated bowel and stomach consistent with severe SBO with transition point in mid abdomen. Surgery consulted for management.      (14 Jan 2024 11:27)      INTERVAL HPI/OVERNIGHT EVENTS:  28y Male seen lying comfortably in bed. No over night events, pending transfer to [facility].      Vital Signs Last 24 Hrs  T(C): 36.9 (29 Jan 2024 07:28), Max: 37.4 (28 Jan 2024 19:36)  T(F): 98.4 (29 Jan 2024 07:28), Max: 99.4 (28 Jan 2024 19:36)  HR: 112 (29 Jan 2024 11:00) (87 - 125)  BP: 108/63 (29 Jan 2024 11:00) (99/63 - 128/78)  BP(mean): 77 (29 Jan 2024 11:00) (64 - 101)  RR: 26 (29 Jan 2024 11:00) (10 - 27)  SpO2: 98% (29 Jan 2024 11:00) (97% - 100%)    Parameters below as of 29 Jan 2024 10:00  Patient On (Oxygen Delivery Method): room air        PHYSICAL EXAM:  GENERAL: NAD, well-groomed  HEAD: s/p shunt externalization, clear CSF noted in bag  MENTAL STATUS: AAO x3; Awake. Opens eyes spontaneously. Appropriately conversant without aphasia, following simple commands.  CRANIAL NERVES PERRL. Facial sensation intact V1-3 distribution b/l. Face symmetric w/ normal eye closure and smile, tongue midline. Hearing grossly intact. Speech clear.   MOTOR: b/l UE 5/5, b/l LE contracted 0/5      LABS:                        10.7   7.87  )-----------( 694      ( 29 Jan 2024 03:15 )             34.1     01-29    137  |  102  |  8.5  ----------------------------<  87  4.2   |  26.0  |  0.52    Ca    8.3<L>      29 Jan 2024 03:15  Phos  2.4     01-29  Mg     1.9     01-29        Urinalysis Basic - ( 29 Jan 2024 03:15 )    Color: x / Appearance: x / SG: x / pH: x  Gluc: 87 mg/dL / Ketone: x  / Bili: x / Urobili: x   Blood: x / Protein: x / Nitrite: x   Leuk Esterase: x / RBC: x / WBC x   Sq Epi: x / Non Sq Epi: x / Bacteria: x        01-28 @ 07:01 - 01-29 @ 07:00  --------------------------------------------------------  IN: 2650 mL / OUT: 3482 mL / NET: -832 mL    01-29 @ 07:01 - 01-29 @ 11:20  --------------------------------------------------------  IN: 490 mL / OUT: 555 mL / NET: -65 mL        RADIOLOGY & ADDITIONAL TESTS:  < from: US Duplex Venous Lower Ext Complete, Bilateral (01.26.24 @ 10:24) >    IMPRESSION:  No evidence of deep venous thrombosis in either lower extremity.    < end of copied text >    < from: CT Pelvis No Cont (01.21.24 @ 11:51) >  IMPRESSION:  *  Intraperitoneal bladder perforation. Moderate to large amount of   extraluminal contrast surrounding small bowel. Moderate amount of   pneumoperitoneum. Extraluminal contrast and gas is also seen tracking   through the midline ventral abdominal wall incision.  *  Redemonstrated tract extending from the right bladder wall to the skin   in the right lower quadrant. Question a urostomy. Correlate with patient   history.  *  The above findings werediscussed with Dr. Woodall 1/21/2024 12:07   PM by Dr. Olivares with read back confirmation.  *  Irregular cavity within the prostate measuring 3.1 x 1.8 cm that is   contiguous with the urethra. A few coarse calcifications within the   prostate are possibly calculi layering within the cavity.  *  Unchanged mild bilateral hydroureteronephrosis to the level of the   bladder. Unchanged urothelial thickening of the bilateral collecting   systems and ureters. Correlate for urinary tract infection.  *  Diffuse wall thickening of the imaged small bowel and sigmoid colon,   which could be reactive or representative of enterocolitis.      < end of copied text >        CAPRINI SCORE [CLOT]:  Patient has an estimated Caprini score of greater than 5.  However, the patient's unique clinical situation will be addressed in an individual manner to determine appropriate anticoagulation treatment, if any.

## 2024-01-30 LAB
ANION GAP SERPL CALC-SCNC: 9 MMOL/L — SIGNIFICANT CHANGE UP (ref 5–17)
BASOPHILS # BLD AUTO: 0.06 K/UL — SIGNIFICANT CHANGE UP (ref 0–0.2)
BASOPHILS NFR BLD AUTO: 0.8 % — SIGNIFICANT CHANGE UP (ref 0–2)
BUN SERPL-MCNC: 12.3 MG/DL — SIGNIFICANT CHANGE UP (ref 8–20)
CALCIUM SERPL-MCNC: 8.6 MG/DL — SIGNIFICANT CHANGE UP (ref 8.4–10.5)
CHLORIDE SERPL-SCNC: 101 MMOL/L — SIGNIFICANT CHANGE UP (ref 96–108)
CO2 SERPL-SCNC: 26 MMOL/L — SIGNIFICANT CHANGE UP (ref 22–29)
CREAT SERPL-MCNC: 0.59 MG/DL — SIGNIFICANT CHANGE UP (ref 0.5–1.3)
EGFR: 134 ML/MIN/1.73M2 — SIGNIFICANT CHANGE UP
EOSINOPHIL # BLD AUTO: 0.11 K/UL — SIGNIFICANT CHANGE UP (ref 0–0.5)
EOSINOPHIL NFR BLD AUTO: 1.5 % — SIGNIFICANT CHANGE UP (ref 0–6)
GLUCOSE SERPL-MCNC: 88 MG/DL — SIGNIFICANT CHANGE UP (ref 70–99)
HCT VFR BLD CALC: 33.9 % — LOW (ref 39–50)
HGB BLD-MCNC: 10.8 G/DL — LOW (ref 13–17)
IMM GRANULOCYTES NFR BLD AUTO: 1.5 % — HIGH (ref 0–0.9)
LYMPHOCYTES # BLD AUTO: 1.88 K/UL — SIGNIFICANT CHANGE UP (ref 1–3.3)
LYMPHOCYTES # BLD AUTO: 25.1 % — SIGNIFICANT CHANGE UP (ref 13–44)
MAGNESIUM SERPL-MCNC: 2 MG/DL — SIGNIFICANT CHANGE UP (ref 1.6–2.6)
MCHC RBC-ENTMCNC: 27.2 PG — SIGNIFICANT CHANGE UP (ref 27–34)
MCHC RBC-ENTMCNC: 31.9 GM/DL — LOW (ref 32–36)
MCV RBC AUTO: 85.4 FL — SIGNIFICANT CHANGE UP (ref 80–100)
MONOCYTES # BLD AUTO: 0.83 K/UL — SIGNIFICANT CHANGE UP (ref 0–0.9)
MONOCYTES NFR BLD AUTO: 11.1 % — SIGNIFICANT CHANGE UP (ref 2–14)
NEUTROPHILS # BLD AUTO: 4.49 K/UL — SIGNIFICANT CHANGE UP (ref 1.8–7.4)
NEUTROPHILS NFR BLD AUTO: 60 % — SIGNIFICANT CHANGE UP (ref 43–77)
PHOSPHATE SERPL-MCNC: 2.4 MG/DL — SIGNIFICANT CHANGE UP (ref 2.4–4.7)
PLATELET # BLD AUTO: 739 K/UL — HIGH (ref 150–400)
POTASSIUM SERPL-MCNC: 4.2 MMOL/L — SIGNIFICANT CHANGE UP (ref 3.5–5.3)
POTASSIUM SERPL-SCNC: 4.2 MMOL/L — SIGNIFICANT CHANGE UP (ref 3.5–5.3)
RBC # BLD: 3.97 M/UL — LOW (ref 4.2–5.8)
RBC # FLD: 15.1 % — HIGH (ref 10.3–14.5)
SODIUM SERPL-SCNC: 135 MMOL/L — SIGNIFICANT CHANGE UP (ref 135–145)
WBC # BLD: 7.48 K/UL — SIGNIFICANT CHANGE UP (ref 3.8–10.5)
WBC # FLD AUTO: 7.48 K/UL — SIGNIFICANT CHANGE UP (ref 3.8–10.5)

## 2024-01-30 PROCEDURE — 99233 SBSQ HOSP IP/OBS HIGH 50: CPT

## 2024-01-30 PROCEDURE — 74177 CT ABD & PELVIS W/CONTRAST: CPT | Mod: 26

## 2024-01-30 PROCEDURE — 71275 CT ANGIOGRAPHY CHEST: CPT | Mod: 26

## 2024-01-30 RX ORDER — SODIUM,POTASSIUM PHOSPHATES 278-250MG
1 POWDER IN PACKET (EA) ORAL ONCE
Refills: 0 | Status: COMPLETED | OUTPATIENT
Start: 2024-01-30 | End: 2024-01-30

## 2024-01-30 RX ADMIN — Medication 1 TABLET(S): at 13:02

## 2024-01-30 RX ADMIN — CHLORHEXIDINE GLUCONATE 1 APPLICATION(S): 213 SOLUTION TOPICAL at 14:32

## 2024-01-30 RX ADMIN — Medication 1 PACKET(S): at 05:39

## 2024-01-30 RX ADMIN — Medication 1 PACKET(S): at 13:02

## 2024-01-30 RX ADMIN — ENOXAPARIN SODIUM 40 MILLIGRAM(S): 100 INJECTION SUBCUTANEOUS at 17:56

## 2024-01-30 RX ADMIN — PANTOPRAZOLE SODIUM 40 MILLIGRAM(S): 20 TABLET, DELAYED RELEASE ORAL at 05:39

## 2024-01-30 RX ADMIN — PANTOPRAZOLE SODIUM 40 MILLIGRAM(S): 20 TABLET, DELAYED RELEASE ORAL at 17:56

## 2024-01-30 NOTE — PROGRESS NOTE ADULT - CRITICAL CARE ATTENDING COMMENT
x x    - shunt remains externalized, pending revision at [facility]  -Breathing comfortably on RA  -Ongoing sinus tachycardia to 120s despite ongoing fluid resuscitation  -PPI ongoing, gastritis noted on EGD -PPI for 8 weeks total and then f/u for repeat EGD  -Tolerating diet, ostomy output noted, adequate, fiber supplements  -Cr stable, urine output adequate from suprapubic and cline  -Completed meropenum cultures on 1/27, cultures have been negative  -No invasive lines  -PT, OOB  -Lovenox 40 bid, scds    -pending transfer to [facility] x  GEN:  Awake, alert, conversant, pleasant, appropriate, smiling,  shunt draining clear CSF, dressing clean and dry on anterior abdominal wall  ABD:  Soft, non tender, incision w/staples - clean w/thin serous drainage -minimal at lower pole, ostomy pink w/stool and gas, supra pubic tube in position - draining clear urine  EXT:  Warm, no edema, chronically in frog leg position w/forshortened feet      - shunt remains externalized, pending revision at [facility]  -Breathing comfortably on RA  -Ongoing sinus tachycardia to 120s despite ongoing fluid resuscitation  -PPI ongoing, gastritis noted on EGD -PPI for 8 weeks total and then f/u for repeat EGD  -Tolerating diet, ostomy output noted, adequate, fiber supplements  -Cr stable, urine output adequate from suprapubic and cline  -Completed meropenum cultures on 1/27, cultures have been negative  -No invasive lines  -PT, OOB  -Lovenox 40 bid, scds    -pending transfer to [facility] Fiber started to slow ostomy output.    GEN:  Awake, alert, conversant, pleasant, appropriate, smiling,  shunt draining clear CSF, dressing clean and dry on anterior abdominal wall  ABD:  Soft, non tender, incision w/staples - clean w/thin serous drainage -minimal at lower pole, ostomy pink w/stool and gas, supra pubic tube in position - draining clear urine  EXT:  Warm, no edema, chronically in frog leg position w/fore-shortened feet    Small bowel obstruction  Intraoperative bladder injury  Anastomotic leak  Gastric Ulcer w/GI bleed    - shunt remains externalized, pending revision at [facility], CSF is clear today  -Breathing comfortably on RA  -Ongoing sinus tachycardia to 120s despite ongoing fluid resuscitation -unclear etiology -patient is comfortable and denies any pain at present  -PPI ongoing, gastritis noted on EGD -PPI for 8 weeks total and then f/u for repeat EGD  -Tolerating diet, ostomy output noted -now has slowed, adequate, fiber supplements  -Cr stable, urine output adequate from suprapubic and cline -both to remain for now - per urology  -Completed meropenum cultures on 1/27, cultures have been negative, WBC normal, afebrile  -No invasive lines  -PT, OOB  -Lovenox 40 bid, scds    Patient w/o critical care needs at this time.  Pending transfer to [facility] for further management of ventriculostomy. Fiber started to slow ostomy output.    GEN:  Awake, alert, conversant, pleasant, appropriate, smiling,  shunt draining clear CSF, dressing clean and dry on anterior abdominal wall  ABD:  Soft, non tender, incision w/staples - clean w/thin serous drainage -minimal at lower pole, ostomy pink w/stool and gas, supra pubic tube in position - draining clear urine  EXT:  Warm, no edema, chronically in frog leg position w/fore-shortened feet    Small bowel obstruction  Intraoperative bladder injury  Anastomotic leak  Gastric Ulcer w/GI bleed    - shunt remains externalized, pending revision at [facility], CSF is clear today  -Breathing comfortably on RA  -Ongoing sinus tachycardia to 120s despite ongoing fluid resuscitation -unclear etiology -patient is comfortable and denies any pain at present and is w/adequate 02 sats, duplex negative but in setting w/ongoing tachycardia of unknown etiology in patient who has been bedridden and had 2 abdominal operations - would want to eval for possible pulmonary embolism -intermediate probability  -PPI ongoing, gastritis noted on EGD -PPI for 8 weeks total and then f/u for repeat EGD  -Tolerating diet, ostomy output noted -now has slowed, adequate, fiber supplements  -Cr stable, urine output adequate from suprapubic and cline -both to remain for now - per urology  -Completed meropenum cultures on 1/27, cultures have been negative, WBC normal, afebrile  -No invasive lines  -PT, OOB  -Lovenox 40 bid, scds    Patient w/o critical care needs at this time.  Pending transfer to [facility] for further management of ventriculostomy pending CT angio.

## 2024-01-30 NOTE — PROGRESS NOTE ADULT - SUBJECTIVE AND OBJECTIVE BOX
24h Events: Ostomy output significantly improved. IVF stopped. Maintaining adequate UOP. Dressing changed. [facility] transfer re-initiated yesterday.      ICU Vital Signs Last 24 Hrs  T(C): 36.5 (30 Jan 2024 04:01), Max: 37.5 (29 Jan 2024 15:30)  T(F): 97.7 (30 Jan 2024 04:01), Max: 99.5 (29 Jan 2024 15:30)  HR: 100 (30 Jan 2024 04:00) (90 - 121)  BP: 107/74 (30 Jan 2024 04:00) (98/54 - 121/84)  BP(mean): 83 (30 Jan 2024 04:00) (64 - 96)  ABP: --  ABP(mean): --  RR: 19 (30 Jan 2024 04:00) (11 - 26)  SpO2: 100% (30 Jan 2024 04:00) (92% - 100%)    O2 Parameters below as of 30 Jan 2024 04:00  Patient On (Oxygen Delivery Method): room air      I&O's Detail    28 Jan 2024 07:01  -  29 Jan 2024 07:00  --------------------------------------------------------  IN:    IV PiggyBack: 250 mL    multiple electrolytes Injection Type 1.: 1200 mL    Oral Fluid: 1200 mL  Total IN: 2650 mL    OUT:    Colostomy (mL): 725 mL    External Ventricular Device (mL): 242 mL    Indwelling Catheter - Suprapubic (mL): 2155 mL    Indwelling Catheter - Urethral (mL): 360 mL  Total OUT: 3482 mL    Total NET: -832 mL      29 Jan 2024 07:01  -  30 Jan 2024 04:48  --------------------------------------------------------  IN:    IV PiggyBack: 100 mL    multiple electrolytes Injection Type 1.: 150 mL    Oral Fluid: 840 mL  Total IN: 1090 mL    OUT:    Colostomy (mL): 600 mL    External Ventricular Device (mL): 218 mL    Indwelling Catheter - Suprapubic (mL): 1100 mL    Indwelling Catheter - Urethral (mL): 295 mL  Total OUT: 2213 mL    Total NET: -1123 mL      MEDICATIONS  (STANDING):  chlorhexidine 2% Cloths 1 Application(s) Topical daily  enoxaparin Injectable 40 milliGRAM(s) SubCutaneous every 24 hours  multivitamin 1 Tablet(s) Oral daily  pantoprazole  Injectable 40 milliGRAM(s) IV Push two times a day  psyllium Powder 1 Packet(s) Oral daily    MEDICATIONS  (PRN):  acetaminophen   IVPB .. 1000 milliGRAM(s) IV Intermittent every 6 hours PRN Temp greater or equal to 38C (100.4F)      Physical Exam:    General: NAD, well-appearing, resting comfortably in bed     Neurological:  GCS  15, CAM negative, B/L lower extremities with plegia, decreased sensation T8 down (baseline)     HEENT:  EOMI      Respiratory: Unlabored, no accessory muscle use     Cardiovascular: Regular rhythm, sinus tachycardia      Gastrointestinal: Softly distended, non-tender to palpation, colostomy pink with output that is starting to become more formed.      : SPT in place draining clear yellow urine, cline in place with clear yellow urine      Extremities: shorted/contracted (baseline)      Vascular: Equal and normal pulses: 2+ peripheral pulses throughout     Skin: No rashes. Dressings changed.          LABS:  CBC Full  -  ( 30 Jan 2024 03:54 )  WBC Count : 7.48 K/uL  RBC Count : 3.97 M/uL  Hemoglobin : 10.8 g/dL  Hematocrit : 33.9 %  Platelet Count - Automated : 739 K/uL  Mean Cell Volume : 85.4 fl  Mean Cell Hemoglobin : 27.2 pg  Mean Cell Hemoglobin Concentration : 31.9 gm/dL  Auto Neutrophil # : 4.49 K/uL  Auto Lymphocyte # : 1.88 K/uL  Auto Monocyte # : 0.83 K/uL  Auto Eosinophil # : 0.11 K/uL  Auto Basophil # : 0.06 K/uL  Auto Neutrophil % : 60.0 %  Auto Lymphocyte % : 25.1 %  Auto Monocyte % : 11.1 %  Auto Eosinophil % : 1.5 %  Auto Basophil % : 0.8 %    01-30    135  |  101  |  12.3  ----------------------------<  88  4.2   |  26.0  |  0.59    Ca    8.6      30 Jan 2024 03:54  Phos  2.4     01-30  Mg     2.0     01-30        Urinalysis Basic - ( 30 Jan 2024 03:54 )    Color: x / Appearance: x / SG: x / pH: x  Gluc: 88 mg/dL / Ketone: x  / Bili: x / Urobili: x   Blood: x / Protein: x / Nitrite: x   Leuk Esterase: x / RBC: x / WBC x   Sq Epi: x / Non Sq Epi: x / Bacteria: x      RECENT CULTURES:

## 2024-01-30 NOTE — PROGRESS NOTE ADULT - ASSESSMENT
Assessment: 28 year old male with PMHx of spina bifida (wheelchair bound at baseline), scoliosis,  Shunt BIBEMS 1/14 with abdominal pain, n/v, and lethargy, found to have severe SBO, metabolic acidosis, and to be in septic and hypovolemic shock. Patient was brought to the OR 1/14 for ex lap and small bowel resection, c/b bladder perf with primary repair and partial colectomy and admitted to SICU post-op for further management. Now with mixed septic/hypovolemic shock, pneumoperitoneum, suspected UGIB. RTOR 1/22 for bladder repair, Kirkpatrick's and EGD.         Plan:       NEURO:    - A&O x 3 at baseline     - Continue with pain control, Tylenol PRN      #Hx of  shunt     -  shunt externalized    - Open to drain, monitor output.     - Per NS, will require internalization of EVD likely with endoscopic third ventriculostomy. Currently plan to transfer to [facility] once stable for discharge as he has had previous care with Dr. Dorsey.     CV:       #Sinus tachycardia     - 1/27 S/p 1 L plasma-lyte and 250 mL albumin with transient improvement in HR    - Pt well appearing, afebrile, without leukocytosis and with stable Hg/Hct    - B/L LE venous duplex negative    - Continue to monitor on telemetry      PULM:    #COVID, resolved    - Currently saturating well on RA    - Continue pulmonary toilet, IS, OOBTC      - Maintain SpO2 > 92%         GI/Nutrition:      #SBO s/p SBR, Kirkpatrick's and bladder repair    - Diet: Regular high fiber    - MVI daily     - Psyllium - 1 packet daily   - Severe protein calorie malnutrition- encourage high protein diet as well, added ensures to patient's tray     - GI – recommend PPI BID for 8 weeks, Rpt EGD in 12 weeks.   - Monitor colostomy output        /Renal:     #Hx of urethral stricture now with bladder injury s/p repair x 3    - Urology following – Keep Cline and SPT upon discharge. Recommend cystogram in 3 weeks to re-evaluate prior to outpatient follow up.    - SPT tube placement and cline 18F.     - Do not flush SPT or cline    - Patient must remain decompressed until bladder injury has healed    - Continue to monitor UOP and I&Os    - IVL   #GALEN, resolved           ENDO:    - TSH WNL   - Maintain euglycemia 120-180.       ID:     - Remains afebrile without leukocytosis    - Completed course of Meropenem     - Blood cx (1/19) NGTD    - CSF cx (1/21) NGTD       HEME:    - Monitor H/H    - Monitor Coags      - SCDs, lovenox 40 daily       SKIN:    - Repositioning for DTI prevention while in bed. Dressing changed        Lines: Peripheral IV's      Dispo: SICU due to externalized EVD drain. Will follow up transfer to [facility] now that patient is stable for discharge except for EVD drain.

## 2024-01-31 LAB
ANION GAP SERPL CALC-SCNC: 13 MMOL/L — SIGNIFICANT CHANGE UP (ref 5–17)
ANION GAP SERPL CALC-SCNC: 14 MMOL/L — SIGNIFICANT CHANGE UP (ref 5–17)
BASOPHILS # BLD AUTO: 0 K/UL — SIGNIFICANT CHANGE UP (ref 0–0.2)
BASOPHILS NFR BLD AUTO: 0 % — SIGNIFICANT CHANGE UP (ref 0–2)
BUN SERPL-MCNC: 14.1 MG/DL — SIGNIFICANT CHANGE UP (ref 8–20)
BUN SERPL-MCNC: 16.8 MG/DL — SIGNIFICANT CHANGE UP (ref 8–20)
CALCIUM SERPL-MCNC: 8.7 MG/DL — SIGNIFICANT CHANGE UP (ref 8.4–10.5)
CALCIUM SERPL-MCNC: 8.8 MG/DL — SIGNIFICANT CHANGE UP (ref 8.4–10.5)
CHLORIDE SERPL-SCNC: 100 MMOL/L — SIGNIFICANT CHANGE UP (ref 96–108)
CHLORIDE SERPL-SCNC: 106 MMOL/L — SIGNIFICANT CHANGE UP (ref 96–108)
CO2 SERPL-SCNC: 22 MMOL/L — SIGNIFICANT CHANGE UP (ref 22–29)
CO2 SERPL-SCNC: 24 MMOL/L — SIGNIFICANT CHANGE UP (ref 22–29)
CREAT SERPL-MCNC: 0.84 MG/DL — SIGNIFICANT CHANGE UP (ref 0.5–1.3)
CREAT SERPL-MCNC: 0.89 MG/DL — SIGNIFICANT CHANGE UP (ref 0.5–1.3)
EGFR: 118 ML/MIN/1.73M2 — SIGNIFICANT CHANGE UP
EGFR: 120 ML/MIN/1.73M2 — SIGNIFICANT CHANGE UP
EOSINOPHIL # BLD AUTO: 0.21 K/UL — SIGNIFICANT CHANGE UP (ref 0–0.5)
EOSINOPHIL NFR BLD AUTO: 2.7 % — SIGNIFICANT CHANGE UP (ref 0–6)
GIANT PLATELETS BLD QL SMEAR: PRESENT — SIGNIFICANT CHANGE UP
GLUCOSE SERPL-MCNC: 102 MG/DL — HIGH (ref 70–99)
GLUCOSE SERPL-MCNC: 110 MG/DL — HIGH (ref 70–99)
HCT VFR BLD CALC: 33.2 % — LOW (ref 39–50)
HGB BLD-MCNC: 10.8 G/DL — LOW (ref 13–17)
HYPOCHROMIA BLD QL: SLIGHT — SIGNIFICANT CHANGE UP
INR BLD: 1.13 RATIO — SIGNIFICANT CHANGE UP (ref 0.85–1.18)
LYMPHOCYTES # BLD AUTO: 1.25 K/UL — SIGNIFICANT CHANGE UP (ref 1–3.3)
LYMPHOCYTES # BLD AUTO: 16.1 % — SIGNIFICANT CHANGE UP (ref 13–44)
MAGNESIUM SERPL-MCNC: 1.8 MG/DL — SIGNIFICANT CHANGE UP (ref 1.6–2.6)
MAGNESIUM SERPL-MCNC: 1.9 MG/DL — SIGNIFICANT CHANGE UP (ref 1.6–2.6)
MANUAL SMEAR VERIFICATION: SIGNIFICANT CHANGE UP
MCHC RBC-ENTMCNC: 28 PG — SIGNIFICANT CHANGE UP (ref 27–34)
MCHC RBC-ENTMCNC: 32.5 GM/DL — SIGNIFICANT CHANGE UP (ref 32–36)
MCV RBC AUTO: 86 FL — SIGNIFICANT CHANGE UP (ref 80–100)
METAMYELOCYTES # FLD: 0.9 % — HIGH (ref 0–0)
MONOCYTES # BLD AUTO: 0.76 K/UL — SIGNIFICANT CHANGE UP (ref 0–0.9)
MONOCYTES NFR BLD AUTO: 9.8 % — SIGNIFICANT CHANGE UP (ref 2–14)
NEUTROPHILS # BLD AUTO: 5.15 K/UL — SIGNIFICANT CHANGE UP (ref 1.8–7.4)
NEUTROPHILS NFR BLD AUTO: 66.1 % — SIGNIFICANT CHANGE UP (ref 43–77)
OVALOCYTES BLD QL SMEAR: SLIGHT — SIGNIFICANT CHANGE UP
PHOSPHATE SERPL-MCNC: 2.9 MG/DL — SIGNIFICANT CHANGE UP (ref 2.4–4.7)
PHOSPHATE SERPL-MCNC: 3 MG/DL — SIGNIFICANT CHANGE UP (ref 2.4–4.7)
PLAT MORPH BLD: ABNORMAL
PLATELET # BLD AUTO: 757 K/UL — HIGH (ref 150–400)
POIKILOCYTOSIS BLD QL AUTO: SLIGHT — SIGNIFICANT CHANGE UP
POLYCHROMASIA BLD QL SMEAR: SLIGHT — SIGNIFICANT CHANGE UP
POTASSIUM SERPL-MCNC: 3.7 MMOL/L — SIGNIFICANT CHANGE UP (ref 3.5–5.3)
POTASSIUM SERPL-MCNC: 4 MMOL/L — SIGNIFICANT CHANGE UP (ref 3.5–5.3)
POTASSIUM SERPL-SCNC: 3.7 MMOL/L — SIGNIFICANT CHANGE UP (ref 3.5–5.3)
POTASSIUM SERPL-SCNC: 4 MMOL/L — SIGNIFICANT CHANGE UP (ref 3.5–5.3)
PROTHROM AB SERPL-ACNC: 12.5 SEC — SIGNIFICANT CHANGE UP (ref 9.5–13)
RBC # BLD: 3.86 M/UL — LOW (ref 4.2–5.8)
RBC # FLD: 15.2 % — HIGH (ref 10.3–14.5)
RBC BLD AUTO: ABNORMAL
SMUDGE CELLS # BLD: PRESENT — SIGNIFICANT CHANGE UP
SODIUM SERPL-SCNC: 136 MMOL/L — SIGNIFICANT CHANGE UP (ref 135–145)
SODIUM SERPL-SCNC: 143 MMOL/L — SIGNIFICANT CHANGE UP (ref 135–145)
STOMATOCYTES BLD QL SMEAR: SLIGHT — SIGNIFICANT CHANGE UP
VARIANT LYMPHS # BLD: 4.4 % — SIGNIFICANT CHANGE UP (ref 0–6)
WBC # BLD: 7.79 K/UL — SIGNIFICANT CHANGE UP (ref 3.8–10.5)
WBC # FLD AUTO: 7.79 K/UL — SIGNIFICANT CHANGE UP (ref 3.8–10.5)

## 2024-01-31 PROCEDURE — 99233 SBSQ HOSP IP/OBS HIGH 50: CPT

## 2024-01-31 PROCEDURE — 49406 IMAGE CATH FLUID PERI/RETRO: CPT

## 2024-01-31 PROCEDURE — 99024 POSTOP FOLLOW-UP VISIT: CPT

## 2024-01-31 RX ORDER — LIDOCAINE HCL 20 MG/ML
10 VIAL (ML) INJECTION ONCE
Refills: 0 | Status: DISCONTINUED | OUTPATIENT
Start: 2024-01-31 | End: 2024-01-31

## 2024-01-31 RX ORDER — MAGNESIUM SULFATE 500 MG/ML
2 VIAL (ML) INJECTION
Refills: 0 | Status: COMPLETED | OUTPATIENT
Start: 2024-01-31 | End: 2024-02-01

## 2024-01-31 RX ORDER — ALBUMIN HUMAN 25 %
250 VIAL (ML) INTRAVENOUS
Refills: 0 | Status: COMPLETED | OUTPATIENT
Start: 2024-01-31 | End: 2024-01-31

## 2024-01-31 RX ADMIN — PANTOPRAZOLE SODIUM 40 MILLIGRAM(S): 20 TABLET, DELAYED RELEASE ORAL at 05:58

## 2024-01-31 RX ADMIN — Medication 125 MILLILITER(S): at 08:32

## 2024-01-31 RX ADMIN — Medication 1 PACKET(S): at 11:55

## 2024-01-31 RX ADMIN — Medication 125 MILLILITER(S): at 09:54

## 2024-01-31 RX ADMIN — Medication 1 TABLET(S): at 11:55

## 2024-01-31 RX ADMIN — ENOXAPARIN SODIUM 40 MILLIGRAM(S): 100 INJECTION SUBCUTANEOUS at 17:37

## 2024-01-31 RX ADMIN — PANTOPRAZOLE SODIUM 40 MILLIGRAM(S): 20 TABLET, DELAYED RELEASE ORAL at 17:36

## 2024-01-31 RX ADMIN — CHLORHEXIDINE GLUCONATE 1 APPLICATION(S): 213 SOLUTION TOPICAL at 13:27

## 2024-01-31 NOTE — PROGRESS NOTE ADULT - ASSESSMENT
ASSESSMENT:   28 year old male with PMHx of spina bifida (wheelchair bound at baseline), scoliosis,  Shunt BIBEMS 1/14 with abdominal pain, n/v, and lethargy, found to have severe SBO, metabolic acidosis, and to be in septic and hypovolemic shock. Patient was brought to the OR 1/14 for ex lap and small bowel resection, c/b bladder perf with primary repair and partial colectomy and admitted to SICU post-op for further management. Now with mixed septic/hypovolemic shock, pneumoperitoneum, suspected UGIB. RTOR 1/22 for bladder repair, Kirkpatrick's and EGD.         PLAN:       NEURO:    - A&O x 3 at baseline     - Continue with pain control, Tylenol PRN      #Hx of  shunt     -  shunt externalized    - Open to drain, monitor output.     - Per NS, will require internalization of EVD likely with endoscopic third ventriculostomy. Currently plan to transfer to [facility] once stable for discharge as he has had previous care with Dr. Dorsey.     CV:       #Sinus tachycardia     - 1/27 S/p 1 L plasma-lyte and 250 mL albumin with transient improvement in HR    - Pt well appearing, afebrile, without leukocytosis and with stable Hg/Hct    - B/L LE venous duplex negative, CTA chest 1/30 negative for PE  -Likely related to intra-abdominal abcess  - Continue to monitor on telemetry      PULM:    #COVID, resolved    - Currently saturating well on RA    - Continue pulmonary toilet, IS, OOBTC      - Maintain SpO2 > 92%         GI/Nutrition:      #SBO s/p SBR, Kirkpatrick's and bladder repair    - Diet: Regular high fiber    - MVI daily     - Psyllium - 1 packet daily   - Severe protein calorie malnutrition- encourage high protein diet as well, added ensures to patient's tray     - GI – recommend PPI BID for 8 weeks, Rpt EGD in 12 weeks.   - Monitor colostomy output        #Abdominal Wall abscess  -Will consult IR in AM for potential drainage     /Renal:     #Hx of urethral stricture now with bladder injury s/p repair x 3    - Urology following – Keep Cline and SPT upon discharge. Recommend cystogram in 3 weeks to re-evaluate prior to outpatient follow up.    - SPT tube placement and cline 18F.     - Do not flush SPT or cline    - Patient must remain decompressed until bladder injury has healed    - Continue to monitor UOP and I&Os    - IVL   #GALEN, resolved           ENDO:    - TSH WNL   - Maintain euglycemia 120-180.       ID:     - Remains afebrile without leukocytosis    - Completed course of Meropenem     - Blood cx (1/19) NGTD    - CSF cx (1/21) NGTD       HEME:    - Monitor H/H    - Monitor Coags      - SCDs, lovenox 40 daily       SKIN:    - Repositioning for DTI prevention while in bed. Dressing changed        Lines: Peripheral IV's      Dispo: SICU due to externalized EVD drain. Will follow up transfer to [facility] once patient is stable for discharge except for EVD drain.

## 2024-01-31 NOTE — PROCEDURE NOTE - NSINDICATIONS_GEN_A_CORE
arterial puncture to obtain ABG's/critical patient/monitoring purposes
altered anatomy/strict intake/output during critical illness
venous access
critical illness/volume resuscitation
critical patient/monitoring purposes
procedural sedation during a procedure
venous access

## 2024-01-31 NOTE — PROGRESS NOTE ADULT - SUBJECTIVE AND OBJECTIVE BOX
IR Post Procedure Note    Diagnosis: LUQ Abscess    Procedure: Abscess Drainage    : Dom Vidal MD    Contrast:  10 cc    Anesthesia: 1% Lidocaine Subcutaneous, Sedation administered by Anesthesiology    Estimated Blood Loss: Less than 10cc    Specimens: Identified, Labeled, Confirmed and Sent to Lab.    Complications: No Immediate Complications    Anticoagulation: Resume in 24 Hours    Findings & Plan: 10 Fr drain placed in collection under US guidance and secured to skin w sutures. Connected to gravity drainage      Please call Interventional Radiology with any questions, concerns, or issues.

## 2024-01-31 NOTE — PROCEDURE NOTE - NSPROCDETAILS_GEN_ALL_CORE
location identified, draped/prepped, sterile technique used/sterile dressing applied
location identified, draped/prepped, sterile technique used, needle inserted/introduced/positive blood return obtained via catheter/connected to a pressurized flush line/sutured in place/hemostasis with direct pressure, dressing applied/Seldinger technique/all materials/supplies accounted for at end of procedure
location identified, draped/prepped, sterile technique used, needle inserted/introduced/positive blood return obtained via catheter/connected to a pressurized flush line/sutured in place/hemostasis with direct pressure, dressing applied/Seldinger technique/all materials/supplies accounted for at end of procedure
guidewire recovered/lumen(s) aspirated and flushed/sterile dressing applied/ultrasound guidance with use of sterile gel and probe cove
sterile technique, indwelling urinary device inserted
location identified, draped/prepped, sterile technique used/ultrasound guidance

## 2024-01-31 NOTE — PROGRESS NOTE ADULT - NS ATTEND AMEND GEN_ALL_CORE FT
NSGY Attg:    see above    patient seen and examined    plan of care determined for externalized shunt  continue CSF drainage  awaiting transfer to [facility] per patient request  will follow

## 2024-01-31 NOTE — PROGRESS NOTE ADULT - ASSESSMENT
Assessment:  28M PMH spina bifida (wheelchair bound), scoliosis, hydrocephalus s/p  shunt placement with multiple revisions followed by Dr. Dorsey at [facility], presented to [facility] with concern for SBO s/p ex lap, partial colectomy ccb bladder perforation with repair on 1/14, shunt externalization on 1/21.   - New intrabdominal abscess found to CT CAP overnight      Plan:  - Discussed and examined with Dr. Harris   - No neurosurgical intervention at this time, maintain externalized shunt 2/2 new abscess and patient requesting transfer to [facility] as patient follows with Dr. Dorsey  - Q4 neuro checks   - Normotensive   - pain control as needed, avoid over sedation  - Per SICU note, IR to be consulted for drainage of abdominal abscess  - Further care per SICU team

## 2024-01-31 NOTE — PROGRESS NOTE ADULT - CRITICAL CARE ATTENDING COMMENT
x CT scan showed an abdominal wall abscess/collection  Given 250cc albumin this am w/some improvement in HR        -EVD externalized w/clear drainage  -Pain controlled  -Ongoing tachycardia -seems to somewhat improve (transiently) w/fluids  -H/H    -Cr increased slightly yesterday -but adequate urine function (about 40/hr total), may be related to contrast load for CT  -Cultures have been negative, WBC normal, afebrile, CT scan showed an abdominal wall abscess/collection  Given 250cc albumin this am w/some improvement in HR      GEN:  Awake, alert, comfortable      -EVD externalized w/clear drainage  -Pain controlled  -Ongoing tachycardia -seems to somewhat improve (transiently) w/fluids  -H/H    -Cr increased slightly yesterday -but adequate urine function (about 40/hr total), may be related to contrast load for CT  -Cultures have been negative, WBC normal, afebrile, CT scan showed an abdominal wall abscess/collection  Given 250cc albumin this am w/some improvement in HR  Sat OOB yesterday    GEN:  Awake, alert, comfortable, conversant  ABD:  Soft, flat, non distended, ostomy pink w/stool, urostomy tube w/clear urine drainage (RLQ), incision w/skipped clips - superior pole clean and dry, inferior pole w/blood tinged mucopurulent drainage w/o associated erythema/cellulitis, non tender throughout (patient reports being mostly insensate in lower abdomen)  EXT:  Warm, no edema, chronic frog-leg position of lower extremities, no edema, for-shortened feet, warm    -EVD externalized w/clear drainage  -Pain controlled  -Ongoing tachycardia -seems to somewhat improve (transiently) w/fluids, CTA chest w/o suggestion of pulmonary embolism, continues to have adequate sats on RA, OOB to chair again today  -H/H stable, scds, lovenox  -Tolerating diet, ostomy functioning  -Cr increased slightly yesterday -but adequate urine function (about 40/hr total), may be related to contrast load for CT, urostomy and cline functioning  -Cultures have been negative, WBC normal, afebrile, would hold on antibiotics at present  -CT scan abd/pelvis w/fluid collection/abscess along anterior abdominal wall on patient's left side.  Will review CT scan w/IR to discuss ability to drain this collection.  If able to be drained- based on caliber (Culture) of drainage - would consider re-starting antibiotics.  -Hold on transfer at this time until issue of abdominal collection and tachycardia can be fully addressed.

## 2024-01-31 NOTE — PROCEDURE NOTE - NSSITEPREP_SKIN_A_CORE
chlorhexidine
chlorhexidine
chlorhexidine/Adherence to aseptic technique: hand hygiene prior to donning barriers (gown, gloves), don cap and mask, sterile drape over patient
chlorhexidine
povidone iodine (if allergic to chlorhexidine)
chlorhexidine
povidone iodine (if allergic to chlorhexidine)/Adherence to aseptic technique: hand hygiene prior to donning barriers (gown, gloves), don cap and mask, sterile drape over patient

## 2024-01-31 NOTE — PROGRESS NOTE ADULT - SUBJECTIVE AND OBJECTIVE BOX
HPI:  HPI:  SUBJECTIVE: 27yo M w/ hx of spina bfida and scoliosis, wheelchair bound at baseline, presenting with abdominal pain, n/v. Pt states that the pain began yesterday morning and has been persistent. He had a bowel movement this morning but prior to that his last bowel movement was one week prior. Denies passing gas. Denies fevers at home. Has been vomiting and nauseous. At home he was lethargic and EMS was called. At that time he was hypotensive and tachycardic and he was BIBEMS to [facility] ED for further workup. On arrival he was tachycardic to the 130s and hypotensive to 80s/50s. Labs notable for K 3.4, bicarb 13, Cr 2.04. Actively vomiting in ED. CT scan showed dilated bowel and stomach consistent with severe SBO with transition point in mid abdomen. Surgery consulted for management.   (14 Jan 2024 11:27)      INTERVAL EVENTS:  CT chest/abdomen/pelvis performed to r/o PE and abscess iso persistent sinus tachycardia to the 110s-120s; negative for PE, patient found to have intra-abdominal abscess.     SUBJECTIVE:  Patient denies chest pain, SOB, abdominal pain, N/V, or any acute complaints.      PHYSICAL EXAM:  General: NAD, well-appearing, resting comfortably in bed     Neurological:  GCS  15, CAM negative, B/L lower extremities with plegia, decreased sensation T8 down (baseline)     HEENT:  EOMI      Respiratory: Unlabored, no accessory muscle use     Cardiovascular: Regular rhythm, sinus tachycardia      Gastrointestinal: Softly distended, non-tender to palpation, colostomy pink with output that is starting to become more formed.      : SPT in place draining clear yellow urine, cline in place with clear yellow urine      Extremities: shorted/contracted (baseline)      Vascular: Equal and normal pulses: 2+ peripheral pulses throughout     Skin: No rashes. Dressings changed.          MEDICATIONS  (STANDING):  chlorhexidine 2% Cloths 1 Application(s) Topical daily  enoxaparin Injectable 40 milliGRAM(s) SubCutaneous every 24 hours  multivitamin 1 Tablet(s) Oral daily  pantoprazole  Injectable 40 milliGRAM(s) IV Push two times a day  psyllium Powder 1 Packet(s) Oral daily    MEDICATIONS  (PRN):  acetaminophen   IVPB .. 1000 milliGRAM(s) IV Intermittent every 6 hours PRN Temp greater or equal to 38C (100.4F)        PAST MEDICAL & SURGICAL HISTORY:  Spina bifida      Scoliosis          ICU Vital Signs Last 24 Hrs  T(C): 37.2 (30 Jan 2024 23:32), Max: 37.4 (30 Jan 2024 16:01)  T(F): 99 (30 Jan 2024 23:32), Max: 99.3 (30 Jan 2024 16:01)  HR: 111 (31 Jan 2024 05:00) (84 - 123)  BP: 107/70 (31 Jan 2024 05:00) (96/65 - 130/91)  BP(mean): 82 (31 Jan 2024 05:00) (65 - 104)  ABP: --  ABP(mean): --  RR: 16 (31 Jan 2024 05:00) (15 - 22)  SpO2: 100% (31 Jan 2024 05:00) (94% - 100%)    O2 Parameters below as of 31 Jan 2024 04:00  Patient On (Oxygen Delivery Method): room air                I&O's Detail    29 Jan 2024 07:01  -  30 Jan 2024 07:00  --------------------------------------------------------  IN:    IV PiggyBack: 100 mL    multiple electrolytes Injection Type 1.: 150 mL    Oral Fluid: 840 mL  Total IN: 1090 mL    OUT:    Colostomy (mL): 600 mL    External Ventricular Device (mL): 237 mL    Indwelling Catheter - Suprapubic (mL): 1160 mL    Indwelling Catheter - Urethral (mL): 295 mL  Total OUT: 2292 mL    Total NET: -1202 mL      30 Jan 2024 07:01  -  31 Jan 2024 05:11  --------------------------------------------------------  IN:    Oral Fluid: 1480 mL  Total IN: 1480 mL    OUT:    Colostomy (mL): 1150 mL    External Ventricular Device (mL): 228 mL    Indwelling Catheter - Suprapubic (mL): 710 mL    Indwelling Catheter - Urethral (mL): 290 mL  Total OUT: 2378 mL    Total NET: -898 mL                LABS:  CBC Full  -  ( 30 Jan 2024 03:54 )  WBC Count : 7.48 K/uL  RBC Count : 3.97 M/uL  Hemoglobin : 10.8 g/dL  Hematocrit : 33.9 %  Platelet Count - Automated : 739 K/uL  Mean Cell Volume : 85.4 fl  Mean Cell Hemoglobin : 27.2 pg  Mean Cell Hemoglobin Concentration : 31.9 gm/dL  Auto Neutrophil # : 4.49 K/uL  Auto Lymphocyte # : 1.88 K/uL  Auto Monocyte # : 0.83 K/uL  Auto Eosinophil # : 0.11 K/uL  Auto Basophil # : 0.06 K/uL  Auto Neutrophil % : 60.0 %  Auto Lymphocyte % : 25.1 %  Auto Monocyte % : 11.1 %  Auto Eosinophil % : 1.5 %  Auto Basophil % : 0.8 %    01-30    135  |  101  |  12.3  ----------------------------<  88  4.2   |  26.0  |  0.59    Ca    8.6      30 Jan 2024 03:54  Phos  2.4     01-30  Mg     2.0     01-30        Urinalysis Basic - ( 30 Jan 2024 03:54 )    Color: x / Appearance: x / SG: x / pH: x  Gluc: 88 mg/dL / Ketone: x  / Bili: x / Urobili: x   Blood: x / Protein: x / Nitrite: x   Leuk Esterase: x / RBC: x / WBC x   Sq Epi: x / Non Sq Epi: x / Bacteria: x       HPI:  HPI:  SUBJECTIVE: 29yo M w/ hx of spina bifida and scoliosis, wheelchair bound at baseline, presenting with abdominal pain, n/v. Pt states that the pain began yesterday morning and has been persistent. He had a bowel movement this morning but prior to that his last bowel movement was one week prior. Denies passing gas. Denies fevers at home. Has been vomiting and nauseous. At home he was lethargic and EMS was called. At that time he was hypotensive and tachycardic and he was BIBEMS to [facility] ED for further workup. On arrival he was tachycardic to the 130s and hypotensive to 80s/50s. Labs notable for K 3.4, bicarb 13, Cr 2.04. Actively vomiting in ED. CT scan showed dilated bowel and stomach consistent with severe SBO with transition point in mid abdomen. Surgery consulted for management.   (14 Jan 2024 11:27)      INTERVAL EVENTS:  CT chest/abdomen/pelvis performed to r/o PE and abscess iso persistent sinus tachycardia to the 110s-120s; negative for PE, patient found to have intra-abdominal abscess.     SUBJECTIVE:  Patient denies chest pain, SOB, abdominal pain, N/V, or any acute complaints.      PHYSICAL EXAM:  General: NAD, well-appearing, resting comfortably in bed     Neurological:  GCS  15, CAM negative, B/L lower extremities with plegia, decreased sensation T8 down (baseline)     HEENT:  EOMI      Respiratory: Unlabored, no accessory muscle use     Cardiovascular: Regular rhythm, sinus tachycardia      Gastrointestinal: Softly distended, non-tender to palpation, colostomy pink with output that is starting to become more formed.      : SPT in place draining clear yellow urine, cline in place with clear yellow urine      Extremities: shorted/contracted (baseline)      Vascular: Equal and normal pulses: 2+ peripheral pulses throughout     Skin: No rashes. Dressings changed.          MEDICATIONS  (STANDING):  chlorhexidine 2% Cloths 1 Application(s) Topical daily  enoxaparin Injectable 40 milliGRAM(s) SubCutaneous every 24 hours  multivitamin 1 Tablet(s) Oral daily  pantoprazole  Injectable 40 milliGRAM(s) IV Push two times a day  psyllium Powder 1 Packet(s) Oral daily    MEDICATIONS  (PRN):  acetaminophen   IVPB .. 1000 milliGRAM(s) IV Intermittent every 6 hours PRN Temp greater or equal to 38C (100.4F)        PAST MEDICAL & SURGICAL HISTORY:  Spina bifida      Scoliosis          ICU Vital Signs Last 24 Hrs  T(C): 37.2 (30 Jan 2024 23:32), Max: 37.4 (30 Jan 2024 16:01)  T(F): 99 (30 Jan 2024 23:32), Max: 99.3 (30 Jan 2024 16:01)  HR: 111 (31 Jan 2024 05:00) (84 - 123)  BP: 107/70 (31 Jan 2024 05:00) (96/65 - 130/91)  BP(mean): 82 (31 Jan 2024 05:00) (65 - 104)  ABP: --  ABP(mean): --  RR: 16 (31 Jan 2024 05:00) (15 - 22)  SpO2: 100% (31 Jan 2024 05:00) (94% - 100%)    O2 Parameters below as of 31 Jan 2024 04:00  Patient On (Oxygen Delivery Method): room air                I&O's Detail    29 Jan 2024 07:01  -  30 Jan 2024 07:00  --------------------------------------------------------  IN:    IV PiggyBack: 100 mL    multiple electrolytes Injection Type 1.: 150 mL    Oral Fluid: 840 mL  Total IN: 1090 mL    OUT:    Colostomy (mL): 600 mL    External Ventricular Device (mL): 237 mL    Indwelling Catheter - Suprapubic (mL): 1160 mL    Indwelling Catheter - Urethral (mL): 295 mL  Total OUT: 2292 mL    Total NET: -1202 mL      30 Jan 2024 07:01  -  31 Jan 2024 05:11  --------------------------------------------------------  IN:    Oral Fluid: 1480 mL  Total IN: 1480 mL    OUT:    Colostomy (mL): 1150 mL    External Ventricular Device (mL): 228 mL    Indwelling Catheter - Suprapubic (mL): 710 mL    Indwelling Catheter - Urethral (mL): 290 mL  Total OUT: 2378 mL    Total NET: -898 mL                LABS:  CBC Full  -  ( 30 Jan 2024 03:54 )  WBC Count : 7.48 K/uL  RBC Count : 3.97 M/uL  Hemoglobin : 10.8 g/dL  Hematocrit : 33.9 %  Platelet Count - Automated : 739 K/uL  Mean Cell Volume : 85.4 fl  Mean Cell Hemoglobin : 27.2 pg  Mean Cell Hemoglobin Concentration : 31.9 gm/dL  Auto Neutrophil # : 4.49 K/uL  Auto Lymphocyte # : 1.88 K/uL  Auto Monocyte # : 0.83 K/uL  Auto Eosinophil # : 0.11 K/uL  Auto Basophil # : 0.06 K/uL  Auto Neutrophil % : 60.0 %  Auto Lymphocyte % : 25.1 %  Auto Monocyte % : 11.1 %  Auto Eosinophil % : 1.5 %  Auto Basophil % : 0.8 %    01-30    135  |  101  |  12.3  ----------------------------<  88  4.2   |  26.0  |  0.59    Ca    8.6      30 Jan 2024 03:54  Phos  2.4     01-30  Mg     2.0     01-30        Urinalysis Basic - ( 30 Jan 2024 03:54 )    Color: x / Appearance: x / SG: x / pH: x  Gluc: 88 mg/dL / Ketone: x  / Bili: x / Urobili: x   Blood: x / Protein: x / Nitrite: x   Leuk Esterase: x / RBC: x / WBC x   Sq Epi: x / Non Sq Epi: x / Bacteria: x

## 2024-01-31 NOTE — PROCEDURE NOTE - NSTOLERANCE_GEN_A_CORE
Patient tolerated procedure well.
Patient tolerated procedure well./Reversal agents were not used.
Patient tolerated procedure well.

## 2024-01-31 NOTE — PROGRESS NOTE ADULT - SUBJECTIVE AND OBJECTIVE BOX
Patient is a 28y old  Male who presents with a chief complaint of small bowel obstruction (31 Jan 2024 05:11)    HPI:  SUBJECTIVE: 27yo M w/ hx of spina bfida and scoliosis, wheelchair bound at baseline, presenting with abdominal pain, n/v. Pt states that the pain began yesterday morning and has been persistent. He had a bowel movement this morning but prior to that his last bowel movement was one week prior. Denies passing gas. Denies fevers at home. Has been vomiting and nauseous. At home he was lethargic and EMS was called. At that time he was hypotensive and tachycardic and he was BIBEMS to [facility] ED for further workup. On arrival he was tachycardic to the 130s and hypotensive to 80s/50s. Labs notable for K 3.4, bicarb 13, Cr 2.04. Actively vomiting in ED. CT scan showed dilated bowel and stomach consistent with severe SBO with transition point in mid abdomen. Surgery consulted for management.  (14 Jan 2024 11:27)      Interval history:  Patient seen and examined by neurosurgery team. Patient had CT CAP overnight 2/2 sinus tachycardia which showed new abdominal abscess. No other acute events reported.       Vital Signs Last 24 Hrs  T(C): 36.7 (31 Jan 2024 11:07), Max: 37.4 (30 Jan 2024 16:01)  T(F): 98 (31 Jan 2024 11:07), Max: 99.3 (30 Jan 2024 16:01)  HR: 102 (31 Jan 2024 10:00) (94 - 123)  BP: 119/80 (31 Jan 2024 10:00) (96/65 - 127/74)  BP(mean): 92 (31 Jan 2024 10:00) (65 - 93)  RR: 16 (31 Jan 2024 10:00) (16 - 21)  SpO2: 100% (31 Jan 2024 10:00) (94% - 100%)    Parameters below as of 31 Jan 2024 10:00  Patient On (Oxygen Delivery Method): room air      Physical Exam:  Constitutional: NAD, lying in bed  Neuro  * Mental Status:  GCS 15: Awake, alert, oriented to conversation. No aphasia or difficulty speaking. No dysarthria. n.  * Cranial Nerves: Cnii-Cnxii grossly intact. EOMI, no gaze deviation, no facial droop   * Motor: RUE 5/5, LUE 5/5, b/l LE 0/5 and contracted (baseline)   * Sensory: Sensation intact to light touch  * Reflexes: not assessed   Drain: dressing in place C/D/I      LABS:                        10.8   7.79  )-----------( 757      ( 31 Jan 2024 05:40 )             33.2     01-31    136  |  100  |  14.1  ----------------------------<  102<H>  4.0   |  22.0  |  0.89    Ca    8.7      31 Jan 2024 05:40  Phos  2.9     01-31  Mg     1.9     01-31      Medications:  MEDICATIONS  (STANDING):  chlorhexidine 2% Cloths 1 Application(s) Topical daily  enoxaparin Injectable 40 milliGRAM(s) SubCutaneous every 24 hours  multivitamin 1 Tablet(s) Oral daily  pantoprazole  Injectable 40 milliGRAM(s) IV Push two times a day  psyllium Powder 1 Packet(s) Oral daily    MEDICATIONS  (PRN):  acetaminophen   IVPB .. 1000 milliGRAM(s) IV Intermittent every 6 hours PRN Temp greater or equal to 38C (100.4F)      RADIOLOGY & ADDITIONAL STUDIES:  < from: CT Abdomen and Pelvis w/ Oral Cont and w/ IV Cont (01.30.24 @ 17:58) >  IMPRESSION:  No pulmonary embolism through the level of the lobar pulmonary arteries.   Evaluation of more distal segmental and subsegmental pulmonary arteries   is limited by suboptimal contrast bolus timing.  Trace bibasilar pleural effusion and atelectasis of posterior basilar   segment of right lower lobe.    Interval repositioning of 2  shunt with tips of catheter now   terminating subcutaneously at the level of xiphoid process  Patient is status post Akash procedure and small bowel resection and a   new ostomy in left lower quadrant abdominal abdomen. No evidence of   extravasation of the contrast from the bowel loops proximal to the ostomy   site.  7.2 x 3.1 cm loculated fluid collection/abscess in the left anterolateral   abdominal wall.  Postsurgical changes and thickening of urinary bladder wall.    --- End of Report ---    NEVIN INIGUEZ MD; Attending Radiologist  This document has been electronically signed. Jan 30 2024  8:18PM    < end of copied text >

## 2024-02-01 LAB
ANION GAP SERPL CALC-SCNC: 11 MMOL/L — SIGNIFICANT CHANGE UP (ref 5–17)
ANION GAP SERPL CALC-SCNC: 13 MMOL/L — SIGNIFICANT CHANGE UP (ref 5–17)
APPEARANCE UR: ABNORMAL
BACTERIA # UR AUTO: ABNORMAL /HPF
BASOPHILS # BLD AUTO: 0.07 K/UL — SIGNIFICANT CHANGE UP (ref 0–0.2)
BASOPHILS NFR BLD AUTO: 0.8 % — SIGNIFICANT CHANGE UP (ref 0–2)
BILIRUB UR-MCNC: NEGATIVE — SIGNIFICANT CHANGE UP
BUN SERPL-MCNC: 16.2 MG/DL — SIGNIFICANT CHANGE UP (ref 8–20)
BUN SERPL-MCNC: 16.9 MG/DL — SIGNIFICANT CHANGE UP (ref 8–20)
CALCIUM SERPL-MCNC: 8.8 MG/DL — SIGNIFICANT CHANGE UP (ref 8.4–10.5)
CALCIUM SERPL-MCNC: 8.8 MG/DL — SIGNIFICANT CHANGE UP (ref 8.4–10.5)
CAST: 2 /LPF — SIGNIFICANT CHANGE UP (ref 0–4)
CHLORIDE SERPL-SCNC: 101 MMOL/L — SIGNIFICANT CHANGE UP (ref 96–108)
CHLORIDE SERPL-SCNC: 104 MMOL/L — SIGNIFICANT CHANGE UP (ref 96–108)
CO2 SERPL-SCNC: 24 MMOL/L — SIGNIFICANT CHANGE UP (ref 22–29)
CO2 SERPL-SCNC: 25 MMOL/L — SIGNIFICANT CHANGE UP (ref 22–29)
COLOR SPEC: YELLOW — SIGNIFICANT CHANGE UP
CREAT ?TM UR-MCNC: 87 MG/DL — SIGNIFICANT CHANGE UP
CREAT SERPL-MCNC: 0.71 MG/DL — SIGNIFICANT CHANGE UP (ref 0.5–1.3)
CREAT SERPL-MCNC: 0.73 MG/DL — SIGNIFICANT CHANGE UP (ref 0.5–1.3)
DIFF PNL FLD: ABNORMAL
EGFR: 126 ML/MIN/1.73M2 — SIGNIFICANT CHANGE UP
EGFR: 127 ML/MIN/1.73M2 — SIGNIFICANT CHANGE UP
EOSINOPHIL # BLD AUTO: 0.1 K/UL — SIGNIFICANT CHANGE UP (ref 0–0.5)
EOSINOPHIL NFR BLD AUTO: 1.2 % — SIGNIFICANT CHANGE UP (ref 0–6)
GLUCOSE SERPL-MCNC: 100 MG/DL — HIGH (ref 70–99)
GLUCOSE SERPL-MCNC: 101 MG/DL — HIGH (ref 70–99)
GLUCOSE UR QL: NEGATIVE MG/DL — SIGNIFICANT CHANGE UP
GRAM STN FLD: SIGNIFICANT CHANGE UP
HCT VFR BLD CALC: 31.9 % — LOW (ref 39–50)
HCT VFR BLD CALC: 32.8 % — LOW (ref 39–50)
HGB BLD-MCNC: 10 G/DL — LOW (ref 13–17)
HGB BLD-MCNC: 10.6 G/DL — LOW (ref 13–17)
IMM GRANULOCYTES NFR BLD AUTO: 1.1 % — HIGH (ref 0–0.9)
KETONES UR-MCNC: NEGATIVE MG/DL — SIGNIFICANT CHANGE UP
LEUKOCYTE ESTERASE UR-ACNC: ABNORMAL
LYMPHOCYTES # BLD AUTO: 1.58 K/UL — SIGNIFICANT CHANGE UP (ref 1–3.3)
LYMPHOCYTES # BLD AUTO: 19.1 % — SIGNIFICANT CHANGE UP (ref 13–44)
MAGNESIUM SERPL-MCNC: 2.4 MG/DL — SIGNIFICANT CHANGE UP (ref 1.6–2.6)
MAGNESIUM SERPL-MCNC: 3.3 MG/DL — HIGH (ref 1.8–2.6)
MCHC RBC-ENTMCNC: 27 PG — SIGNIFICANT CHANGE UP (ref 27–34)
MCHC RBC-ENTMCNC: 27.9 PG — SIGNIFICANT CHANGE UP (ref 27–34)
MCHC RBC-ENTMCNC: 31.3 GM/DL — LOW (ref 32–36)
MCHC RBC-ENTMCNC: 32.3 GM/DL — SIGNIFICANT CHANGE UP (ref 32–36)
MCV RBC AUTO: 86.2 FL — SIGNIFICANT CHANGE UP (ref 80–100)
MCV RBC AUTO: 86.3 FL — SIGNIFICANT CHANGE UP (ref 80–100)
MONOCYTES # BLD AUTO: 0.98 K/UL — HIGH (ref 0–0.9)
MONOCYTES NFR BLD AUTO: 11.9 % — SIGNIFICANT CHANGE UP (ref 2–14)
NEUTROPHILS # BLD AUTO: 5.44 K/UL — SIGNIFICANT CHANGE UP (ref 1.8–7.4)
NEUTROPHILS NFR BLD AUTO: 65.9 % — SIGNIFICANT CHANGE UP (ref 43–77)
NITRITE UR-MCNC: POSITIVE
OSMOLALITY UR: 463 MOSM/KG — SIGNIFICANT CHANGE UP (ref 300–1000)
PH UR: 8 — SIGNIFICANT CHANGE UP (ref 5–8)
PHOSPHATE SERPL-MCNC: 3.1 MG/DL — SIGNIFICANT CHANGE UP (ref 2.4–4.7)
PHOSPHATE SERPL-MCNC: 3.4 MG/DL — SIGNIFICANT CHANGE UP (ref 2.4–4.7)
PLATELET # BLD AUTO: 714 K/UL — HIGH (ref 150–400)
PLATELET # BLD AUTO: 726 K/UL — HIGH (ref 150–400)
POTASSIUM SERPL-MCNC: 3.6 MMOL/L — SIGNIFICANT CHANGE UP (ref 3.5–5.3)
POTASSIUM SERPL-MCNC: 3.8 MMOL/L — SIGNIFICANT CHANGE UP (ref 3.5–5.3)
POTASSIUM SERPL-SCNC: 3.6 MMOL/L — SIGNIFICANT CHANGE UP (ref 3.5–5.3)
POTASSIUM SERPL-SCNC: 3.8 MMOL/L — SIGNIFICANT CHANGE UP (ref 3.5–5.3)
POTASSIUM UR-SCNC: 16 MMOL/L — SIGNIFICANT CHANGE UP
PROT ?TM UR-MCNC: >400 MG/DL — HIGH (ref 0–12)
PROT UR-MCNC: 300 MG/DL
PROT/CREAT UR-RTO: >4.6 RATIO — HIGH
RBC # BLD: 3.7 M/UL — LOW (ref 4.2–5.8)
RBC # BLD: 3.8 M/UL — LOW (ref 4.2–5.8)
RBC # FLD: 15.1 % — HIGH (ref 10.3–14.5)
RBC # FLD: 15.2 % — HIGH (ref 10.3–14.5)
RBC CASTS # UR COMP ASSIST: 72 /HPF — HIGH (ref 0–4)
SODIUM SERPL-SCNC: 137 MMOL/L — SIGNIFICANT CHANGE UP (ref 135–145)
SODIUM SERPL-SCNC: 141 MMOL/L — SIGNIFICANT CHANGE UP (ref 135–145)
SODIUM UR-SCNC: 59 MMOL/L — SIGNIFICANT CHANGE UP
SP GR SPEC: 1.02 — SIGNIFICANT CHANGE UP (ref 1–1.03)
SPECIMEN SOURCE: SIGNIFICANT CHANGE UP
SQUAMOUS # UR AUTO: 0 /HPF — SIGNIFICANT CHANGE UP (ref 0–5)
UROBILINOGEN FLD QL: 1 MG/DL — SIGNIFICANT CHANGE UP (ref 0.2–1)
WBC # BLD: 8.26 K/UL — SIGNIFICANT CHANGE UP (ref 3.8–10.5)
WBC # BLD: 9.53 K/UL — SIGNIFICANT CHANGE UP (ref 3.8–10.5)
WBC # FLD AUTO: 8.26 K/UL — SIGNIFICANT CHANGE UP (ref 3.8–10.5)
WBC # FLD AUTO: 9.53 K/UL — SIGNIFICANT CHANGE UP (ref 3.8–10.5)
WBC UR QL: 581 /HPF — HIGH (ref 0–5)

## 2024-02-01 PROCEDURE — 99233 SBSQ HOSP IP/OBS HIGH 50: CPT

## 2024-02-01 PROCEDURE — 99231 SBSQ HOSP IP/OBS SF/LOW 25: CPT

## 2024-02-01 PROCEDURE — 99024 POSTOP FOLLOW-UP VISIT: CPT

## 2024-02-01 RX ORDER — ALBUMIN HUMAN 25 %
250 VIAL (ML) INTRAVENOUS
Refills: 0 | Status: COMPLETED | OUTPATIENT
Start: 2024-02-01 | End: 2024-02-01

## 2024-02-01 RX ADMIN — PANTOPRAZOLE SODIUM 40 MILLIGRAM(S): 20 TABLET, DELAYED RELEASE ORAL at 17:57

## 2024-02-01 RX ADMIN — Medication 1 TABLET(S): at 11:22

## 2024-02-01 RX ADMIN — CHLORHEXIDINE GLUCONATE 1 APPLICATION(S): 213 SOLUTION TOPICAL at 16:49

## 2024-02-01 RX ADMIN — Medication 125 MILLILITER(S): at 17:55

## 2024-02-01 RX ADMIN — Medication 1 PACKET(S): at 11:25

## 2024-02-01 RX ADMIN — Medication 25 GRAM(S): at 02:08

## 2024-02-01 RX ADMIN — PANTOPRAZOLE SODIUM 40 MILLIGRAM(S): 20 TABLET, DELAYED RELEASE ORAL at 05:46

## 2024-02-01 RX ADMIN — Medication 25 GRAM(S): at 00:08

## 2024-02-01 RX ADMIN — Medication 125 MILLILITER(S): at 20:34

## 2024-02-01 RX ADMIN — ENOXAPARIN SODIUM 40 MILLIGRAM(S): 100 INJECTION SUBCUTANEOUS at 17:57

## 2024-02-01 NOTE — PROGRESS NOTE ADULT - NS ATTEND AMEND GEN_ALL_CORE FT
NSGY Attg:    see above    patient seen and examined    plan of care determined for externalized shunt  continue CSF drainage  awaiting transfer to [facility] per patient request  will follow

## 2024-02-01 NOTE — PROGRESS NOTE ADULT - SUBJECTIVE AND OBJECTIVE BOX
INTERVAL HPI/OVERNIGHT EVENTS: Patient denies pain, seems in better spirits. Denies pain. Tolerating diet. Remains tachycardia however, slightly improved. S/p IR drain with minimal serosanginous output. Colostomy output formed 680. SBT and cline draining adequately. Labs pending this AM.       PAST MEDICAL & SURGICAL HISTORY:  Spina bifida      Scoliosis          MEDICATIONS  (STANDING):  chlorhexidine 2% Cloths 1 Application(s) Topical daily  enoxaparin Injectable 40 milliGRAM(s) SubCutaneous every 24 hours  multivitamin 1 Tablet(s) Oral daily  pantoprazole  Injectable 40 milliGRAM(s) IV Push two times a day  psyllium Powder 1 Packet(s) Oral daily    MEDICATIONS  (PRN):  acetaminophen   IVPB .. 1000 milliGRAM(s) IV Intermittent every 6 hours PRN Temp greater or equal to 38C (100.4F)      ICU Vital Signs Last 24 Hrs  T(C): 36.9 (31 Jan 2024 23:00), Max: 37.3 (31 Jan 2024 05:00)  T(F): 98.4 (31 Jan 2024 23:00), Max: 99.1 (31 Jan 2024 05:00)  HR: 118 (01 Feb 2024 02:00) (94 - 123)  BP: 112/69 (01 Feb 2024 02:00) (99/64 - 120/71)  BP(mean): 82 (01 Feb 2024 02:00) (66 - 92)  ABP: --  ABP(mean): --  RR: 17 (01 Feb 2024 02:00) (13 - 21)  SpO2: 97% (01 Feb 2024 02:00) (91% - 100%)    O2 Parameters below as of 01 Feb 2024 00:00  Patient On (Oxygen Delivery Method): room air            Drug Dosing Weight  Height (cm): 132.1 (14 Jan 2024 09:30)  Weight (kg): 70 (14 Jan 2024 07:48)  BMI (kg/m2): 40.1 (14 Jan 2024 09:30)  BSA (m2): 1.51 (14 Jan 2024 09:30)    CENTRAL LINE: [ ] YES [ ] NO  LOCATION:   DATE INSERTED:  REMOVE: [ ] YES [ ] NO  EXPLAIN:    CLINE: [ ] YES [ ] NO    DATE INSERTED:  REMOVE: [ ] YES [ ] NO  EXPLAIN:    A-LINE: [ ] YES [ ] NO  LOCATION:   DATE INSERTED:  REMOVE: [ ] YES [ ] NO  EXPLAIN:        I&O's Detail    30 Jan 2024 07:01  -  31 Jan 2024 07:00  --------------------------------------------------------  IN:    Oral Fluid: 1480 mL  Total IN: 1480 mL    OUT:    Colostomy (mL): 1200 mL    External Ventricular Device (mL): 248 mL    Indwelling Catheter - Suprapubic (mL): 745 mL    Indwelling Catheter - Urethral (mL): 330 mL  Total OUT: 2523 mL    Total NET: -1043 mL      31 Jan 2024 07:01  -  01 Feb 2024 02:58  --------------------------------------------------------  IN:    IV PiggyBack: 500 mL    IV PiggyBack: 100 mL    Oral Fluid: 600 mL  Total IN: 1200 mL    OUT:    Colostomy (mL): 680 mL    External Ventricular Device (mL): 190 mL    Indwelling Catheter - Suprapubic (mL): 575 mL    Indwelling Catheter - Urethral (mL): 130 mL  Total OUT: 1575 mL    Total NET: -375 mL              Physical Exam:    Neurological:  GCS 15     CAM neg     B/L lower extremities with plegia (baseline)     HEENT: PERRL, EOMI     Respiratory: Unlabored, no accessory muscle use    Cardiovascular: Sinus tachycardia     Gastrointestinal: Softly distended, non-tender to palpation, colostomy pink w/ formed stool, IR drain in LQ w/ serosanginous output     : Cline and SPT in place    Extremities: No peripheral edema, legs in frog leg position     Skin: No rashes    LABS:  CBC Full  -  ( 31 Jan 2024 05:40 )  WBC Count : 7.79 K/uL  RBC Count : 3.86 M/uL  Hemoglobin : 10.8 g/dL  Hematocrit : 33.2 %  Platelet Count - Automated : 757 K/uL  Mean Cell Volume : 86.0 fl  Mean Cell Hemoglobin : 28.0 pg  Mean Cell Hemoglobin Concentration : 32.5 gm/dL  Auto Neutrophil # : 5.15 K/uL  Auto Lymphocyte # : 1.25 K/uL  Auto Monocyte # : 0.76 K/uL  Auto Eosinophil # : 0.21 K/uL  Auto Basophil # : 0.00 K/uL  Auto Neutrophil % : 66.1 %  Auto Lymphocyte % : 16.1 %  Auto Monocyte % : 9.8 %  Auto Eosinophil % : 2.7 %  Auto Basophil % : 0.0 %    01-31    143  |  106  |  16.8  ----------------------------<  110<H>  3.7   |  24.0  |  0.84    Ca    8.8      31 Jan 2024 21:10  Phos  3.0     01-31  Mg     1.8     01-31      PT/INR - ( 31 Jan 2024 13:00 )   PT: 12.5 sec;   INR: 1.13 ratio           Urinalysis Basic - ( 31 Jan 2024 21:10 )    Color: x / Appearance: x / SG: x / pH: x  Gluc: 110 mg/dL / Ketone: x  / Bili: x / Urobili: x   Blood: x / Protein: x / Nitrite: x   Leuk Esterase: x / RBC: x / WBC x   Sq Epi: x / Non Sq Epi: x / Bacteria: x

## 2024-02-01 NOTE — PROGRESS NOTE ADULT - CRITICAL CARE ATTENDING COMMENT
x Events noted yesterday.  IR drain appreciated.    GEN:    -Ongoing tachycardia of unknown etiology, no improvement w/fluids given  -Breathing comfortably on RA, no PE on CTA chest  -PPI for 8 weeks  -Tolerating diet, ostomy functioning  -Cr improved, adequate urine output, plan for cystogram in 3 weeks per urology  -Completed meropenum, CSF and blood cultures negative, gram stain from yesterday negative to date Events noted yesterday.  IR drain appreciated.    GEN:  Awake, alert, comfortable, smiling, conversing, appropriate, clear CSF draining from  shunt now exteriorized to chest wall  ABD:  Soft, flat, non distended, ostomy pink w/stool, urostomy tube w/clear urine drainage (RLQ), dressing had just been changed -clean and dry (reportedly still w/some mucopurulent scant drainage from lower pole), non tender throughout (patient is insensate in lower abdomen)  EXT:  Warm, no edema, chronic frog-leg position of lower extremities, no edema, for-shortened feet, warm    Small bowel obstruction - resolved  GI bleed -resolved  Bladder injury -intra operative  Colonic perforation - resolved   shunt    -Ongoing tachycardia of unknown etiology, no improvement w/fluids given, CTA chest w/o PE, fluid collection drained yesterday -to reveal no bacteria on grm stain and only serosanguinous fluid (no purulence).  Unclear of etiology of ongoing tachycardia - could this be related to an autonomic process related to spina bifida?  -Breathing comfortably on RA, no PE on CTA chest  -H/H stable, scds, lovenox  -PPI for 8 weeks  -Tolerating diet, ostomy functioning, protonix  -Cr improved, adequate urine output, plan for cystogram in 3 weeks per urology  -Completed meropenum, CSF and blood cultures negative, gram stain from yesterday negative to date  -RUE/LUE midlines  Patient to be transferred to [facility] when ready for discharge to have  shunt internalized. Events noted yesterday.  IR drain appreciated.    GEN:  Awake, alert, comfortable, smiling, conversing, appropriate, clear CSF draining from  shunt now exteriorized to chest wall  ABD:  Soft, flat, non distended, ostomy pink w/stool, urostomy tube w/clear urine drainage (RLQ), dressing had just been changed -clean and dry (reportedly still w/some mucopurulent scant drainage from lower pole), non tender throughout (patient is insensate in lower abdomen), L side abdominal drain w/serous slightly blood tinged scant drainage  EXT:  Warm, no edema, chronic frog-leg position of lower extremities, no edema, for-shortened feet, warm    Small bowel obstruction - resolved  GI bleed -resolved  Bladder injury -intra operative  Colonic perforation - resolved   shunt    -Ongoing tachycardia of unknown etiology, no improvement w/fluids given, CTA chest w/o PE, fluid collection drained yesterday -to reveal no bacteria on grm stain and only serosanguinous fluid (no purulence).  Unclear of etiology of ongoing tachycardia - could this be related to an autonomic process related to spina bifida?  -Breathing comfortably on RA, no PE on CTA chest  -H/H stable, scds, lovenox  -PPI for 8 weeks  -Tolerating diet, ostomy functioning, protonix  -Cr improved, adequate urine output, plan for cystogram in 3 weeks per urology  -Completed meropenum, CSF and blood cultures negative, gram stain from yesterday negative to date  -RUE/LUE midlines  Patient to be transferred to [facility] when ready for discharge to have  shunt internalized.

## 2024-02-01 NOTE — PROGRESS NOTE ADULT - SUBJECTIVE AND OBJECTIVE BOX
HPI:  28M w/ PMH of spina bifida and scoliosis, wheelchair bound at baseline, presenting with abdominal pain, n/v. Pt states that the pain began yesterday morning and has been persistent. He had a bowel movement this morning but prior to that his last bowel movement was one week prior. Denies passing gas. Denies fevers at home. Has been vomiting and nauseous. At home he was lethargic and EMS was called. At that time he was hypotensive and tachycardic and he was BIBEMS to [facility] ED for further workup. On arrival he was tachycardic to the 130s and hypotensive to 80s/50s. Labs notable for K 3.4, bicarb 13, Cr 2.04. Actively vomiting in ED. CT scan showed dilated bowel and stomach consistent with severe SBO with transition point in mid abdomen. Surgery consulted for management.     INTERVAL HPI/OVERNIGHT EVENTS:  28M s/p shunt externalization on 1/21, now s/p abdominal wall fluid collection drainage on 1/31 in IR. Patient seen and examined at bedside this AM on rounds. Patient lying comfortably in bed. Denies any headache, dizziness, N/V. No overnight events reported. When patient medically optimized, plan is to transfer to [facility] NSICU for continuation of care, as patient follows with Dr. Dorsey.    Vital Signs Last 24 Hrs  T(C): 36.9 (01 Feb 2024 05:00), Max: 37.1 (31 Jan 2024 21:07)  T(F): 98.5 (01 Feb 2024 05:00), Max: 98.7 (31 Jan 2024 21:07)  HR: 108 (01 Feb 2024 09:00) (99 - 123)  BP: 95/60 (01 Feb 2024 09:00) (95/60 - 120/71)  BP(mean): 72 (01 Feb 2024 09:00) (66 - 85)  RR: 19 (01 Feb 2024 09:00) (11 - 21)  SpO2: 98% (01 Feb 2024 09:00) (91% - 100%)  Parameters below as of 01 Feb 2024 00:00  Patient On (Oxygen Delivery Method): room air    PHYSICAL EXAM:  GENERAL: NAD, well-groomed  HEAD:  Atraumatic, normocephalic  DRAIN: Dressing in place, clean dry intact  MIA COMA SCORE: E-4 V-5 M-6 = 15  MENTAL STATUS: AAOx3; Awake; Opens eyes spontaneously; Appropriately conversant without aphasia; following simple commands  CRANIAL NERVES: PERRL. EOMI without nystagmus. Facial sensation intact V1-3 distribution b/l. Face symmetric, tongue midline. Hearing grossly intact. Speech clear. Head turning and shoulder shrug intact.   MOTOR: RUE 5/5, LUE 5/5, b/l LE 0/5 and contracted at baseline  SENSATION: Grossly intact to light touch all extremities on upper extremities  CHEST/LUNG: Non-labored breathing on room air  SKIN: Warm, dry    LABS:                        10.0   8.26  )-----------( 714      ( 01 Feb 2024 03:30 )             31.9     02-01    141  |  104  |  16.2  ----------------------------<  100<H>  3.6   |  24.0  |  0.71    Ca    8.8      01 Feb 2024 03:30  Phos  3.4     02-01  Mg     3.3     02-01    PT/INR - ( 31 Jan 2024 13:00 )   PT: 12.5 sec;   INR: 1.13 ratio      Urinalysis Basic - ( 01 Feb 2024 03:30 )  Color: x / Appearance: x / SG: x / pH: x  Gluc: 100 mg/dL / Ketone: x  / Bili: x / Urobili: x   Blood: x / Protein: x / Nitrite: x   Leuk Esterase: x / RBC: x / WBC x   Sq Epi: x / Non Sq Epi: x / Bacteria: x    01-31 @ 07:01  -  02-01 @ 07:00  --------------------------------------------------------  IN: 1200 mL / OUT: 1980 mL / NET: -780 mL    02-01 @ 07:01  - 02-01 @ 10:28  --------------------------------------------------------  IN: 100 mL / OUT: 127 mL / NET: -27 mL    RADIOLOGY & ADDITIONAL TESTS:    CT Abdomen and Pelvis w/ Oral Cont and w/ IV Cont (01.30.24 @ 17:58)  IMPRESSION:  No pulmonary embolism through the level of the lobar pulmonary arteries.   Evaluation of more distal segmental and subsegmental pulmonary arteries is limited by suboptimal contrast bolus timing.  Trace bibasilar pleural effusion and atelectasis of posterior basilar segment of right lower lobe.    Interval repositioning of 2  shunt with tips of catheter now terminating subcutaneously at the level of xiphoid process  Patient is status post Akash procedure and small bowel resection and a new ostomy in left lower quadrant abdominal abdomen.   No evidence of extravasation of the contrast from the bowel loops proximal to the ostomy site.  7.2 x 3.1 cm loculated fluid collection/abscess in the left anterolateral abdominal wall.  Postsurgical changes and thickening of urinary bladder wall.    CT Pelvis No Cont (01.21.24 @ 11:51)   IMPRESSION:  Intraperitoneal bladder perforation. Moderate to large amount of   extraluminal contrast surrounding small bowel. Moderate amount of   pneumoperitoneum. Extraluminal contrast and gas is also seen tracking   through the midline ventral abdominal wall incision.    Redemonstrated tract extending from the right bladder wall to the skin   in the right lower quadrant. Question a urostomy. Correlate with patient history.    Irregular cavity within the prostate measuring 3.1 x 1.8 cm that is   contiguous with the urethra. A few coarse calcifications within the   prostate are possibly calculi layering within the cavity.    Unchanged mild bilateral hydroureteronephrosis to the level of the   bladder. Unchanged urothelial thickening of the bilateral collecting   systems and ureters. Correlate for urinary tract infection.    Diffuse wall thickening of the imaged small bowel and sigmoid colon,   which could be reactive or representative of enterocolitis.    CT Head No Cont (01.16.24 @ 10:57)  IMPRESSION: Stable follow-up CT exam when compared with 1/14/2024.

## 2024-02-01 NOTE — PROGRESS NOTE ADULT - ASSESSMENT
28M PMH spina bifida (wheelchair bound), scoliosis, hydrocephalus s/p  shunt placement with multiple revisions followed by Dr. Dorsey at [facility], presented to [facility] with concern for SBO s/p ex lap, partial colectomy ccb bladder perforation with repair on 1/14, shunt externalization on 1/21, s/p abdominal wall fluid collection drained by IR on 1/31.    Plan:  - Q4 neuro checks   - No neurosurgical intervention at this time  - Maintain externalized shunt 2/2 recent abscess  - Patient requesting transfer to [facility] as patient follows with Dr. Dorsey  - Pending transfer to [facility] when medically optimized  - Normotensive SBP goals  - Pain control as needed, avoid over sedation  - Further medical/supportive management per SICU team   - Discussed with Dr. Harris

## 2024-02-01 NOTE — PROGRESS NOTE ADULT - ASSESSMENT
A/p: 28 year old male with PMHx of spina bifida (wheelchair bound at baseline), scoliosis,  Shunt BIBEMS 1/14 with abdominal pain, n/v, and lethargy, found to have severe SBO, metabolic acidosis, and to be in septic and hypovolemic shock. Patient was brought to the OR 1/14 for ex lap and small bowel resection, c/b bladder perf with primary repair and partial colectomy and admitted to SICU post-op for further management. Now with mixed septic/hypovolemic shock, pneumoperitoneum, suspected UGIB. RTOR 1/22 for bladder repair, Kirkpatrick's and EGD.         PLAN:       NEURO:  Pain controlled with minimal pain medications     Hx of  shunt - now s/p shunt externalized   Open to drain, monitor output.     Patient will be transferred to [facility] for internalization of EVD likely with endoscopic third ventriculostomy once recovered from abdominal surgery     CV:   Sinus tachycardia- unclear etiology. No improvement with volume expansion. Patient remains euvolemic, afebrile, without leukocytosis and with stable Hg/Hct, B/L LE venous duplex negative, CTA chest 1/30 negative for PE.  May require BB within the next few days.     PULM:  Currently saturating well on RA, pulmonary toilet, IS, OOBTC          GI/Nutrition:  SBO s/p SBR, Kirkpatrick's and bladder repair - high output, monitor colostomy   Moderate protein malnutrition - tolerating regular high fiber    Gastritis - recommend PPI BID for 8 weeks, Rpt EGD in 12 weeks.   Abdominal Wall abscess- s/p IR drainage, f/u gram stain     /Renal:   Hx of neobladder and urethral stricture now s/p neobladder repair x 3 . Monitor GALEN yesterday    Urology following – Keep Cline and SPT upon discharge. Recommend cystogram in 3 weeks to re-evaluate prior to outpatient follow up.     SPT tube placement and cline 18F.       ENDO:  Maintain euglycemia 120-180.       ID:   Completed course of Meropenem. F/u gram stain after IR drainage    Blood cx (1/19) NGTD   CSF cx (1/21) NGTD       HEME:   Monitor H/H. Monitor Coags.  SCDs, lovenox 40 daily       SKIN:   Repositioning for DTI prevention while in bed. Dressing changed        Lines: Peripheral IV's      Dispo: SICU   Plan for transfer to [facility] when able

## 2024-02-01 NOTE — CHART NOTE - NSCHARTNOTEFT_GEN_A_CORE
Source: Patient [ ]  Family [ ]   other [x ] EMR and staff     Current Diet: Diet, Regular:   High Fiber (HIFIBER) (01-31-24 @ 16:37)    PO intake:  < 50% [x ]   50-75%  [ ]   %  [ ]  other :    Source for PO intake [ ] Patient [ ] family [x ] chart [ ] staff [ ] other    Current Weight:   1/29: 55.9 kg   1/28 57.1 kg  1/23 57.8 kg  1/16 62.8 kg    % Weight Change: 15lb (10.8%) weight loss since admission    Pertinent Medications: MEDICATIONS  (STANDING):  chlorhexidine 2% Cloths 1 Application(s) Topical daily  enoxaparin Injectable 40 milliGRAM(s) SubCutaneous every 24 hours  multivitamin 1 Tablet(s) Oral daily  pantoprazole  Injectable 40 milliGRAM(s) IV Push two times a day  psyllium Powder 1 Packet(s) Oral daily    MEDICATIONS  (PRN):  acetaminophen   IVPB .. 1000 milliGRAM(s) IV Intermittent every 6 hours PRN Temp greater or equal to 38C (100.4F)    Pertinent Labs: CBC Full  -  ( 01 Feb 2024 03:30 )  WBC Count : 8.26 K/uL  RBC Count : 3.70 M/uL  Hemoglobin : 10.0 g/dL  Hematocrit : 31.9 %  Platelet Count - Automated : 714 K/uL  Mean Cell Volume : 86.2 fl  Mean Cell Hemoglobin : 27.0 pg  Mean Cell Hemoglobin Concentration : 31.3 gm/dL  Auto Neutrophil # : 5.44 K/uL  Auto Lymphocyte # : 1.58 K/uL  Auto Monocyte # : 0.98 K/uL  Auto Eosinophil # : 0.10 K/uL  Auto Basophil # : 0.07 K/uL  Auto Neutrophil % : 65.9 %  Auto Lymphocyte % : 19.1 %  Auto Monocyte % : 11.9 %  Auto Eosinophil % : 1.2 %  Auto Basophil % : 0.8 %        Skin: Sx Midline abd     Nutrition focused physical exam conducted - found signs of malnutrition [ ]absent [x]present    Subcutaneous fat loss: [x] Orbital fat pads region, [x]Buccal fat region, [x]Triceps region,  [ ]Ribs region    Muscle wasting: [x]Temples region, [x]Clavicle region, [x]Shoulder region, [ ]Scapula region, [ ]Interosseous region,  [ ]thigh region, [ ]Calf region      Estimated Needs:   [x ] no change since previous assessment  [ ] recalculated:     Hospital Course:   Pt is a 28 year old male with PMHx of spina bifida (wheelchair bound at baseline), scoliosis,  Shunt BIBEMS 1/14 with abdominal pain, n/v, and lethargy, found to have severe SBO, metabolic acidosis, and to be in septic and hypovolemic shock. Patient was brought to the OR 1/14 for ex lap and small bowel resection, c/b bladder perf with primary repair and partial colectomy and admitted to SICU post-op for further management. Now with mixed septic/hypovolemic shock, pneumoperitoneum, suspected UGIB. RTOR 1/22 for bladder repair, Kirkpatrick's and EGD.           Current Nutrition Diagnosis:  Pt remains at high nutrition risk secondary to Severe (chronic) protein calorie malnutrition related to inadequate energy/protein intake,  with altered GI function in setting of severe SBO, spina bifida,  shunt as evidenced by pt likely meeting <75% est needs >1mo, +1 edema, severe muscle/fat wasting, now with 10.8% wt loss since admission (if weights accurate). Visited pt during breakfast this morning, pt sleeping during visit; Pt with poor PO intake; observed Breakfast tray less then 50% of breakfast consumed. Colostomy output with formed stool noted. Pt continues to receive metamucil daily. Recommendations below:     Recommendations:   1. Consider changing diet to low fiber/low residue   2. Monitor I/O, BM trends  3. Monitor electrolytes, replete prn  4. Provide Magic Cup BID (280 kcals, 9 g pro each)  5. Rx: MVI daily to optimize nutrition  6. Trend weights      Monitoring and Evaluation:   [ x] PO intake [x ] Tolerance to diet prescription [X] Weights  [X] Follow up per protocol [X] Labs: Source: Patient [ ]  Family [ ]   other [x ] EMR and staff     Current Diet: Diet, Regular:   High Fiber (HIFIBER) (01-31-24 @ 16:37)    PO intake:  < 50% [x ]   50-75%  [ ]   %  [ ]  other :    Source for PO intake [ ] Patient [ ] family [x ] chart [ ] staff [ ] other    Current Weight:   1/29: 55.9 kg   1/28 57.1 kg  1/23 57.8 kg  1/16 62.8 kg    % Weight Change: 15lb (10.8%) weight loss since admission    Pertinent Medications: MEDICATIONS  (STANDING):  chlorhexidine 2% Cloths 1 Application(s) Topical daily  enoxaparin Injectable 40 milliGRAM(s) SubCutaneous every 24 hours  multivitamin 1 Tablet(s) Oral daily  pantoprazole  Injectable 40 milliGRAM(s) IV Push two times a day  psyllium Powder 1 Packet(s) Oral daily    MEDICATIONS  (PRN):  acetaminophen   IVPB .. 1000 milliGRAM(s) IV Intermittent every 6 hours PRN Temp greater or equal to 38C (100.4F)    Pertinent Labs: CBC Full  -  ( 01 Feb 2024 03:30 )  WBC Count : 8.26 K/uL  RBC Count : 3.70 M/uL  Hemoglobin : 10.0 g/dL  Hematocrit : 31.9 %  Platelet Count - Automated : 714 K/uL  Mean Cell Volume : 86.2 fl  Mean Cell Hemoglobin : 27.0 pg  Mean Cell Hemoglobin Concentration : 31.3 gm/dL  Auto Neutrophil # : 5.44 K/uL  Auto Lymphocyte # : 1.58 K/uL  Auto Monocyte # : 0.98 K/uL  Auto Eosinophil # : 0.10 K/uL  Auto Basophil # : 0.07 K/uL  Auto Neutrophil % : 65.9 %  Auto Lymphocyte % : 19.1 %  Auto Monocyte % : 11.9 %  Auto Eosinophil % : 1.2 %  Auto Basophil % : 0.8 %        Skin: Sx Midline abd     Nutrition focused physical exam conducted - found signs of malnutrition [ ]absent [x]present    Subcutaneous fat loss: [x] Orbital fat pads region, [x]Buccal fat region, [x]Triceps region,  [ ]Ribs region    Muscle wasting: [x]Temples region, [x]Clavicle region, [x]Shoulder region, [ ]Scapula region, [ ]Interosseous region,  [ ]thigh region, [ ]Calf region      Estimated Needs:   [x ] no change since previous assessment  [ ] recalculated:     Hospital Course:   Pt is a 28 year old male with PMHx of spina bifida (wheelchair bound at baseline), scoliosis,  Shunt BIBEMS 1/14 with abdominal pain, n/v, and lethargy, found to have severe SBO, metabolic acidosis, and to be in septic and hypovolemic shock. Patient was brought to the OR 1/14 for ex lap and small bowel resection, c/b bladder perf with primary repair and partial colectomy and admitted to SICU post-op for further management. Now with mixed septic/hypovolemic shock, pneumoperitoneum, suspected UGIB. RTOR 1/22 for bladder repair, Kirkpatrick's and EGD.           Current Nutrition Diagnosis:  Pt remains at high nutrition risk secondary to Severe (chronic) protein calorie malnutrition related to inadequate energy/protein intake,  with altered GI function in setting of severe SBO, spina bifida,  shunt as evidenced by pt likely meeting <75% est needs >1mo, +1 edema, severe muscle/fat wasting, now with 10.8% wt loss since admission (if weights accurate). Visited pt during breakfast this morning, pt sleeping during visit; Pt with poor PO intake; observed Breakfast tray less then 50% of breakfast consumed. Colostomy output with formed stool noted. Pt continues to receive metamucil daily. Pt receiving Magic Cup daily, will continue to provide TID. Recommendations below:     Recommendations:   1. Consider changing diet to low fiber/low residue   2. Monitor I/O, BM trends  3. Monitor electrolytes, replete prn  4. Provide Magic Cup BID (280 kcals, 9 g pro each)  5. Rx: MVI daily to optimize nutrition  6. Trend weights      Monitoring and Evaluation:   [ x] PO intake [x ] Tolerance to diet prescription [X] Weights  [X] Follow up per protocol [X] Labs:

## 2024-02-01 NOTE — PROGRESS NOTE ADULT - ASSESSMENT
28 year-old Male admitted with Sepsis.    abdominal wall fluid collection s/p drainage   - Continue global management per primary team  - Monitor output/VS/Labs  - Flush drain with 5cc normal saline daily forward only; DO NOT ASPIRATE  - Change dressing q3 days or when dressing is saturated  - Patient informed that optimal time for drain check is when output is <10cc/24hours  - For outpatient follow-up/questions and scheduling please call [phone]  - They will benefit from home care services to help with drainage catheter care; they should continue same drainage catheter care as an outpatient.     Please call IR at extension [phone] with any questions, concerns, or issues regarding above.

## 2024-02-01 NOTE — PROGRESS NOTE ADULT - SUBJECTIVE AND OBJECTIVE BOX
Interventional Radiology Follow-Up Note    This is a 28y Male s/p on abdominal wall fluid collection drainage on 1/31 in Interventional Radiology with Dr. Vidal     S: Patient seen and examined @ bedside. No complaints offered.     Medication:  MEDICATIONS  (STANDING):  chlorhexidine 2% Cloths 1 Application(s) Topical daily  enoxaparin Injectable 40 milliGRAM(s) SubCutaneous every 24 hours  multivitamin 1 Tablet(s) Oral daily  pantoprazole  Injectable 40 milliGRAM(s) IV Push two times a day  psyllium Powder 1 Packet(s) Oral daily    MEDICATIONS  (PRN):  acetaminophen   IVPB .. 1000 milliGRAM(s) IV Intermittent every 6 hours PRN Temp greater or equal to 38C (100.4F)    Vitals:  ICU Vital Signs Last 24 Hrs  T(C): 36.9 (01 Feb 2024 05:00), Max: 37.1 (31 Jan 2024 21:07)  T(F): 98.5 (01 Feb 2024 05:00), Max: 98.7 (31 Jan 2024 21:07)  HR: 109 (01 Feb 2024 07:00) (102 - 123)  BP: 109/68 (01 Feb 2024 07:00) (96/58 - 120/71)  BP(mean): 81 (01 Feb 2024 07:00) (66 - 92)  ABP: --  ABP(mean): --  RR: 12 (01 Feb 2024 07:00) (11 - 21)  SpO2: 95% (01 Feb 2024 07:00) (91% - 100%)    O2 Parameters below as of 01 Feb 2024 00:00  Patient On (Oxygen Delivery Method): room air    Physical Exam:  General: Nontoxic, in NAD, A&O x3.  Abdomen: soft, NTND, no peritoneal signs.  Drain Device: Drain intact attached to gravity bag. Dressing clean, dry, intact.    I&O's Detail    31 Jan 2024 07:01  -  01 Feb 2024 07:00  --------------------------------------------------------  IN:    IV PiggyBack: 500 mL    IV PiggyBack: 100 mL    Oral Fluid: 600 mL  Total IN: 1200 mL    OUT:    Colostomy (mL): 880 mL    External Ventricular Device (mL): 225 mL    Indwelling Catheter - Suprapubic (mL): 640 mL    Indwelling Catheter - Urethral (mL): 235 mL  Total OUT: 1980 mL    Total NET: -780 mL    LABS:                        10.0   8.26  )-----------( 714      ( 01 Feb 2024 03:30 )             31.9     02-01    141  |  104  |  16.2  ----------------------------<  100<H>  3.6   |  24.0  |  0.71    Ca    8.8      01 Feb 2024 03:30  Phos  3.4     02-01  Mg     3.3     02-01      PT/INR - ( 31 Jan 2024 13:00 )   PT: 12.5 sec;   INR: 1.13 ratio      Urinalysis Basic - ( 01 Feb 2024 03:30 )    Color: x / Appearance: x / SG: x / pH: x  Gluc: 100 mg/dL / Ketone: x  / Bili: x / Urobili: x   Blood: x / Protein: x / Nitrite: x   Leuk Esterase: x / RBC: x / WBC x   Sq Epi: x / Non Sq Epi: x / Bacteria: x

## 2024-02-02 LAB
ANION GAP SERPL CALC-SCNC: 12 MMOL/L — SIGNIFICANT CHANGE UP (ref 5–17)
BUN SERPL-MCNC: 16.8 MG/DL — SIGNIFICANT CHANGE UP (ref 8–20)
CALCIUM SERPL-MCNC: 8.9 MG/DL — SIGNIFICANT CHANGE UP (ref 8.4–10.5)
CHLORIDE SERPL-SCNC: 103 MMOL/L — SIGNIFICANT CHANGE UP (ref 96–108)
CO2 SERPL-SCNC: 24 MMOL/L — SIGNIFICANT CHANGE UP (ref 22–29)
CREAT SERPL-MCNC: 0.58 MG/DL — SIGNIFICANT CHANGE UP (ref 0.5–1.3)
EGFR: 135 ML/MIN/1.73M2 — SIGNIFICANT CHANGE UP
GLUCOSE SERPL-MCNC: 93 MG/DL — SIGNIFICANT CHANGE UP (ref 70–99)
HCT VFR BLD CALC: 29.4 % — LOW (ref 39–50)
HGB BLD-MCNC: 9.3 G/DL — LOW (ref 13–17)
MAGNESIUM SERPL-MCNC: 2.1 MG/DL — SIGNIFICANT CHANGE UP (ref 1.6–2.6)
MCHC RBC-ENTMCNC: 27.4 PG — SIGNIFICANT CHANGE UP (ref 27–34)
MCHC RBC-ENTMCNC: 31.6 GM/DL — LOW (ref 32–36)
MCV RBC AUTO: 86.5 FL — SIGNIFICANT CHANGE UP (ref 80–100)
PHOSPHATE SERPL-MCNC: 3.2 MG/DL — SIGNIFICANT CHANGE UP (ref 2.4–4.7)
PLATELET # BLD AUTO: 620 K/UL — HIGH (ref 150–400)
POTASSIUM SERPL-MCNC: 3.6 MMOL/L — SIGNIFICANT CHANGE UP (ref 3.5–5.3)
POTASSIUM SERPL-SCNC: 3.6 MMOL/L — SIGNIFICANT CHANGE UP (ref 3.5–5.3)
RBC # BLD: 3.4 M/UL — LOW (ref 4.2–5.8)
RBC # FLD: 15.2 % — HIGH (ref 10.3–14.5)
SODIUM SERPL-SCNC: 138 MMOL/L — SIGNIFICANT CHANGE UP (ref 135–145)
UUN UR-MCNC: 518 MG/DL — SIGNIFICANT CHANGE UP
WBC # BLD: 8.25 K/UL — SIGNIFICANT CHANGE UP (ref 3.8–10.5)
WBC # FLD AUTO: 8.25 K/UL — SIGNIFICANT CHANGE UP (ref 3.8–10.5)

## 2024-02-02 PROCEDURE — 99231 SBSQ HOSP IP/OBS SF/LOW 25: CPT

## 2024-02-02 PROCEDURE — 99024 POSTOP FOLLOW-UP VISIT: CPT

## 2024-02-02 PROCEDURE — 99233 SBSQ HOSP IP/OBS HIGH 50: CPT

## 2024-02-02 RX ORDER — LANOLIN ALCOHOL/MO/W.PET/CERES
5 CREAM (GRAM) TOPICAL AT BEDTIME
Refills: 0 | Status: DISCONTINUED | OUTPATIENT
Start: 2024-02-02 | End: 2024-02-02

## 2024-02-02 RX ORDER — ALBUMIN HUMAN 25 %
250 VIAL (ML) INTRAVENOUS ONCE
Refills: 0 | Status: COMPLETED | OUTPATIENT
Start: 2024-02-02 | End: 2024-02-02

## 2024-02-02 RX ADMIN — PANTOPRAZOLE SODIUM 40 MILLIGRAM(S): 20 TABLET, DELAYED RELEASE ORAL at 05:52

## 2024-02-02 RX ADMIN — PANTOPRAZOLE SODIUM 40 MILLIGRAM(S): 20 TABLET, DELAYED RELEASE ORAL at 18:10

## 2024-02-02 RX ADMIN — Medication 1 PACKET(S): at 12:50

## 2024-02-02 RX ADMIN — Medication 1 TABLET(S): at 12:50

## 2024-02-02 RX ADMIN — ENOXAPARIN SODIUM 40 MILLIGRAM(S): 100 INJECTION SUBCUTANEOUS at 18:10

## 2024-02-02 RX ADMIN — CHLORHEXIDINE GLUCONATE 1 APPLICATION(S): 213 SOLUTION TOPICAL at 12:51

## 2024-02-02 RX ADMIN — Medication 125 MILLILITER(S): at 02:53

## 2024-02-02 NOTE — PROGRESS NOTE ADULT - NS ATTEND AMEND GEN_ALL_CORE FT
NSGY Attg:    see above    patient seen and examined    plan of care determined for externalized shunt  continue CSF drainage  awaiting transfer to [facility] per patient request  will follow

## 2024-02-02 NOTE — PROGRESS NOTE ADULT - CRITICAL CARE ATTENDING COMMENT
-Tolerating diet, ostomy functioning Patient feels well.  Had leakage of urine overnight into bed -but unclear where it arose from    GEN:  Smiling, conversing, appropriate, clear CSF draining from  shunt now exteriorized to chest wall  ABD:  Soft, flat, non distended, ostomy pink w/stool, both cline and urostomy tube w/murky clumpy drainage (RLQ), non tender throughout (patient is insensate in lower abdomen), L side abdominal drain w/serous slightly blood tinged scant drainage, incision w/ongoing murky serosanguinous drainage from lower pole, slightly thinner than yesterday  EXT:  Warm, no edema, chronic frog-leg position of lower extremities, no edema, for-shortened feet, warm    Small bowel obstruction - resolved  GI bleed -resolved  Bladder injury -intra operative  Colonic perforation - resolved   shunt    -Ongoing tachycardia of unclear etiology, remains HD stable otherwise  -Breathing comfortably on RA  -H/H slightly decreased but adequate, scds, lovenox  -Tolerating diet, ostomy functioning, BID  -Cr stable, catheters now draining adequately, unclear why/where drainage in bed last night came from -which mirrored transient low urine output at that time - now improved.  Would notify urology and see their thoughts about UA findings/caliber of urine in setting of a patient w/chronically colonized bladder in patient that straight caths at chronically  -Afebrile, WBC normal, off antibiotics  -Pending transfer to [facility] service once clinically otherwise ready for discharge Patient feels well.  Had leakage of urine overnight into bed -but unclear where it arose from    GEN:  Smiling, conversing, appropriate, clear CSF draining from  shunt now exteriorized to chest wall  ABD:  Soft, flat, non distended, ostomy pink w/stool, both cline and urostomy tube w/murky clumpy drainage (RLQ), non tender throughout (patient is insensate in lower abdomen), L side abdominal drain w/serous slightly blood tinged scant drainage, incision w/ongoing murky serosanguinous drainage from lower pole, slightly thinner than yesterday  EXT:  Warm, no edema, chronic frog-leg position of lower extremities, no edema, for-shortened feet, warm    Small bowel obstruction - resolved  GI bleed -resolved  Bladder injury -intra operative  Colonic perforation - resolved   shunt    -Ongoing tachycardia of unclear etiology, remains HD stable otherwise  -Breathing comfortably on RA  -H/H slightly decreased but adequate, scds, lovenox  -Tolerating diet, ostomy functioning, BID  -Cr stable, catheters now draining adequately, unclear why/where drainage in bed last night came from -which mirrored transient low urine output at that time - now improved.  Would notify urology and see their thoughts - given ongoing tachycardia.  Could patient have a sub-clinical urine leak?   -Afebrile, WBC normal, off antibiotics  -Pending transfer to [facility] NS[facility] service once clinically otherwise ready for discharge, but will hold given ongoing tachycardia Patient feels well.  Had leakage of urine overnight into bed -but unclear where it arose from    GEN:  Smiling, conversing, appropriate, clear CSF draining from  shunt now exteriorized to chest wall  ABD:  Soft, flat, non distended, ostomy pink w/stool, both cline and urostomy tube w/murky clumpy drainage (RLQ), non tender throughout (patient is insensate in lower abdomen), L side abdominal drain w/serous slightly blood tinged scant drainage, incision w/ongoing murky serosanguinous drainage from lower pole, slightly thinner than yesterday  EXT:  Warm, no edema, chronic frog-leg position of lower extremities, no edema, for-shortened feet, warm    Small bowel obstruction - resolved  GI bleed -resolved  Bladder injury -intra operative  Colonic perforation - resolved   shunt    -Ongoing tachycardia of unclear etiology, remains HD stable otherwise  -Breathing comfortably on RA  -H/H slightly decreased but adequate, scds, lovenox  -Tolerating diet, ostomy functioning, BID  -Cr stable, catheters now draining adequately, unclear why/where drainage in bed last night came from -which mirrored transient low urine output at that time - now improved.  Would notify urology and see their thoughts - given ongoing tachycardia.  Could patient have a sub-clinical urine leak?  Is there a role for imaging to evaluate quality of bladder repair?  Of note -patient's tachycardia had resolved after second operation - but now w/persistent tachycardia of unclear etiology.  -Afebrile, WBC normal, off antibiotics  -Pending transfer to [facility] NSGY service once clinically otherwise ready for discharge, but will hold given ongoing tachycardia

## 2024-02-02 NOTE — PROGRESS NOTE ADULT - ASSESSMENT
ASSESSMENT:   28 year old male with PMHx of spina bifida (wheelchair bound at baseline), scoliosis,  Shunt BIBEMS 1/14 with abdominal pain, n/v, and lethargy, found to have severe SBO, metabolic acidosis, and to be in septic and hypovolemic shock. Patient was brought to the OR 1/14 for ex lap and small bowel resection, c/b bladder perf with primary repair and partial colectomy and admitted to SICU post-op for further management. Developed mixed septic/hypovolemic shock, pneumoperitoneum, suspected UGIB.  shunt externalized. RTOR 1/22 for bladder repair, Kirkpatrick's and EGD. Course complicated by high ostomy output, now resolved, and abdominal wall abscess, s/p drain placement with IR.     PLAN:       NEURO:    - A&O x 3 at baseline     - Continue with pain control, Tylenol PRN      #Hx of  shunt     -  shunt externalized    - Open to drain, monitor output.     - Per NS, will require internalization of EVD likely with endoscopic third ventriculostomy. Currently plan to transfer to [facility] once stable for discharge as he has had previous care with Dr. Dorsey.     CV:       #Sinus tachycardia     - 1/27 S/p 1 L plasma-lyte and 250 mL albumin with transient improvement in HR    - Pt well appearing, afebrile, without leukocytosis and with stable Hg/Hct    - B/L LE venous duplex negative, CTA chest 1/30 negative for PE  -Likely related to intra-abdominal abcess  - Continue to monitor on telemetry    - May require BB within the next few days.     PULM:    #COVID, resolved    - Currently saturating well on RA    - Continue pulmonary toilet, IS, OOBTC      - Maintain SpO2 > 92%         GI/Nutrition:      #SBO s/p SBR, Kirkpatrick's and bladder repair    - Diet: Regular high fiber    - MVI daily     - Psyllium - 1 packet daily   - Severe protein calorie malnutrition- encourage high protein diet as well, added ensures to patient's tray     - GI – recommend PPI BID for 8 weeks, Rpt EGD in 12 weeks.   - Monitor colostomy output        #Abdominal Wall abscess  -S/p IR drainage on 1/31, will monitor output    /Renal:     #Hx of urethral stricture now with bladder injury s/p repair x 3    - Urology following – Keep Cline and SPT upon discharge. Recommend cystogram in 3 weeks to re-evaluate prior to outpatient follow up.    - SPT tube placement and cline 18F.     - Do not flush SPT or cline    - Patient must remain decompressed until bladder injury has healed    - Continue to monitor UOP and I&Os    - IVL   #GALEN, resolved           ENDO:    - TSH WNL   - Maintain euglycemia 120-180.       ID:     - Remains afebrile without leukocytosis    - Completed course of Meropenem     - Blood cx (1/19) NGTD    - CSF cx (1/21) NGTD       HEME:    - Monitor H/H    - Monitor Coags      - SCDs, lovenox 40 daily       SKIN:    - Repositioning for DTI prevention while in bed. Dressing changed        Lines: Peripheral IV's      Dispo: SICU due to externalized EVD drain. Will follow up transfer to [facility] once patient is stable for discharge except for EVD drain.

## 2024-02-02 NOTE — PROGRESS NOTE ADULT - SUBJECTIVE AND OBJECTIVE BOX
HPI:  SUBJECTIVE: 29yo M w/ hx of spina bfida and scoliosis, wheelchair bound at baseline, presenting with abdominal pain, n/v. Pt states that the pain began yesterday morning and has been persistent. He had a bowel movement this morning but prior to that his last bowel movement was one week prior. Denies passing gas. Denies fevers at home. Has been vomiting and nauseous. At home he was lethargic and EMS was called. At that time he was hypotensive and tachycardic and he was BIBEMS to [facility] ED for further workup. On arrival he was tachycardic to the 130s and hypotensive to 80s/50s. Labs notable for K 3.4, bicarb 13, Cr 2.04. Actively vomiting in ED. CT scan showed dilated bowel and stomach consistent with severe SBO with transition point in mid abdomen. Surgery consulted for management.    (14 Jan 2024 11:27)        INTERVAL EVENTS:  Patient was oliguric throughout the day, UOP improved following Albumin administration.     SUBJECTIVE:  Patient denies chest pain, SOB, abdominal pain, N/V, or any acute complaints.      PHYSICAL EXAM:  General: NAD, well-appearing, resting comfortably in bed     Neurological:  GCS  15, CAM negative, B/L lower extremities with plegia, decreased sensation T8 down (baseline)     HEENT:  EOMI      Respiratory: Unlabored, no accessory muscle use     Cardiovascular: Regular rhythm, sinus tachycardia      Gastrointestinal: Softly distended, non-tender to palpation, colostomy pink with formed brown stool output.      : SPT in place draining clear yellow urine, cline in place with clear yellow urine      Extremities: shorted/contracted (baseline)      Vascular: Equal and normal pulses: 2+ peripheral pulses throughout     Skin: No rashes. Dressings changed.            MEDICATIONS  (STANDING):  chlorhexidine 2% Cloths 1 Application(s) Topical daily  enoxaparin Injectable 40 milliGRAM(s) SubCutaneous every 24 hours  multivitamin 1 Tablet(s) Oral daily  pantoprazole  Injectable 40 milliGRAM(s) IV Push two times a day  psyllium Powder 1 Packet(s) Oral daily    MEDICATIONS  (PRN):  acetaminophen   IVPB .. 1000 milliGRAM(s) IV Intermittent every 6 hours PRN Temp greater or equal to 38C (100.4F)        PAST MEDICAL & SURGICAL HISTORY:  Spina bifida      Scoliosis          ICU Vital Signs Last 24 Hrs  T(C): 36.9 (02 Feb 2024 00:34), Max: 37.6 (01 Feb 2024 21:19)  T(F): 98.5 (02 Feb 2024 00:34), Max: 99.7 (01 Feb 2024 21:19)  HR: 108 (02 Feb 2024 04:00) (97 - 130)  BP: 105/72 (02 Feb 2024 04:00) (93/61 - 117/78)  BP(mean): 82 (02 Feb 2024 04:00) (71 - 89)  ABP: --  ABP(mean): --  RR: 19 (02 Feb 2024 04:00) (11 - 27)  SpO2: 98% (02 Feb 2024 04:00) (95% - 100%)    O2 Parameters below as of 02 Feb 2024 04:00  Patient On (Oxygen Delivery Method): room air                I&O's Detail    31 Jan 2024 07:01  -  01 Feb 2024 07:00  --------------------------------------------------------  IN:    IV PiggyBack: 500 mL    IV PiggyBack: 100 mL    Oral Fluid: 600 mL  Total IN: 1200 mL    OUT:    Colostomy (mL): 880 mL    External Ventricular Device (mL): 225 mL    Indwelling Catheter - Suprapubic (mL): 640 mL    Indwelling Catheter - Urethral (mL): 235 mL  Total OUT: 1980 mL    Total NET: -780 mL      01 Feb 2024 07:01  -  02 Feb 2024 04:36  --------------------------------------------------------  IN:    Albumin 5%  - 250 mL: 250 mL    Albumin 5%  - 500 mL: 500 mL    IV PiggyBack: 30 mL    Oral Fluid: 100 mL  Total IN: 880 mL    OUT:    Colostomy (mL): 800 mL    External Ventricular Device (mL): 262 mL    Indwelling Catheter - Suprapubic (mL): 443 mL    Indwelling Catheter - Urethral (mL): 301 mL  Total OUT: 1806 mL    Total NET: -926 mL                LABS:  CBC Full  -  ( 02 Feb 2024 03:00 )  WBC Count : 8.25 K/uL  RBC Count : 3.40 M/uL  Hemoglobin : 9.3 g/dL  Hematocrit : 29.4 %  Platelet Count - Automated : 620 K/uL  Mean Cell Volume : 86.5 fl  Mean Cell Hemoglobin : 27.4 pg  Mean Cell Hemoglobin Concentration : 31.6 gm/dL  Auto Neutrophil # : x  Auto Lymphocyte # : x  Auto Monocyte # : x  Auto Eosinophil # : x  Auto Basophil # : x  Auto Neutrophil % : x  Auto Lymphocyte % : x  Auto Monocyte % : x  Auto Eosinophil % : x  Auto Basophil % : x    02-02    138  |  103  |  16.8  ----------------------------<  93  3.6   |  24.0  |  0.58    Ca    8.9      02 Feb 2024 03:00  Phos  3.2     02-02  Mg     2.1     02-02      PT/INR - ( 31 Jan 2024 13:00 )   PT: 12.5 sec;   INR: 1.13 ratio           Urinalysis Basic - ( 02 Feb 2024 03:00 )    Color: x / Appearance: x / SG: x / pH: x  Gluc: 93 mg/dL / Ketone: x  / Bili: x / Urobili: x   Blood: x / Protein: x / Nitrite: x   Leuk Esterase: x / RBC: x / WBC x   Sq Epi: x / Non Sq Epi: x / Bacteria: x

## 2024-02-02 NOTE — PROGRESS NOTE ADULT - SUBJECTIVE AND OBJECTIVE BOX
NSx PA Note  HD# 19  Shunt externalization day #12    HPI  28M w/ PMH of spina bifida and scoliosis, wheelchair bound at baseline, presenting with abdominal pain, n/v. Pt states that the pain began yesterday morning and has been persistent. He had a bowel movement this morning but prior to that his last bowel movement was one week prior. Denies passing gas. Denies fevers at home. Has been vomiting and nauseous. At home he was lethargic and EMS was called. At that time he was hypotensive and tachycardic and he was BIBEMS to [facility] ED for further workup. On arrival he was tachycardic to the 130s and hypotensive to 80s/50s. Labs notable for K 3.4, bicarb 13, Cr 2.04. Actively vomiting in ED. CT scan showed dilated bowel and stomach consistent with severe SBO with transition point in mid abdomen. Surgery consulted for management.     Interval/Overnight Events   s/p abdominal wall fluid collection drainage on 1/31 in IR. Patient seen in bed on rounds. Denies all ROS questions. Per SICU note, patient was oliguric all day yesterday, urine output now improving s/p albumin administration.  Otherwise, awaiting medical optimization and clearance from gen surgery for transfer to [facility] for continuation of care.     MEDICATIONS  (STANDING):  chlorhexidine 2% Cloths 1 Application(s) Topical daily  enoxaparin Injectable 40 milliGRAM(s) SubCutaneous every 24 hours  multivitamin 1 Tablet(s) Oral daily  pantoprazole  Injectable 40 milliGRAM(s) IV Push two times a day  psyllium Powder 1 Packet(s) Oral daily    MEDICATIONS  (PRN):  acetaminophen   IVPB .. 1000 milliGRAM(s) IV Intermittent every 6 hours PRN Temp greater or equal to 38C (100.4F)    I&O's Summary    01 Feb 2024 07:01  -  02 Feb 2024 07:00  --------------------------------------------------------  IN: 880 mL / OUT: 2037 mL / NET: -1157 mL    Shunt- 293cc/24hrs, 108 overnight, clear CSF    Vital Signs Last 24 Hrs  T(C): 36.9 (02 Feb 2024 07:39), Max: 37.6 (01 Feb 2024 21:19)  T(F): 98.4 (02 Feb 2024 07:39), Max: 99.7 (01 Feb 2024 21:19)  HR: 118 (02 Feb 2024 08:00) (92 - 130)  BP: 134/73 (02 Feb 2024 08:00) (93/61 - 134/73)  BP(mean): 90 (02 Feb 2024 08:00) (68 - 90)  RR: 19 (02 Feb 2024 08:00) (11 - 27)  SpO2: 100% (02 Feb 2024 08:00) (96% - 100%)    Parameters below as of 02 Feb 2024 08:00  Patient On (Oxygen Delivery Method): room air    Neuro- Awake, alert, orientedx3  speech clear and appropriate  follows commands  pupils equal and reactive, EOMI, very slight beating horizontal nystagmus  tongue ML, face symmetric,   b/l UE 5/5  b/l LEs paretic 2/2 spina bifida   Shunt- externalized, CSF draining clear to slightly straw colored, no purulence noted                           9.3    8.25  )-----------( 620      ( 02 Feb 2024 03:00 )             29.4   02-02    138  |  103  |  16.8  ----------------------------<  93  3.6   |  24.0  |  0.58    Ca    8.9      02 Feb 2024 03:00  Phos  3.2     02-02  Mg     2.1     02-02    Plan:  NS stable  Maintain EVD externalized, will change drainage bag later today, record hourly output and record   Q4 neuro checks   Pending transfer to [facility] when medically optimized and gen surgery clears for continuation of neurosurgical care   Keep normothermic and normotensive   Pain control as needed   Lovenox DVT ppx, SCDs in bed   Incentive judson   Bowel Regimen  Management per primary team  Seen on rounds by Dr. Harris

## 2024-02-03 LAB
ALBUMIN SERPL ELPH-MCNC: 3.6 G/DL — SIGNIFICANT CHANGE UP (ref 3.3–5.2)
ALP SERPL-CCNC: 114 U/L — SIGNIFICANT CHANGE UP (ref 40–120)
ALT FLD-CCNC: 23 U/L — SIGNIFICANT CHANGE UP
ANION GAP SERPL CALC-SCNC: 13 MMOL/L — SIGNIFICANT CHANGE UP (ref 5–17)
AST SERPL-CCNC: 21 U/L — SIGNIFICANT CHANGE UP
BASOPHILS # BLD AUTO: 0.06 K/UL — SIGNIFICANT CHANGE UP (ref 0–0.2)
BASOPHILS NFR BLD AUTO: 0.7 % — SIGNIFICANT CHANGE UP (ref 0–2)
BILIRUB SERPL-MCNC: 0.3 MG/DL — LOW (ref 0.4–2)
BUN SERPL-MCNC: 15.8 MG/DL — SIGNIFICANT CHANGE UP (ref 8–20)
CALCIUM SERPL-MCNC: 9 MG/DL — SIGNIFICANT CHANGE UP (ref 8.4–10.5)
CHLORIDE SERPL-SCNC: 103 MMOL/L — SIGNIFICANT CHANGE UP (ref 96–108)
CO2 SERPL-SCNC: 23 MMOL/L — SIGNIFICANT CHANGE UP (ref 22–29)
CREAT SERPL-MCNC: 0.64 MG/DL — SIGNIFICANT CHANGE UP (ref 0.5–1.3)
EGFR: 131 ML/MIN/1.73M2 — SIGNIFICANT CHANGE UP
EOSINOPHIL # BLD AUTO: 0.1 K/UL — SIGNIFICANT CHANGE UP (ref 0–0.5)
EOSINOPHIL NFR BLD AUTO: 1.2 % — SIGNIFICANT CHANGE UP (ref 0–6)
GLUCOSE SERPL-MCNC: 94 MG/DL — SIGNIFICANT CHANGE UP (ref 70–99)
HCT VFR BLD CALC: 29.8 % — LOW (ref 39–50)
HGB BLD-MCNC: 9.3 G/DL — LOW (ref 13–17)
IMM GRANULOCYTES NFR BLD AUTO: 0.6 % — SIGNIFICANT CHANGE UP (ref 0–0.9)
LYMPHOCYTES # BLD AUTO: 1.94 K/UL — SIGNIFICANT CHANGE UP (ref 1–3.3)
LYMPHOCYTES # BLD AUTO: 23.2 % — SIGNIFICANT CHANGE UP (ref 13–44)
MAGNESIUM SERPL-MCNC: 1.8 MG/DL — SIGNIFICANT CHANGE UP (ref 1.6–2.6)
MCHC RBC-ENTMCNC: 26.8 PG — LOW (ref 27–34)
MCHC RBC-ENTMCNC: 31.2 GM/DL — LOW (ref 32–36)
MCV RBC AUTO: 85.9 FL — SIGNIFICANT CHANGE UP (ref 80–100)
MONOCYTES # BLD AUTO: 1 K/UL — HIGH (ref 0–0.9)
MONOCYTES NFR BLD AUTO: 11.9 % — SIGNIFICANT CHANGE UP (ref 2–14)
NEUTROPHILS # BLD AUTO: 5.23 K/UL — SIGNIFICANT CHANGE UP (ref 1.8–7.4)
NEUTROPHILS NFR BLD AUTO: 62.4 % — SIGNIFICANT CHANGE UP (ref 43–77)
PHOSPHATE SERPL-MCNC: 3.2 MG/DL — SIGNIFICANT CHANGE UP (ref 2.4–4.7)
PLATELET # BLD AUTO: 609 K/UL — HIGH (ref 150–400)
POTASSIUM SERPL-MCNC: 3.3 MMOL/L — LOW (ref 3.5–5.3)
POTASSIUM SERPL-SCNC: 3.3 MMOL/L — LOW (ref 3.5–5.3)
PROT SERPL-MCNC: 7.3 G/DL — SIGNIFICANT CHANGE UP (ref 6.6–8.7)
RBC # BLD: 3.47 M/UL — LOW (ref 4.2–5.8)
RBC # FLD: 15.1 % — HIGH (ref 10.3–14.5)
SODIUM SERPL-SCNC: 139 MMOL/L — SIGNIFICANT CHANGE UP (ref 135–145)
WBC # BLD: 8.38 K/UL — SIGNIFICANT CHANGE UP (ref 3.8–10.5)
WBC # FLD AUTO: 8.38 K/UL — SIGNIFICANT CHANGE UP (ref 3.8–10.5)

## 2024-02-03 PROCEDURE — 99233 SBSQ HOSP IP/OBS HIGH 50: CPT

## 2024-02-03 RX ORDER — MAGNESIUM SULFATE 500 MG/ML
2 VIAL (ML) INJECTION ONCE
Refills: 0 | Status: COMPLETED | OUTPATIENT
Start: 2024-02-03 | End: 2024-02-03

## 2024-02-03 RX ORDER — DIPHENOXYLATE HCL/ATROPINE 2.5-.025MG
1 TABLET ORAL EVERY 6 HOURS
Refills: 0 | Status: DISCONTINUED | OUTPATIENT
Start: 2024-02-03 | End: 2024-02-04

## 2024-02-03 RX ORDER — POTASSIUM CHLORIDE 20 MEQ
40 PACKET (EA) ORAL ONCE
Refills: 0 | Status: COMPLETED | OUTPATIENT
Start: 2024-02-03 | End: 2024-02-03

## 2024-02-03 RX ORDER — POTASSIUM CHLORIDE 20 MEQ
10 PACKET (EA) ORAL
Refills: 0 | Status: COMPLETED | OUTPATIENT
Start: 2024-02-03 | End: 2024-02-03

## 2024-02-03 RX ORDER — SODIUM CHLORIDE 9 MG/ML
500 INJECTION, SOLUTION INTRAVENOUS ONCE
Refills: 0 | Status: COMPLETED | OUTPATIENT
Start: 2024-02-03 | End: 2024-02-03

## 2024-02-03 RX ADMIN — Medication 100 MILLIEQUIVALENT(S): at 05:03

## 2024-02-03 RX ADMIN — Medication 100 MILLIEQUIVALENT(S): at 06:16

## 2024-02-03 RX ADMIN — PANTOPRAZOLE SODIUM 40 MILLIGRAM(S): 20 TABLET, DELAYED RELEASE ORAL at 05:46

## 2024-02-03 RX ADMIN — SODIUM CHLORIDE 500 MILLILITER(S): 9 INJECTION, SOLUTION INTRAVENOUS at 05:47

## 2024-02-03 RX ADMIN — Medication 100 MILLIEQUIVALENT(S): at 03:42

## 2024-02-03 RX ADMIN — ENOXAPARIN SODIUM 40 MILLIGRAM(S): 100 INJECTION SUBCUTANEOUS at 18:26

## 2024-02-03 RX ADMIN — Medication 40 MILLIEQUIVALENT(S): at 05:46

## 2024-02-03 RX ADMIN — Medication 1 PACKET(S): at 12:03

## 2024-02-03 RX ADMIN — PANTOPRAZOLE SODIUM 40 MILLIGRAM(S): 20 TABLET, DELAYED RELEASE ORAL at 18:26

## 2024-02-03 RX ADMIN — CHLORHEXIDINE GLUCONATE 1 APPLICATION(S): 213 SOLUTION TOPICAL at 12:03

## 2024-02-03 RX ADMIN — Medication 25 GRAM(S): at 03:42

## 2024-02-03 RX ADMIN — Medication 1 TABLET(S): at 12:03

## 2024-02-03 NOTE — PROGRESS NOTE ADULT - SUBJECTIVE AND OBJECTIVE BOX
INTERVAL HPI/OVERNIGHT EVENTS:    SPT and cline continues to function and making adequate urine for weight. Creatinine remains stable. Colostomy functioning but with high output, approximately 1500 cc. Remains afebrile and HR is improved.     MEDICATIONS  (STANDING):  chlorhexidine 2% Cloths 1 Application(s) Topical daily  enoxaparin Injectable 40 milliGRAM(s) SubCutaneous every 24 hours  multivitamin 1 Tablet(s) Oral daily  pantoprazole  Injectable 40 milliGRAM(s) IV Push two times a day  potassium chloride   Powder 40 milliEquivalent(s) Oral once  potassium chloride  10 mEq/100 mL IVPB 10 milliEquivalent(s) IV Intermittent every 1 hour  psyllium Powder 1 Packet(s) Oral daily    MEDICATIONS  (PRN):  acetaminophen   IVPB .. 1000 milliGRAM(s) IV Intermittent every 6 hours PRN Temp greater or equal to 38C (100.4F)      Drug Dosing Weight  Height (cm): 132.1 (14 Jan 2024 09:30)  Weight (kg): 70 (14 Jan 2024 07:48)  BMI (kg/m2): 40.1 (14 Jan 2024 09:30)  BSA (m2): 1.51 (14 Jan 2024 09:30)      PAST MEDICAL & SURGICAL HISTORY:  Spina bifida      Scoliosis          ICU Vital Signs Last 24 Hrs  T(C): 36.9 (03 Feb 2024 03:00), Max: 37.7 (02 Feb 2024 15:35)  T(F): 98.4 (03 Feb 2024 03:00), Max: 99.8 (02 Feb 2024 15:35)  HR: 78 (03 Feb 2024 04:00) (78 - 118)  BP: 100/48 (03 Feb 2024 04:00) (100/48 - 150/86)  BP(mean): 64 (03 Feb 2024 04:00) (64 - 98)  ABP: --  ABP(mean): --  RR: 15 (03 Feb 2024 04:00) (13 - 21)  SpO2: 100% (03 Feb 2024 04:00) (95% - 100%)    O2 Parameters below as of 03 Feb 2024 04:00  Patient On (Oxygen Delivery Method): room air                I&O's Detail    01 Feb 2024 07:01  -  02 Feb 2024 07:00  --------------------------------------------------------  IN:    Albumin 5%  - 250 mL: 250 mL    Albumin 5%  - 500 mL: 500 mL    IV PiggyBack: 30 mL    Oral Fluid: 100 mL  Total IN: 880 mL    OUT:    Colostomy (mL): 850 mL    Drain (mL): 25 mL    External Ventricular Device (mL): 293 mL    Indwelling Catheter - Suprapubic (mL): 528 mL    Indwelling Catheter - Urethral (mL): 341 mL  Total OUT: 2037 mL    Total NET: -1157 mL      02 Feb 2024 07:01  -  03 Feb 2024 04:51  --------------------------------------------------------  IN:    IV PiggyBack: 100 mL    IV PiggyBack: 50 mL    Oral Fluid: 900 mL  Total IN: 1050 mL    OUT:    Colostomy (mL): 1300 mL    External Ventricular Device (mL): 218 mL    Indwelling Catheter - Suprapubic (mL): 580 mL    Indwelling Catheter - Urethral (mL): 290 mL  Total OUT: 2388 mL    Total NET: -1338 mL      PHYSICAL EXAM:    General: NAD, well-appearing, resting comfortably in bed     Neurological:  GCS  15, CAM negative, B/L lower extremities with plegia, decreased sensation T8 down (baseline)     HEENT:  EOMI      Respiratory: Unlabored, no accessory muscle use     Cardiovascular: Regular rhythm, sinus tachycardia      Gastrointestinal: Softly distended, non-tender to palpation, colostomy pink with formed brown stool output.      : SPT in place draining clear yellow urine, cline in place with clear yellow urine      Extremities: shorted/contracted (baseline)      Vascular: Equal and normal pulses: 2+ peripheral pulses throughout     Skin: No rashes. Dressings changed.          LABS:  CBC Full  -  ( 03 Feb 2024 02:40 )  WBC Count : 8.38 K/uL  RBC Count : 3.47 M/uL  Hemoglobin : 9.3 g/dL  Hematocrit : 29.8 %  Platelet Count - Automated : 609 K/uL  Mean Cell Volume : 85.9 fl  Mean Cell Hemoglobin : 26.8 pg  Mean Cell Hemoglobin Concentration : 31.2 gm/dL  Auto Neutrophil # : 5.23 K/uL  Auto Lymphocyte # : 1.94 K/uL  Auto Monocyte # : 1.00 K/uL  Auto Eosinophil # : 0.10 K/uL  Auto Basophil # : 0.06 K/uL  Auto Neutrophil % : 62.4 %  Auto Lymphocyte % : 23.2 %  Auto Monocyte % : 11.9 %  Auto Eosinophil % : 1.2 %  Auto Basophil % : 0.7 %    02-03    139  |  103  |  15.8  ----------------------------<  94  3.3<L>   |  23.0  |  0.64    Ca    9.0      03 Feb 2024 02:40  Phos  3.2     02-03  Mg     1.8     02-03    TPro  7.3  /  Alb  3.6  /  TBili  0.3<L>  /  DBili  x   /  AST  21  /  ALT  23  /  AlkPhos  114  02-03      Urinalysis Basic - ( 03 Feb 2024 02:40 )    Color: x / Appearance: x / SG: x / pH: x  Gluc: 94 mg/dL / Ketone: x  / Bili: x / Urobili: x   Blood: x / Protein: x / Nitrite: x   Leuk Esterase: x / RBC: x / WBC x   Sq Epi: x / Non Sq Epi: x / Bacteria: x

## 2024-02-03 NOTE — PROGRESS NOTE ADULT - NS ATTEND AMEND GEN_ALL_CORE FT
Continues to recover appropriately.   Pain well controlled, HDS, no respiratory distress.   Tolerating diet, having BMs, acceptable labs and uop.   --MMPR  --Diet as tolerated   --Trend vitals, labs, strict I&Os   --DVT ppx

## 2024-02-03 NOTE — PROGRESS NOTE ADULT - ASSESSMENT
28 year old male with PMHx of spina bifida (wheelchair bound at baseline), scoliosis,  Shunt BIBEMS 1/14 with abdominal pain, n/v, and lethargy, found to have severe SBO, metabolic acidosis, and to be in septic and hypovolemic shock. Patient was brought to the OR 1/14 for ex lap and small bowel resection, c/b bladder perf with primary repair and partial colectomy and admitted to SICU post-op for further management. Developed mixed septic/hypovolemic shock, pneumoperitoneum, suspected UGIB.  shunt externalized. RTOR 1/22 for bladder repair, Kirkpatrick's and EGD. Course complicated by high ostomy output, now resolved, and abdominal wall abscess, s/p drain placement with IR.     PLAN:       NEURO:    - A&O x 3 at baseline     - Continue with pain control, Tylenol PRN      #Hx of  shunt     -  shunt externalized    - Open to drain, monitor output.     - Per NS, will require internalization of EVD likely with endoscopic third ventriculostomy. Currently plan to transfer to [facility] once stable for discharge as he has had previous care with Dr. Dorsey.     CV:       #Sinus tachycardia     - 1/27 S/p 1 L plasma-lyte and 250 mL albumin with transient improvement in HR    - Pt well appearing, afebrile, without leukocytosis and with stable Hg/Hct    - B/L LE venous duplex negative, CTA chest 1/30 negative for PE  -Likely related to intra-abdominal abcess  - Continue to monitor on telemetry    - May require BB within the next few days.     PULM:    #COVID, resolved    - Currently saturating well on RA    - Continue pulmonary toilet, IS, OOBTC      - Maintain SpO2 > 92%         GI/Nutrition:      #SBO s/p SBR, Kirkpatrick's and bladder repair    - Diet: Regular high fiber    - MVI daily     - Psyllium - 1 packet daily   - Severe protein calorie malnutrition- encourage high protein diet as well, added ensures to patient's tray     - GI – recommend PPI BID for 8 weeks, Rpt EGD in 12 weeks.   - Monitor colostomy output: Output has been high. Consider lomotil         #Abdominal Wall abscess  -S/p IR drainage on 1/31, will monitor output    /Renal:     #Hx of urethral stricture now with bladder injury s/p repair x 3    - Urology following – Keep Cline and SPT upon discharge. Recommend cystogram in 3 weeks to re-evaluate prior to outpatient follow up.    - SPT tube placement and cline 18F.     - Do not flush SPT or cline    - Patient must remain decompressed until bladder injury has healed    - Continue to monitor UOP and I&Os    - IVL   #GALEN, resolved           ENDO:    - TSH WNL   - Maintain euglycemia 120-180.       ID:     - Remains afebrile without leukocytosis    - Completed course of Meropenem     - Blood cx (1/19) NGTD    - CSF cx (1/21) NGTD       HEME:    - Monitor H/H    - Monitor Coags      - SCDs, lovenox 40 daily       SKIN:    - Repositioning for DTI prevention while in bed. Dressing changed        Lines: Peripheral IV's      Dispo: SICU due to externalized EVD drain. Will follow up transfer to [facility] once patient is stable for discharge except for EVD drain.

## 2024-02-03 NOTE — PROGRESS NOTE ADULT - SUBJECTIVE AND OBJECTIVE BOX
NSx PA Note  HD# 20  Shunt externalization day #13    HPI  28M w/ PMH of spina bifida and scoliosis, wheelchair bound at baseline, presenting with abdominal pain, n/v. Pt states that the pain began yesterday morning and has been persistent. He had a bowel movement this morning but prior to that his last bowel movement was one week prior. Denies passing gas. Denies fevers at home. Has been vomiting and nauseous. At home he was lethargic and EMS was called. At that time he was hypotensive and tachycardic and he was BIBEMS to [facility] ED for further workup. On arrival he was tachycardic to the 130s and hypotensive to 80s/50s. Labs notable for K 3.4, bicarb 13, Cr 2.04. Actively vomiting in ED. CT scan showed dilated bowel and stomach consistent with severe SBO with transition point in mid abdomen. Surgery consulted for management.     Interval/Overnight Events   s/p abdominal wall fluid collection drainage on 1/31 in IR. Patient seen in bed on rounds. Denies all ROS questions. No events overnight. Urine output remains good. Otherwise, awaiting medical optimization and clearance from gen surgery for transfer to [facility] for continuation of care.     MEDICATIONS  (STANDING):  chlorhexidine 2% Cloths 1 Application(s) Topical daily  diphenoxylate/atropine 1 Tablet(s) Oral every 6 hours  enoxaparin Injectable 40 milliGRAM(s) SubCutaneous every 24 hours  multivitamin 1 Tablet(s) Oral daily  pantoprazole  Injectable 40 milliGRAM(s) IV Push two times a day  psyllium Powder 1 Packet(s) Oral daily    MEDICATIONS  (PRN):  acetaminophen   IVPB .. 1000 milliGRAM(s) IV Intermittent every 6 hours PRN Temp greater or equal to 38C (100.4F)    I&O's Detail    02 Feb 2024 07:01  -  03 Feb 2024 07:00  --------------------------------------------------------  IN:    IV PiggyBack: 50 mL    IV PiggyBack: 300 mL    multiple electrolytes Injection Type 1 Bolus: 500 mL    Oral Fluid: 1100 mL  Total IN: 1950 mL    OUT:    Colostomy (mL): 1300 mL    External Ventricular Device (mL): 239 mL    Indwelling Catheter - Suprapubic (mL): 665 mL    Indwelling Catheter - Urethral (mL): 335 mL  Total OUT: 2539 mL    Total NET: -589 mL      03 Feb 2024 07:01  -  03 Feb 2024 08:37  --------------------------------------------------------  IN:    Oral Fluid: 100 mL  Total IN: 100 mL    OUT:    Colostomy (mL): 325 mL    External Ventricular Device (mL): 10 mL    Indwelling Catheter - Suprapubic (mL): 45 mL      Vital Signs Last 24 Hrs  T(C): 36.7 (03 Feb 2024 07:40), Max: 37.7 (02 Feb 2024 15:35)  T(F): 98 (03 Feb 2024 07:40), Max: 99.8 (02 Feb 2024 15:35)  HR: 98 (03 Feb 2024 07:00) (78 - 118)  BP: 95/59 (03 Feb 2024 07:00) (95/59 - 150/86)  BP(mean): 71 (03 Feb 2024 07:00) (64 - 98)  RR: 17 (03 Feb 2024 07:00) (14 - 21)  SpO2: 100% (03 Feb 2024 07:00) (95% - 100%)    Parameters below as of 03 Feb 2024 04:00  Patient On (Oxygen Delivery Method): room air      Indwelling Catheter - Urethral (mL): 55 mL  Total OUT: 435 mL    Total NET: -335 mL    externalized shunt- 239cc/24hrs, 108 overnight, clear to slightly straw colored     Neuro-          Shunt- externalized, CSF draining                           9.3    8.38  )-----------( 609      ( 03 Feb 2024 02:40 )             29.8   02-03    139  |  103  |  15.8  ----------------------------<  94  3.3<L>   |  23.0  |  0.64    Ca    9.0      03 Feb 2024 02:40  Phos  3.2     02-03  Mg     1.8     02-03    TPro  7.3  /  Alb  3.6  /  TBili  0.3<L>  /  DBili  x   /  AST  21  /  ALT  23  /  AlkPhos  114  02-03      Plan:  NS stable  Maintain EVD externalized, monitor and record hourly outputs   Q4 neuro checks   Pending transfer to [facility] when medically optimized and gen surgery clears for continuation of neurosurgical care   Keep normothermic and normotensive   Pain control as needed   Lovenox DVT ppx, SCDs in bed   Incentive judson   Bowel Regimen  Management per primary team  Dr. Harris to see.  NSx PA Note  HD# 20  Shunt externalization day #13    HPI  28M w/ PMH of spina bifida and scoliosis, wheelchair bound at baseline, presenting with abdominal pain, n/v. Pt states that the pain began yesterday morning and has been persistent. He had a bowel movement this morning but prior to that his last bowel movement was one week prior. Denies passing gas. Denies fevers at home. Has been vomiting and nauseous. At home he was lethargic and EMS was called. At that time he was hypotensive and tachycardic and he was BIBEMS to [facility] ED for further workup. On arrival he was tachycardic to the 130s and hypotensive to 80s/50s. Labs notable for K 3.4, bicarb 13, Cr 2.04. Actively vomiting in ED. CT scan showed dilated bowel and stomach consistent with severe SBO with transition point in mid abdomen. Surgery consulted for management.     Interval/Overnight Events   s/p abdominal wall fluid collection drainage on 1/31 in IR. Patient seen in bed on rounds. Denies headache and neck pain. No events overnight. Urine output remains good. Otherwise, awaiting medical optimization and clearance from gen surgery for transfer to [facility] for continuation of care.     MEDICATIONS  (STANDING):  chlorhexidine 2% Cloths 1 Application(s) Topical daily  diphenoxylate/atropine 1 Tablet(s) Oral every 6 hours  enoxaparin Injectable 40 milliGRAM(s) SubCutaneous every 24 hours  multivitamin 1 Tablet(s) Oral daily  pantoprazole  Injectable 40 milliGRAM(s) IV Push two times a day  psyllium Powder 1 Packet(s) Oral daily    MEDICATIONS  (PRN):  acetaminophen   IVPB .. 1000 milliGRAM(s) IV Intermittent every 6 hours PRN Temp greater or equal to 38C (100.4F)    I&O's Detail    02 Feb 2024 07:01  -  03 Feb 2024 07:00  --------------------------------------------------------  IN:    IV PiggyBack: 50 mL    IV PiggyBack: 300 mL    multiple electrolytes Injection Type 1 Bolus: 500 mL    Oral Fluid: 1100 mL  Total IN: 1950 mL    OUT:    Colostomy (mL): 1300 mL    External Ventricular Device (mL): 239 mL    Indwelling Catheter - Suprapubic (mL): 665 mL    Indwelling Catheter - Urethral (mL): 335 mL  Total OUT: 2539 mL    Total NET: -589 mL      03 Feb 2024 07:01  -  03 Feb 2024 08:37  --------------------------------------------------------  IN:    Oral Fluid: 100 mL  Total IN: 100 mL    OUT:    Colostomy (mL): 325 mL    External Ventricular Device (mL): 10 mL    Indwelling Catheter - Suprapubic (mL): 45 mL      Vital Signs Last 24 Hrs  T(C): 36.7 (03 Feb 2024 07:40), Max: 37.7 (02 Feb 2024 15:35)  T(F): 98 (03 Feb 2024 07:40), Max: 99.8 (02 Feb 2024 15:35)  HR: 98 (03 Feb 2024 07:00) (78 - 118)  BP: 95/59 (03 Feb 2024 07:00) (95/59 - 150/86)  BP(mean): 71 (03 Feb 2024 07:00) (64 - 98)  RR: 17 (03 Feb 2024 07:00) (14 - 21)  SpO2: 100% (03 Feb 2024 07:00) (95% - 100%)    Parameters below as of 03 Feb 2024 04:00  Patient On (Oxygen Delivery Method): room air      Indwelling Catheter - Urethral (mL): 55 mL  Total OUT: 435 mL    Total NET: -335 mL    externalized shunt- 239cc/24hrs, 108 overnight, clear to slightly straw colored     Neuro- Awake, alert, orientedx3  speech clear and appropriate  follows commands  pupils equal and reactive, EOMI, very slight beating horizontal nystagmus  tongue ML, face symmetric,   b/l UE 5/5  b/l LEs paretic 2/2 spina bifida  Shunt- externalized, CSF draining clear to slightly straw colored, no purulence noted                          9.3    8.38  )-----------( 609      ( 03 Feb 2024 02:40 )             29.8   02-03    139  |  103  |  15.8  ----------------------------<  94  3.3<L>   |  23.0  |  0.64    Ca    9.0      03 Feb 2024 02:40  Phos  3.2     02-03  Mg     1.8     02-03    TPro  7.3  /  Alb  3.6  /  TBili  0.3<L>  /  DBili  x   /  AST  21  /  ALT  23  /  AlkPhos  114  02-03      Plan:  NS stable  Maintain EVD externalized, monitor and record hourly outputs   Q4 neuro checks   Pending transfer to [facility] when medically optimized and gen surgery clears for continuation of neurosurgical care   Keep normothermic and normotensive   Pain control as needed   Lovenox DVT ppx, SCDs in bed   Incentive judson   Bowel Regimen  Management per primary team  Dr. Harris to see.

## 2024-02-04 LAB
ANION GAP SERPL CALC-SCNC: 12 MMOL/L — SIGNIFICANT CHANGE UP (ref 5–17)
BASOPHILS # BLD AUTO: 0.07 K/UL — SIGNIFICANT CHANGE UP (ref 0–0.2)
BASOPHILS NFR BLD AUTO: 1 % — SIGNIFICANT CHANGE UP (ref 0–2)
BUN SERPL-MCNC: 16.1 MG/DL — SIGNIFICANT CHANGE UP (ref 8–20)
CALCIUM SERPL-MCNC: 8.9 MG/DL — SIGNIFICANT CHANGE UP (ref 8.4–10.5)
CHLORIDE SERPL-SCNC: 103 MMOL/L — SIGNIFICANT CHANGE UP (ref 96–108)
CO2 SERPL-SCNC: 23 MMOL/L — SIGNIFICANT CHANGE UP (ref 22–29)
CREAT SERPL-MCNC: 0.64 MG/DL — SIGNIFICANT CHANGE UP (ref 0.5–1.3)
EGFR: 131 ML/MIN/1.73M2 — SIGNIFICANT CHANGE UP
EOSINOPHIL # BLD AUTO: 0.19 K/UL — SIGNIFICANT CHANGE UP (ref 0–0.5)
EOSINOPHIL NFR BLD AUTO: 2.7 % — SIGNIFICANT CHANGE UP (ref 0–6)
GLUCOSE SERPL-MCNC: 93 MG/DL — SIGNIFICANT CHANGE UP (ref 70–99)
HCT VFR BLD CALC: 30.3 % — LOW (ref 39–50)
HGB BLD-MCNC: 9.5 G/DL — LOW (ref 13–17)
IMM GRANULOCYTES NFR BLD AUTO: 0.8 % — SIGNIFICANT CHANGE UP (ref 0–0.9)
LYMPHOCYTES # BLD AUTO: 1.75 K/UL — SIGNIFICANT CHANGE UP (ref 1–3.3)
LYMPHOCYTES # BLD AUTO: 24.5 % — SIGNIFICANT CHANGE UP (ref 13–44)
MAGNESIUM SERPL-MCNC: 2.1 MG/DL — SIGNIFICANT CHANGE UP (ref 1.8–2.6)
MCHC RBC-ENTMCNC: 27.1 PG — SIGNIFICANT CHANGE UP (ref 27–34)
MCHC RBC-ENTMCNC: 31.4 GM/DL — LOW (ref 32–36)
MCV RBC AUTO: 86.6 FL — SIGNIFICANT CHANGE UP (ref 80–100)
MONOCYTES # BLD AUTO: 0.83 K/UL — SIGNIFICANT CHANGE UP (ref 0–0.9)
MONOCYTES NFR BLD AUTO: 11.6 % — SIGNIFICANT CHANGE UP (ref 2–14)
NEUTROPHILS # BLD AUTO: 4.23 K/UL — SIGNIFICANT CHANGE UP (ref 1.8–7.4)
NEUTROPHILS NFR BLD AUTO: 59.4 % — SIGNIFICANT CHANGE UP (ref 43–77)
PHOSPHATE SERPL-MCNC: 3.7 MG/DL — SIGNIFICANT CHANGE UP (ref 2.4–4.7)
PLATELET # BLD AUTO: 577 K/UL — HIGH (ref 150–400)
POTASSIUM SERPL-MCNC: 4 MMOL/L — SIGNIFICANT CHANGE UP (ref 3.5–5.3)
POTASSIUM SERPL-SCNC: 4 MMOL/L — SIGNIFICANT CHANGE UP (ref 3.5–5.3)
RBC # BLD: 3.5 M/UL — LOW (ref 4.2–5.8)
RBC # FLD: 14.9 % — HIGH (ref 10.3–14.5)
SODIUM SERPL-SCNC: 138 MMOL/L — SIGNIFICANT CHANGE UP (ref 135–145)
WBC # BLD: 7.13 K/UL — SIGNIFICANT CHANGE UP (ref 3.8–10.5)
WBC # FLD AUTO: 7.13 K/UL — SIGNIFICANT CHANGE UP (ref 3.8–10.5)

## 2024-02-04 PROCEDURE — 99024 POSTOP FOLLOW-UP VISIT: CPT

## 2024-02-04 PROCEDURE — 99232 SBSQ HOSP IP/OBS MODERATE 35: CPT

## 2024-02-04 RX ORDER — SODIUM CHLORIDE 9 MG/ML
1000 INJECTION, SOLUTION INTRAVENOUS ONCE
Refills: 0 | Status: COMPLETED | OUTPATIENT
Start: 2024-02-04 | End: 2024-02-04

## 2024-02-04 RX ADMIN — CHLORHEXIDINE GLUCONATE 1 APPLICATION(S): 213 SOLUTION TOPICAL at 13:28

## 2024-02-04 RX ADMIN — PANTOPRAZOLE SODIUM 40 MILLIGRAM(S): 20 TABLET, DELAYED RELEASE ORAL at 05:14

## 2024-02-04 RX ADMIN — ENOXAPARIN SODIUM 40 MILLIGRAM(S): 100 INJECTION SUBCUTANEOUS at 18:29

## 2024-02-04 RX ADMIN — Medication 1 PACKET(S): at 11:57

## 2024-02-04 RX ADMIN — Medication 1 TABLET(S): at 11:57

## 2024-02-04 RX ADMIN — PANTOPRAZOLE SODIUM 40 MILLIGRAM(S): 20 TABLET, DELAYED RELEASE ORAL at 18:29

## 2024-02-04 RX ADMIN — SODIUM CHLORIDE 1000 MILLILITER(S): 9 INJECTION, SOLUTION INTRAVENOUS at 04:45

## 2024-02-04 NOTE — PROGRESS NOTE ADULT - ASSESSMENT
28M PMH spina bifida (wheelchair bound), scoliosis, hydrocephalus s/p  shunt placement with multiple revisions followed by Dr. Dorsey at [facility], presented to [facility] with concern for SBO s/p ex lap, partial colectomy ccb bladder perforation with repair on 1/14, shunt externalization on 1/21, s/p abdominal wall fluid collection drained by IR on 1/31.    Plan:  - Q4 neuro checks   - No neurosurgical intervention at this time  - Maintain externalized shunt, record hourly outputs  - Do not clamp externalized  shunt  - Pain control as needed, avoid over sedation  - Patient pending transfer to [facility] when medically stable; patient follows with Dr. Dorsey  - Normotensive SBP goals  - Lovenox 40mg, SCDs for DVT ppx  - Further medical/supportive management per SICU team   - Will discuss with Dr. Harris

## 2024-02-04 NOTE — PROGRESS NOTE ADULT - SUBJECTIVE AND OBJECTIVE BOX
HPI:  28M w/ PMH of spina bifida and scoliosis, wheelchair bound at baseline, presenting with abdominal pain, n/v. Pt states that the pain began yesterday morning and has been persistent. He had a bowel movement this morning but prior to that his last bowel movement was one week prior. Denies passing gas. Denies fevers at home. Has been vomiting and nauseous. At home he was lethargic and EMS was called. At that time he was hypotensive and tachycardic and he was BIBEMS to [facility] ED for further workup. On arrival he was tachycardic to the 130s and hypotensive to 80s/50s. Labs notable for K 3.4, bicarb 13, Cr 2.04. Actively vomiting in ED. CT scan showed dilated bowel and stomach consistent with severe SBO with transition point in mid abdomen. Surgery consulted for management.     INTERVAL HPI/OVERNIGHT EVENTS:  28M s/p shunt externalization on 1/21, now s/p abdominal wall fluid collection drainage on 1/31 in IR. Patient seen and examined at bedside this AM on rounds. Patient lying comfortably in bed. Denies any headache, dizziness, N/V. No overnight events reported. When patient medically optimized, plan is to transfer to [facility] NSICU for internalization of shunt, possible endoscopic third ventriculostomy, patient follows with Dr. Dorsey.    Vital Signs Last 24 Hrs  T(C): 36.9 (01 Feb 2024 05:00), Max: 37.1 (31 Jan 2024 21:07)  T(F): 98.5 (01 Feb 2024 05:00), Max: 98.7 (31 Jan 2024 21:07)  HR: 108 (01 Feb 2024 09:00) (99 - 123)  BP: 95/60 (01 Feb 2024 09:00) (95/60 - 120/71)  BP(mean): 72 (01 Feb 2024 09:00) (66 - 85)  RR: 19 (01 Feb 2024 09:00) (11 - 21)  SpO2: 98% (01 Feb 2024 09:00) (91% - 100%)  Parameters below as of 01 Feb 2024 00:00  Patient On (Oxygen Delivery Method): room air    PHYSICAL EXAM:  GENERAL: NAD, well-groomed  HEAD:  Atraumatic, normocephalic  SHUNT: Externalized, clear-straw colored CSF draining  MENTAL STATUS: AAOx3; Awake; Opens eyes spontaneously; Appropriately conversant without aphasia; following simple commands  CRANIAL NERVES: PERRL. EOMI without nystagmus. Facial sensation intact V1-3 distribution b/l. Face symmetric, tongue midline. Hearing grossly intact. Speech clear. Head turning and shoulder shrug intact.   MOTOR: RUE 5/5, LUE 5/5, b/l LE 0/5 and contracted at baseline  SENSATION: Grossly intact to light touch all extremities on upper extremities  CHEST/LUNG: Non-labored breathing on room air  SKIN: Warm, dry    LABS:                        10.0   8.26  )-----------( 714      ( 01 Feb 2024 03:30 )             31.9     02-01    141  |  104  |  16.2  ----------------------------<  100<H>  3.6   |  24.0  |  0.71    Ca    8.8      01 Feb 2024 03:30  Phos  3.4     02-01  Mg     3.3     02-01    PT/INR - ( 31 Jan 2024 13:00 )   PT: 12.5 sec;   INR: 1.13 ratio      Urinalysis Basic - ( 01 Feb 2024 03:30 )  Color: x / Appearance: x / SG: x / pH: x  Gluc: 100 mg/dL / Ketone: x  / Bili: x / Urobili: x   Blood: x / Protein: x / Nitrite: x   Leuk Esterase: x / RBC: x / WBC x   Sq Epi: x / Non Sq Epi: x / Bacteria: x    01-31 @ 07:01 - 02-01 @ 07:00  --------------------------------------------------------  IN: 1200 mL / OUT: 1980 mL / NET: -780 mL    02-01 @ 07:01  - 02-01 @ 10:28  --------------------------------------------------------  IN: 100 mL / OUT: 127 mL / NET: -27 mL    RADIOLOGY & ADDITIONAL TESTS:    CT Abdomen and Pelvis w/ Oral Cont and w/ IV Cont (01.30.24 @ 17:58)  IMPRESSION:  No pulmonary embolism through the level of the lobar pulmonary arteries.   Evaluation of more distal segmental and subsegmental pulmonary arteries is limited by suboptimal contrast bolus timing.  Trace bibasilar pleural effusion and atelectasis of posterior basilar segment of right lower lobe.    Interval repositioning of 2  shunt with tips of catheter now terminating subcutaneously at the level of xiphoid process  Patient is status post Akash procedure and small bowel resection and a new ostomy in left lower quadrant abdominal abdomen.   No evidence of extravasation of the contrast from the bowel loops proximal to the ostomy site.  7.2 x 3.1 cm loculated fluid collection/abscess in the left anterolateral abdominal wall.  Postsurgical changes and thickening of urinary bladder wall.    CT Pelvis No Cont (01.21.24 @ 11:51)   IMPRESSION:  Intraperitoneal bladder perforation. Moderate to large amount of   extraluminal contrast surrounding small bowel. Moderate amount of   pneumoperitoneum. Extraluminal contrast and gas is also seen tracking   through the midline ventral abdominal wall incision.    Redemonstrated tract extending from the right bladder wall to the skin   in the right lower quadrant. Question a urostomy. Correlate with patient history.    Irregular cavity within the prostate measuring 3.1 x 1.8 cm that is   contiguous with the urethra. A few coarse calcifications within the   prostate are possibly calculi layering within the cavity.    Unchanged mild bilateral hydroureteronephrosis to the level of the   bladder. Unchanged urothelial thickening of the bilateral collecting   systems and ureters. Correlate for urinary tract infection.    Diffuse wall thickening of the imaged small bowel and sigmoid colon,   which could be reactive or representative of enterocolitis.    CT Head No Cont (01.16.24 @ 10:57)  IMPRESSION: Stable follow-up CT exam when compared with 1/14/2024.

## 2024-02-04 NOTE — PROGRESS NOTE ADULT - NS ATTEND AMEND GEN_ALL_CORE FT
Patient seen and examined at bedside.    N: AAOx3,  shunt externalized  P: satting well on RA  C: remains mildly tachycardic but improving, MAPs >65  Gi: tolerating diet, ostomy output went down today, will continue to monitor and replete appropriately  Gu: Sharp and suprapubic catheters in place, making adequate urine output  ID: no leukocytosis, afebrile, abdominal wound with active grainage from the inferior aspect of the wound, possible fat necrosis, no erythema, continue daily packing change, IR drain with minimal output,  Heme: H/H stable, SCds and LVNX for DVT ppx  Endo: continue glycemic monitoring and control

## 2024-02-04 NOTE — PROGRESS NOTE ADULT - ASSESSMENT
28 year old male with PMHx of spina bifida (wheelchair bound at baseline), scoliosis,  Shunt BIBEMS 1/14 with abdominal pain, n/v, and lethargy, found to have severe SBO, metabolic acidosis, and to be in septic and hypovolemic shock. Patient was brought to the OR 1/14 for ex lap and small bowel resection, c/b bladder perf with primary repair and partial colectomy and admitted to SICU post-op for further management. Developed mixed septic/hypovolemic shock, pneumoperitoneum, suspected UGIB.  shunt externalized. RTOR 1/22 for bladder repair, Kirkpatrick's and EGD. Course complicated by high ostomy output, now resolved, and abdominal wall abscess, s/p drain placement with IR.     PLAN:       NEURO:    - A&O x 3 at baseline     - Continue with pain control, Tylenol PRN      #Hx of  shunt     -  shunt externalized    - Open to drain, monitor output.     - Per NS, will require internalization of EVD likely with endoscopic third ventriculostomy. Currently plan to transfer to [facility] once stable for discharge as he has had previous care with Dr. Dorsey.     CV:       #Sinus tachycardia     - Significant improvement in Heart rate. Mostly in 90 to low 100s  - Continue volume resuscitation as needed  - Pt well appearing, afebrile, without leukocytosis and with stable Hg/Hct    - B/L LE venous duplex negative, CTA chest 1/30 negative for PE  - Continue to monitor on telemetry      PULM:    #COVID, resolved    - Currently saturating well on RA    - Continue pulmonary toilet, IS, OOBTC      - Maintain SpO2 > 92%         GI/Nutrition:      #SBO s/p SBR, Kirkpatrick's and bladder repair    - Diet: Regular high fiber    - MVI daily     - Psyllium - 1 packet daily   - Severe protein calorie malnutrition- encourage high protein diet as well, added ensures to patient's tray     - GI – recommend PPI BID for 8 weeks, Rpt EGD in 12 weeks.   - Monitor colostomy output: Output has been high by is improving        #Abdominal Wall abscess  -S/p IR drainage on 1/31  -Continue monitor output    /Renal:     #Hx of urethral stricture now with bladder injury s/p repair x 3    - Urology following – Keep Cline and SPT upon discharge. Recommend cystogram in 3 weeks to re-evaluate prior to outpatient follow up.    - SPT tube placement and cline 18F.     - Do not flush SPT or cline    - Patient must remain decompressed until bladder injury has healed    - Continue to monitor UOP and I&Os    - IVL   #GALEN, resolved           ENDO:    - TSH WNL   - Maintain euglycemia 120-180.       ID:     - Remains afebrile without leukocytosis    - Completed course of Meropenem     - Blood cx (1/19) NGTD    - CSF cx (1/21) NGTD       HEME:    - Monitor H/H    - Monitor Coags      - SCDs, lovenox 40 daily       SKIN:    - Repositioning for DTI prevention while in bed. Dressing changed        Lines: Peripheral IV's      Dispo: SICU due to externalized EVD drain. Will follow up transfer to [facility] once patient is stable for discharge except for EVD drain.

## 2024-02-04 NOTE — PROGRESS NOTE ADULT - NS ATTEND AMEND GEN_ALL_CORE FT
NSGY Attg:    see above    patient seen and examined    agree with above    plan of care determined for externalized shunt  continue CSF drainage  transfer to [facility] for additional shunt intervention per patient request

## 2024-02-04 NOTE — PROGRESS NOTE ADULT - SUBJECTIVE AND OBJECTIVE BOX
INTERVAL HPI/OVERNIGHT EVENTS:    Colostomy functioning with decreased output and did not require continued Lomotil. Pt with approximately 1000 cc of output. Remains afebrile and HR is improved.  SPT and cline continues to function and making adequate urine for weight. Creatinine remains stable. Pain is controlled.    MEDICATIONS  (STANDING):  chlorhexidine 2% Cloths 1 Application(s) Topical daily  diphenoxylate/atropine 1 Tablet(s) Oral every 6 hours  enoxaparin Injectable 40 milliGRAM(s) SubCutaneous every 24 hours  multivitamin 1 Tablet(s) Oral daily  pantoprazole  Injectable 40 milliGRAM(s) IV Push two times a day  psyllium Powder 1 Packet(s) Oral daily    MEDICATIONS  (PRN):  acetaminophen   IVPB .. 1000 milliGRAM(s) IV Intermittent every 6 hours PRN Temp greater or equal to 38C (100.4F)      Drug Dosing Weight  Height (cm): 132.1 (14 Jan 2024 09:30)  Weight (kg): 70 (14 Jan 2024 07:48)  BMI (kg/m2): 40.1 (14 Jan 2024 09:30)  BSA (m2): 1.51 (14 Jan 2024 09:30)      PAST MEDICAL & SURGICAL HISTORY:  Spina bifida      Scoliosis          ICU Vital Signs Last 24 Hrs  T(C): 37.5 (03 Feb 2024 20:25), Max: 37.5 (03 Feb 2024 20:25)  T(F): 99.5 (03 Feb 2024 20:25), Max: 99.5 (03 Feb 2024 20:25)  HR: 102 (03 Feb 2024 23:00) (78 - 113)  BP: 92/62 (03 Feb 2024 23:00) (89/66 - 114/80)  BP(mean): 71 (03 Feb 2024 23:00) (64 - 92)  ABP: --  ABP(mean): --  RR: 16 (03 Feb 2024 23:00) (14 - 22)  SpO2: 97% (03 Feb 2024 23:00) (92% - 100%)    O2 Parameters below as of 03 Feb 2024 22:00  Patient On (Oxygen Delivery Method): room air                I&O's Detail    02 Feb 2024 07:01  -  03 Feb 2024 07:00  --------------------------------------------------------  IN:    IV PiggyBack: 50 mL    IV PiggyBack: 300 mL    multiple electrolytes Injection Type 1 Bolus: 500 mL    Oral Fluid: 1100 mL  Total IN: 1950 mL    OUT:    Colostomy (mL): 1300 mL    External Ventricular Device (mL): 239 mL    Indwelling Catheter - Suprapubic (mL): 665 mL    Indwelling Catheter - Urethral (mL): 335 mL  Total OUT: 2539 mL    Total NET: -589 mL      03 Feb 2024 07:01  -  04 Feb 2024 00:35  --------------------------------------------------------  IN:    Oral Fluid: 600 mL  Total IN: 600 mL    OUT:    Colostomy (mL): 820 mL    Drain (mL): 0 mL    External Ventricular Device (mL): 151 mL    Indwelling Catheter - Suprapubic (mL): 570 mL    Indwelling Catheter - Urethral (mL): 490 mL  Total OUT: 2031 mL    Total NET: -1431 mL      PHYSICAL EXAM:    General: NAD, well-appearing, resting comfortably in bed     Neurological:  GCS  15, CAM negative, B/L lower extremities with plegia, decreased sensation T8 down (baseline)     HEENT:  EOMI      Respiratory: Unlabored, no accessory muscle use     Cardiovascular: Regular rhythm, sinus tachycardia      Gastrointestinal: Softly distended, non-tender to palpation, colostomy pink with formed brown stool output.      : SPT in place draining yellow urine with sediment/debris, Cline in place also with yellow urine with sediment.       Extremities: shorted/contracted (baseline)      Vascular: Equal and normal pulses: 2+ peripheral pulses throughout     Skin: No rashes. Dressings changed.          LABS:  CBC Full  -  ( 03 Feb 2024 02:40 )  WBC Count : 8.38 K/uL  RBC Count : 3.47 M/uL  Hemoglobin : 9.3 g/dL  Hematocrit : 29.8 %  Platelet Count - Automated : 609 K/uL  Mean Cell Volume : 85.9 fl  Mean Cell Hemoglobin : 26.8 pg  Mean Cell Hemoglobin Concentration : 31.2 gm/dL  Auto Neutrophil # : 5.23 K/uL  Auto Lymphocyte # : 1.94 K/uL  Auto Monocyte # : 1.00 K/uL  Auto Eosinophil # : 0.10 K/uL  Auto Basophil # : 0.06 K/uL  Auto Neutrophil % : 62.4 %  Auto Lymphocyte % : 23.2 %  Auto Monocyte % : 11.9 %  Auto Eosinophil % : 1.2 %  Auto Basophil % : 0.7 %    02-03    139  |  103  |  15.8  ----------------------------<  94  3.3<L>   |  23.0  |  0.64    Ca    9.0      03 Feb 2024 02:40  Phos  3.2     02-03  Mg     1.8     02-03    TPro  7.3  /  Alb  3.6  /  TBili  0.3<L>  /  DBili  x   /  AST  21  /  ALT  23  /  AlkPhos  114  02-03      Urinalysis Basic - ( 03 Feb 2024 02:40 )    Color: x / Appearance: x / SG: x / pH: x  Gluc: 94 mg/dL / Ketone: x  / Bili: x / Urobili: x   Blood: x / Protein: x / Nitrite: x   Leuk Esterase: x / RBC: x / WBC x   Sq Epi: x / Non Sq Epi: x / Bacteria: x

## 2024-02-05 LAB
ANION GAP SERPL CALC-SCNC: 15 MMOL/L — SIGNIFICANT CHANGE UP (ref 5–17)
BASOPHILS # BLD AUTO: 0.06 K/UL — SIGNIFICANT CHANGE UP (ref 0–0.2)
BASOPHILS NFR BLD AUTO: 0.8 % — SIGNIFICANT CHANGE UP (ref 0–2)
BUN SERPL-MCNC: 15.9 MG/DL — SIGNIFICANT CHANGE UP (ref 8–20)
CALCIUM SERPL-MCNC: 9 MG/DL — SIGNIFICANT CHANGE UP (ref 8.4–10.5)
CHLORIDE SERPL-SCNC: 104 MMOL/L — SIGNIFICANT CHANGE UP (ref 96–108)
CO2 SERPL-SCNC: 22 MMOL/L — SIGNIFICANT CHANGE UP (ref 22–29)
CREAT SERPL-MCNC: 0.69 MG/DL — SIGNIFICANT CHANGE UP (ref 0.5–1.3)
EGFR: 128 ML/MIN/1.73M2 — SIGNIFICANT CHANGE UP
EOSINOPHIL # BLD AUTO: 0.23 K/UL — SIGNIFICANT CHANGE UP (ref 0–0.5)
EOSINOPHIL NFR BLD AUTO: 3 % — SIGNIFICANT CHANGE UP (ref 0–6)
GLUCOSE SERPL-MCNC: 89 MG/DL — SIGNIFICANT CHANGE UP (ref 70–99)
HCT VFR BLD CALC: 29.8 % — LOW (ref 39–50)
HGB BLD-MCNC: 9.3 G/DL — LOW (ref 13–17)
IMM GRANULOCYTES NFR BLD AUTO: 0.6 % — SIGNIFICANT CHANGE UP (ref 0–0.9)
LYMPHOCYTES # BLD AUTO: 1.87 K/UL — SIGNIFICANT CHANGE UP (ref 1–3.3)
LYMPHOCYTES # BLD AUTO: 24.1 % — SIGNIFICANT CHANGE UP (ref 13–44)
MAGNESIUM SERPL-MCNC: 1.8 MG/DL — SIGNIFICANT CHANGE UP (ref 1.6–2.6)
MCHC RBC-ENTMCNC: 26.8 PG — LOW (ref 27–34)
MCHC RBC-ENTMCNC: 31.2 GM/DL — LOW (ref 32–36)
MCV RBC AUTO: 85.9 FL — SIGNIFICANT CHANGE UP (ref 80–100)
MONOCYTES # BLD AUTO: 0.83 K/UL — SIGNIFICANT CHANGE UP (ref 0–0.9)
MONOCYTES NFR BLD AUTO: 10.7 % — SIGNIFICANT CHANGE UP (ref 2–14)
NEUTROPHILS # BLD AUTO: 4.72 K/UL — SIGNIFICANT CHANGE UP (ref 1.8–7.4)
NEUTROPHILS NFR BLD AUTO: 60.8 % — SIGNIFICANT CHANGE UP (ref 43–77)
PHOSPHATE SERPL-MCNC: 3.3 MG/DL — SIGNIFICANT CHANGE UP (ref 2.4–4.7)
PLATELET # BLD AUTO: 574 K/UL — HIGH (ref 150–400)
POTASSIUM SERPL-MCNC: 3.8 MMOL/L — SIGNIFICANT CHANGE UP (ref 3.5–5.3)
POTASSIUM SERPL-SCNC: 3.8 MMOL/L — SIGNIFICANT CHANGE UP (ref 3.5–5.3)
RBC # BLD: 3.47 M/UL — LOW (ref 4.2–5.8)
RBC # FLD: 14.8 % — HIGH (ref 10.3–14.5)
SODIUM SERPL-SCNC: 140 MMOL/L — SIGNIFICANT CHANGE UP (ref 135–145)
WBC # BLD: 7.76 K/UL — SIGNIFICANT CHANGE UP (ref 3.8–10.5)
WBC # FLD AUTO: 7.76 K/UL — SIGNIFICANT CHANGE UP (ref 3.8–10.5)

## 2024-02-05 PROCEDURE — 99232 SBSQ HOSP IP/OBS MODERATE 35: CPT

## 2024-02-05 PROCEDURE — 99024 POSTOP FOLLOW-UP VISIT: CPT

## 2024-02-05 RX ORDER — SODIUM CHLORIDE 9 MG/ML
500 INJECTION, SOLUTION INTRAVENOUS ONCE
Refills: 0 | Status: COMPLETED | OUTPATIENT
Start: 2024-02-05 | End: 2024-02-05

## 2024-02-05 RX ORDER — MAGNESIUM SULFATE 500 MG/ML
2 VIAL (ML) INJECTION ONCE
Refills: 0 | Status: COMPLETED | OUTPATIENT
Start: 2024-02-05 | End: 2024-02-05

## 2024-02-05 RX ORDER — SODIUM CHLORIDE 9 MG/ML
1000 INJECTION, SOLUTION INTRAVENOUS ONCE
Refills: 0 | Status: COMPLETED | OUTPATIENT
Start: 2024-02-05 | End: 2024-02-05

## 2024-02-05 RX ORDER — POTASSIUM CHLORIDE 20 MEQ
20 PACKET (EA) ORAL ONCE
Refills: 0 | Status: COMPLETED | OUTPATIENT
Start: 2024-02-05 | End: 2024-02-05

## 2024-02-05 RX ADMIN — ENOXAPARIN SODIUM 40 MILLIGRAM(S): 100 INJECTION SUBCUTANEOUS at 17:47

## 2024-02-05 RX ADMIN — SODIUM CHLORIDE 3000 MILLILITER(S): 9 INJECTION, SOLUTION INTRAVENOUS at 18:42

## 2024-02-05 RX ADMIN — CHLORHEXIDINE GLUCONATE 1 APPLICATION(S): 213 SOLUTION TOPICAL at 11:02

## 2024-02-05 RX ADMIN — Medication 1 PACKET(S): at 11:01

## 2024-02-05 RX ADMIN — SODIUM CHLORIDE 1000 MILLILITER(S): 9 INJECTION, SOLUTION INTRAVENOUS at 06:34

## 2024-02-05 RX ADMIN — Medication 25 GRAM(S): at 05:15

## 2024-02-05 RX ADMIN — PANTOPRAZOLE SODIUM 40 MILLIGRAM(S): 20 TABLET, DELAYED RELEASE ORAL at 05:14

## 2024-02-05 RX ADMIN — PANTOPRAZOLE SODIUM 40 MILLIGRAM(S): 20 TABLET, DELAYED RELEASE ORAL at 17:47

## 2024-02-05 RX ADMIN — Medication 1 TABLET(S): at 11:01

## 2024-02-05 RX ADMIN — Medication 20 MILLIEQUIVALENT(S): at 05:14

## 2024-02-05 NOTE — PROGRESS NOTE ADULT - SUBJECTIVE AND OBJECTIVE BOX
INTERVAL HPI/OVERNIGHT EVENTS:    Patient continues to tolerate diet with no significant abdominal pain with improved HR. The inferior portion of his surgical incision with seropurulent drainage. Appears superficial and does not track beyond incision site. Wound packed and dressing applied.  Ostomy output remains approximately 1 L.     MEDICATIONS  (STANDING):  chlorhexidine 2% Cloths 1 Application(s) Topical daily  enoxaparin Injectable 40 milliGRAM(s) SubCutaneous every 24 hours  multivitamin 1 Tablet(s) Oral daily  pantoprazole  Injectable 40 milliGRAM(s) IV Push two times a day  psyllium Powder 1 Packet(s) Oral daily    MEDICATIONS  (PRN):  acetaminophen   IVPB .. 1000 milliGRAM(s) IV Intermittent every 6 hours PRN Temp greater or equal to 38C (100.4F)      Drug Dosing Weight  Height (cm): 132.1 (14 Jan 2024 09:30)  Weight (kg): 70 (14 Jan 2024 07:48)  BMI (kg/m2): 40.1 (14 Jan 2024 09:30)  BSA (m2): 1.51 (14 Jan 2024 09:30)      PAST MEDICAL & SURGICAL HISTORY:  Spina bifida      Scoliosis          ICU Vital Signs Last 24 Hrs  T(C): 37.2 (04 Feb 2024 23:50), Max: 37.4 (04 Feb 2024 16:35)  T(F): 98.9 (04 Feb 2024 23:50), Max: 99.4 (04 Feb 2024 16:35)  HR: 91 (05 Feb 2024 02:00) (89 - 116)  BP: 93/64 (05 Feb 2024 02:00) (85/59 - 119/76)  BP(mean): 74 (05 Feb 2024 02:00) (67 - 93)  ABP: --  ABP(mean): --  RR: 18 (05 Feb 2024 02:00) (12 - 22)  SpO2: 99% (05 Feb 2024 02:00) (97% - 100%)    O2 Parameters below as of 05 Feb 2024 00:00  Patient On (Oxygen Delivery Method): room air                I&O's Detail    03 Feb 2024 07:01  -  04 Feb 2024 07:00  --------------------------------------------------------  IN:    multiple electrolytes Injection Type 1 Bolus: 1000 mL    Oral Fluid: 600 mL  Total IN: 1600 mL    OUT:    Colostomy (mL): 1020 mL    Drain (mL): 0 mL    External Ventricular Device (mL): 210 mL    Indwelling Catheter - Suprapubic (mL): 860 mL    Indwelling Catheter - Urethral (mL): 595 mL  Total OUT: 2685 mL    Total NET: -1085 mL      04 Feb 2024 07:01  -  05 Feb 2024 02:42  --------------------------------------------------------  IN:    Oral Fluid: 550 mL  Total IN: 550 mL    OUT:    Colostomy (mL): 925 mL    Drain (mL): 0 mL    External Ventricular Device (mL): 160 mL    Indwelling Catheter - Suprapubic (mL): 655 mL    Indwelling Catheter - Urethral (mL): 565 mL  Total OUT: 2305 mL    Total NET: -1755 mL      PHYSICAL EXAM:    General: NAD, well-appearing, resting comfortably in bed     Neurological:  GCS  15, CAM negative, B/L lower extremities with plegia, decreased sensation T8 down (baseline)   EVD with clear fluid    HEENT:  EOMI      Respiratory: Unlabored, no accessory muscle use     Cardiovascular: Regular rhythm, sinus tachycardia      Gastrointestinal: Softly distended, non-tender to palpation, colostomy pink with formed brown stool output.      : SPT in place draining yellow urine with sediment/debris, Sharp in place also with yellow urine with sediment.       Extremities: shorted/contracted (baseline)      Vascular: Equal and normal pulses: 2+ peripheral pulses throughout     Skin: Midline wound with minimal seropurulent drainage. Packed with dressing placed    LABS:  CBC Full  -  ( 04 Feb 2024 03:30 )  WBC Count : 7.13 K/uL  RBC Count : 3.50 M/uL  Hemoglobin : 9.5 g/dL  Hematocrit : 30.3 %  Platelet Count - Automated : 577 K/uL  Mean Cell Volume : 86.6 fl  Mean Cell Hemoglobin : 27.1 pg  Mean Cell Hemoglobin Concentration : 31.4 gm/dL  Auto Neutrophil # : 4.23 K/uL  Auto Lymphocyte # : 1.75 K/uL  Auto Monocyte # : 0.83 K/uL  Auto Eosinophil # : 0.19 K/uL  Auto Basophil # : 0.07 K/uL  Auto Neutrophil % : 59.4 %  Auto Lymphocyte % : 24.5 %  Auto Monocyte % : 11.6 %  Auto Eosinophil % : 2.7 %  Auto Basophil % : 1.0 %    02-04    138  |  103  |  16.1  ----------------------------<  93  4.0   |  23.0  |  0.64    Ca    8.9      04 Feb 2024 03:30  Phos  3.7     02-04  Mg     2.1     02-04        Urinalysis Basic - ( 04 Feb 2024 03:30 )    Color: x / Appearance: x / SG: x / pH: x  Gluc: 93 mg/dL / Ketone: x  / Bili: x / Urobili: x   Blood: x / Protein: x / Nitrite: x   Leuk Esterase: x / RBC: x / WBC x   Sq Epi: x / Non Sq Epi: x / Bacteria: x

## 2024-02-05 NOTE — PROGRESS NOTE ADULT - ASSESSMENT
28 year old male with PMHx of spina bifida (wheelchair bound at baseline), scoliosis,  Shunt BIBEMS 1/14 with abdominal pain, n/v, and lethargy, found to have severe SBO, metabolic acidosis, and to be in septic and hypovolemic shock. Patient was brought to the OR 1/14 for ex lap and small bowel resection, c/b bladder perf with primary repair and partial colectomy and admitted to SICU post-op for further management. Developed mixed septic/hypovolemic shock, pneumoperitoneum, suspected UGIB.  shunt externalized. RTOR 1/22 for bladder repair, Kirkpatrick's and EGD. Course complicated by high ostomy output, now resolved, and abdominal wall abscess, s/p drain placement with IR.     PLAN:       NEURO:    - A&O x 3 at baseline     - Continue with pain control, Tylenol PRN      #Hx of  shunt     -  shunt externalized    - Open to drain, monitor output.     - Per NS, will require internalization of EVD likely with endoscopic third ventriculostomy. Currently plan to transfer to [facility] once stable for discharge as he has had previous care with Dr. Dorsey.     CV:       #Sinus tachycardia     - Significant improvement in Heart rate. Mostly in 90 to low 100s  - Continue volume resuscitation as needed  - Pt well appearing, afebrile, without leukocytosis and with stable Hg/Hct    - B/L LE venous duplex negative, CTA chest 1/30 negative for PE  - Continue to monitor on telemetry      PULM:    #COVID, resolved    - Currently saturating well on RA    - Continue pulmonary toilet, IS, OOBTC      - Maintain SpO2 > 92%         GI/Nutrition:    SBO s/p SBR, Kirkpatrick's and bladder repair    - Diet: Regular high fiber    - MVI daily     - Psyllium - 1 packet daily   - Severe protein calorie malnutrition- encourage high protein diet as well, added ensures to patient's tray     - GI – recommend PPI BID for 8 weeks, Rpt EGD in 12 weeks.   - Monitor colostomy output: Output has been high by is improving and now approx 1 L daily    - Abdominal Wall abscess S/p IR drainage on 1/31  - Monitor midline wound    /Renal:  Hx of urethral stricture now with bladder injury s/p repair x 3    - Urology following – Keep Cline and SPT upon discharge. Recommend cystogram in 3 weeks to re-evaluate prior to outpatient follow up.    - SPT tube placement and cline 18F.     - Do not flush SPT or cline    - Patient must remain decompressed until bladder injury has healed    - Continue to monitor UOP and I&Os    - IVL   - GALEN, resolved           ENDO:    - TSH WNL   - Maintain euglycemia 120-180.       ID:     - Remains afebrile without leukocytosis    - Completed course of Meropenem     - Blood cx (1/19) NGTD    - CSF cx (1/21) NGTD       HEME:    - Monitor H/H    - Monitor Coags      - SCDs, lovenox 40 daily       SKIN:    - Repositioning for DTI prevention while in bed. Dressing changed      - Continue local wound care to midline wound with daily packing change and monitor output    Lines: Peripheral IV's      Dispo: SICU due to externalized EVD drain. Will follow up transfer to [facility] once patient is stable for discharge except for EVD drain.

## 2024-02-05 NOTE — PROGRESS NOTE ADULT - NS ATTEND AMEND GEN_ALL_CORE FT
NSGY Attg:    see above    patient seen and examined    agree with above    plan of care determined for externalized shunt  continue CSF drainage  transfer to [facility] for additional shunt intervention per patient request

## 2024-02-05 NOTE — PROGRESS NOTE ADULT - SUBJECTIVE AND OBJECTIVE BOX
HPI:  SUBJECTIVE: 27yo M w/ hx of spina bfida and scoliosis, wheelchair bound at baseline, presenting with abdominal pain, n/v. Pt states that the pain began yesterday morning and has been persistent. He had a bowel movement this morning but prior to that his last bowel movement was one week prior. Denies passing gas. Denies fevers at home. Has been vomiting and nauseous. At home he was lethargic and EMS was called. At that time he was hypotensive and tachycardic and he was BIBEMS to [facility] ED for further workup. On arrival he was tachycardic to the 130s and hypotensive to 80s/50s. Labs notable for K 3.4, bicarb 13, Cr 2.04. Actively vomiting in ED. CT scan showed dilated bowel and stomach consistent with severe SBO with transition point in mid abdomen. Surgery consulted for management.       INTERVAL HPI/OVERNIGHT EVENTS:  28y Male s/p shunt externalization seen lying comfortably in bed. Tolerating diet. Passing gas/BM. Voiding. Sharp in place. Denies headache, weakness, numbness, n/v/d, fevers, chills, chest pain, SOB.     Vital Signs Last 24 Hrs  T(C): 36.8 (05 Feb 2024 08:55), Max: 37.4 (04 Feb 2024 16:35)  T(F): 98.3 (05 Feb 2024 08:55), Max: 99.4 (04 Feb 2024 16:35)  HR: 112 (05 Feb 2024 10:00) (87 - 116)  BP: 113/69 (05 Feb 2024 10:00) (89/61 - 119/76)  BP(mean): 81 (05 Feb 2024 10:00) (69 - 94)  RR: 15 (05 Feb 2024 10:00) (12 - 22)  SpO2: 100% (05 Feb 2024 10:00) (98% - 100%)    Parameters below as of 05 Feb 2024 08:00  Patient On (Oxygen Delivery Method): room air        PHYSICAL EXAM:  GENERAL: NAD, well-groomed, calm, comfortable  HEAD:  Atraumatic, normocephalic  SHUNT: externalized, clear/straw colored CSF draining  MIA COMA SCORE: 4E- 5V- 6M- = 15       E: 4= opens eyes spontaneously        V: 5= oriented       M: 6= follows commands   MENTAL STATUS: AAO x3; Awake/Comatose; Opens eyes spontaneously/to voice/to light touch/to noxious stimuli; Appropriately conversant without aphasia/Nonverbal; following simple commands/mimicking/not following commands  CRANIAL NERVES: Visual acuity normal for age, visual fields full to confrontation, PERRL. EOMI without nystagmus. Facial sensation intact V1-3 distribution b/l. Face symmetric w/ normal eye closure and smile, tongue midline. Hearing grossly intact. Speech clear. Head turning and shoulder shrug intact.   MOTOR: strength 5/5 b/l upper extremities, BLE 0/5 and contracted at baseline  SENSATION: grossly intact to light touch in upper extremities  CHEST/LUNG: equal bilateral chest rise with no accuse distress or accessory muscle usage    SKIN: Warm, dry; no rashes or lesions    LABS:                        9.3    7.76  )-----------( 574      ( 05 Feb 2024 03:00 )             29.8     02-05    140  |  104  |  15.9  ----------------------------<  89  3.8   |  22.0  |  0.69    Ca    9.0      05 Feb 2024 03:00  Phos  3.3     02-05  Mg     1.8     02-05        Urinalysis Basic - ( 05 Feb 2024 03:00 )    Color: x / Appearance: x / SG: x / pH: x  Gluc: 89 mg/dL / Ketone: x  / Bili: x / Urobili: x   Blood: x / Protein: x / Nitrite: x   Leuk Esterase: x / RBC: x / WBC x   Sq Epi: x / Non Sq Epi: x / Bacteria: x        02-04 @ 07:01 - 02-05 @ 07:00  --------------------------------------------------------  IN: 1600 mL / OUT: 2737 mL / NET: -1137 mL    02-05 @ 07:01  - 02-05 @ 10:50  --------------------------------------------------------  IN: 0 mL / OUT: 329 mL / NET: -329 mL        RADIOLOGY & ADDITIONAL TESTS:      CT Abdomen and Pelvis w/ Oral Cont and w/ IV Cont (01.30.24 @ 17:58)  IMPRESSION:  No pulmonary embolism through the level of the lobar pulmonary arteries.   Evaluation of more distal segmental and subsegmental pulmonary arteries is limited by suboptimal contrast bolus timing.  Trace bibasilar pleural effusion and atelectasis of posterior basilar segment of right lower lobe.    Interval repositioning of 2  shunt with tips of catheter now terminating subcutaneously at the level of xiphoid process  Patient is status post Akash procedure and small bowel resection and a new ostomy in left lower quadrant abdominal abdomen.   No evidence of extravasation of the contrast from the bowel loops proximal to the ostomy site.  7.2 x 3.1 cm loculated fluid collection/abscess in the left anterolateral abdominal wall.  Postsurgical changes and thickening of urinary bladder wall.    CT Pelvis No Cont (01.21.24 @ 11:51)   IMPRESSION:  Intraperitoneal bladder perforation. Moderate to large amount of   extraluminal contrast surrounding small bowel. Moderate amount of   pneumoperitoneum. Extraluminal contrast and gas is also seen tracking   through the midline ventral abdominal wall incision.    Redemonstrated tract extending from the right bladder wall to the skin   in the right lower quadrant. Question a urostomy. Correlate with patient history.    Irregular cavity within the prostate measuring 3.1 x 1.8 cm that is   contiguous with the urethra. A few coarse calcifications within the   prostate are possibly calculi layering within the cavity.    Unchanged mild bilateral hydroureteronephrosis to the level of the   bladder. Unchanged urothelial thickening of the bilateral collecting   systems and ureters. Correlate for urinary tract infection.    Diffuse wall thickening of the imaged small bowel and sigmoid colon,   which could be reactive or representative of enterocolitis.    CT Head No Cont (01.16.24 @ 10:57)  IMPRESSION: Stable follow-up CT exam when compared with 1/14/2024.

## 2024-02-05 NOTE — PROGRESS NOTE ADULT - ASSESSMENT
28M PMH spina bifida (wheelchair bound), scoliosis, hydrocephalus s/p  shunt placement with multiple revisions followed by Dr. Dorsey at [facility], presented to [facility] with concern for SBO s/p ex lap, partial colectomy ccb bladder perforation with repair on 1/14, shunt externalization on 1/21, s/p abdominal wall fluid collection drained by IR on 1/31.    Plan:  - Q4 neuro checks   - No neurosurgical intervention at this time  - Maintain externalized shunt, record hourly outputs  - Do not clamp externalized  shunt  - Pain control as needed, avoid over sedation  - Patient pending transfer to [facility] when medically stable; patient follows with Dr. Dorsey  - Normotensive SBP goals  - Lovenox 40mg, SCDs for DVT ppx  - Further medical/supportive management per SICU team   - Will discuss with Dr. Harris

## 2024-02-05 NOTE — PROGRESS NOTE ADULT - NS ATTEND AMEND GEN_ALL_CORE FT
Patient seen and examined on AM SICU rounds  Doing well  No new complaints  Mildly tachycardic last 24 hours (pulse )  SBP=  last 24 hours  98%+ oxygen saturations on RA  Hematrocrit stable  WBC=WNL x 2 weeks, now off all antibiotics.  No grow from VIR abdominal drainage on 1/30/24  Lovenox for DVT prophylaxis  Tolerating diet    - Requires ICU level of care secondary to externalized  shunt  - If continues to do well, will likely transfer to [facility] neurosurgery service for internalization of shunt

## 2024-02-06 LAB
ANION GAP SERPL CALC-SCNC: 11 MMOL/L — SIGNIFICANT CHANGE UP (ref 5–17)
BASOPHILS # BLD AUTO: 0.06 K/UL — SIGNIFICANT CHANGE UP (ref 0–0.2)
BASOPHILS NFR BLD AUTO: 0.7 % — SIGNIFICANT CHANGE UP (ref 0–2)
BUN SERPL-MCNC: 12.1 MG/DL — SIGNIFICANT CHANGE UP (ref 8–20)
CALCIUM SERPL-MCNC: 8.7 MG/DL — SIGNIFICANT CHANGE UP (ref 8.4–10.5)
CHLORIDE SERPL-SCNC: 101 MMOL/L — SIGNIFICANT CHANGE UP (ref 96–108)
CO2 SERPL-SCNC: 23 MMOL/L — SIGNIFICANT CHANGE UP (ref 22–29)
CREAT FLD-MCNC: 0.76 MG/DL — SIGNIFICANT CHANGE UP
CREAT SERPL-MCNC: 0.71 MG/DL — SIGNIFICANT CHANGE UP (ref 0.5–1.3)
CULTURE RESULTS: SIGNIFICANT CHANGE UP
EGFR: 127 ML/MIN/1.73M2 — SIGNIFICANT CHANGE UP
EOSINOPHIL # BLD AUTO: 0.32 K/UL — SIGNIFICANT CHANGE UP (ref 0–0.5)
EOSINOPHIL NFR BLD AUTO: 3.8 % — SIGNIFICANT CHANGE UP (ref 0–6)
GLUCOSE SERPL-MCNC: 100 MG/DL — HIGH (ref 70–99)
HCT VFR BLD CALC: 28.8 % — LOW (ref 39–50)
HGB BLD-MCNC: 9.5 G/DL — LOW (ref 13–17)
IMM GRANULOCYTES NFR BLD AUTO: 0.5 % — SIGNIFICANT CHANGE UP (ref 0–0.9)
LYMPHOCYTES # BLD AUTO: 1.65 K/UL — SIGNIFICANT CHANGE UP (ref 1–3.3)
LYMPHOCYTES # BLD AUTO: 19.5 % — SIGNIFICANT CHANGE UP (ref 13–44)
MAGNESIUM SERPL-MCNC: 1.9 MG/DL — SIGNIFICANT CHANGE UP (ref 1.8–2.6)
MCHC RBC-ENTMCNC: 28.4 PG — SIGNIFICANT CHANGE UP (ref 27–34)
MCHC RBC-ENTMCNC: 33 GM/DL — SIGNIFICANT CHANGE UP (ref 32–36)
MCV RBC AUTO: 86 FL — SIGNIFICANT CHANGE UP (ref 80–100)
MONOCYTES # BLD AUTO: 0.75 K/UL — SIGNIFICANT CHANGE UP (ref 0–0.9)
MONOCYTES NFR BLD AUTO: 8.9 % — SIGNIFICANT CHANGE UP (ref 2–14)
NEUTROPHILS # BLD AUTO: 5.63 K/UL — SIGNIFICANT CHANGE UP (ref 1.8–7.4)
NEUTROPHILS NFR BLD AUTO: 66.6 % — SIGNIFICANT CHANGE UP (ref 43–77)
PHOSPHATE SERPL-MCNC: 3.2 MG/DL — SIGNIFICANT CHANGE UP (ref 2.4–4.7)
PLATELET # BLD AUTO: 508 K/UL — HIGH (ref 150–400)
POTASSIUM SERPL-MCNC: 3.7 MMOL/L — SIGNIFICANT CHANGE UP (ref 3.5–5.3)
POTASSIUM SERPL-SCNC: 3.7 MMOL/L — SIGNIFICANT CHANGE UP (ref 3.5–5.3)
RBC # BLD: 3.35 M/UL — LOW (ref 4.2–5.8)
RBC # FLD: 14.7 % — HIGH (ref 10.3–14.5)
SODIUM SERPL-SCNC: 135 MMOL/L — SIGNIFICANT CHANGE UP (ref 135–145)
SPECIMEN SOURCE: SIGNIFICANT CHANGE UP
WBC # BLD: 8.45 K/UL — SIGNIFICANT CHANGE UP (ref 3.8–10.5)
WBC # FLD AUTO: 8.45 K/UL — SIGNIFICANT CHANGE UP (ref 3.8–10.5)

## 2024-02-06 PROCEDURE — 99024 POSTOP FOLLOW-UP VISIT: CPT

## 2024-02-06 PROCEDURE — 99232 SBSQ HOSP IP/OBS MODERATE 35: CPT

## 2024-02-06 PROCEDURE — 74177 CT ABD & PELVIS W/CONTRAST: CPT | Mod: 26

## 2024-02-06 RX ORDER — SODIUM CHLORIDE 9 MG/ML
1000 INJECTION, SOLUTION INTRAVENOUS ONCE
Refills: 0 | Status: COMPLETED | OUTPATIENT
Start: 2024-02-06 | End: 2024-02-06

## 2024-02-06 RX ORDER — POTASSIUM CHLORIDE 20 MEQ
40 PACKET (EA) ORAL ONCE
Refills: 0 | Status: DISCONTINUED | OUTPATIENT
Start: 2024-02-06 | End: 2024-02-06

## 2024-02-06 RX ORDER — MAGNESIUM SULFATE 500 MG/ML
2 VIAL (ML) INJECTION ONCE
Refills: 0 | Status: COMPLETED | OUTPATIENT
Start: 2024-02-06 | End: 2024-02-06

## 2024-02-06 RX ORDER — POTASSIUM CHLORIDE 20 MEQ
40 PACKET (EA) ORAL ONCE
Refills: 0 | Status: COMPLETED | OUTPATIENT
Start: 2024-02-06 | End: 2024-02-06

## 2024-02-06 RX ADMIN — SODIUM CHLORIDE 1000 MILLILITER(S): 9 INJECTION, SOLUTION INTRAVENOUS at 10:56

## 2024-02-06 RX ADMIN — Medication 1 TABLET(S): at 11:33

## 2024-02-06 RX ADMIN — Medication 1 PACKET(S): at 11:33

## 2024-02-06 RX ADMIN — Medication 40 MILLIEQUIVALENT(S): at 05:07

## 2024-02-06 RX ADMIN — CHLORHEXIDINE GLUCONATE 1 APPLICATION(S): 213 SOLUTION TOPICAL at 12:34

## 2024-02-06 RX ADMIN — ENOXAPARIN SODIUM 40 MILLIGRAM(S): 100 INJECTION SUBCUTANEOUS at 17:45

## 2024-02-06 RX ADMIN — PANTOPRAZOLE SODIUM 40 MILLIGRAM(S): 20 TABLET, DELAYED RELEASE ORAL at 17:45

## 2024-02-06 RX ADMIN — Medication 25 GRAM(S): at 07:45

## 2024-02-06 RX ADMIN — Medication 25 GRAM(S): at 05:08

## 2024-02-06 RX ADMIN — PANTOPRAZOLE SODIUM 40 MILLIGRAM(S): 20 TABLET, DELAYED RELEASE ORAL at 05:07

## 2024-02-06 NOTE — PROGRESS NOTE ADULT - SUBJECTIVE AND OBJECTIVE BOX
HISTORY  28y Male admitted with SBO, now s/p SBR, sigmoid resection, bladder injury, complicated by RTOR further repair of bladder, Kirkpatrick’s, and exteriorization of  shunt.    24 HOUR EVENTS: Sinus tachycardia noted, received 500mL bolus during the day, Ostomy OP better overnight no further fluid boluses given, HR improved to 90's this am    SUBJECTIVE/ROS:  [x ] A ten-point review of systems was otherwise negative except as noted.  [ ] Due to altered mental status/intubation, subjective information were not able to be obtained from the patient. History was obtained, to the extent possible, from review of the chart and collateral sources of information.      NEURO  RASS:  0   GCS:  15   CAM ICU: neg  Exam:  LAM, non focal, evd in place.  Meds: acetaminophen   IVPB .. 1000 milliGRAM(s) IV Intermittent every 6 hours PRN Temp greater or equal to 38C (100.4F)    [x] Adequacy of sedation and pain control has been assessed and adjusted      RESPIRATORY  RR: 10 (02-06-24 @ 04:00) (10 - 22)  SpO2: 99% (02-06-24 @ 04:00) (94% - 100%)  Wt(kg): --  Exam: unlabored, clear to auscultation bilaterally  Mechanical Ventilation:     [ ] Extubation Readiness Assessed  Meds:       CARDIOVASCULAR  HR: 90 (02-06-24 @ 04:00) (87 - 122)  BP: 92/58 (02-06-24 @ 04:00) (89/61 - 123/105)  BP(mean): 69 (02-06-24 @ 04:00) (69 - 112)  ABP: --  ABP(mean): --  Wt(kg): --  CVP(cm H2O): --      Exam: s1s2  Cardiac Rhythm: sinus  Perfusion     [x ]Adequate   [ ]Inadequate  Mentation   [x ]Normal       [ ]Reduced  Extremities  [ x]Warm         [ ]Cool  Volume Status [ ]Hypervolemic [x ]Euvolemic [ ]Hypovolemic  Meds:       GI/NUTRITION  Exam: midline wound with serous drainage, ostomy functioning  Diet: regular diet, high fiber  Meds: pantoprazole  Injectable 40 milliGRAM(s) IV Push two times a day  psyllium Powder 1 Packet(s) Oral daily      GENITOURINARY  I&O's Detail    02-04 @ 07:01  -  02-05 @ 07:00  --------------------------------------------------------  IN:    IV PiggyBack: 50 mL    multiple electrolytes Injection Type 1 Bolus: 1000 mL    Oral Fluid: 550 mL  Total IN: 1600 mL    OUT:    Colostomy (mL): 1000 mL    Drain (mL): 0 mL    External Ventricular Device (mL): 232 mL    Indwelling Catheter - Suprapubic (mL): 760 mL    Indwelling Catheter - Urethral (mL): 745 mL  Total OUT: 2737 mL    Total NET: -1137 mL      02-05 @ 07:01 - 02-06 @ 05:30  --------------------------------------------------------  IN:    Oral Fluid: 1700 mL  Total IN: 1700 mL    OUT:    Colostomy (mL): 650 mL    Drain (mL): 3 mL    External Ventricular Device (mL): 235 mL    Indwelling Catheter - Suprapubic (mL): 880 mL    Indwelling Catheter - Urethral (mL): 510 mL  Total OUT: 2278 mL    Total NET: -578 mL          02-06    135  |  101  |  12.1  ----------------------------<  100<H>  3.7   |  23.0  |  0.71    Ca    8.7      06 Feb 2024 03:25  Phos  3.2     02-06  Mg     1.9     02-06      [ ] Sharp catheter, indication: N/A  Meds: multivitamin 1 Tablet(s) Oral daily        HEMATOLOGIC  Meds: enoxaparin Injectable 40 milliGRAM(s) SubCutaneous every 24 hours    [x] VTE Prophylaxis                        9.5    8.45  )-----------( 508      ( 06 Feb 2024 03:25 )             28.8       Transfusion     [ ] PRBC   [ ] Platelets   [ ] FFP   [ ] Cryoprecipitate      INFECTIOUS DISEASES  T(C): 37.4 (02-06-24 @ 04:00), Max: 38.2 (02-05-24 @ 21:07)  Wt(kg): --  WBC Count: 8.45 K/uL (02-06 @ 03:25)    Recent Cultures:  Specimen Source: .Abscess LUQ collection, 01-31 @ 16:00; Results   No growth to date.; Gram Stain:   Moderate polymorphonuclear leukocytes seen per low power field  No organisms seen per oil power field; Organism: --    Meds:       ENDOCRINE  Capillary Blood Glucose    Meds:       ACCESS DEVICES:  [ x] Peripheral IV  [ ] Central Venous Line	[ ] R	[ ] L	[ ] IJ	[ ] Fem	[ ] SC	Placed:   [ ] Arterial Line		[ ] R	[ ] L	[ ] Fem	[ ] Rad	[ ] Ax	Placed:   [ ] PICC:					[ ] Mediport  [ ] Urinary Catheter, Date Placed:   [ ] Necessity of urinary, arterial, and venous catheters discussed    OTHER MEDICATIONS:  chlorhexidine 2% Cloths 1 Application(s) Topical daily

## 2024-02-06 NOTE — PROGRESS NOTE ADULT - SUBJECTIVE AND OBJECTIVE BOX
HPI:  SUBJECTIVE: 27yo M w/ hx of spina bfida and scoliosis, wheelchair bound at baseline, presenting with abdominal pain, n/v. Pt states that the pain began yesterday morning and has been persistent. He had a bowel movement this morning but prior to that his last bowel movement was one week prior. Denies passing gas. Denies fevers at home. Has been vomiting and nauseous. At home he was lethargic and EMS was called. At that time he was hypotensive and tachycardic and he was BIBEMS to [facility] ED for further workup. On arrival he was tachycardic to the 130s and hypotensive to 80s/50s. Labs notable for K 3.4, bicarb 13, Cr 2.04. Actively vomiting in ED. CT scan showed dilated bowel and stomach consistent with severe SBO with transition point in mid abdomen. Surgery consulted for management.     2/6  as above - no issues overnight, denies h/a N/V/D, on Lovenox, drain reservior changed yesterday    MEDICATIONS  (STANDING):  chlorhexidine 2% Cloths 1 Application(s) Topical daily  enoxaparin Injectable 40 milliGRAM(s) SubCutaneous every 24 hours  multivitamin 1 Tablet(s) Oral daily  pantoprazole  Injectable 40 milliGRAM(s) IV Push two times a day  psyllium Powder 1 Packet(s) Oral daily    MEDICATIONS  (PRN):  acetaminophen   IVPB .. 1000 milliGRAM(s) IV Intermittent every 6 hours PRN Temp greater or equal to 38C (100.4F)      SARS-CoV-2: NotDetec (20 Jan 2024 02:15)                          9.5    8.45  )-----------( 508      ( 06 Feb 2024 03:25 )             28.8     02-06    135  |  101  |  12.1  ----------------------------<  100<H>  3.7   |  23.0  |  0.71    Ca    8.7      06 Feb 2024 03:25  Phos  3.2     02-06  Mg     1.9     02-06          Vital Signs Last 24 Hrs  T(C): 36.9 (06 Feb 2024 11:36), Max: 38.2 (05 Feb 2024 21:07)  T(F): 98.5 (06 Feb 2024 11:36), Max: 100.8 (05 Feb 2024 21:07)  HR: 113 (06 Feb 2024 12:00) (89 - 122)  BP: 100/78 (06 Feb 2024 12:00) (89/62 - 123/105)  BP(mean): 85 (06 Feb 2024 12:00) (69 - 112)  RR: 21 (06 Feb 2024 12:00) (10 - 22)  SpO2: 100% (06 Feb 2024 12:00) (94% - 100%)    Parameters below as of 06 Feb 2024 12:00  Patient On (Oxygen Delivery Method): room air      I&O's Detail    05 Feb 2024 07:01  -  06 Feb 2024 07:00  --------------------------------------------------------  IN:    IV PiggyBack: 25 mL    Oral Fluid: 1700 mL  Total IN: 1725 mL    OUT:    Colostomy (mL): 650 mL    Drain (mL): 3 mL    External Ventricular Device (mL): 265 mL    Indwelling Catheter - Suprapubic (mL): 935 mL    Indwelling Catheter - Urethral (mL): 525 mL  Total OUT: 2378 mL    Total NET: -653 mL      06 Feb 2024 07:01  -  06 Feb 2024 12:44  --------------------------------------------------------  IN:    IV PiggyBack: 25 mL    multiple electrolytes Injection Type 1 Bolus: 1000 mL  Total IN: 1025 mL    OUT:    Colostomy (mL): 250 mL    External Ventricular Device (mL): 47 mL    Indwelling Catheter - Suprapubic (mL): 360 mL    Indwelling Catheter - Urethral (mL): 55 mL  Total OUT: 712 mL    Total NET: 313 mL          PHYSICAL EXAM:  A&Ox3  drain output 149 overnight / 265 24h

## 2024-02-06 NOTE — PROGRESS NOTE ADULT - NS ATTEND AMEND GEN_ALL_CORE FT
Patient seen and examined on AM SICU rounds  Doing well  No new complaints  Pain seems with good control  Oxygenating well  Hemodynamically stable  WBC=8.45, off all antibiotics  Lovenox for DVT prophylaxis  Protonix for treatment of UGI ulcer    - Plan is for transfer to [facility] neurosurgery for  shunt management once all general surgical issues resolved.   At this time, only remaining gen surg issue would be intraabdominal drain.  Creatinine of drainage is low, so doubt ongoing bladder leak.   Will repeat CT-abd/pelvis to assure resolution of fluid collection, then may remove drain.

## 2024-02-06 NOTE — PROGRESS NOTE ADULT - ASSESSMENT
Impression s/p shunt externalization 1/21    Plan maintain shunt externalization, [facility] transfer pending, q4h neuro checks

## 2024-02-06 NOTE — PROGRESS NOTE ADULT - NS ATTEND AMEND GEN_ALL_CORE FT
NSGY Attg:    see above    patient seen and examined    agree with exam as above     plan of care determined for externalized shunt  supportive care per primary team  awaiting transfer to [facility] per patient preference

## 2024-02-07 LAB
ANION GAP SERPL CALC-SCNC: 14 MMOL/L — SIGNIFICANT CHANGE UP (ref 5–17)
ANION GAP SERPL CALC-SCNC: 14 MMOL/L — SIGNIFICANT CHANGE UP (ref 5–17)
BUN SERPL-MCNC: 13.7 MG/DL — SIGNIFICANT CHANGE UP (ref 8–20)
BUN SERPL-MCNC: 13.9 MG/DL — SIGNIFICANT CHANGE UP (ref 8–20)
CALCIUM SERPL-MCNC: 8.6 MG/DL — SIGNIFICANT CHANGE UP (ref 8.4–10.5)
CALCIUM SERPL-MCNC: 9 MG/DL — SIGNIFICANT CHANGE UP (ref 8.4–10.5)
CHLORIDE SERPL-SCNC: 100 MMOL/L — SIGNIFICANT CHANGE UP (ref 96–108)
CHLORIDE SERPL-SCNC: 100 MMOL/L — SIGNIFICANT CHANGE UP (ref 96–108)
CO2 SERPL-SCNC: 20 MMOL/L — LOW (ref 22–29)
CO2 SERPL-SCNC: 23 MMOL/L — SIGNIFICANT CHANGE UP (ref 22–29)
CREAT SERPL-MCNC: 0.75 MG/DL — SIGNIFICANT CHANGE UP (ref 0.5–1.3)
CREAT SERPL-MCNC: 0.76 MG/DL — SIGNIFICANT CHANGE UP (ref 0.5–1.3)
EGFR: 124 ML/MIN/1.73M2 — SIGNIFICANT CHANGE UP
EGFR: 124 ML/MIN/1.73M2 — SIGNIFICANT CHANGE UP
GLUCOSE SERPL-MCNC: 102 MG/DL — HIGH (ref 70–99)
GLUCOSE SERPL-MCNC: 92 MG/DL — SIGNIFICANT CHANGE UP (ref 70–99)
HCT VFR BLD CALC: 32 % — LOW (ref 39–50)
HGB BLD-MCNC: 10.5 G/DL — LOW (ref 13–17)
MAGNESIUM SERPL-MCNC: 2 MG/DL — SIGNIFICANT CHANGE UP (ref 1.6–2.6)
MAGNESIUM SERPL-MCNC: 2.1 MG/DL — SIGNIFICANT CHANGE UP (ref 1.6–2.6)
MCHC RBC-ENTMCNC: 27.8 PG — SIGNIFICANT CHANGE UP (ref 27–34)
MCHC RBC-ENTMCNC: 32.8 GM/DL — SIGNIFICANT CHANGE UP (ref 32–36)
MCV RBC AUTO: 84.7 FL — SIGNIFICANT CHANGE UP (ref 80–100)
PHOSPHATE SERPL-MCNC: 3.5 MG/DL — SIGNIFICANT CHANGE UP (ref 2.4–4.7)
PHOSPHATE SERPL-MCNC: 3.9 MG/DL — SIGNIFICANT CHANGE UP (ref 2.4–4.7)
PLATELET # BLD AUTO: 486 K/UL — HIGH (ref 150–400)
POTASSIUM SERPL-MCNC: 4 MMOL/L — SIGNIFICANT CHANGE UP (ref 3.5–5.3)
POTASSIUM SERPL-MCNC: 5.6 MMOL/L — HIGH (ref 3.5–5.3)
POTASSIUM SERPL-SCNC: 4 MMOL/L — SIGNIFICANT CHANGE UP (ref 3.5–5.3)
POTASSIUM SERPL-SCNC: 5.6 MMOL/L — HIGH (ref 3.5–5.3)
RBC # BLD: 3.78 M/UL — LOW (ref 4.2–5.8)
RBC # FLD: 14.6 % — HIGH (ref 10.3–14.5)
SODIUM SERPL-SCNC: 134 MMOL/L — LOW (ref 135–145)
SODIUM SERPL-SCNC: 137 MMOL/L — SIGNIFICANT CHANGE UP (ref 135–145)
WBC # BLD: 6.2 K/UL — SIGNIFICANT CHANGE UP (ref 3.8–10.5)
WBC # FLD AUTO: 6.2 K/UL — SIGNIFICANT CHANGE UP (ref 3.8–10.5)

## 2024-02-07 PROCEDURE — 99232 SBSQ HOSP IP/OBS MODERATE 35: CPT

## 2024-02-07 PROCEDURE — 99024 POSTOP FOLLOW-UP VISIT: CPT

## 2024-02-07 PROCEDURE — 99233 SBSQ HOSP IP/OBS HIGH 50: CPT | Mod: 25,24

## 2024-02-07 RX ORDER — ACETAMINOPHEN 500 MG
975 TABLET ORAL EVERY 6 HOURS
Refills: 0 | Status: DISCONTINUED | OUTPATIENT
Start: 2024-02-07 | End: 2024-02-19

## 2024-02-07 RX ORDER — MEROPENEM 1 G/30ML
1000 INJECTION INTRAVENOUS EVERY 8 HOURS
Refills: 0 | Status: DISCONTINUED | OUTPATIENT
Start: 2024-02-07 | End: 2024-02-07

## 2024-02-07 RX ORDER — SODIUM CHLORIDE 9 MG/ML
1000 INJECTION, SOLUTION INTRAVENOUS ONCE
Refills: 0 | Status: COMPLETED | OUTPATIENT
Start: 2024-02-07 | End: 2024-02-07

## 2024-02-07 RX ORDER — SODIUM CHLORIDE 9 MG/ML
500 INJECTION, SOLUTION INTRAVENOUS ONCE
Refills: 0 | Status: COMPLETED | OUTPATIENT
Start: 2024-02-07 | End: 2024-02-07

## 2024-02-07 RX ORDER — MEROPENEM 1 G/30ML
1000 INJECTION INTRAVENOUS EVERY 8 HOURS
Refills: 0 | Status: COMPLETED | OUTPATIENT
Start: 2024-02-08 | End: 2024-02-11

## 2024-02-07 RX ADMIN — SODIUM CHLORIDE 1000 MILLILITER(S): 9 INJECTION, SOLUTION INTRAVENOUS at 07:26

## 2024-02-07 RX ADMIN — Medication 975 MILLIGRAM(S): at 00:11

## 2024-02-07 RX ADMIN — ENOXAPARIN SODIUM 40 MILLIGRAM(S): 100 INJECTION SUBCUTANEOUS at 17:37

## 2024-02-07 RX ADMIN — Medication 1 PACKET(S): at 11:17

## 2024-02-07 RX ADMIN — SODIUM CHLORIDE 3000 MILLILITER(S): 9 INJECTION, SOLUTION INTRAVENOUS at 20:53

## 2024-02-07 RX ADMIN — SODIUM CHLORIDE 2000 MILLILITER(S): 9 INJECTION, SOLUTION INTRAVENOUS at 05:00

## 2024-02-07 RX ADMIN — MEROPENEM 1000 MILLIGRAM(S): 1 INJECTION INTRAVENOUS at 23:11

## 2024-02-07 RX ADMIN — Medication 975 MILLIGRAM(S): at 01:11

## 2024-02-07 RX ADMIN — PANTOPRAZOLE SODIUM 40 MILLIGRAM(S): 20 TABLET, DELAYED RELEASE ORAL at 17:36

## 2024-02-07 RX ADMIN — MEROPENEM 100 MILLIGRAM(S): 1 INJECTION INTRAVENOUS at 16:01

## 2024-02-07 RX ADMIN — Medication 1 TABLET(S): at 11:17

## 2024-02-07 RX ADMIN — PANTOPRAZOLE SODIUM 40 MILLIGRAM(S): 20 TABLET, DELAYED RELEASE ORAL at 05:00

## 2024-02-07 NOTE — PROGRESS NOTE ADULT - SUBJECTIVE AND OBJECTIVE BOX
Subjective:28yMale admitted with SBO, s/p ex-lap, extensive MUKUL, SB resection, partial left colectomy, cystotomy repair. Subsequent sigmoid anastimotic breakdown, bladder leak, reexplored, Akash's procedure performed, partial resection of necrotic bladder, SPT placement, externalization of  shunt. Pt had repeat CT yesterday to assess resolution of SQ collection that was drained.  CT showed possible fistula from bladder to abdominal wall, right ureteral stone, hydronephrosis.  Called by SICU team to reassess pt.  Pt resting comfortably, no c/o pain, SPT and cline have been draining well.  pt has no c/o flank pain or abdominal pain, ostomy functioning.  pt afebrile, wbc nl.    Cline: yellow with sediment  SPT: yellow with sediment    Vital Signs Last 24 Hrs  T(C): 37 (07 Feb 2024 11:00), Max: 37.3 (06 Feb 2024 15:45)  T(F): 98.6 (07 Feb 2024 11:00), Max: 99.2 (06 Feb 2024 15:45)  HR: 104 (07 Feb 2024 13:00) (71 - 107)  BP: 106/70 (07 Feb 2024 13:00) (86/63 - 141/97)  BP(mean): 82 (07 Feb 2024 13:00) (67 - 113)  RR: 23 (07 Feb 2024 13:00) (12 - 23)  SpO2: 100% (07 Feb 2024 13:00) (97% - 100%)    Parameters below as of 07 Feb 2024 12:00  Patient On (Oxygen Delivery Method): room air      I&O's Detail    06 Feb 2024 07:01  -  07 Feb 2024 07:00  --------------------------------------------------------  IN:    IV PiggyBack: 25 mL    multiple electrolytes Injection Type 1 Bolus: 2000 mL    Oral Fluid: 974 mL  Total IN: 2999 mL    OUT:    Colostomy (mL): 1650 mL    External Ventricular Device (mL): 256 mL    Indwelling Catheter - Suprapubic (mL): 2010 mL    Indwelling Catheter - Urethral (mL): 165 mL  Total OUT: 4081 mL    Total NET: -1082 mL      07 Feb 2024 07:01  -  07 Feb 2024 14:45  --------------------------------------------------------  IN:    multiple electrolytes Injection Type 1 Bolus: 1000 mL  Total IN: 1000 mL    OUT:    Colostomy (mL): 600 mL    Drain (mL): 0 mL    External Ventricular Device (mL): 67 mL    Indwelling Catheter - Suprapubic (mL): 315 mL    Indwelling Catheter - Urethral (mL): 130 mL  Total OUT: 1112 mL    Total NET: -112 mL          Labs:                        10.5   6.20  )-----------( 486      ( 07 Feb 2024 03:05 )             32.0     02-07    137  |  100  |  13.7  ----------------------------<  102<H>  4.0   |  23.0  |  0.76    Ca    8.6      07 Feb 2024 09:04  Phos  3.5     02-07  Mg     2.1     02-07      radiology: < from: CT Abdomen and Pelvis w/ Oral Cont and w/ IV Cont (02.06.24 @ 17:30) >    ACC: [id number] EXAM:  CT ABDOMEN AND PELVIS OC IC   ORDERED BY: ANTONI EDWARDS     PROCEDURE DATE:  02/06/2024          INTERPRETATION:  CLINICAL INFORMATION: Abdominal wall abscess.    COMPARISON: CT abdomen and pelvis 1/30/2024    CONTRAST/COMPLICATIONS:  IV Contrast: Omnipaque 350  90 cc administered   10 cc discarded  Oral Contrast: Smoothie Readi-Cat 2  Complications: None reported at time of study completion    PROCEDURE:  CT of the Abdomen and Pelvis was performed.  Sagittal and coronal reformats were performed.    FINDINGS:  LOWER CHEST: Bilateral lower lobe dependent atelectasis.    LIVER: Within normal limits.  BILE DUCTS: Normal caliber.  GALLBLADDER: Cholelithiasis.  SPLEEN: Within normal limits.  PANCREAS: Within normal limits.  ADRENALS: Within normal limits.  KIDNEYS/URETERS: Bilateral renal cortical scarring. Dysplastic right   upper pole collecting system. Bilateral urothelial enhancement with   interval progression of a 3 mm calculus is present in the distal right   ureter. Extensive thickening and inflammation of the mid left ureter..    BLADDER: Markedly thick-walled hyperemic urinary bladder containing a   suprapubic catheter. Foci of gas extend into the anterior bladder wall   extending through the anterior abdominal wall, similar to prior study..  REPRODUCTIVE ORGANS: Mildly enlarged prostate gland with central   irregular stable appearance of the prostatic urethra.    BOWEL/PERITONEUM: Status post Kirkpatrick procedure with a left lower   quadrant colostomy. Thickening versus underdistention of the transverse   and descending colon. Right abdominal small bowel resection. Thickened   loops of left abdominal small bowel. Near complete drainage of a left   anterior abdominal fluid collection containingthe tip of a percutaneous   drainage catheter. A sliver of residual fluid remains.  VESSELS: Within normal limits.  RETROPERITONEUM/LYMPH NODES: No lymphadenopathy.  ABDOMINAL WALL: Ventral skin staples. A left lower quadrant colostomy.   Right anterior abdominal approach suprapubic catheter. Left lateral   abdominal wall approach peritoneal drainage catheter..  BONES: Right sacroiliac screw fixation. S-shaped scoliosis of the spine   with long segment posterior spinal fusion    IMPRESSION:  Nearcomplete drainage of the left abdominal peritoneal fluid collection.    Mild bilateral hydroureteronephrosis to the level of the urinary bladder   with extensive thickening and infiltration surrounding the mid left   ureter along with bilateral generalized urothelial enhancement concerning   for pyelitis. Please correlate with urinalysis. Diffuse bladder wall   thickening may reflect concomitant cystitis.    Left abdominal small bowel wall thickening may be infectious or   inflammatory. Thickening versus underdistention of the colon is noted for   which correlation for symptoms of colitis is recommended.    Redemonstration of a tract of fluid and gas extending from the anterior   urinary bladder through the ventral abdominal wall. Correlate for the   presence of vesiculocutaneous fistula.    < end of copied text >       Subjective: 28y Male admitted with SBO, s/p ex-lap, extensive MUKUL, SB resection, partial left colectomy, cystotomy repair. Subsequent sigmoid anastomotic breakdown, bladder leak, reexplored, Akash's procedure performed, partial resection of necrotic bladder, SPT placement, externalization of  shunt. Pt had repeat CT yesterday to assess resolution of SQ collection that was drained.  CT showed possible fistula from bladder to abdominal wall, right ureteral stone, hydronephrosis.  Called by SICU team to reassess pt.  Pt resting comfortably, no c/o pain, SPT and cline have been draining well.  pt has no c/o flank pain or abdominal pain, ostomy functioning.  pt afebrile, wbc nl.    Cline: yellow with sediment  SPT: yellow with sediment    Vital Signs Last 24 Hrs  T(C): 37 (07 Feb 2024 11:00), Max: 37.3 (06 Feb 2024 15:45)  T(F): 98.6 (07 Feb 2024 11:00), Max: 99.2 (06 Feb 2024 15:45)  HR: 104 (07 Feb 2024 13:00) (71 - 107)  BP: 106/70 (07 Feb 2024 13:00) (86/63 - 141/97)  BP(mean): 82 (07 Feb 2024 13:00) (67 - 113)  RR: 23 (07 Feb 2024 13:00) (12 - 23)  SpO2: 100% (07 Feb 2024 13:00) (97% - 100%)    Parameters below as of 07 Feb 2024 12:00  Patient On (Oxygen Delivery Method): room air      I&O's Detail    06 Feb 2024 07:01  -  07 Feb 2024 07:00  --------------------------------------------------------  IN:    IV PiggyBack: 25 mL    multiple electrolytes Injection Type 1 Bolus: 2000 mL    Oral Fluid: 974 mL  Total IN: 2999 mL    OUT:    Colostomy (mL): 1650 mL    External Ventricular Device (mL): 256 mL    Indwelling Catheter - Suprapubic (mL): 2010 mL    Indwelling Catheter - Urethral (mL): 165 mL  Total OUT: 4081 mL    Total NET: -1082 mL      07 Feb 2024 07:01  -  07 Feb 2024 14:45  --------------------------------------------------------  IN:    multiple electrolytes Injection Type 1 Bolus: 1000 mL  Total IN: 1000 mL    OUT:    Colostomy (mL): 600 mL    Drain (mL): 0 mL    External Ventricular Device (mL): 67 mL    Indwelling Catheter - Suprapubic (mL): 315 mL    Indwelling Catheter - Urethral (mL): 130 mL  Total OUT: 1112 mL    Total NET: -112 mL          Labs:                        10.5   6.20  )-----------( 486      ( 07 Feb 2024 03:05 )             32.0     02-07    137  |  100  |  13.7  ----------------------------<  102<H>  4.0   |  23.0  |  0.76    Ca    8.6      07 Feb 2024 09:04  Phos  3.5     02-07  Mg     2.1     02-07      radiology: < from: CT Abdomen and Pelvis w/ Oral Cont and w/ IV Cont (02.06.24 @ 17:30) >    ACC: [id number] EXAM:  CT ABDOMEN AND PELVIS OC IC   ORDERED BY: ANTONI EDWARDS     PROCEDURE DATE:  02/06/2024          INTERPRETATION:  CLINICAL INFORMATION: Abdominal wall abscess.    COMPARISON: CT abdomen and pelvis 1/30/2024    CONTRAST/COMPLICATIONS:  IV Contrast: Omnipaque 350  90 cc administered   10 cc discarded  Oral Contrast: Smoothie Readi-Cat 2  Complications: None reported at time of study completion    PROCEDURE:  CT of the Abdomen and Pelvis was performed.  Sagittal and coronal reformats were performed.    FINDINGS:  LOWER CHEST: Bilateral lower lobe dependent atelectasis.    LIVER: Within normal limits.  BILE DUCTS: Normal caliber.  GALLBLADDER: Cholelithiasis.  SPLEEN: Within normal limits.  PANCREAS: Within normal limits.  ADRENALS: Within normal limits.  KIDNEYS/URETERS: Bilateral renal cortical scarring. Dysplastic right   upper pole collecting system. Bilateral urothelial enhancement with   interval progression of a 3 mm calculus is present in the distal right   ureter. Extensive thickening and inflammation of the mid left ureter..    BLADDER: Markedly thick-walled hyperemic urinary bladder containing a   suprapubic catheter. Foci of gas extend into the anterior bladder wall   extending through the anterior abdominal wall, similar to prior study..  REPRODUCTIVE ORGANS: Mildly enlarged prostate gland with central   irregular stable appearance of the prostatic urethra.    BOWEL/PERITONEUM: Status post Kirkpatrick procedure with a left lower   quadrant colostomy. Thickening versus underdistention of the transverse   and descending colon. Right abdominal small bowel resection. Thickened   loops of left abdominal small bowel. Near complete drainage of a left   anterior abdominal fluid collection containingthe tip of a percutaneous   drainage catheter. A sliver of residual fluid remains.  VESSELS: Within normal limits.  RETROPERITONEUM/LYMPH NODES: No lymphadenopathy.  ABDOMINAL WALL: Ventral skin staples. A left lower quadrant colostomy.   Right anterior abdominal approach suprapubic catheter. Left lateral   abdominal wall approach peritoneal drainage catheter..  BONES: Right sacroiliac screw fixation. S-shaped scoliosis of the spine   with long segment posterior spinal fusion    IMPRESSION:  Nearcomplete drainage of the left abdominal peritoneal fluid collection.    Mild bilateral hydroureteronephrosis to the level of the urinary bladder   with extensive thickening and infiltration surrounding the mid left   ureter along with bilateral generalized urothelial enhancement concerning   for pyelitis. Please correlate with urinalysis. Diffuse bladder wall   thickening may reflect concomitant cystitis.    Left abdominal small bowel wall thickening may be infectious or   inflammatory. Thickening versus underdistention of the colon is noted for   which correlation for symptoms of colitis is recommended.    Redemonstration of a tract of fluid and gas extending from the anterior   urinary bladder through the ventral abdominal wall. Correlate for the   presence of vesiculocutaneous fistula.    < end of copied text >

## 2024-02-07 NOTE — PROGRESS NOTE ADULT - ASSESSMENT
Impression s/p shunt externalization 1/21    Plan maintain shunt externalization, [facility] transfer pending, q4h neuro checks

## 2024-02-07 NOTE — PROGRESS NOTE ADULT - GASTROINTESTINAL COMMENTS
dressing intact
isma with serosanguinous output
mild tenderness midline, bilious content draining from lower portion of incision., FRITZ with bilious output
dressing in place
midline dressing intact, ostomy viable, SPT in place

## 2024-02-07 NOTE — PROGRESS NOTE ADULT - ASSESSMENT
Assessment: 28 year olf male with PMH of spina bifida, scoliosis and SBO POD 16 s/p ex lap, Hartmans, partial cystectomy and bladder repair, SP catheter placement and POD 7 s/p IR drainage of abdominal wall abscess.     Plan    Neuro:  shunt   - pain controlled at this time   -  shunt to be internalized after transfer to , EVD draining      Pulm  - saturating well on RA  - encourage IS    Cardiac  - sinus tachycardia improving with ostomy output repletions   - maintain maps >65    GI: SBO s/p Kirkpatrick's   - BID PPI  - monitor ostomy Output quality and quantity   - QD dressing changes   - Regular high fiber diet  - replete ostomy output with IVF as needed   - Drain to be removed as per IR, output minimal and abscess resolved/minimal on CT     : s/p bladder repair, hydronephrosis   - urology following   - hydronephrosis seen on CT A/P, follow up plan with urology   - monitor UO from both SP cath and intraurethral cline catheter   - monitor and replete electrolytes as needed     Endo   - monitor BGL on BMPs    Heme: DVT ppx   - Lovenox for DVT ppx  - SCDs    ID  - monitor for fever/leukocytosis  - completed ABX on 1/27    Code status: full code

## 2024-02-07 NOTE — CHART NOTE - NSCHARTNOTEFT_GEN_A_CORE
Source: Patient [x]  Family [ ]   other [x] EMR    Current Diet: Diet, Regular:   High Fiber (HIFIBER) (01-31-24 @ 16:37)    PO intake:  < 50% [ ]   50-75%  [ ]   %  [x]  other :    Source for PO intake [x] Patient [ ] family [ ] chart [ ] staff [ ] other    Current Weight:   2/7 51.0 kg  2/2 53.0 kg  1/28 57.1 kg  1/16 62.8 kg    % Weight Change: 26 lb, 18.8% weight loss x 3 weeks, no edema per documentation     Pertinent Medications: MEDICATIONS  (STANDING):  multivitamin 1 Tablet(s) Oral daily  pantoprazole  Injectable 40 milliGRAM(s) IV Push two times a day  psyllium Powder 1 Packet(s) Oral daily    Pertinent Labs: 02-07 Na134 mmol/L<L> Glu 92 mg/dL K+ 5.6 mmol/L<H> Cr  0.75 mg/dL BUN 13.9 mg/dL Phos 3.9 mg/dL     Skin: midline abd, IAD, skin abrasions    Nutrition focused physical exam conducted - found signs of malnutrition [ ]absent [x]present    Subcutaneous fat loss: [x] Orbital fat pads region, [x]Buccal fat region, [x]Triceps region,  [ ]Ribs region    Muscle wasting: [x]Temples region, [x]Clavicle region, [x]Shoulder region, [ ]Scapula region, [ ]Interosseous region,  [ ]thigh region, [ ]Calf region    Estimated Needs:   [x] no change since previous assessment  [ ] recalculated:     Clinical Course: 28y Male  with pmhx of spina bifida,  shunt, scoliosis, wheelchair bound at baseline, admitted with SBO, now s/p SBR, sigmoid resection, bladder injury, complicated by RTOR further repair of bladder, Kirkpatrick’s, and exteriorization of  shunt.    Current Nutrition Diagnosis: Pt remains at high nutrition risk secondary to Severe (chronic) protein calorie malnutrition related to inadequate energy/protein intake,  with altered GI function in setting of severe SBO, spina bifida,  shunt as evidenced by pt likely meeting <75% est needs >1mo, +1 edema, severe muscle/fat wasting, now with 18.8% weight loss since admission.    Met with pt at bedside this AM, he reports good po intake, ~100% meal consumption. Pt reports not eating magic cup, has tried various flavor and dislikes supplement, will discontinue. Offered protein shake, pt refused. Encourage continued adequate PO intake and HBV protein food options to preserve lean muscle mass and prevent weight loss. Pt has had significant weight loss since admission, s/p kirkpatrick procedure, ostomy output fluctuating, < 1 L output yesterday ; ?malabsorption. Output thus far today 200 ml. Receiving metamucil. Pt currently on high fiber diet, would recommend low fiber seconday to s/p kirkpatrick procedure. Slight hyperkalemia noted today, previously WNL. Made pt aware RD remains available. Recommendations below:    Recommendations:   1. Switch from high fiber to low fiber/low residue diet to help manage ostomy output (order pending for verification)  2. Can consider increased metamucil to BID if output remains high  3. Continue Rx: MVI daily to optimize nutrition  4. Monitor potassium levels/need for dietary restriction  5. Continue to encourage PO and HBV protein food options, assist with meals as needed  6. Trend weights closely    Monitoring and Evaluation:   [x] PO intake [x] Tolerance to diet prescription [X] Weights  [X] Follow up per protocol [X] Labs: chem 8, phos, mg Source: Patient [x]  Family [ ]   other [x] EMR    Current Diet: Diet, Regular:   High Fiber (HIFIBER) (01-31-24 @ 16:37)    PO intake:  < 50% [ ]   50-75%  [ ]   %  [x]  other :    Source for PO intake [x] Patient [ ] family [ ] chart [ ] staff [ ] other    Current Weight:   2/7 51.0 kg  2/2 53.0 kg  1/28 57.1 kg  1/16 62.8 kg    % Weight Change: 26 lb, 18.8% weight loss x 3 weeks, no edema per documentation     Pertinent Medications: MEDICATIONS  (STANDING):  multivitamin 1 Tablet(s) Oral daily  pantoprazole  Injectable 40 milliGRAM(s) IV Push two times a day  psyllium Powder 1 Packet(s) Oral daily    Pertinent Labs: 02-07 Na134 mmol/L<L> Glu 92 mg/dL K+ 5.6 mmol/L<H> Cr  0.75 mg/dL BUN 13.9 mg/dL Phos 3.9 mg/dL     Skin: midline abd, IAD, skin abrasions    Nutrition focused physical exam conducted - found signs of malnutrition [ ]absent [x]present    Subcutaneous fat loss: [x] Orbital fat pads region, [x]Buccal fat region, [x]Triceps region,  [ ]Ribs region    Muscle wasting: [x]Temples region, [x]Clavicle region, [x]Shoulder region, [ ]Scapula region, [ ]Interosseous region,  [ ]thigh region, [ ]Calf region    Estimated Needs:   [x] no change since previous assessment  [ ] recalculated:     Clinical Course: 28y Male  with pmhx of spina bifida,  shunt, scoliosis, wheelchair bound at baseline, admitted with SBO, now s/p SBR, sigmoid resection, bladder injury, complicated by RTOR further repair of bladder, Kirkpatrick’s, and exteriorization of  shunt.    Current Nutrition Diagnosis: Pt remains at high nutrition risk secondary to Severe (chronic) protein calorie malnutrition related to inadequate energy/protein intake,  with altered GI function in setting of severe SBO, spina bifida,  shunt as evidenced by pt likely meeting <75% est needs >1mo, +1 edema, severe muscle/fat wasting, now with 18.8% weight loss since admission.    Met with pt at bedside this AM, he reports good po intake, ~100% meal consumption. Pt reports not eating magic cup, has tried various flavor and dislikes supplement, will discontinue. Offered protein shake, pt refused. Encourage continued adequate PO intake and HBV protein food options to preserve lean muscle mass and prevent weight loss. Pt has had significant weight loss since admission, s/p kirkpatrick procedure, ostomy output fluctuating, < 1 L output yesterday ; ?malabsorption. Output thus far today 200 ml. Receiving metamucil. Pt currently on high fiber diet, would recommend low fiber seconday to s/p kirkpatrick procedure. Slight hyperkalemia noted today, previously WNL. Made pt aware RD remains available. Recommendations below:    Recommendations:   1. Switch from high fiber to low fiber/low residue diet to help manage ostomy output (order pending for verification)  2. Can consider increasing metamucil to BID if output remains high  3. Continue Rx: MVI daily to optimize nutrition  4. Monitor potassium levels/need for dietary restriction  5. Continue to encourage PO and HBV protein food options, assist with meals as needed  6. Trend weights closely    Monitoring and Evaluation:   [x] PO intake [x] Tolerance to diet prescription [X] Weights  [X] Follow up per protocol [X] Labs: chem 8, phos, mg

## 2024-02-07 NOTE — PROGRESS NOTE ADULT - SUBJECTIVE AND OBJECTIVE BOX
HPI:  SUBJECTIVE: 29yo M w/ hx of spina bfida and scoliosis, wheelchair bound at baseline, presenting with abdominal pain, n/v. Pt states that the pain began yesterday morning and has been persistent. He had a bowel movement this morning but prior to that his last bowel movement was one week prior. Denies passing gas. Denies fevers at home. Has been vomiting and nauseous. At home he was lethargic and EMS was called. At that time he was hypotensive and tachycardic and he was BIBEMS to [facility] ED for further workup. On arrival he was tachycardic to the 130s and hypotensive to 80s/50s. Labs notable for K 3.4, bicarb 13, Cr 2.04. Actively vomiting in ED. CT scan showed dilated bowel and stomach consistent with severe SBO with transition point in mid abdomen. Surgery consulted for management.     2/7 as above, no issues overnight  Drain 116/248        MEDICATIONS  (STANDING):  chlorhexidine 2% Cloths 1 Application(s) Topical daily  enoxaparin Injectable 40 milliGRAM(s) SubCutaneous every 24 hours  multivitamin 1 Tablet(s) Oral daily  pantoprazole  Injectable 40 milliGRAM(s) IV Push two times a day  psyllium Powder 1 Packet(s) Oral daily    MEDICATIONS  (PRN):  acetaminophen     Tablet .. 975 milliGRAM(s) Oral every 6 hours PRN Mild Pain (1 - 3)      SARS-CoV-2: NotDetec (20 Jan 2024 02:15)                          10.5   6.20  )-----------( 486      ( 07 Feb 2024 03:05 )             32.0     02-07    137  |  100  |  13.7  ----------------------------<  102<H>  4.0   |  23.0  |  0.76    Ca    8.6      07 Feb 2024 09:04  Phos  3.5     02-07  Mg     2.1     02-07            IMAGING    Neurosurgery Imaging:        Vital Signs Last 24 Hrs  T(C): 36.6 (07 Feb 2024 07:38), Max: 37.3 (06 Feb 2024 15:45)  T(F): 97.9 (07 Feb 2024 07:38), Max: 99.2 (06 Feb 2024 15:45)  HR: 98 (07 Feb 2024 09:00) (71 - 113)  BP: 95/59 (07 Feb 2024 09:00) (86/54 - 141/97)  BP(mean): 70 (07 Feb 2024 09:00) (66 - 113)  RR: 13 (07 Feb 2024 09:00) (12 - 22)  SpO2: 100% (07 Feb 2024 09:00) (97% - 100%)    Parameters below as of 07 Feb 2024 08:00  Patient On (Oxygen Delivery Method): room air      I&O's Detail    06 Feb 2024 07:01  -  07 Feb 2024 07:00  --------------------------------------------------------  IN:    IV PiggyBack: 25 mL    multiple electrolytes Injection Type 1 Bolus: 2000 mL    Oral Fluid: 974 mL  Total IN: 2999 mL    OUT:    Colostomy (mL): 1650 mL    External Ventricular Device (mL): 256 mL    Indwelling Catheter - Suprapubic (mL): 2010 mL    Indwelling Catheter - Urethral (mL): 165 mL  Total OUT: 4081 mL    Total NET: -1082 mL      07 Feb 2024 07:01  -  07 Feb 2024 09:56  --------------------------------------------------------  IN:  Total IN: 0 mL    OUT:    Colostomy (mL): 100 mL    External Ventricular Device (mL): 24 mL    Indwelling Catheter - Suprapubic (mL): 135 mL    Indwelling Catheter - Urethral (mL): 15 mL  Total OUT: 274 mL    Total NET: -274 mL          PHYSICAL EXAM:  A&Ox3

## 2024-02-07 NOTE — PROGRESS NOTE ADULT - NS ATTEND AMEND GEN_ALL_CORE FT
agree with assessment and recommendations  reviewed the images and agree with no need for treatment for the mentioned stone  the patient will need further urology follow up

## 2024-02-07 NOTE — PROGRESS NOTE ADULT - NS ATTEND AMEND GEN_ALL_CORE FT
NSGY Attg:    see above    patient seen and examined    agree with above    plan of care determined for externalized shunt  supportive care per primary team  continue CSF drainage  awaiting transfer to [facility] per patient request

## 2024-02-07 NOTE — PROGRESS NOTE ADULT - RESPIRATORY
respirations non-labored
good air movement
respirations non-labored
respirations non-labored

## 2024-02-07 NOTE — PROGRESS NOTE ADULT - GENITOURINARY COMMENTS
10fr cline in place
cline in place, draining well
SPT in place, cline catheter in place
10Fr cline in place
cline in place
10Fr cline in place, draining well at this time

## 2024-02-07 NOTE — PROGRESS NOTE ADULT - ASSESSMENT
27 yo male with spina bifida, admitted with SBO, s/p ex-lap, extensive MUKUL, SB resection, partial left colectomy, cystotomy repair. Subsequent sigmoid anastimotic breakdown, bladder leak, reexplored, Akash's procedure performed, partial resection of necrotic bladder, SPT placement, externalization of  shunt  now with vesicocutaneous fistula, urothelial enhancement, questionable stone in distal right ureter  - CT reviewed with radiology- stone read as in right ureter is in a Hutch diverticulum as further review- no intervention needed for this at this time  - recommend starting abx for early UTI  - prior cx's sensitive to merem  - bladder gently irrigated via, SPT and cline, draining well  - cont both catheters to drainage bags  - will follow with you

## 2024-02-07 NOTE — PROGRESS NOTE ADULT - NS ATTEND AMEND GEN_ALL_CORE FT
Patient seen and examined  Doing well, no new complaints  Pain with good control  Awake, alert  Hemodynamically stable  Oxygenating well  Tolerating diet  Colostomy with good function  LUQ drain with very low output last couple of days  Mid-line laparotomy wound with good granulation tissue, no signs of infection  On lovenox for DVT prophylaxis    - CT-scan with almost complete resolution of LUQ collection.  Will discuss with VIR possibly removing LUQ drain  - Multiple findings on CT concerning  tract (Mild bilateral hydroureteronephrosis to the level of the urinary bladder with extensive thickening and infiltration surrounding the mid left ureter along with bilateral generalized urothelial enhancement concerning   for pyelitis. Diffuse bladder wall thickening may reflect concomitant cystitis).  Will have  service review & comment  - Once acute issues are resolved, will likely transfer to [facility] for management of externalized  shunt

## 2024-02-07 NOTE — PROGRESS NOTE ADULT - SUBJECTIVE AND OBJECTIVE BOX
24H events: Pt received 2L IVF to replete high output from colostomy. Tachycardia continues to improve. CT A/P showing resolution of collection but worsening hydronephrosis Urology made aware and continues to follow,       PAST MEDICAL & SURGICAL HISTORY:  Spina bifida  Scoliosis      MEDICATIONS  (STANDING):  chlorhexidine 2% Cloths 1 Application(s) Topical daily  enoxaparin Injectable 40 milliGRAM(s) SubCutaneous every 24 hours  multivitamin 1 Tablet(s) Oral daily  pantoprazole  Injectable 40 milliGRAM(s) IV Push two times a day  psyllium Powder 1 Packet(s) Oral daily    MEDICATIONS  (PRN):  acetaminophen     Tablet .. 975 milliGRAM(s) Oral every 6 hours PRN Mild Pain (1 - 3)    ICU Vital Signs Last 24 Hrs  T(C): 37 (07 Feb 2024 11:00), Max: 37.3 (06 Feb 2024 15:45)  T(F): 98.6 (07 Feb 2024 11:00), Max: 99.2 (06 Feb 2024 15:45)  HR: 95 (07 Feb 2024 11:00) (71 - 113)  BP: 98/66 (07 Feb 2024 11:00) (86/54 - 141/97)  BP(mean): 77 (07 Feb 2024 11:00) (66 - 113)  RR: 15 (07 Feb 2024 11:00) (12 - 22)  SpO2: 100% (07 Feb 2024 11:00) (97% - 100%)    O2 Parameters below as of 07 Feb 2024 08:00  Patient On (Oxygen Delivery Method): room air      Drug Dosing Weight  Height (cm): 132.1 (14 Jan 2024 09:30)  Weight (kg): 70 (14 Jan 2024 07:48)  BMI (kg/m2): 40.1 (14 Jan 2024 09:30)  BSA (m2): 1.51 (14 Jan 2024 09:30)    CENTRAL LINE: [ ] YES [x] NO  LOCATION:   DATE INSERTED:  REMOVE: [ ] YES [ ] NO  EXPLAIN:    CLINE: [x] YES [ ] NO    DATE INSERTED: SP and intraurethral cline inserted 1/22  REMOVE: [ ] YES [x] NO  EXPLAIN: bladder decompression while repair is healing    A-LINE: [ ] YES [x] NO  LOCATION:   DATE INSERTED:  REMOVE: [ ] YES [ ] NO  EXPLAIN:        I&O's Detail    06 Feb 2024 07:01  -  07 Feb 2024 07:00  --------------------------------------------------------  IN:    IV PiggyBack: 25 mL    multiple electrolytes Injection Type 1 Bolus: 2000 mL    Oral Fluid: 974 mL  Total IN: 2999 mL    OUT:    Colostomy (mL): 1650 mL    External Ventricular Device (mL): 256 mL    Indwelling Catheter - Suprapubic (mL): 2010 mL    Indwelling Catheter - Urethral (mL): 165 mL  Total OUT: 4081 mL    Total NET: -1082 mL      07 Feb 2024 07:01  -  07 Feb 2024 11:38  --------------------------------------------------------  IN:  Total IN: 0 mL    OUT:    Colostomy (mL): 100 mL    External Ventricular Device (mL): 38 mL    Indwelling Catheter - Suprapubic (mL): 185 mL    Indwelling Catheter - Urethral (mL): 20 mL  Total OUT: 343 mL    Total NET: -343 mL        Physical Exam:    Neurological: A&Ox3, no focal neuro deficits     HEENT: PERRL, EOMI     Respiratory: Unlabored, no accessory muscle use, saturating well on RA    Cardiovascular: Regular rate & rhythm    Gastrointestinal: Soft, nttp, nd, pink well looking ostomy with stool in the bag, incision clean with intact dressing, IR drain in place with no active drainage     Extremities: No peripheral edema    Vascular: Equal and normal pulses: 2+ peripheral pulses throughout    Musculoskeletal: No joint pain, swelling     Skin: No rashes, well healing abdominal incision     LABS:  CBC Full  -  ( 07 Feb 2024 03:05 )  WBC Count : 6.20 K/uL  RBC Count : 3.78 M/uL  Hemoglobin : 10.5 g/dL  Hematocrit : 32.0 %  Platelet Count - Automated : 486 K/uL  Mean Cell Volume : 84.7 fl  Mean Cell Hemoglobin : 27.8 pg  Mean Cell Hemoglobin Concentration : 32.8 gm/dL      02-07    137  |  100  |  13.7  ----------------------------<  102<H>  4.0   |  23.0  |  0.76    Ca    8.6      07 Feb 2024 09:04  Phos  3.5     02-07  Mg     2.1     02-07

## 2024-02-08 PROCEDURE — 76080 X-RAY EXAM OF FISTULA: CPT | Mod: 26

## 2024-02-08 PROCEDURE — 99024 POSTOP FOLLOW-UP VISIT: CPT

## 2024-02-08 PROCEDURE — 49424 ASSESS CYST CONTRAST INJECT: CPT

## 2024-02-08 PROCEDURE — 99232 SBSQ HOSP IP/OBS MODERATE 35: CPT

## 2024-02-08 RX ORDER — SODIUM CHLORIDE 9 MG/ML
1000 INJECTION, SOLUTION INTRAVENOUS
Refills: 0 | Status: DISCONTINUED | OUTPATIENT
Start: 2024-02-08 | End: 2024-02-09

## 2024-02-08 RX ADMIN — MEROPENEM 1000 MILLIGRAM(S): 1 INJECTION INTRAVENOUS at 08:05

## 2024-02-08 RX ADMIN — PANTOPRAZOLE SODIUM 40 MILLIGRAM(S): 20 TABLET, DELAYED RELEASE ORAL at 17:10

## 2024-02-08 RX ADMIN — SODIUM CHLORIDE 84 MILLILITER(S): 9 INJECTION, SOLUTION INTRAVENOUS at 08:06

## 2024-02-08 RX ADMIN — ENOXAPARIN SODIUM 40 MILLIGRAM(S): 100 INJECTION SUBCUTANEOUS at 17:10

## 2024-02-08 RX ADMIN — Medication 1 TABLET(S): at 11:20

## 2024-02-08 RX ADMIN — CHLORHEXIDINE GLUCONATE 1 APPLICATION(S): 213 SOLUTION TOPICAL at 05:37

## 2024-02-08 RX ADMIN — Medication 1 PACKET(S): at 11:20

## 2024-02-08 RX ADMIN — PANTOPRAZOLE SODIUM 40 MILLIGRAM(S): 20 TABLET, DELAYED RELEASE ORAL at 05:37

## 2024-02-08 RX ADMIN — MEROPENEM 1000 MILLIGRAM(S): 1 INJECTION INTRAVENOUS at 17:10

## 2024-02-08 NOTE — PROGRESS NOTE ADULT - NS ATTEND AMEND GEN_ALL_CORE FT
BRINDA Attg:    see above    patient seen and examined      agree with above    plan of care determined for externalized shunt  continue CSF drainage  possible tx to [facility] for shunt internalization

## 2024-02-08 NOTE — PROGRESS NOTE ADULT - ASSESSMENT
Assessment: 28 year olf male with PMH of spina bifida, scoliosis and SBO POD 16 s/p ex lap, Hartmans, partial cystectomy and bladder repair, SP catheter placement and POD 7 s/p IR drainage of abdominal wall abscess.     Plan    Neuro:  shunt   - pain controlled at this time   -  shunt to be internalized after transfer to , EVD draining      Pulm  - saturating well on RA  - encourage IS    Cardiac  - Tachycardia now resolved with abx on and fluid resucitation   - maintain maps >65    GI: SBO s/p Kirkpatrick's   - BID PPI-2 week total course  - monitor ostomy Output quality and quantity   - QD dressing changes   - Regular high fiber diet  - replete ostomy output with IVF as needed   - Drain to be removed as per IR, output minimal and abscess resolved/minimal on CT     : s/p bladder repair, hydronephrosis   - urology following   - hydronephrosis seen on CT A/P, follow up plan with urology   - monitor UO from both SP cath and intraurethral cline catheter   - monitor and replete electrolytes as needed     Endo   - monitor BGL on BMPs    Heme: DVT ppx   - Lovenox for DVT ppx  - SCDs    ID  - monitor for fever/leukocytosis  - Merrem started 2/7 for 10 day total course    Code status: full code

## 2024-02-08 NOTE — PROGRESS NOTE ADULT - ASSESSMENT
28M PMH spina bifida (wheelchair bound), scoliosis, hydrocephalus s/p  shunt placement with multiple revisions followed by Dr. Dorsey at [facility], presented to [facility] with concern for SBO s/p ex lap, partial colectomy ccb bladder perforation with repair on 1/14, shunt externalization on 1/21, s/p abdominal wall fluid collection drain placed by IR on 1/31. IR drain removed 2/7.    Plan:  - Q4 neuro checks   - No neurosurgical intervention at this time  - Maintain externalized shunt, record hourly outputs  - Do not clamp externalized  shunt  - Pain control as needed, avoid over sedation  - Patient pending transfer to [facility] when medically stable; patient follows with Dr. Dorsey  - Normotensive SBP goals  - Lovenox 40mg, SCDs for DVT ppx  - Further medical/supportive management per SICU team   - Will discuss with Dr. Harris   28M PMH spina bifida (wheelchair bound), scoliosis, hydrocephalus s/p  shunt placement with multiple revisions followed by Dr. Dorsey at [facility], presented to [facility] with concern for SBO s/p ex lap, partial colectomy ccb bladder perforation with repair on 1/14, shunt externalization on 1/21, s/p abdominal wall fluid collection drain placed by IR on 1/31. IR drain removed 2/7.    Plan:  - Q4 neuro checks   - No neurosurgical intervention at this time  - Maintain externalized shunt, record hourly outputs  - Do not clamp externalized  shunt  - Pain control as needed, avoid over sedation  - Patient pending transfer to [facility] when medically stable; patient follows with Dr. Dorsey  - Normotensive SBP goals  - Lovenox 40mg, SCDs for DVT ppx  - Further medical/supportive management per SICU team   - Discussed with Dr. Harris

## 2024-02-08 NOTE — PROCEDURE NOTE - NSINFORMCONSENT_GEN_A_CORE
Benefits, risks, and possible complications of procedure explained to patient/caregiver who verbalized understanding and gave verbal consent.
Benefits, risks, and possible complications of procedure explained to patient/caregiver who verbalized understanding and gave written consent.
Benefits, risks, and possible complications of procedure explained to patient/caregiver who verbalized understanding and gave verbal consent.
Benefits, risks, and possible complications of procedure explained to patient/caregiver who verbalized understanding and gave written consent.
Benefits, risks, and possible complications of procedure explained to patient/caregiver who verbalized understanding and gave verbal consent.

## 2024-02-08 NOTE — PROCEDURE NOTE - PROCEDURE DATE TIME, MLM
14-Jan-2024 15:08
08-Feb-2024 14:39
18-Jan-2024 17:00
31-Jan-2024 06:00
21-Jan-2024 13:30
20-Jan-2024 13:45
21-Jan-2024
21-Jan-2024 14:48

## 2024-02-08 NOTE — PROCEDURE NOTE - PROCEDURE FINDINGS AND DETAILS
The patient was brought to the fluoroscopy suite and placed supine on the fluoroscopy table.  Patient reports zero daily output. CT on 2/6 noted for near complete resolution of collection.   Existing drainage bag was removed,  image was obtained, and existing drain was injected with contrast. which showed no obvious fistula or significant cavity.  Given these findings, the contrast was aspirated and drain was cut and removed.

## 2024-02-08 NOTE — PROGRESS NOTE ADULT - SUBJECTIVE AND OBJECTIVE BOX
HPI:  28M w/ PMH of spina bifida and scoliosis, wheelchair bound at baseline, presenting with abdominal pain, n/v. Pt states that the pain began yesterday morning and has been persistent. He had a bowel movement this morning but prior to that his last bowel movement was one week prior. Denies passing gas. Denies fevers at home. Has been vomiting and nauseous. At home he was lethargic and EMS was called. At that time he was hypotensive and tachycardic and he was BIBEMS to [facility] ED for further workup. On arrival he was tachycardic to the 130s and hypotensive to 80s/50s. Labs notable for K 3.4, bicarb 13, Cr 2.04. Actively vomiting in ED. CT scan showed dilated bowel and stomach consistent with severe SBO with transition point in mid abdomen. Surgery consulted for management.     INTERVAL HPI/OVERNIGHT EVENTS:  28M s/p shunt externalization on 1/21, s/p abdominal wall fluid collection drainage on 1/31 in IR. Patient seen and examined at bedside this AM on rounds. Patient lying comfortably in bed. Denies any headache, dizziness, N/V. No overnight events reported. When patient medically optimized, plan is to transfer to [facility] NSICU for internalization of shunt, possible endoscopic third ventriculostomy, patient follows with Dr. Dorsey.     2/7: Abdominal drain removed by IR  2/8: Externalized ventricular shunt output: 28cc overnight     Vital Signs Last 24 Hrs  T(C): 36.8 (08 Feb 2024 07:24), Max: 37 (07 Feb 2024 11:00)  T(F): 98.3 (08 Feb 2024 07:24), Max: 98.6 (07 Feb 2024 11:00)  HR: 108 (08 Feb 2024 10:00) (81 - 116)  BP: 96/57 (08 Feb 2024 10:00) (90/60 - 126/80)  BP(mean): 69 (08 Feb 2024 10:00) (68 - 93)  RR: 16 (08 Feb 2024 10:00) (13 - 23)  SpO2: 96% (08 Feb 2024 10:00) (96% - 100%)  Parameters below as of 08 Feb 2024 08:00  Patient On (Oxygen Delivery Method): room air    PHYSICAL EXAM:  GENERAL: NAD, well-groomed  HEAD:  Atraumatic, normocephalic  SHUNT: Externalized, clear-straw colored CSF draining  MENTAL STATUS: AAOx3; awake; opens eyes spontaneously; appropriately conversant without aphasia; following simple commands  CRANIAL NERVES: PERRL. EOMI without nystagmus. Facial sensation intact V1-3 distribution b/l. Face symmetric, tongue midline. Hearing grossly intact. Speech clear. Head turning and shoulder shrug intact.   MOTOR: RUE 5/5, LUE 5/5, b/l LE 0/5 and contracted at baseline  SENSATION: Grossly intact to light touch all extremities on upper extremities  CHEST/LUNG: Non-labored breathing on room air  SKIN: Warm, dry    LABS:                        10.5   6.20  )-----------( 486      ( 07 Feb 2024 03:05 )             32.0     02-07    137  |  100  |  13.7  ----------------------------<  102<H>  4.0   |  23.0  |  0.76    Ca    8.6      07 Feb 2024 09:04  Phos  3.5     02-07  Mg     2.1     02-07    Urinalysis Basic - ( 07 Feb 2024 09:04 )  Color: x / Appearance: x / SG: x / pH: x  Gluc: 102 mg/dL / Ketone: x  / Bili: x / Urobili: x   Blood: x / Protein: x / Nitrite: x   Leuk Esterase: x / RBC: x / WBC x   Sq Epi: x / Non Sq Epi: x / Bacteria: x    02-07 @ 07:01 - 02-08 @ 07:00  --------------------------------------------------------  IN: 1500 mL / OUT: 3045 mL / NET: -1545 mL    02-08 @ 07:01  - 02-08 @ 10:31  --------------------------------------------------------  IN: 332 mL / OUT: 313 mL / NET: 19 mL    RADIOLOGY & ADDITIONAL TESTS:  CT Abdomen and Pelvis w/ Oral Cont and w/ IV Cont (01.30.24 @ 17:58)  IMPRESSION:  No pulmonary embolism through the level of the lobar pulmonary arteries.   Evaluation of more distal segmental and subsegmental pulmonary arteries is limited by suboptimal contrast bolus timing.  Trace bibasilar pleural effusion and atelectasis of posterior basilar segment of right lower lobe.    Interval repositioning of 2  shunt with tips of catheter now terminating subcutaneously at the level of xiphoid process  Patient is status post Akash procedure and small bowel resection and a new ostomy in left lower quadrant abdominal abdomen.   No evidence of extravasation of the contrast from the bowel loops proximal to the ostomy site.  7.2 x 3.1 cm loculated fluid collection/abscess in the left anterolateral abdominal wall.  Postsurgical changes and thickening of urinary bladder wall.    CT Pelvis No Cont (01.21.24 @ 11:51)   IMPRESSION:  Intraperitoneal bladder perforation. Moderate to large amount of   extraluminal contrast surrounding small bowel. Moderate amount of   pneumoperitoneum. Extraluminal contrast and gas is also seen tracking   through the midline ventral abdominal wall incision.    Redemonstrated tract extending from the right bladder wall to the skin   in the right lower quadrant. Question a urostomy. Correlate with patient history.    Irregular cavity within the prostate measuring 3.1 x 1.8 cm that is   contiguous with the urethra. A few coarse calcifications within the   prostate are possibly calculi layering within the cavity.    Unchanged mild bilateral hydroureteronephrosis to the level of the   bladder. Unchanged urothelial thickening of the bilateral collecting   systems and ureters. Correlate for urinary tract infection.    Diffuse wall thickening of the imaged small bowel and sigmoid colon,   which could be reactive or representative of enterocolitis.    CT Head No Cont (01.16.24 @ 10:57)  IMPRESSION: Stable follow-up CT exam when compared with 1/14/2024. HPI:  28M w/ PMH of spina bifida and scoliosis, wheelchair bound at baseline, presenting with abdominal pain, n/v. Pt states that the pain began yesterday morning and has been persistent. He had a bowel movement this morning but prior to that his last bowel movement was one week prior. Denies passing gas. Denies fevers at home. Has been vomiting and nauseous. At home he was lethargic and EMS was called. At that time he was hypotensive and tachycardic and he was BIBEMS to [facility] ED for further workup. On arrival he was tachycardic to the 130s and hypotensive to 80s/50s. Labs notable for K 3.4, bicarb 13, Cr 2.04. Actively vomiting in ED. CT scan showed dilated bowel and stomach consistent with severe SBO with transition point in mid abdomen. Surgery consulted for management.     INTERVAL HPI/OVERNIGHT EVENTS:  28M s/p shunt externalization on 1/21, s/p abdominal wall fluid collection drainage on 1/31 in IR. Patient seen and examined at bedside this AM on rounds. Patient lying comfortably in bed. Denies any headache, dizziness, N/V. No overnight events reported. When patient medically optimized, plan is to transfer to [facility] NSICU for internalization of shunt, possible endoscopic third ventriculostomy, patient follows with Dr. Dorsey.     2/7: Abdominal drain removed by IR  2/8: Externalized ventricular shunt output: 128/255 28cc overnight     Vital Signs Last 24 Hrs  T(C): 36.8 (08 Feb 2024 07:24), Max: 37 (07 Feb 2024 11:00)  T(F): 98.3 (08 Feb 2024 07:24), Max: 98.6 (07 Feb 2024 11:00)  HR: 108 (08 Feb 2024 10:00) (81 - 116)  BP: 96/57 (08 Feb 2024 10:00) (90/60 - 126/80)  BP(mean): 69 (08 Feb 2024 10:00) (68 - 93)  RR: 16 (08 Feb 2024 10:00) (13 - 23)  SpO2: 96% (08 Feb 2024 10:00) (96% - 100%)  Parameters below as of 08 Feb 2024 08:00  Patient On (Oxygen Delivery Method): room air    PHYSICAL EXAM:  GENERAL: NAD, well-groomed  HEAD:  Atraumatic, normocephalic  SHUNT: Externalized, clear-straw colored CSF draining  MENTAL STATUS: AAOx3; awake; opens eyes spontaneously; appropriately conversant without aphasia; following simple commands  CRANIAL NERVES: PERRL. EOMI without nystagmus. Facial sensation intact V1-3 distribution b/l. Face symmetric, tongue midline. Hearing grossly intact. Speech clear. Head turning and shoulder shrug intact.   MOTOR: RUE 5/5, LUE 5/5, b/l LE 0/5 and contracted at baseline  SENSATION: Grossly intact to light touch all extremities on upper extremities  CHEST/LUNG: Non-labored breathing on room air  SKIN: Warm, dry    LABS:                        10.5   6.20  )-----------( 486      ( 07 Feb 2024 03:05 )             32.0     02-07    137  |  100  |  13.7  ----------------------------<  102<H>  4.0   |  23.0  |  0.76    Ca    8.6      07 Feb 2024 09:04  Phos  3.5     02-07  Mg     2.1     02-07    Urinalysis Basic - ( 07 Feb 2024 09:04 )  Color: x / Appearance: x / SG: x / pH: x  Gluc: 102 mg/dL / Ketone: x  / Bili: x / Urobili: x   Blood: x / Protein: x / Nitrite: x   Leuk Esterase: x / RBC: x / WBC x   Sq Epi: x / Non Sq Epi: x / Bacteria: x    02-07 @ 07:01 - 02-08 @ 07:00  --------------------------------------------------------  IN: 1500 mL / OUT: 3045 mL / NET: -1545 mL    02-08 @ 07:01  - 02-08 @ 10:31  --------------------------------------------------------  IN: 332 mL / OUT: 313 mL / NET: 19 mL    RADIOLOGY & ADDITIONAL TESTS:  CT Abdomen and Pelvis w/ Oral Cont and w/ IV Cont (01.30.24 @ 17:58)  IMPRESSION:  No pulmonary embolism through the level of the lobar pulmonary arteries.   Evaluation of more distal segmental and subsegmental pulmonary arteries is limited by suboptimal contrast bolus timing.  Trace bibasilar pleural effusion and atelectasis of posterior basilar segment of right lower lobe.    Interval repositioning of 2  shunt with tips of catheter now terminating subcutaneously at the level of xiphoid process  Patient is status post Akash procedure and small bowel resection and a new ostomy in left lower quadrant abdominal abdomen.   No evidence of extravasation of the contrast from the bowel loops proximal to the ostomy site.  7.2 x 3.1 cm loculated fluid collection/abscess in the left anterolateral abdominal wall.  Postsurgical changes and thickening of urinary bladder wall.    CT Pelvis No Cont (01.21.24 @ 11:51)   IMPRESSION:  Intraperitoneal bladder perforation. Moderate to large amount of   extraluminal contrast surrounding small bowel. Moderate amount of   pneumoperitoneum. Extraluminal contrast and gas is also seen tracking   through the midline ventral abdominal wall incision.    Redemonstrated tract extending from the right bladder wall to the skin   in the right lower quadrant. Question a urostomy. Correlate with patient history.    Irregular cavity within the prostate measuring 3.1 x 1.8 cm that is   contiguous with the urethra. A few coarse calcifications within the   prostate are possibly calculi layering within the cavity.    Unchanged mild bilateral hydroureteronephrosis to the level of the   bladder. Unchanged urothelial thickening of the bilateral collecting   systems and ureters. Correlate for urinary tract infection.    Diffuse wall thickening of the imaged small bowel and sigmoid colon,   which could be reactive or representative of enterocolitis.    CT Head No Cont (01.16.24 @ 10:57)  IMPRESSION: Stable follow-up CT exam when compared with 1/14/2024.

## 2024-02-08 NOTE — PROGRESS NOTE ADULT - SUBJECTIVE AND OBJECTIVE BOX
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  Urology reengaged.  Merrem started for possible UTI.  Overnight pt still net NEG, 500cc IVF bolus given with continued improvement in HR.          ICU Vital Signs Last 24 Hrs  T(C): 36.7 (07 Feb 2024 21:29), Max: 37 (07 Feb 2024 11:00)  T(F): 98.1 (07 Feb 2024 21:29), Max: 98.6 (07 Feb 2024 11:00)  HR: 94 (08 Feb 2024 06:00) (73 - 116)  BP: 96/59 (08 Feb 2024 06:00) (92/68 - 126/80)  BP(mean): 70 (08 Feb 2024 06:00) (70 - 93)  ABP: --  ABP(mean): --  RR: 17 (08 Feb 2024 06:00) (12 - 23)  SpO2: 99% (08 Feb 2024 06:00) (98% - 100%)    O2 Parameters below as of 08 Feb 2024 04:00  Patient On (Oxygen Delivery Method): room air        I&O's Detail    06 Feb 2024 07:01  -  07 Feb 2024 07:00  --------------------------------------------------------  IN:    IV PiggyBack: 25 mL    multiple electrolytes Injection Type 1 Bolus: 2000 mL    Oral Fluid: 974 mL  Total IN: 2999 mL    OUT:    Colostomy (mL): 1650 mL    External Ventricular Device (mL): 256 mL    Indwelling Catheter - Suprapubic (mL): 2010 mL    Indwelling Catheter - Urethral (mL): 165 mL  Total OUT: 4081 mL    Total NET: -1082 mL      07 Feb 2024 07:01  -  08 Feb 2024 06:23  --------------------------------------------------------  IN:    multiple electrolytes Injection Type 1 Bolus: 1500 mL  Total IN: 1500 mL    OUT:    Colostomy (mL): 900 mL    Drain (mL): 0 mL    External Ventricular Device (mL): 246 mL    Indwelling Catheter - Suprapubic (mL): 1215 mL    Indwelling Catheter - Urethral (mL): 440 mL  Total OUT: 2801 mL    Total NET: -1301 mL        MEDICATIONS  (STANDING):  chlorhexidine 2% Cloths 1 Application(s) Topical daily  enoxaparin Injectable 40 milliGRAM(s) SubCutaneous every 24 hours  meropenem Injectable 1000 milliGRAM(s) IV Push every 8 hours  multivitamin 1 Tablet(s) Oral daily  pantoprazole  Injectable 40 milliGRAM(s) IV Push two times a day  psyllium Powder 1 Packet(s) Oral daily    MEDICATIONS  (PRN):  acetaminophen     Tablet .. 975 milliGRAM(s) Oral every 6 hours PRN Mild Pain (1 - 3)        CENTRAL LINE: [ ] YES [x] NO  LOCATION:   DATE INSERTED:  REMOVE: [ ] YES [ ] NO  EXPLAIN:    CLINE: [x] YES [ ] NO    DATE INSERTED: SP and intraurethral cline inserted 1/22  REMOVE: [ ] YES [x] NO  EXPLAIN: bladder decompression while repair is healing    A-LINE: [ ] YES [x] NO  LOCATION:   DATE INSERTED:  REMOVE: [ ] YES [ ] NO  EXPLAIN:          Physical Exam:    Neurological: A&Ox3, no focal neuro deficits     HEENT: PERRL, EOMI     Respiratory: Unlabored, no accessory muscle use, saturating well on RA    Cardiovascular: Regular rate & rhythm    Gastrointestinal: Soft, nttp, nd, pink well looking ostomy with stool in the bag, incision clean with intact dressing, IR drain in place with no active drainage     Extremities: No peripheral edema    Vascular: Equal and normal pulses: 2+ peripheral pulses throughout    Musculoskeletal: No joint pain, swelling     Skin: No rashes, well healing abdominal incision             LABS:                              10.5   6.20  )-----------( 486      ( 07 Feb 2024 03:05 )             32.0         02-07    137  |  100  |  13.7  ----------------------------<  102<H>  4.0   |  23.0  |  0.76    Ca    8.6      07 Feb 2024 09:04  Phos  3.5     02-07  Mg     2.1     02-07

## 2024-02-08 NOTE — PROGRESS NOTE ADULT - NS ATTEND AMEND GEN_ALL_CORE FT
Patient seen and examined on AM SICU rounds  Doing well   No new complaints  Tolerating diet  Hemodynamically stable, intermittent low grade tachycardia  Oxygenating well  Electrolytes WNL  On Meropenem for possible UTI (day #2 of 5)  About 2L - 3L output from colostomy per day, on IVF replacements    - For VIR study today, hopefully to remove VIR placed drain  - May need to start meds to slow down ostomy output

## 2024-02-08 NOTE — PROCEDURE NOTE - NSPROCNAME_GEN_A_CORE
Interventional Radiology
General
Central Line Insertion
Arterial Puncture/Cannulation
Urinary Device Placement
Arterial Puncture/Cannulation
Midline Insertion
Midline Insertion
Procedural Sedation

## 2024-02-09 LAB
ANION GAP SERPL CALC-SCNC: 11 MMOL/L — SIGNIFICANT CHANGE UP (ref 5–17)
BASOPHILS # BLD AUTO: 0.05 K/UL — SIGNIFICANT CHANGE UP (ref 0–0.2)
BASOPHILS NFR BLD AUTO: 0.7 % — SIGNIFICANT CHANGE UP (ref 0–2)
BUN SERPL-MCNC: 10 MG/DL — SIGNIFICANT CHANGE UP (ref 8–20)
CALCIUM SERPL-MCNC: 8.9 MG/DL — SIGNIFICANT CHANGE UP (ref 8.4–10.5)
CHLORIDE SERPL-SCNC: 100 MMOL/L — SIGNIFICANT CHANGE UP (ref 96–108)
CO2 SERPL-SCNC: 26 MMOL/L — SIGNIFICANT CHANGE UP (ref 22–29)
CREAT SERPL-MCNC: 0.58 MG/DL — SIGNIFICANT CHANGE UP (ref 0.5–1.3)
EGFR: 135 ML/MIN/1.73M2 — SIGNIFICANT CHANGE UP
EOSINOPHIL # BLD AUTO: 0.39 K/UL — SIGNIFICANT CHANGE UP (ref 0–0.5)
EOSINOPHIL NFR BLD AUTO: 5.1 % — SIGNIFICANT CHANGE UP (ref 0–6)
GLUCOSE SERPL-MCNC: 90 MG/DL — SIGNIFICANT CHANGE UP (ref 70–99)
HCT VFR BLD CALC: 30.3 % — LOW (ref 39–50)
HGB BLD-MCNC: 9.6 G/DL — LOW (ref 13–17)
IMM GRANULOCYTES NFR BLD AUTO: 0.4 % — SIGNIFICANT CHANGE UP (ref 0–0.9)
LYMPHOCYTES # BLD AUTO: 1.55 K/UL — SIGNIFICANT CHANGE UP (ref 1–3.3)
LYMPHOCYTES # BLD AUTO: 20.2 % — SIGNIFICANT CHANGE UP (ref 13–44)
MAGNESIUM SERPL-MCNC: 1.8 MG/DL — SIGNIFICANT CHANGE UP (ref 1.6–2.6)
MCHC RBC-ENTMCNC: 26.7 PG — LOW (ref 27–34)
MCHC RBC-ENTMCNC: 31.7 GM/DL — LOW (ref 32–36)
MCV RBC AUTO: 84.4 FL — SIGNIFICANT CHANGE UP (ref 80–100)
MONOCYTES # BLD AUTO: 0.49 K/UL — SIGNIFICANT CHANGE UP (ref 0–0.9)
MONOCYTES NFR BLD AUTO: 6.4 % — SIGNIFICANT CHANGE UP (ref 2–14)
NEUTROPHILS # BLD AUTO: 5.15 K/UL — SIGNIFICANT CHANGE UP (ref 1.8–7.4)
NEUTROPHILS NFR BLD AUTO: 67.2 % — SIGNIFICANT CHANGE UP (ref 43–77)
PHOSPHATE SERPL-MCNC: 3.3 MG/DL — SIGNIFICANT CHANGE UP (ref 2.4–4.7)
PLATELET # BLD AUTO: 518 K/UL — HIGH (ref 150–400)
POTASSIUM SERPL-MCNC: 4.2 MMOL/L — SIGNIFICANT CHANGE UP (ref 3.5–5.3)
POTASSIUM SERPL-SCNC: 4.2 MMOL/L — SIGNIFICANT CHANGE UP (ref 3.5–5.3)
RBC # BLD: 3.59 M/UL — LOW (ref 4.2–5.8)
RBC # FLD: 14.8 % — HIGH (ref 10.3–14.5)
SODIUM SERPL-SCNC: 136 MMOL/L — SIGNIFICANT CHANGE UP (ref 135–145)
WBC # BLD: 7.66 K/UL — SIGNIFICANT CHANGE UP (ref 3.8–10.5)
WBC # FLD AUTO: 7.66 K/UL — SIGNIFICANT CHANGE UP (ref 3.8–10.5)

## 2024-02-09 PROCEDURE — 99232 SBSQ HOSP IP/OBS MODERATE 35: CPT

## 2024-02-09 RX ORDER — MAGNESIUM SULFATE 500 MG/ML
2 VIAL (ML) INJECTION ONCE
Refills: 0 | Status: COMPLETED | OUTPATIENT
Start: 2024-02-09 | End: 2024-02-09

## 2024-02-09 RX ADMIN — MEROPENEM 1000 MILLIGRAM(S): 1 INJECTION INTRAVENOUS at 16:19

## 2024-02-09 RX ADMIN — PANTOPRAZOLE SODIUM 40 MILLIGRAM(S): 20 TABLET, DELAYED RELEASE ORAL at 17:38

## 2024-02-09 RX ADMIN — Medication 1 TABLET(S): at 12:15

## 2024-02-09 RX ADMIN — MEROPENEM 1000 MILLIGRAM(S): 1 INJECTION INTRAVENOUS at 23:49

## 2024-02-09 RX ADMIN — ENOXAPARIN SODIUM 40 MILLIGRAM(S): 100 INJECTION SUBCUTANEOUS at 17:37

## 2024-02-09 RX ADMIN — MEROPENEM 1000 MILLIGRAM(S): 1 INJECTION INTRAVENOUS at 00:53

## 2024-02-09 RX ADMIN — Medication 25 GRAM(S): at 05:44

## 2024-02-09 RX ADMIN — MEROPENEM 1000 MILLIGRAM(S): 1 INJECTION INTRAVENOUS at 08:33

## 2024-02-09 RX ADMIN — PANTOPRAZOLE SODIUM 40 MILLIGRAM(S): 20 TABLET, DELAYED RELEASE ORAL at 05:43

## 2024-02-09 RX ADMIN — CHLORHEXIDINE GLUCONATE 1 APPLICATION(S): 213 SOLUTION TOPICAL at 05:44

## 2024-02-09 NOTE — PROGRESS NOTE ADULT - ASSESSMENT
Assessment: 28 year old male with PMHx of spina bifida (wheelchair bound at baseline), scoliosis,  Shunt BIBEMS 1/14 with abdominal pain, n/v, and lethargy, found to have severe SBO, metabolic acidosis, and to be in septic and hypovolemic shock. Patient was brought to the OR 1/14 for ex lap and small bowel resection, c/b bladder perf with primary repair and partial colectomy and admitted to SICU post-op for further management. Developed mixed septic/hypovolemic shock, pneumoperitoneum, suspected UGIB.  shunt externalized. RTOR 1/22 for bladder repair, Kirkpatrick's and EGD. Course complicated by high ostomy output, now resolved, and abdominal wall abscess, s/p drain placement with IR.        PLAN:        NEURO:     - A&O x 3 at baseline      - Continue with pain control, Tylenol PRN       #Hx of  shunt      -  shunt externalized     - Open to drain, monitor output.      - Per NS, will require internalization of EVD likely with endoscopic third ventriculostomy. Currently plan to transfer to [facility] once stable for discharge as he has had previous care with Dr. Dorsey.      CV:       #Sinus tachycardia      - Significant improvement in Heart rate. Mostly in 90 to low 100s   - Continue volume resuscitation as needed   - Pt well appearing, afebrile, without leukocytosis and with stable Hg/Hct     - B/L LE venous duplex negative, CTA chest 1/30 negative for PE   - Continue to monitor on telemetry       PULM:     #COVID, resolved     - Currently saturating well on RA     - Continue pulmonary toilet, IS, OOBTC       - Maintain SpO2 > 92%      GI/Nutrition: SBO s/p SBR, Kirkpatrick's and bladder repair     - Diet: Regular high fiber     - MVI daily      - Psyllium - 1 packet daily    - Severe protein calorie malnutrition- encourage high protein diet as well, added ensures to patient's tray      - GI – recommend PPI BID for 8 weeks, Rpt EGD in 12 weeks.    - Monitor colostomy output: Output has been high by is improving and now approx 1 L daily     - Abdominal Wall abscess S/p IR drainage on 1/31. Drain now removed by IR on 2/8.   - Monitor midline wound     /Renal:  Hx of urethral stricture now with bladder injury s/p repair x 3     - s/p bladder repair, hydronephrosis seen on CT, urology following    - monitor UO from both SP catheter and intraurethral cline catheter    - monitor and replete electrolytes as needed   - Do not flush SPT or cline     - Patient must remain decompressed until bladder injury has healed     - Continue to monitor UOP and I&Os     - IVL    - GALEN, resolved        ENDO:     - TSH WNL    - Maintain euglycemia 120-180.        ID:      - Remains afebrile without leukocytosis     - Remains on Meropenem for complicated UTI.   - Blood cx (1/19) NGTD     - CSF cx (1/21) NGTD        HEME:     - Monitor H/H     - Monitor Coags       - SCDs, lovenox 40 daily        SKIN:     - Repositioning for DTI prevention while in bed. Dressing changed       - Continue local wound care to midline wound with daily packing change and monitor output     Lines: Peripheral IV's       Dispo: SICU due to externalized EVD drain. Will follow up transfer to [facility] once patient is stable for discharge except for EVD drain.

## 2024-02-09 NOTE — PROGRESS NOTE ADULT - SUBJECTIVE AND OBJECTIVE BOX
24h Events: Decreased colostomy output yesterday. Electrolytes remain stable. Patient went to IR yesterday and had drain study done which was negative and drain was removed.       ICU Vital Signs Last 24 Hrs  T(C): 37 (09 Feb 2024 03:00), Max: 37.4 (08 Feb 2024 15:28)  T(F): 98.6 (09 Feb 2024 03:00), Max: 99.4 (08 Feb 2024 15:28)  HR: 89 (09 Feb 2024 04:00) (83 - 120)  BP: 91/63 (09 Feb 2024 04:00) (83/55 - 121/81)  BP(mean): 73 (09 Feb 2024 04:00) (65 - 93)  ABP: --  ABP(mean): --  RR: 16 (09 Feb 2024 04:00) (10 - 20)  SpO2: 100% (09 Feb 2024 04:00) (96% - 100%)    O2 Parameters below as of 09 Feb 2024 04:00  Patient On (Oxygen Delivery Method): room air      I&O's Detail    07 Feb 2024 07:01  -  08 Feb 2024 07:00  --------------------------------------------------------  IN:    multiple electrolytes Injection Type 1 Bolus: 1500 mL  Total IN: 1500 mL    OUT:    Colostomy (mL): 1100 mL    Drain (mL): 0 mL    External Ventricular Device (mL): 255 mL    Indwelling Catheter - Suprapubic (mL): 1240 mL    Indwelling Catheter - Urethral (mL): 450 mL  Total OUT: 3045 mL    Total NET: -1545 mL      08 Feb 2024 07:01  -  09 Feb 2024 04:52  --------------------------------------------------------  IN:    multiple electrolytes Injection Type 1.: 996 mL  Total IN: 996 mL    OUT:    Colostomy (mL): 350 mL    Drain (mL): 0 mL    External Ventricular Device (mL): 208 mL    Indwelling Catheter - Suprapubic (mL): 950 mL    Indwelling Catheter - Urethral (mL): 260 mL  Total OUT: 1768 mL    Total NET: -772 mL        MEDICATIONS  (STANDING):  chlorhexidine 2% Cloths 1 Application(s) Topical daily  enoxaparin Injectable 40 milliGRAM(s) SubCutaneous every 24 hours  magnesium sulfate  IVPB 2 Gram(s) IV Intermittent once  meropenem Injectable 1000 milliGRAM(s) IV Push every 8 hours  multiple electrolytes Injection Type 1 1000 milliLiter(s) (84 mL/Hr) IV Continuous <Continuous>  multivitamin 1 Tablet(s) Oral daily  pantoprazole  Injectable 40 milliGRAM(s) IV Push two times a day  psyllium Powder 1 Packet(s) Oral daily    MEDICATIONS  (PRN):  acetaminophen     Tablet .. 975 milliGRAM(s) Oral every 6 hours PRN Mild Pain (1 - 3)      Physical Exam:    General: NAD, well-appearing, resting comfortably in bed      Neurological:  GCS  15, CAM negative, B/L lower extremities with plegia, decreased sensation T8 down (baseline)      EVD with clear fluid     HEENT:  EOMI      Respiratory: Unlabored, no accessory muscle use      Cardiovascular: Regular rhythm, sinus tachycardia/NSR       Gastrointestinal: Softly distended, non-tender to palpation, colostomy pink with formed brown stool output.       : SPT in place draining yellow urine with sediment/debris, Sharp in place also with yellow urine with sediment.       Extremities: shorted/contracted (baseline)       Vascular: Equal and normal pulses: 2+ peripheral pulses throughout      Skin: Midline wound with serous drainage. Packed with dressing placed       LABS:  CBC Full  -  ( 09 Feb 2024 04:00 )  WBC Count : 7.66 K/uL  RBC Count : 3.59 M/uL  Hemoglobin : 9.6 g/dL  Hematocrit : 30.3 %  Platelet Count - Automated : 518 K/uL  Mean Cell Volume : 84.4 fl  Mean Cell Hemoglobin : 26.7 pg  Mean Cell Hemoglobin Concentration : 31.7 gm/dL  Auto Neutrophil # : 5.15 K/uL  Auto Lymphocyte # : 1.55 K/uL  Auto Monocyte # : 0.49 K/uL  Auto Eosinophil # : 0.39 K/uL  Auto Basophil # : 0.05 K/uL  Auto Neutrophil % : 67.2 %  Auto Lymphocyte % : 20.2 %  Auto Monocyte % : 6.4 %  Auto Eosinophil % : 5.1 %  Auto Basophil % : 0.7 %    02-09    136  |  100  |  10.0  ----------------------------<  90  4.2   |  26.0  |  0.58    Ca    8.9      09 Feb 2024 04:00  Phos  3.3     02-09  Mg     1.8     02-09        Urinalysis Basic - ( 09 Feb 2024 04:00 )    Color: x / Appearance: x / SG: x / pH: x  Gluc: 90 mg/dL / Ketone: x  / Bili: x / Urobili: x   Blood: x / Protein: x / Nitrite: x   Leuk Esterase: x / RBC: x / WBC x   Sq Epi: x / Non Sq Epi: x / Bacteria: x      RECENT CULTURES:

## 2024-02-09 NOTE — PROGRESS NOTE ADULT - NS ATTEND AMEND GEN_ALL_CORE FT
Patient seen and examined on AM SICU rounds 2/9/24  Doing well  Neurologically stable  Hemodynamically stable  Oxygenation ok  WBC=WNL, on Meropenem for possible UTI (day #3 of 5)  Decreased colostomy output today, no need for meds to slow down ostomy output    - Discussed with patient - may need internalization of  shunt at [facility], as not accepted at [facility] at this time

## 2024-02-09 NOTE — PROGRESS NOTE ADULT - ASSESSMENT
28M PMH spina bifida (wheelchair bound), scoliosis, hydrocephalus s/p  shunt placement with multiple revisions followed by Dr. Dorsey at [facility], presented to [facility] with concern for SBO s/p ex lap, partial colectomy ccb bladder perforation with repair on 1/14, shunt externalization on 1/21, s/p abdominal wall fluid collection drain placed by IR on 1/31. IR drain removed 2/7.    Plan:  - Q4 neuro checks   - No neurosurgical intervention at this time  - Maintain externalized shunt, record hourly outputs  - Do not clamp externalized  shunt  - Pain control as needed, avoid over sedation  - Patient pending transfer to [facility] when medically stable; patient follows with Dr. Dorsey  - Normotensive SBP goals  - Lovenox 40mg, SCDs for DVT ppx  - Further medical/supportive management per SICU team   - Discussed with Dr. Harris

## 2024-02-09 NOTE — PROGRESS NOTE ADULT - SUBJECTIVE AND OBJECTIVE BOX
HPI:  28M w/ PMH of spina bifida and scoliosis, wheelchair bound at baseline, presenting with abdominal pain, n/v. Pt states that the pain began yesterday morning and has been persistent. He had a bowel movement this morning but prior to that his last bowel movement was one week prior. Denies passing gas. Denies fevers at home. Has been vomiting and nauseous. At home he was lethargic and EMS was called. At that time he was hypotensive and tachycardic and he was BIBEMS to [facility] ED for further workup. On arrival he was tachycardic to the 130s and hypotensive to 80s/50s. Labs notable for K 3.4, bicarb 13, Cr 2.04. Actively vomiting in ED. CT scan showed dilated bowel and stomach consistent with severe SBO with transition point in mid abdomen. Surgery consulted for management.     INTERVAL HPI/OVERNIGHT EVENTS:  28M s/p shunt externalization on 1/21, s/p abdominal wall fluid collection drainage on 1/31 in IR. Patient seen and examined at bedside this AM on rounds. Patient lying comfortably in bed. Denies any headache, dizziness, N/V. No overnight events reported. When patient medically optimized, plan is to transfer to [facility] NSICU for internalization of shunt, possible endoscopic third ventriculostomy, patient follows with Dr. Dorsey.   1/21: Shunt externalized at bedside  1/31: Abdominal drain placed  2/7: Abdominal drain removed by IR  2/9: Externalized ventricular shunt output: 122/235cc overnight     Vital Signs Last 24 Hrs  T(C): 36.8 (09 Feb 2024 11:19), Max: 37.4 (08 Feb 2024 15:28)  T(F): 98.3 (09 Feb 2024 11:19), Max: 99.4 (08 Feb 2024 15:28)  HR: 105 (09 Feb 2024 11:00) (81 - 120)  BP: 95/59 (09 Feb 2024 11:00) (83/55 - 121/81)  BP(mean): 71 (09 Feb 2024 11:00) (65 - 93)  RR: 17 (09 Feb 2024 11:00) (10 - 20)  SpO2: 99% (09 Feb 2024 11:00) (87% - 100%)  Parameters below as of 09 Feb 2024 04:00  Patient On (Oxygen Delivery Method): room air    PHYSICAL EXAM:  GENERAL: NAD, well-groomed  HEAD:  Atraumatic, normocephalic  SHUNT: Externalized, clear-straw colored CSF draining  MENTAL STATUS: AAOx3; awake; opens eyes spontaneously; appropriately conversant without aphasia; following simple commands  CRANIAL NERVES: PERRL. EOMI without nystagmus. Facial sensation intact V1-3 distribution b/l. Face symmetric, tongue midline. Hearing grossly intact. Speech clear. Head turning and shoulder shrug intact.   MOTOR: RUE 5/5, LUE 5/5, b/l LE 0/5 and contracted at baseline  SENSATION: Grossly intact to light touch all extremities on upper extremities  CHEST/LUNG: Non-labored breathing on room air  SKIN: Warm, dry    LABS:                        9.6    7.66  )-----------( 518      ( 09 Feb 2024 04:00 )             30.3     02-09    136  |  100  |  10.0  ----------------------------<  90  4.2   |  26.0  |  0.58    Ca    8.9      09 Feb 2024 04:00  Phos  3.3     02-09  Mg     1.8     02-09    Urinalysis Basic - ( 09 Feb 2024 04:00 )  Color: x / Appearance: x / SG: x / pH: x  Gluc: 90 mg/dL / Ketone: x  / Bili: x / Urobili: x   Blood: x / Protein: x / Nitrite: x   Leuk Esterase: x / RBC: x / WBC x   Sq Epi: x / Non Sq Epi: x / Bacteria:     02-08 @ 07:01  -  02-09 @ 07:00  --------------------------------------------------------  IN: 1046 mL / OUT: 2055 mL / NET: -1009 mL    02-09 @ 07:01  - 02-09 @ 13:41  --------------------------------------------------------  IN: 240 mL / OUT: 275 mL / NET: -35 mL    RADIOLOGY & ADDITIONAL TESTS:    CT Abdomen and Pelvis w/ Oral Cont and w/ IV Cont (01.30.24 @ 17:58)  IMPRESSION:  No pulmonary embolism through the level of the lobar pulmonary arteries.   Evaluation of more distal segmental and subsegmental pulmonary arteries is limited by suboptimal contrast bolus timing.  Trace bibasilar pleural effusion and atelectasis of posterior basilar segment of right lower lobe.    Interval repositioning of 2  shunt with tips of catheter now terminating subcutaneously at the level of xiphoid process  Patient is status post Akash procedure and small bowel resection and a new ostomy in left lower quadrant abdominal abdomen.   No evidence of extravasation of the contrast from the bowel loops proximal to the ostomy site.  7.2 x 3.1 cm loculated fluid collection/abscess in the left anterolateral abdominal wall.  Postsurgical changes and thickening of urinary bladder wall.    CT Pelvis No Cont (01.21.24 @ 11:51)   IMPRESSION:  Intraperitoneal bladder perforation. Moderate to large amount of   extraluminal contrast surrounding small bowel. Moderate amount of   pneumoperitoneum. Extraluminal contrast and gas is also seen tracking   through the midline ventral abdominal wall incision.    Redemonstrated tract extending from the right bladder wall to the skin   in the right lower quadrant. Question a urostomy. Correlate with patient history.    Irregular cavity within the prostate measuring 3.1 x 1.8 cm that is   contiguous with the urethra. A few coarse calcifications within the   prostate are possibly calculi layering within the cavity.    Unchanged mild bilateral hydroureteronephrosis to the level of the   bladder. Unchanged urothelial thickening of the bilateral collecting   systems and ureters. Correlate for urinary tract infection.    Diffuse wall thickening of the imaged small bowel and sigmoid colon,   which could be reactive or representative of enterocolitis.    CT Head No Cont (01.16.24 @ 10:57)  IMPRESSION: Stable follow-up CT exam when compared with 1/14/2024.

## 2024-02-09 NOTE — PROGRESS NOTE ADULT - NS ATTEND AMEND GEN_ALL_CORE FT
Patient seen and examined on AM SICU rounds  Neurologically stable  Hemodynamically stable  Oxygenating well  On Meropenem for possible UTI (day #3 of 5)  Colostomy with decreased output last 24 hours    - VIR drain removed  - If not accepted by [facility], will discuss internalization of  shunt at [facility]

## 2024-02-10 PROCEDURE — 99024 POSTOP FOLLOW-UP VISIT: CPT

## 2024-02-10 PROCEDURE — 99232 SBSQ HOSP IP/OBS MODERATE 35: CPT

## 2024-02-10 RX ADMIN — MEROPENEM 1000 MILLIGRAM(S): 1 INJECTION INTRAVENOUS at 09:46

## 2024-02-10 RX ADMIN — Medication 1 TABLET(S): at 11:55

## 2024-02-10 RX ADMIN — MEROPENEM 1000 MILLIGRAM(S): 1 INJECTION INTRAVENOUS at 17:08

## 2024-02-10 RX ADMIN — ENOXAPARIN SODIUM 40 MILLIGRAM(S): 100 INJECTION SUBCUTANEOUS at 17:09

## 2024-02-10 RX ADMIN — PANTOPRAZOLE SODIUM 40 MILLIGRAM(S): 20 TABLET, DELAYED RELEASE ORAL at 17:09

## 2024-02-10 RX ADMIN — Medication 1 PACKET(S): at 11:57

## 2024-02-10 RX ADMIN — CHLORHEXIDINE GLUCONATE 1 APPLICATION(S): 213 SOLUTION TOPICAL at 05:10

## 2024-02-10 RX ADMIN — MEROPENEM 1000 MILLIGRAM(S): 1 INJECTION INTRAVENOUS at 23:14

## 2024-02-10 RX ADMIN — PANTOPRAZOLE SODIUM 40 MILLIGRAM(S): 20 TABLET, DELAYED RELEASE ORAL at 05:10

## 2024-02-10 NOTE — PROGRESS NOTE ADULT - SUBJECTIVE AND OBJECTIVE BOX
HPI:  28M w/ PMH of spina bifida and scoliosis, wheelchair bound at baseline, presenting with abdominal pain, n/v. Pt states that the pain began yesterday morning and has been persistent. He had a bowel movement this morning but prior to that his last bowel movement was one week prior. Denies passing gas. Denies fevers at home. Has been vomiting and nauseous. At home he was lethargic and EMS was called. At that time he was hypotensive and tachycardic and he was BIBEMS to [facility] ED for further workup. On arrival he was tachycardic to the 130s and hypotensive to 80s/50s. Labs notable for K 3.4, bicarb 13, Cr 2.04. Actively vomiting in ED. CT scan showed dilated bowel and stomach consistent with severe SBO with transition point in mid abdomen. Surgery consulted for management.     INTERVAL HPI/OVERNIGHT EVENTS:  28M s/p shunt externalization on 1/21, s/p abdominal wall fluid collection drainage on 1/31 in IR. Patient seen and examined at bedside this AM on rounds. Patient lying comfortably in bed. Denies any headache, dizziness, N/V. No overnight events reported. When patient medically optimized, plan is to transfer to [facility] NSICU for internalization of shunt, possible endoscopic third ventriculostomy, patient follows with Dr. Dorsey.   1/21: Shunt externalized at bedside  1/31: Abdominal drain placed  2/7: Abdominal drain removed by IR      Vital Signs Last 24 Hrs  T(C): 36.7 (02-10-24 @ 11:00), Max: 37.9 (02-09-24 @ 19:40)  T(F): 98 (02-10-24 @ 11:00), Max: 100.3 (02-09-24 @ 19:40)  HR: 111 (02-10-24 @ 11:00) (89 - 116)  BP: 92/61 (02-10-24 @ 11:00) (92/61 - 115/72)  BP(mean): 72 (02-10-24 @ 11:00) (70 - 86)  RR: 18 (02-10-24 @ 11:00) (10 - 23)  SpO2: 98% (02-10-24 @ 11:00) (90% - 100%)    PHYSICAL EXAM:  GENERAL: NAD, well-groomed  HEAD:  Atraumatic, normocephalic  SHUNT: Externalized, clear colored CSF draining; externalization site dressing c/d/i  MENTAL STATUS: AAOx3; awake; opens eyes spontaneously; appropriately conversant without aphasia; following simple commands  CRANIAL NERVES: PERRL. EOMI without nystagmus. Facial sensation intact V1-3 distribution b/l. Face symmetric, tongue midline. Hearing grossly intact. Speech clear. Head turning and shoulder shrug intact.   MOTOR: RUE 5/5, LUE 5/5, b/l LE 0/5 and contracted at baseline  SENSATION: Grossly intact to light touch all extremities on upper extremities  CHEST/LUNG: Non-labored breathing on room air  SKIN: Warm, dry    LABS:                                   9.6    7.66  )-----------( 518      ( 09 Feb 2024 04:00 )             30.3   02-09    136  |  100  |  10.0  ----------------------------<  90  4.2   |  26.0  |  0.58    Ca    8.9      09 Feb 2024 04:00  Phos  3.3     02-09  Mg     1.8     02-09      RADIOLOGY & ADDITIONAL TESTS:    CT Abdomen and Pelvis w/ Oral Cont and w/ IV Cont (01.30.24 @ 17:58)  IMPRESSION:  No pulmonary embolism through the level of the lobar pulmonary arteries.   Evaluation of more distal segmental and subsegmental pulmonary arteries is limited by suboptimal contrast bolus timing.  Trace bibasilar pleural effusion and atelectasis of posterior basilar segment of right lower lobe.    Interval repositioning of 2  shunt with tips of catheter now terminating subcutaneously at the level of xiphoid process  Patient is status post Akash procedure and small bowel resection and a new ostomy in left lower quadrant abdominal abdomen.   No evidence of extravasation of the contrast from the bowel loops proximal to the ostomy site.  7.2 x 3.1 cm loculated fluid collection/abscess in the left anterolateral abdominal wall.  Postsurgical changes and thickening of urinary bladder wall.    CT Pelvis No Cont (01.21.24 @ 11:51)   IMPRESSION:  Intraperitoneal bladder perforation. Moderate to large amount of   extraluminal contrast surrounding small bowel. Moderate amount of   pneumoperitoneum. Extraluminal contrast and gas is also seen tracking   through the midline ventral abdominal wall incision.    Redemonstrated tract extending from the right bladder wall to the skin   in the right lower quadrant. Question a urostomy. Correlate with patient history.    Irregular cavity within the prostate measuring 3.1 x 1.8 cm that is   contiguous with the urethra. A few coarse calcifications within the   prostate are possibly calculi layering within the cavity.    Unchanged mild bilateral hydroureteronephrosis to the level of the   bladder. Unchanged urothelial thickening of the bilateral collecting   systems and ureters. Correlate for urinary tract infection.    Diffuse wall thickening of the imaged small bowel and sigmoid colon,   which could be reactive or representative of enterocolitis.    CT Head No Cont (01.16.24 @ 10:57)  IMPRESSION: Stable follow-up CT exam when compared with 1/14/2024.

## 2024-02-10 NOTE — PROGRESS NOTE ADULT - ASSESSMENT
28M PMH spina bifida (wheelchair bound), scoliosis, hydrocephalus s/p  shunt placement with multiple revisions followed by Dr. Dorsey at [facility], presented to [facility] with concern for SBO s/p ex lap, partial colectomy ccb bladder perforation with repair on 1/14, shunt externalization on 1/21, s/p abdominal wall fluid collection drain placed by IR on 1/31. IR drain removed 2/7.    Plan:    - Q4 neuro checks   - No neurosurgical intervention at this time  - Maintain externalized shunt, record hourly outputs  - Do not clamp externalized  shunt  - Pain control as needed, avoid over sedation  - Patient pending transfer to [facility] when medically stable; patient follows with Dr. Dorsey  - Normotensive SBP goals  - Lovenox 40mg, SCDs for DVT ppx  - Further medical/supportive management per SICU team   - Discussed with Dr. Parker

## 2024-02-10 NOTE — PROGRESS NOTE ADULT - SUBJECTIVE AND OBJECTIVE BOX
24h Events: Patient remains stable overall. Ongoing discussions with possible transfer to [facility] vs. Internalization of  shunt here.       ICU Vital Signs Last 24 Hrs  T(C): 36.6 (10 Feb 2024 03:44), Max: 37.9 (09 Feb 2024 19:40)  T(F): 97.9 (10 Feb 2024 03:44), Max: 100.3 (09 Feb 2024 19:40)  HR: 92 (10 Feb 2024 05:00) (89 - 116)  BP: 102/64 (10 Feb 2024 05:00) (90/54 - 107/73)  BP(mean): 75 (10 Feb 2024 05:00) (66 - 84)  ABP: --  ABP(mean): --  RR: 13 (10 Feb 2024 05:00) (10 - 23)  SpO2: 99% (10 Feb 2024 05:00) (87% - 100%)        I&O's Detail    08 Feb 2024 07:01  -  09 Feb 2024 07:00  --------------------------------------------------------  IN:    IV PiggyBack: 50 mL    multiple electrolytes Injection Type 1.: 996 mL  Total IN: 1046 mL    OUT:    Colostomy (mL): 450 mL    Drain (mL): 0 mL    External Ventricular Device (mL): 235 mL    Indwelling Catheter - Suprapubic (mL): 1080 mL    Indwelling Catheter - Urethral (mL): 290 mL  Total OUT: 2055 mL    Total NET: -1009 mL      09 Feb 2024 07:01  -  10 Feb 2024 06:20  --------------------------------------------------------  IN:    Oral Fluid: 440 mL  Total IN: 440 mL    OUT:    External Ventricular Device (mL): 210 mL    Indwelling Catheter - Suprapubic (mL): 990 mL    Indwelling Catheter - Urethral (mL): 210 mL  Total OUT: 1410 mL    Total NET: -970 mL      MEDICATIONS  (STANDING):  chlorhexidine 2% Cloths 1 Application(s) Topical daily  enoxaparin Injectable 40 milliGRAM(s) SubCutaneous every 24 hours  meropenem Injectable 1000 milliGRAM(s) IV Push every 8 hours  multivitamin 1 Tablet(s) Oral daily  pantoprazole  Injectable 40 milliGRAM(s) IV Push two times a day  psyllium Powder 1 Packet(s) Oral daily    MEDICATIONS  (PRN):  acetaminophen     Tablet .. 975 milliGRAM(s) Oral every 6 hours PRN Mild Pain (1 - 3)      Physical Exam:    General: NAD, well-appearing, resting comfortably in bed      Neurological:  GCS  15, CAM negative, B/L lower extremities with plegia, decreased sensation T8 down (baseline)      EVD with clear fluid     HEENT:  EOMI      Respiratory: Unlabored, no accessory muscle use      Cardiovascular: Regular rhythm, sinus tachycardia/NSR       Gastrointestinal: Softly distended, non-tender to palpation, colostomy pink with formed brown stool output.       : SPT in place draining yellow urine with sediment/debris, Sharp in place also with yellow urine with sediment.       Extremities: shorted/contracted (baseline)       Vascular: Equal and normal pulses: 2+ peripheral pulses throughout      Skin: Midline wound with serous drainage. Packed with dressing placed     LABS:  CBC Full  -  ( 09 Feb 2024 04:00 )  WBC Count : 7.66 K/uL  RBC Count : 3.59 M/uL  Hemoglobin : 9.6 g/dL  Hematocrit : 30.3 %  Platelet Count - Automated : 518 K/uL  Mean Cell Volume : 84.4 fl  Mean Cell Hemoglobin : 26.7 pg  Mean Cell Hemoglobin Concentration : 31.7 gm/dL  Auto Neutrophil # : 5.15 K/uL  Auto Lymphocyte # : 1.55 K/uL  Auto Monocyte # : 0.49 K/uL  Auto Eosinophil # : 0.39 K/uL  Auto Basophil # : 0.05 K/uL  Auto Neutrophil % : 67.2 %  Auto Lymphocyte % : 20.2 %  Auto Monocyte % : 6.4 %  Auto Eosinophil % : 5.1 %  Auto Basophil % : 0.7 %    02-09    136  |  100  |  10.0  ----------------------------<  90  4.2   |  26.0  |  0.58    Ca    8.9      09 Feb 2024 04:00  Phos  3.3     02-09  Mg     1.8     02-09        Urinalysis Basic - ( 09 Feb 2024 04:00 )    Color: x / Appearance: x / SG: x / pH: x  Gluc: 90 mg/dL / Ketone: x  / Bili: x / Urobili: x   Blood: x / Protein: x / Nitrite: x   Leuk Esterase: x / RBC: x / WBC x   Sq Epi: x / Non Sq Epi: x / Bacteria: x      RECENT CULTURES:

## 2024-02-10 NOTE — PROGRESS NOTE ADULT - NS ATTEND AMEND GEN_ALL_CORE FT
Patient was seen and examined at bedside with the SICU team.    No acute events overnight.  Patient appears well this morning.  Neuro: Awake and alert,  shunt externalized.  Awaiting internalization.  Card:  HDS, heart rate responsive to IVF bolus.  Resp: Saturating well on room air.  GI: Regular diet, normal BMs.  : S/p bladder repair with SPC and Sharp for drainage, appropriate UOP.  Now with vesiculocutaneous fistula.    Heme: Transfuse PRN  ID: Trend WBC, culture if febrile.  On Meropenem for ureteral inflammation and pyelonephritis.  Endo: BG monitoring per protocol.  Dispo: Continued SICU care.

## 2024-02-10 NOTE — PROGRESS NOTE ADULT - ASSESSMENT
Assessment: 28 year old male with PMHx of spina bifida (wheelchair bound at baseline), scoliosis,  Shunt BIBEMS 1/14 with abdominal pain, n/v, and lethargy, found to have severe SBO, metabolic acidosis, and to be in septic and hypovolemic shock. Patient was brought to the OR 1/14 for ex lap and small bowel resection, c/b bladder perf with primary repair and partial colectomy and admitted to SICU post-op for further management. Developed mixed septic/hypovolemic shock, pneumoperitoneum, suspected UGIB.  shunt externalized. RTOR 1/22 for bladder repair, Kirkpatrick's and EGD. Course complicated by high ostomy output, now resolved, and abdominal wall abscess, s/p drain placement with IR.            PLAN:              NEURO:       - A&O x 3 at baseline        - Continue with pain control, Tylenol PRN         #Hx of  shunt        -  shunt externalized       - Open to drain, monitor output.        - Per NS, will require internalization of EVD likely with endoscopic third ventriculostomy. f/u plan for internalization at [facility] vs. [facility].            CV:          #Sinus tachycardia        - Significant improvement in Heart rate. Mostly in 90 to low 100s     - Continue volume resuscitation as needed     - Pt well appearing, afebrile, without leukocytosis and with stable Hg/Hct       - B/L LE venous duplex negative, CTA chest 1/30 negative for PE     - Continue to monitor on telemetry             PULM:       #COVID, resolved       - Currently saturating well on RA       - Continue pulmonary toilet, IS, OOBTC         - Maintain SpO2 > 92%            GI/Nutrition: SBO s/p SBR, Kirkpatrick's and bladder repair       - Diet: Regular high fiber       - MVI daily        - Psyllium - 1 packet daily      - Severe protein calorie malnutrition- encourage high protein diet as well, added ensures to patient's tray        - GI – recommend PPI BID for 8 weeks, Rpt EGD in 12 weeks.      - Monitor colostomy output: Output has been high by is improving and now approx 1 L daily       - Abdominal Wall abscess S/p IR drainage on 1/31. Drain now removed by IR on 2/8.     - Monitor midline wound           /Renal:  Hx of urethral stricture now with bladder injury s/p repair x 3       - s/p bladder repair, hydronephrosis seen on CT, urology following      - monitor UO from both SP catheter and intraurethral cline catheter      - monitor and replete electrolytes as needed     - Do not flush SPT or cline       - Patient must remain decompressed until bladder injury has healed       - Continue to monitor UOP and I&Os       - IVL      - GALEN, resolved             ENDO:       - TSH WNL      - Maintain euglycemia 120-180.              ID:        - Remains afebrile without leukocytosis       - Remains on Meropenem for complicated UTI.     - Blood cx (1/19) NGTD       - CSF cx (1/21) NGTD              HEME:       - Monitor H/H       - Monitor Coags         - SCDs, lovenox 40 daily              SKIN:       - Repositioning for DTI prevention while in bed. Dressing changed         - Continue local wound care to midline wound with daily packing change and monitor output          Lines: Peripheral IV's             Dispo: SICU due to externalized EVD drain. Will follow up transfer to [facility] for internalization of  shunt vs having it done here.

## 2024-02-11 LAB
ANION GAP SERPL CALC-SCNC: 12 MMOL/L — SIGNIFICANT CHANGE UP (ref 5–17)
BASOPHILS # BLD AUTO: 0.05 K/UL — SIGNIFICANT CHANGE UP (ref 0–0.2)
BASOPHILS NFR BLD AUTO: 0.6 % — SIGNIFICANT CHANGE UP (ref 0–2)
BUN SERPL-MCNC: 18.7 MG/DL — SIGNIFICANT CHANGE UP (ref 8–20)
CALCIUM SERPL-MCNC: 9.3 MG/DL — SIGNIFICANT CHANGE UP (ref 8.4–10.5)
CHLORIDE SERPL-SCNC: 102 MMOL/L — SIGNIFICANT CHANGE UP (ref 96–108)
CO2 SERPL-SCNC: 26 MMOL/L — SIGNIFICANT CHANGE UP (ref 22–29)
CREAT SERPL-MCNC: 0.72 MG/DL — SIGNIFICANT CHANGE UP (ref 0.5–1.3)
EGFR: 126 ML/MIN/1.73M2 — SIGNIFICANT CHANGE UP
EOSINOPHIL # BLD AUTO: 0.36 K/UL — SIGNIFICANT CHANGE UP (ref 0–0.5)
EOSINOPHIL NFR BLD AUTO: 4.2 % — SIGNIFICANT CHANGE UP (ref 0–6)
GLUCOSE SERPL-MCNC: 94 MG/DL — SIGNIFICANT CHANGE UP (ref 70–99)
HCT VFR BLD CALC: 33.3 % — LOW (ref 39–50)
HGB BLD-MCNC: 10.8 G/DL — LOW (ref 13–17)
IMM GRANULOCYTES NFR BLD AUTO: 0.3 % — SIGNIFICANT CHANGE UP (ref 0–0.9)
LYMPHOCYTES # BLD AUTO: 1.92 K/UL — SIGNIFICANT CHANGE UP (ref 1–3.3)
LYMPHOCYTES # BLD AUTO: 22.3 % — SIGNIFICANT CHANGE UP (ref 13–44)
MAGNESIUM SERPL-MCNC: 1.8 MG/DL — SIGNIFICANT CHANGE UP (ref 1.6–2.6)
MCHC RBC-ENTMCNC: 27.7 PG — SIGNIFICANT CHANGE UP (ref 27–34)
MCHC RBC-ENTMCNC: 32.4 GM/DL — SIGNIFICANT CHANGE UP (ref 32–36)
MCV RBC AUTO: 85.4 FL — SIGNIFICANT CHANGE UP (ref 80–100)
MONOCYTES # BLD AUTO: 0.7 K/UL — SIGNIFICANT CHANGE UP (ref 0–0.9)
MONOCYTES NFR BLD AUTO: 8.1 % — SIGNIFICANT CHANGE UP (ref 2–14)
NEUTROPHILS # BLD AUTO: 5.54 K/UL — SIGNIFICANT CHANGE UP (ref 1.8–7.4)
NEUTROPHILS NFR BLD AUTO: 64.5 % — SIGNIFICANT CHANGE UP (ref 43–77)
PHOSPHATE SERPL-MCNC: 3.5 MG/DL — SIGNIFICANT CHANGE UP (ref 2.4–4.7)
PLATELET # BLD AUTO: 470 K/UL — HIGH (ref 150–400)
POTASSIUM SERPL-MCNC: 4.4 MMOL/L — SIGNIFICANT CHANGE UP (ref 3.5–5.3)
POTASSIUM SERPL-SCNC: 4.4 MMOL/L — SIGNIFICANT CHANGE UP (ref 3.5–5.3)
RBC # BLD: 3.9 M/UL — LOW (ref 4.2–5.8)
RBC # FLD: 14.9 % — HIGH (ref 10.3–14.5)
SODIUM SERPL-SCNC: 140 MMOL/L — SIGNIFICANT CHANGE UP (ref 135–145)
WBC # BLD: 8.6 K/UL — SIGNIFICANT CHANGE UP (ref 3.8–10.5)
WBC # FLD AUTO: 8.6 K/UL — SIGNIFICANT CHANGE UP (ref 3.8–10.5)

## 2024-02-11 PROCEDURE — 99024 POSTOP FOLLOW-UP VISIT: CPT

## 2024-02-11 PROCEDURE — 99232 SBSQ HOSP IP/OBS MODERATE 35: CPT

## 2024-02-11 RX ORDER — ALBUMIN HUMAN 25 %
250 VIAL (ML) INTRAVENOUS EVERY 6 HOURS
Refills: 0 | Status: COMPLETED | OUTPATIENT
Start: 2024-02-11 | End: 2024-02-12

## 2024-02-11 RX ORDER — MAGNESIUM SULFATE 500 MG/ML
2 VIAL (ML) INJECTION ONCE
Refills: 0 | Status: COMPLETED | OUTPATIENT
Start: 2024-02-11 | End: 2024-02-11

## 2024-02-11 RX ADMIN — CHLORHEXIDINE GLUCONATE 1 APPLICATION(S): 213 SOLUTION TOPICAL at 07:01

## 2024-02-11 RX ADMIN — MEROPENEM 1000 MILLIGRAM(S): 1 INJECTION INTRAVENOUS at 17:07

## 2024-02-11 RX ADMIN — MEROPENEM 1000 MILLIGRAM(S): 1 INJECTION INTRAVENOUS at 09:25

## 2024-02-11 RX ADMIN — Medication 25 GRAM(S): at 05:07

## 2024-02-11 RX ADMIN — PANTOPRAZOLE SODIUM 40 MILLIGRAM(S): 20 TABLET, DELAYED RELEASE ORAL at 05:06

## 2024-02-11 RX ADMIN — Medication 1 TABLET(S): at 12:14

## 2024-02-11 RX ADMIN — Medication 1 PACKET(S): at 12:14

## 2024-02-11 RX ADMIN — PANTOPRAZOLE SODIUM 40 MILLIGRAM(S): 20 TABLET, DELAYED RELEASE ORAL at 17:09

## 2024-02-11 RX ADMIN — ENOXAPARIN SODIUM 40 MILLIGRAM(S): 100 INJECTION SUBCUTANEOUS at 17:08

## 2024-02-11 NOTE — PROGRESS NOTE ADULT - ASSESSMENT
28 year old male with PMHx of spina bifida (wheelchair bound at baseline), scoliosis,  Shunt BIBEMS 1/14 with abdominal pain, n/v, and lethargy, found to have severe SBO, metabolic acidosis, and to be in septic and hypovolemic shock. Patient was brought to the OR 1/14 for ex lap and small bowel resection, c/b bladder perf with primary repair and partial colectomy and admitted to SICU post-op for further management. Developed mixed septic/hypovolemic shock, pneumoperitoneum, suspected UGIB.  shunt externalized. RTOR 1/22 for bladder repair, Kirkpatrick's and EGD. Course complicated by high ostomy output, now resolved, and abdominal wall abscess, s/p drain placement with IR.          NEURO:  Continue with pain control, Tylenol PRN.     Hx of  shunt-  shunt externalized. Open to drain, monitor output.      Possible plan for transfer to [facility] endoscopic third ventriculostomy vs internalization  at [facility].     CV:   Remains hemodynamically well. Continue to monitor.         PULM:   Currently saturating well on RA.  Continue pulmonary toilet, IS, OOBTC.  Maintain SpO2 > 92%      GI/Nutrition: SBO s/p SBR, Kirkpatrick's - continue Regular high fiber, metamucil      Severe protein calorie malnutrition- encourage high protein diet as well, added ensures to patient's tray      Gastritis - GI – recommend PPI BID for 8 weeks, Rpt EGD in 12 weeks.        /Renal:  Hx of urethral stricture now with bladder injury s/p repair x 3   - s/p bladder repair, hydronephrosis seen on CT, urology following. Cont SPT & cline.  Monitor UO from both SP catheter and intraurethral cline catheter. Concern for vesicocutaneous fistula- continue wound care.     ENDO:   Maintain euglycemia 120-180.        ID: Remains afebrile without leukocytosis . Remains on Meropenem for complicated UTI until today 2/11.     HEME:   Monitor H/H     SCDs, lovenox 40 daily        SKIN:   Repositioning for DTI prevention while in bed. Dressing changed       Continue local wound care to midline wound with daily packing change and monitor output     Lines: Peripheral IV's       Dispo: Will follow up with [facility] transfer center.

## 2024-02-11 NOTE — PROGRESS NOTE ADULT - NS ATTEND AMEND GEN_ALL_CORE FT
I have seen and examined the patient during SICU multidisciplinary rounds from 9978-4998 hrs.   Events noted.    Neuro: Awake, pain under control, EVD clear CSF  CV: HD normal, perfusion is adequate  Pulm: SaO2 adequate  GI: Soft, stool in colostomy,. dressing c.d.i  : urie flow from SPT and cline clear urine,   ID:  completing 10 days of meropenem  Heme: H/H stable kalyan DVt chemoprophylaxes  Endo: glycemia at target    Plan:  Complex post op course SBO, externalized  SHunt.  NO new issues,   in ICU due to externalized  shuint.  to compete 10 days abd  Ongoing discussion with [facility] for transfer.  Mother an patient updated, all questions answered

## 2024-02-11 NOTE — PROGRESS NOTE ADULT - ASSESSMENT
28M PMH spina bifida (wheelchair bound), scoliosis, hydrocephalus s/p  shunt placement with multiple revisions followed by Dr. Dorsey at [facility], presented to [facility] with concern for SBO s/p ex lap, partial colectomy ccb bladder perforation with repair on 1/14, shunt externalization on 1/21, s/p abdominal wall fluid collection drain placed by IR on 1/31. IR drain removed 2/7.    Plan:    - Q4 neuro checks   - No neurosurgical intervention at this time  - Maintain externalized shunt, record hourly outputs; externalization site dressing changed today  - Do not clamp externalized  shunt  - Pain control as needed, avoid over sedation  - Patient pending transfer to [facility] when medically stable for  internalization of shunt v. possible endoscopic third ventriculostomy ; patient follows with Dr. Dorsey  - Normotensive SBP goals  - Lovenox 40mg, SCDs for DVT ppx  - Further medical/supportive management per SICU team   - Discussed with Dr. Parker

## 2024-02-11 NOTE — PROGRESS NOTE ADULT - SUBJECTIVE AND OBJECTIVE BOX
INTERVAL HPI/OVERNIGHT EVENTS: Patient doing well. Denies any pain. Externalized drain with csf output.  Patient's SPT and cline draining adequately.  Minimal output of midline incision. Colostomy with output.  H/h stable. Remains afebrile.       PAST MEDICAL & SURGICAL HISTORY:  Spina bifida      Scoliosis          MEDICATIONS  (STANDING):  chlorhexidine 2% Cloths 1 Application(s) Topical daily  enoxaparin Injectable 40 milliGRAM(s) SubCutaneous every 24 hours  meropenem Injectable 1000 milliGRAM(s) IV Push every 8 hours  multivitamin 1 Tablet(s) Oral daily  pantoprazole  Injectable 40 milliGRAM(s) IV Push two times a day  psyllium Powder 1 Packet(s) Oral daily    MEDICATIONS  (PRN):  acetaminophen     Tablet .. 975 milliGRAM(s) Oral every 6 hours PRN Mild Pain (1 - 3)      ICU Vital Signs Last 24 Hrs  T(C): 36.9 (11 Feb 2024 00:00), Max: 37.3 (10 Feb 2024 16:06)  T(F): 98.5 (11 Feb 2024 00:00), Max: 99.1 (10 Feb 2024 16:06)  HR: 104 (11 Feb 2024 00:00) (80 - 111)  BP: 99/71 (11 Feb 2024 00:00) (92/61 - 115/72)  BP(mean): 80 (11 Feb 2024 00:00) (70 - 86)  ABP: --  ABP(mean): --  RR: 16 (11 Feb 2024 00:00) (10 - 22)  SpO2: 100% (11 Feb 2024 00:00) (90% - 100%)    O2 Parameters below as of 10 Feb 2024 23:00  Patient On (Oxygen Delivery Method): room air            Drug Dosing Weight  Height (cm): 132.1 (14 Jan 2024 09:30)  Weight (kg): 70 (14 Jan 2024 07:48)  BMI (kg/m2): 40.1 (14 Jan 2024 09:30)  BSA (m2): 1.51 (14 Jan 2024 09:30)    CENTRAL LINE: [ ] YES [ ] NO  LOCATION:   DATE INSERTED:  REMOVE: [ ] YES [ ] NO  EXPLAIN:    CLINE: [ ] YES [ ] NO    DATE INSERTED:  REMOVE: [ ] YES [ ] NO  EXPLAIN:    A-LINE: [ ] YES [ ] NO  LOCATION:   DATE INSERTED:  REMOVE: [ ] YES [ ] NO  EXPLAIN:        I&O's Detail    09 Feb 2024 07:01  -  10 Feb 2024 07:00  --------------------------------------------------------  IN:    Oral Fluid: 440 mL  Total IN: 440 mL    OUT:    External Ventricular Device (mL): 235 mL    Indwelling Catheter - Suprapubic (mL): 1040 mL    Indwelling Catheter - Urethral (mL): 210 mL  Total OUT: 1485 mL    Total NET: -1045 mL      10 Feb 2024 07:01  -  11 Feb 2024 00:51  --------------------------------------------------------  IN:    Oral Fluid: 900 mL  Total IN: 900 mL    OUT:    External Ventricular Device (mL): 184 mL    Indwelling Catheter - Suprapubic (mL): 705 mL    Indwelling Catheter - Urethral (mL): 175 mL  Total OUT: 1064 mL    Total NET: -164 mL              Physical Exam:    General: NAD, well-appearing    Neurological:  GCS  15, CAM negative, B/L lower extremities with plegia, EVD with clear fluid     HEENT:  EOMI      Respiratory: Unlabored, no accessory muscle use      Cardiovascular: Regular rhythm, sinus tachycardia/NSR       Gastrointestinal: Softly distended, non-tender to palpation, colostomy pink with formed brown stool output.       : SPT in place draining yellow urine, , Cline in place also with yellow urine with sediment.       Extremities: contracted leg     Vascular: Equal and normal pulses: 2+ peripheral pulses throughout      Skin: Midline wound with serous drainage. Packed with dressing placed     LABS:  CBC Full  -  ( 09 Feb 2024 04:00 )  WBC Count : 7.66 K/uL  RBC Count : 3.59 M/uL  Hemoglobin : 9.6 g/dL  Hematocrit : 30.3 %  Platelet Count - Automated : 518 K/uL  Mean Cell Volume : 84.4 fl  Mean Cell Hemoglobin : 26.7 pg  Mean Cell Hemoglobin Concentration : 31.7 gm/dL  Auto Neutrophil # : 5.15 K/uL  Auto Lymphocyte # : 1.55 K/uL  Auto Monocyte # : 0.49 K/uL  Auto Eosinophil # : 0.39 K/uL  Auto Basophil # : 0.05 K/uL  Auto Neutrophil % : 67.2 %  Auto Lymphocyte % : 20.2 %  Auto Monocyte % : 6.4 %  Auto Eosinophil % : 5.1 %  Auto Basophil % : 0.7 %    02-09    136  |  100  |  10.0  ----------------------------<  90  4.2   |  26.0  |  0.58    Ca    8.9      09 Feb 2024 04:00  Phos  3.3     02-09  Mg     1.8     02-09        Urinalysis Basic - ( 09 Feb 2024 04:00 )    Color: x / Appearance: x / SG: x / pH: x  Gluc: 90 mg/dL / Ketone: x  / Bili: x / Urobili: x   Blood: x / Protein: x / Nitrite: x   Leuk Esterase: x / RBC: x / WBC x   Sq Epi: x / Non Sq Epi: x / Bacteria: x

## 2024-02-11 NOTE — PROGRESS NOTE ADULT - SUBJECTIVE AND OBJECTIVE BOX
HPI:  28M w/ PMH of spina bifida and scoliosis, wheelchair bound at baseline, presenting with abdominal pain, n/v. Pt states that the pain began yesterday morning and has been persistent. He had a bowel movement this morning but prior to that his last bowel movement was one week prior. Denies passing gas. Denies fevers at home. Has been vomiting and nauseous. At home he was lethargic and EMS was called. At that time he was hypotensive and tachycardic and he was BIBEMS to [facility] ED for further workup. On arrival he was tachycardic to the 130s and hypotensive to 80s/50s. Labs notable for K 3.4, bicarb 13, Cr 2.04. Actively vomiting in ED. CT scan showed dilated bowel and stomach consistent with severe SBO with transition point in mid abdomen. Surgery consulted for management.     INTERVAL HPI/OVERNIGHT EVENTS:  28M s/p shunt externalization on 1/21, s/p abdominal wall fluid collection drainage on 1/31 in IR. Patient seen and examined at bedside this morning on rounds. Patient lying comfortably in bed. Denies any headache, dizziness, N/V. No overnight events reported. Shunt externalization site dressing changed today.       1/21: Shunt externalized at bedside  1/31: Abdominal drain placed  2/7: Abdominal drain removed by IR      Vital Signs Last 24 Hrs  T(C): 36.4 (02-11-24 @ 11:49), Max: 37.3 (02-10-24 @ 16:06)  T(F): 97.5 (02-11-24 @ 11:49), Max: 99.1 (02-10-24 @ 16:06)  HR: 101 (02-11-24 @ 11:00) (80 - 110)  BP: 103/67 (02-11-24 @ 11:00) (95/59 - 120/77)  BP(mean): 77 (02-11-24 @ 11:00) (70 - 92)  RR: 18 (02-11-24 @ 11:00) (14 - 22)  SpO2: 96% (02-11-24 @ 11:00) (96% - 100%)    PHYSICAL EXAM:  GENERAL: NAD, well-groomed  HEAD:  Atraumatic, normocephalic  SHUNT: Externalized, clear colored CSF draining; externalization site dressing c/d/i  MENTAL STATUS: AAOx3; awake; opens eyes spontaneously; appropriately conversant without aphasia; following simple commands  CRANIAL NERVES: PERRL. EOMI without nystagmus. Facial sensation intact V1-3 distribution b/l. Face symmetric, tongue midline. Hearing grossly intact. Speech clear. Head turning and shoulder shrug intact.   MOTOR: RUE 5/5, LUE 5/5, b/l LE 0/5 and contracted at baseline  SENSATION: Grossly intact to light touch all extremities on upper extremities  CHEST/LUNG: Non-labored breathing on room air  SKIN: Warm, dry    LABS:                                   10.8   8.60  )-----------( 470      ( 11 Feb 2024 03:20 )             33.3   02-11    140  |  102  |  18.7  ----------------------------<  94  4.4   |  26.0  |  0.72    Ca    9.3      11 Feb 2024 03:20  Phos  3.5     02-11  Mg     1.8     02-11        RADIOLOGY & ADDITIONAL TESTS:    CT Abdomen and Pelvis w/ Oral Cont and w/ IV Cont (01.30.24 @ 17:58)  IMPRESSION:  No pulmonary embolism through the level of the lobar pulmonary arteries.   Evaluation of more distal segmental and subsegmental pulmonary arteries is limited by suboptimal contrast bolus timing.  Trace bibasilar pleural effusion and atelectasis of posterior basilar segment of right lower lobe.    Interval repositioning of 2  shunt with tips of catheter now terminating subcutaneously at the level of xiphoid process  Patient is status post Akash procedure and small bowel resection and a new ostomy in left lower quadrant abdominal abdomen.   No evidence of extravasation of the contrast from the bowel loops proximal to the ostomy site.  7.2 x 3.1 cm loculated fluid collection/abscess in the left anterolateral abdominal wall.  Postsurgical changes and thickening of urinary bladder wall.    CT Pelvis No Cont (01.21.24 @ 11:51)   IMPRESSION:  Intraperitoneal bladder perforation. Moderate to large amount of   extraluminal contrast surrounding small bowel. Moderate amount of   pneumoperitoneum. Extraluminal contrast and gas is also seen tracking   through the midline ventral abdominal wall incision.    Redemonstrated tract extending from the right bladder wall to the skin   in the right lower quadrant. Question a urostomy. Correlate with patient history.    Irregular cavity within the prostate measuring 3.1 x 1.8 cm that is   contiguous with the urethra. A few coarse calcifications within the   prostate are possibly calculi layering within the cavity.    Unchanged mild bilateral hydroureteronephrosis to the level of the   bladder. Unchanged urothelial thickening of the bilateral collecting   systems and ureters. Correlate for urinary tract infection.    Diffuse wall thickening of the imaged small bowel and sigmoid colon,   which could be reactive or representative of enterocolitis.    CT Head No Cont (01.16.24 @ 10:57)  IMPRESSION: Stable follow-up CT exam when compared with 1/14/2024.

## 2024-02-12 PROCEDURE — 99233 SBSQ HOSP IP/OBS HIGH 50: CPT

## 2024-02-12 RX ADMIN — Medication 125 MILLILITER(S): at 17:30

## 2024-02-12 RX ADMIN — Medication 125 MILLILITER(S): at 00:20

## 2024-02-12 RX ADMIN — PANTOPRAZOLE SODIUM 40 MILLIGRAM(S): 20 TABLET, DELAYED RELEASE ORAL at 17:30

## 2024-02-12 RX ADMIN — Medication 125 MILLILITER(S): at 11:46

## 2024-02-12 RX ADMIN — Medication 1 TABLET(S): at 11:47

## 2024-02-12 RX ADMIN — ENOXAPARIN SODIUM 40 MILLIGRAM(S): 100 INJECTION SUBCUTANEOUS at 17:31

## 2024-02-12 RX ADMIN — CHLORHEXIDINE GLUCONATE 1 APPLICATION(S): 213 SOLUTION TOPICAL at 05:53

## 2024-02-12 RX ADMIN — PANTOPRAZOLE SODIUM 40 MILLIGRAM(S): 20 TABLET, DELAYED RELEASE ORAL at 05:45

## 2024-02-12 RX ADMIN — Medication 125 MILLILITER(S): at 05:43

## 2024-02-12 RX ADMIN — Medication 975 MILLIGRAM(S): at 19:39

## 2024-02-12 RX ADMIN — Medication 975 MILLIGRAM(S): at 05:45

## 2024-02-12 NOTE — PROGRESS NOTE ADULT - NS ATTEND AMEND GEN_ALL_CORE FT
I agree with the above. I personally examined and saw the patient. Neurointact, at his baseline. Will discuss with [facility] for potential transfer.

## 2024-02-12 NOTE — PROGRESS NOTE ADULT - ASSESSMENT
28M PMH spina bifida (wheelchair bound), scoliosis, hydrocephalus s/p  shunt placement with multiple revisions followed by Dr. Dorsey at [facility], presented to [facility] with concern for SBO s/p ex lap, partial colectomy ccb bladder perforation with repair on 1/14, shunt externalization on 1/21, s/p abdominal wall fluid collection drain placed by IR on 1/31. IR drain removed 2/7.    Plan:    - Q4 neuro checks   - No neurosurgical intervention at this time  - Maintain externalized shunt, record hourly outputs; externalization site dressing changed today  - Do not clamp externalized  shunt  - Pain control as needed, avoid over sedation  - Patient pending transfer to [facility] when medically stable for  internalization of shunt v. possible endoscopic third ventriculostomy ; patient follows with Dr. Dorsey     -Will call [facility] today and try to initiate NS to NS transfer  - Normotensive SBP goals  - Lovenox 40mg, SCDs for DVT ppx  - Further medical/supportive management per SICU team   - Discussed with Dr. Richmond   28M PMH spina bifida (wheelchair bound), scoliosis, hydrocephalus s/p  shunt placement with multiple revisions followed by Dr. Dorsey at [facility], presented to [facility] with concern for SBO s/p ex lap, partial colectomy ccb bladder perforation with repair on 1/14, shunt externalization on 1/21, s/p abdominal wall fluid collection drain placed by IR on 1/31. IR drain removed 2/7.    Plan:  - Q4 neuro checks   - No neurosurgical intervention at this time  - Maintain externalized shunt, record hourly outputs; bag changed out today  - Do not clamp externalized  shunt  - Pain control as needed, avoid over sedation  - Patient pending transfer to [facility] when medically stable for  internalization of shunt v. possible endoscopic third ventriculostomy ; patient follows with Dr. Dorsey     -Will call [facility] today and try to initiate NS to NS transfer  - Normotensive SBP goals  - Lovenox 40mg, SCDs for DVT ppx  - Further medical/supportive management per SICU team   - Discussed with Dr. Richmond

## 2024-02-12 NOTE — PROGRESS NOTE ADULT - SUBJECTIVE AND OBJECTIVE BOX
Urology f/u:    POD# 29 s/p ex lap and small bowel resection, c/b bladder perf with primary repair and partial colectomy  POD# 15 s/p RTOR with repair bladder perf. and partial colectomy.     Called to see patient re-evaluate drainage from wound.   Patient seen and examined with Dr. Durant at bedside.  Patient awake, and responsive resting in bed in no acute distress.      Vital Signs Last 24 Hrs  T(C): 36.8 (12 Feb 2024 11:00), Max: 37 (11 Feb 2024 15:53)  T(F): 98.3 (12 Feb 2024 11:00), Max: 98.6 (11 Feb 2024 15:53)  HR: 82 (12 Feb 2024 10:00) (82 - 118)  BP: 110/64 (12 Feb 2024 10:00) (94/60 - 130/79)  BP(mean): 78 (12 Feb 2024 10:00) (71 - 93)  RR: 14 (12 Feb 2024 10:00) (12 - 21)  SpO2: 100% (12 Feb 2024 10:00) (96% - 100%)    Parameters below as of 12 Feb 2024 10:00  Patient On (Oxygen Delivery Method): room air    Abdomen:  soft, n/d colostomy-active and viable  SPT in place with dressing in place :  surgery site very distal region with a controlled drainage apparently urine.  Dressing changed. ( Routine dressing changes.)       :  cline in place noted with sediment  SPT noted with sediment ( surgical team gently irrigated both ( SPT, Cline ) catheters and evacuated sedimented and urine drained again.      I&O's Detail    11 Feb 2024 07:01  -  12 Feb 2024 07:00  --------------------------------------------------------  IN:    Albumin 5%  - 250 mL: 250 mL    Oral Fluid: 520 mL  Total IN: 770 mL    OUT:    External Ventricular Device (mL): 234 mL    Indwelling Catheter - Suprapubic (mL): 683 mL    Indwelling Catheter - Urethral (mL): 285 mL  Total OUT: 1202 mL    Total NET: -432 mL      12 Feb 2024 07:01  -  12 Feb 2024 12:08  --------------------------------------------------------  IN:    IV PiggyBack: 250 mL  Total IN: 250 mL    OUT:    External Ventricular Device (mL): 40 mL    Indwelling Catheter - Suprapubic (mL): 60 mL    Indwelling Catheter - Urethral (mL): 40 mL  Total OUT: 140 mL    Total NET: 110 mL      Labs:  CBC:                        10.8   8.60  )-----------( 470      ( 11 Feb 2024 03:20 )             33.3     Chemistry:  02-11    140  |  102  |  18.7  ----------------------------<  94  4.4   |  26.0  |  0.72      Impression:   Stable POD# 29  s/p  ex lap and small bowel resection, c/b bladder perf with primary repair and partial colectomy and admitted to SICU post-op for further management.   Post op day# 15; Resting in bed no acute distress.  Awake and responsive, surgical site ( midline ) with vesicocutaneous fistuale, stable and controlled.   SPT, and cline with noted sediment drainage- with need intermittent gentle irrigation via STP and cline catheter.    Discuss with Dr. Abbott present situation and continued care and management.   Plan: Continue present care and management with Primary tea, with intermittent gentle irrigation via SPT and cline as needed.  No change of cline catheter at present time . Will follow and consider removal of cline ( urethral catheter 1-2 days.  And no radiologic studies at present time .  Would consider Urtherogram possible in 2 weeks.

## 2024-02-12 NOTE — PROGRESS NOTE ADULT - ASSESSMENT
28 year old male with PMHx of spina bifida (wheelchair bound at baseline), scoliosis,  Shunt BIBEMS 1/14 with abdominal pain, n/v, and lethargy, found to have severe SBO, metabolic acidosis, and to be in septic and hypovolemic shock. Patient was brought to the OR 1/14 for ex lap and small bowel resection, c/b bladder perf with primary repair and partial colectomy and admitted to SICU post-op for further management. Developed mixed septic/hypovolemic shock, pneumoperitoneum, suspected UGIB.  shunt externalized. RTOR 1/22 for bladder repair, Kirkpatrick's and EGD. Course complicated by high ostomy output, now resolved, and abdominal wall abscess, s/p drain placement with IR.          NEURO:  Continue with pain control, Tylenol PRN.     Hx of  shunt-  shunt externalized. Open to drain, monitor output.      Will discuss with neurosurgery on plan to move shunt to ventricular pleura shunt here at [facility].  Patient is amenable to this plan.  Patient is post procedure day #21 from externalization, patient is at risk for ventriculitis.  Patient understands this.     CV:   Tachycardia appears to be related to hypovolemia - resolved with albumin.         PULM:   Currently saturating well on RA.  Continue pulmonary toilet, IS, OOBTC.  Maintain SpO2 > 92%      GI/Nutrition: SBO s/p SBR, Kirkpatrick's - continue Regular high fiber, metamucil -held at this time   Severe protein calorie malnutrition- encourage high protein diet as well, added ensures to patient's tray      Gastritis - GI – recommend PPI BID for 8 weeks, Rpt EGD in 12 weeks.        /Renal:  Hx of urethral stricture now with bladder injury s/p repair x 3   - s/p bladder repair, hydronephrosis. Cont SPT & cline.  Monitor UO from both SP catheter and intraurethral cline catheter. Concern for vesicocutaneous fistula- continue wound care, appears to be improving.     ENDO:   Maintain euglycemia 120-180.        ID: Remains afebrile without leukocytosis . Completed course of meropenum.     HEME:   Monitor H/H     SCDs, lovenox 40 daily        SKIN:   Repositioning for DTI prevention while in bed. Dressing changed       Continue local wound care to midline wound with daily packing change and monitor output     Lines: Peripheral IV's       Dispo: Will follow up with [facility] transfer center vs internalization of drain by neurosurgery at [facility].

## 2024-02-12 NOTE — PROGRESS NOTE ADULT - CRITICAL CARE ATTENDING COMMENT
x Albumin given overnight for tachycardia -resolved    GEN:  Awakens easily, conversant  ABD:        -HD stable  -Breathing comfortably on RA  -H/H stable, scds, lovenox  -Afebrile, WBC normal, meropenum continues 2/7-217  -Metamucil now held in setting of very thickened stool, seems to be alternating between copious loose and very thick stool.  Continue to monitor.  -Remains on protonix for GI bleed for 8 weeks total and will need f/u EGD  -Cr stable, adequate uop overnight but this am w/no UOP noted this am - could catheters be clogged?  Will check bedside US, will d/w urology, patient has been three weeks post op -would they want to ultimately consider evaluation bladder w/possible cystogram for possible removal?  -Lytes adequate Albumin given overnight for tachycardia -resolved    GEN:  Awakens easily, conversant  ABD:  Soft, non tender, non distended, ostomy pink w/stool, midline incision looks mostly healed , although serous drainage noted from lower midline incision  EXT:  Warm, no edema      -HD stable  -Breathing comfortably on RA  -H/H stable, scds, lovenox  -Afebrile, WBC normal, meropenum continues 2/7-217  -Metamucil now held in setting of very thickened stool, seems to be alternating between copious loose and very thick stool.  Continue to monitor.  -Remains on protonix for GI bleed for 8 weeks total and will need f/u EGD  -Cr stable, adequate uop overnight but this am w/no UOP noted this am, bedside evaluation of catheters (withdrawing back on catethers) yielded drainage of urine w/murky clots.  Will check bedside US, will d/w urology, patient has been three weeks post op -would they want to ultimately consider evaluation bladder w/possible cystogram for possible removal?  -Lytes adequate Albumin given overnight for tachycardia -resolved    GEN:  Awakens easily, conversant  ABD:  Soft, non tender, non distended, ostomy pink w/stool, midline incision looks mostly healed , although serous drainage noted from lower midline incision  EXT:  Warm, no edema    -HD stable  -Breathing comfortably on RA  -H/H stable, scds, lovenox  -Afebrile, WBC normal, meropenum continues 2/7-217 for pyelonephritis  -Metamucil now held in setting of very thickened stool, seems to be alternating between copious loose and very thick stool.  Continue to monitor.  -Remains on protonix for GI bleed for 8 weeks total and will need f/u EGD  -Cr stable, adequate uop overnight but this w/no UOP noted this am.  Bedside US shows residual fluid in bladder (both catheter balloons were able to be seen w/fluid around them).  Evaluation of catheters (withdrawing back on catheters) yielded drainage of urine w/murky clots (non bloody).  Will follow for improvement in urine output now post removal of clots.  Will d/w urology, patient has been three weeks post op -would they want to ultimately consider evaluation bladder w/possible cystogram for possible removal?  Although patient still w/intermittent leakage of urine from lower portion of midline incision - may be more prudent to keep both drainage systems in place.  -Lytes adequate  -Patient had been transferring himself OOB to chair prior to admission.  Need for him to be able to do this upon discharge.  Will reconsult physical therapy to assess patient's ability to transfer.

## 2024-02-12 NOTE — PROGRESS NOTE ADULT - SUBJECTIVE AND OBJECTIVE BOX
INTERVAL HPI/OVERNIGHT EVENTS: Patient denies pain. Still with minimal output from externalized shunt. Patient had intermittent episodes of tachycardia and blood pressure MAPs 55-60, suspected with oliguria.  Patient given albumin with adequate response in output and resolution of tachycardia.  Patient improved.  Patient with thicker colosotmy output - metamucil held. Patient SPT & cline with improved drainage. H/h stable.  Remains afebrile.     Of NOTE, there has been ongoing discussions with [facility] - appear that they are currently refusing patient.  Will discuss with neurosurgery and follow up with [facility].       PAST MEDICAL & SURGICAL HISTORY:  Spina bifida      Scoliosis          MEDICATIONS  (STANDING):  albumin human  5% IVPB 250 milliLiter(s) IV Intermittent every 6 hours  chlorhexidine 2% Cloths 1 Application(s) Topical daily  enoxaparin Injectable 40 milliGRAM(s) SubCutaneous every 24 hours  multivitamin 1 Tablet(s) Oral daily  pantoprazole  Injectable 40 milliGRAM(s) IV Push two times a day    MEDICATIONS  (PRN):  acetaminophen     Tablet .. 975 milliGRAM(s) Oral every 6 hours PRN Mild Pain (1 - 3)      ICU Vital Signs Last 24 Hrs  T(C): 36.8 (12 Feb 2024 00:00), Max: 37 (11 Feb 2024 07:33)  T(F): 98.3 (12 Feb 2024 00:00), Max: 98.6 (11 Feb 2024 07:33)  HR: 94 (12 Feb 2024 02:00) (87 - 118)  BP: 94/60 (12 Feb 2024 02:00) (94/60 - 130/79)  BP(mean): 71 (12 Feb 2024 02:00) (70 - 93)  ABP: --  ABP(mean): --  RR: 19 (12 Feb 2024 02:00) (14 - 24)  SpO2: 99% (12 Feb 2024 02:00) (96% - 100%)    O2 Parameters below as of 12 Feb 2024 00:00  Patient On (Oxygen Delivery Method): room air            Drug Dosing Weight  Height (cm): 132.1 (14 Jan 2024 09:30)  Weight (kg): 70 (14 Jan 2024 07:48)  BMI (kg/m2): 40.1 (14 Jan 2024 09:30)  BSA (m2): 1.51 (14 Jan 2024 09:30)    CENTRAL LINE: [ ] YES [ ] NO  LOCATION:   DATE INSERTED:  REMOVE: [ ] YES [ ] NO  EXPLAIN:    CLINE: [ ] YES [ ] NO    DATE INSERTED:  REMOVE: [ ] YES [ ] NO  EXPLAIN:    A-LINE: [ ] YES [ ] NO  LOCATION:   DATE INSERTED:  REMOVE: [ ] YES [ ] NO  EXPLAIN:        I&O's Detail    10 Feb 2024 07:01  -  11 Feb 2024 07:00  --------------------------------------------------------  IN:    Oral Fluid: 900 mL  Total IN: 900 mL    OUT:    Colostomy (mL): 50 mL    External Ventricular Device (mL): 280 mL    Indwelling Catheter - Suprapubic (mL): 1050 mL    Indwelling Catheter - Urethral (mL): 250 mL  Total OUT: 1630 mL    Total NET: -730 mL      11 Feb 2024 07:01  -  12 Feb 2024 02:29  --------------------------------------------------------  IN:    Albumin 5%  - 250 mL: 250 mL    Oral Fluid: 520 mL  Total IN: 770 mL    OUT:    External Ventricular Device (mL): 164 mL    Indwelling Catheter - Suprapubic (mL): 675 mL    Indwelling Catheter - Urethral (mL): 130 mL  Total OUT: 969 mL    Total NET: -199 mL              Physical Exam:  General: NAD, well-appearing, pleasant, talkative with staff     Neurological:  GCS  15, CAM negative, B/L lower extremities with plegia, EVD with clear fluid     HEENT:  EOMI      Respiratory: Unlabored, no accessory muscle use      Cardiovascular: Regular rhythm, sinus tachycardia/NSR       Gastrointestinal: Softly distended, non-tender to palpation, colostomy pink with formed brown stool output.   Midline incision with adequate granulation tissue.     : SPT in place draining yellow urine, , Cline in place also with yellow urine with sediment.       Extremities: contracted leg     Vascular: Equal and normal pulses: 2+ peripheral pulses throughout      Skin: Midline wound with serous drainage. Packed with dressing placed       LABS:  CBC Full  -  ( 11 Feb 2024 03:20 )  WBC Count : 8.60 K/uL  RBC Count : 3.90 M/uL  Hemoglobin : 10.8 g/dL  Hematocrit : 33.3 %  Platelet Count - Automated : 470 K/uL  Mean Cell Volume : 85.4 fl  Mean Cell Hemoglobin : 27.7 pg  Mean Cell Hemoglobin Concentration : 32.4 gm/dL  Auto Neutrophil # : 5.54 K/uL  Auto Lymphocyte # : 1.92 K/uL  Auto Monocyte # : 0.70 K/uL  Auto Eosinophil # : 0.36 K/uL  Auto Basophil # : 0.05 K/uL  Auto Neutrophil % : 64.5 %  Auto Lymphocyte % : 22.3 %  Auto Monocyte % : 8.1 %  Auto Eosinophil % : 4.2 %  Auto Basophil % : 0.6 %    02-11    140  |  102  |  18.7  ----------------------------<  94  4.4   |  26.0  |  0.72    Ca    9.3      11 Feb 2024 03:20  Phos  3.5     02-11  Mg     1.8     02-11        Urinalysis Basic - ( 11 Feb 2024 03:20 )    Color: x / Appearance: x / SG: x / pH: x  Gluc: 94 mg/dL / Ketone: x  / Bili: x / Urobili: x   Blood: x / Protein: x / Nitrite: x   Leuk Esterase: x / RBC: x / WBC x   Sq Epi: x / Non Sq Epi: x / Bacteria: x

## 2024-02-12 NOTE — PROGRESS NOTE ADULT - SUBJECTIVE AND OBJECTIVE BOX
HPI:  SUBJECTIVE: 27yo M w/ hx of spina bfida and scoliosis, wheelchair bound at baseline, presenting with abdominal pain, n/v. Pt states that the pain began yesterday morning and has been persistent. He had a bowel movement this morning but prior to that his last bowel movement was one week prior. Denies passing gas. Denies fevers at home. Has been vomiting and nauseous. At home he was lethargic and EMS was called. At that time he was hypotensive and tachycardic and he was BIBEMS to [facility] ED for further workup. On arrival he was tachycardic to the 130s and hypotensive to 80s/50s. Labs notable for K 3.4, bicarb 13, Cr 2.04. Actively vomiting in ED. CT scan showed dilated bowel and stomach consistent with severe SBO with transition point in mid abdomen. Surgery consulted for management.     INTERVAL HPI/OVERNIGHT EVENTS:  28y Male PMH spina bifida s/p ex lap on 1/14, shunt externalization on 1/21, and OR for bladder perf on 1/31 seen lying comfortably in bed. Tolerating diet. Passing gas/BM. Voiding. Denies headache, weakness, numbness, n/v/d, fevers, chills, chest pain, SOB.     Vital Signs Last 24 Hrs  T(C): 36.8 (12 Feb 2024 07:50), Max: 37 (11 Feb 2024 15:53)  T(F): 98.3 (12 Feb 2024 07:50), Max: 98.6 (11 Feb 2024 15:53)  HR: 86 (12 Feb 2024 09:00) (82 - 118)  BP: 108/65 (12 Feb 2024 09:00) (94/60 - 130/79)  BP(mean): 78 (12 Feb 2024 09:00) (71 - 93)  RR: 14 (12 Feb 2024 09:00) (12 - 24)  SpO2: 100% (12 Feb 2024 09:00) (96% - 100%)    Parameters below as of 12 Feb 2024 08:00  Patient On (Oxygen Delivery Method): room air      PHYSICAL EXAM:  GENERAL: NAD, well-groomed  HEAD:  Atraumatic, normocephalic  SHUNT: Externalized, clear colored CSF draining; externalization site dressing c/d/i  MENTAL STATUS: AAOx3; awake; opens eyes spontaneously; appropriately conversant without aphasia; following simple commands  CRANIAL NERVES: PERRL. EOMI without nystagmus. Facial sensation intact V1-3 distribution b/l. Face symmetric, tongue midline. Hearing grossly intact. Speech clear. Head turning and shoulder shrug intact.   MOTOR: RUE 5/5, LUE 5/5, b/l LE 0/5 and contracted at baseline  SENSATION: Grossly intact to light touch all extremities on upper extremities  CHEST/LUNG: Non-labored breathing on room air  SKIN: Warm, dry    LABS:                        10.8   8.60  )-----------( 470      ( 11 Feb 2024 03:20 )             33.3     02-11    140  |  102  |  18.7  ----------------------------<  94  4.4   |  26.0  |  0.72    Ca    9.3      11 Feb 2024 03:20  Phos  3.5     02-11  Mg     1.8     02-11        Urinalysis Basic - ( 11 Feb 2024 03:20 )    Color: x / Appearance: x / SG: x / pH: x  Gluc: 94 mg/dL / Ketone: x  / Bili: x / Urobili: x   Blood: x / Protein: x / Nitrite: x   Leuk Esterase: x / RBC: x / WBC x   Sq Epi: x / Non Sq Epi: x / Bacteria: x        02-11 @ 07:01 - 02-12 @ 07:00  --------------------------------------------------------  IN: 770 mL / OUT: 1202 mL / NET: -432 mL    02-12 @ 07:01  - 02-12 @ 09:23  --------------------------------------------------------  IN: 0 mL / OUT: 12 mL / NET: -12 mL        RADIOLOGY & ADDITIONAL TESTS:    CT Head No Cont (01.16.24 @ 10:57) >  IMPRESSION: Stable follow-up CT exam when compared with 1/14/2024.      CT Head No Cont (01.14.24 @ 12:58) >  IMPRESSION:  1.  No acute intracranial abnormality. No comparison examinations were   available at the time of interpretation. There is slitlike ventricular   caliber of indeterminate clinical significance. Clinical correlation is   advised. An addendum to this report can be made to assess for stability   of ventricular caliber once a comparison examination is made available.  2.  The 2.0 x 1.0 x 1.0 cm calcific mass in the left parietal lobe has a   broad differential. Comparison with prior examinations would be useful to   assess for stability. If these are not available, further evaluation with   MR brain without and with contrast would be recommended.   HPI:  SUBJECTIVE: 29yo M w/ hx of spina bfida and scoliosis, wheelchair bound at baseline, presenting with abdominal pain, n/v. Pt states that the pain began yesterday morning and has been persistent. He had a bowel movement this morning but prior to that his last bowel movement was one week prior. Denies passing gas. Denies fevers at home. Has been vomiting and nauseous. At home he was lethargic and EMS was called. At that time he was hypotensive and tachycardic and he was BIBEMS to [facility] ED for further workup. On arrival he was tachycardic to the 130s and hypotensive to 80s/50s. Labs notable for K 3.4, bicarb 13, Cr 2.04. Actively vomiting in ED. CT scan showed dilated bowel and stomach consistent with severe SBO with transition point in mid abdomen. Surgery consulted for management.     INTERVAL HPI/OVERNIGHT EVENTS:  28y Male PMH spina bifida s/p ex lap on 1/14, shunt externalization on 1/21, and OR for bladder perf on 1/31 seen lying comfortably in bed. Denies headache, weakness, numbness, n/v/d, fevers, chills, chest pain, SOB.     Vital Signs Last 24 Hrs  T(C): 36.8 (12 Feb 2024 07:50), Max: 37 (11 Feb 2024 15:53)  T(F): 98.3 (12 Feb 2024 07:50), Max: 98.6 (11 Feb 2024 15:53)  HR: 86 (12 Feb 2024 09:00) (82 - 118)  BP: 108/65 (12 Feb 2024 09:00) (94/60 - 130/79)  BP(mean): 78 (12 Feb 2024 09:00) (71 - 93)  RR: 14 (12 Feb 2024 09:00) (12 - 24)  SpO2: 100% (12 Feb 2024 09:00) (96% - 100%)    Parameters below as of 12 Feb 2024 08:00  Patient On (Oxygen Delivery Method): room air      PHYSICAL EXAM:  GENERAL: NAD, well-groomed  HEAD:  Atraumatic, normocephalic  SHUNT: Externalized, clear CSF draining; externalization site dressing c/d/i  MENTAL STATUS: AAOx3; awake; opens eyes spontaneously; appropriately conversant without aphasia; following simple commands  CRANIAL NERVES: PERRL. EOMI without nystagmus.   MOTOR: RUE 5/5, LUE 5/5, b/l LE 0/5 and contracted at baseline  SENSATION: Grossly intact to light touch all extremities on upper extremities  CHEST/LUNG: Non-labored breathing on room air  SKIN: Warm, dry    LABS:                        10.8   8.60  )-----------( 470      ( 11 Feb 2024 03:20 )             33.3     02-11    140  |  102  |  18.7  ----------------------------<  94  4.4   |  26.0  |  0.72    Ca    9.3      11 Feb 2024 03:20  Phos  3.5     02-11  Mg     1.8     02-11        Urinalysis Basic - ( 11 Feb 2024 03:20 )    Color: x / Appearance: x / SG: x / pH: x  Gluc: 94 mg/dL / Ketone: x  / Bili: x / Urobili: x   Blood: x / Protein: x / Nitrite: x   Leuk Esterase: x / RBC: x / WBC x   Sq Epi: x / Non Sq Epi: x / Bacteria: x        02-11 @ 07:01 - 02-12 @ 07:00  --------------------------------------------------------  IN: 770 mL / OUT: 1202 mL / NET: -432 mL    02-12 @ 07:01  -  02-12 @ 09:23  --------------------------------------------------------  IN: 0 mL / OUT: 12 mL / NET: -12 mL        RADIOLOGY & ADDITIONAL TESTS:    CT Head No Cont (01.16.24 @ 10:57) >  IMPRESSION: Stable follow-up CT exam when compared with 1/14/2024.      CT Head No Cont (01.14.24 @ 12:58) >  IMPRESSION:  1.  No acute intracranial abnormality. No comparison examinations were   available at the time of interpretation. There is slitlike ventricular   caliber of indeterminate clinical significance. Clinical correlation is   advised. An addendum to this report can be made to assess for stability   of ventricular caliber once a comparison examination is made available.  2.  The 2.0 x 1.0 x 1.0 cm calcific mass in the left parietal lobe has a   broad differential. Comparison with prior examinations would be useful to   assess for stability. If these are not available, further evaluation with   MR brain without and with contrast would be recommended.

## 2024-02-13 LAB
ANION GAP SERPL CALC-SCNC: 13 MMOL/L — SIGNIFICANT CHANGE UP (ref 5–17)
BASOPHILS # BLD AUTO: 0.04 K/UL — SIGNIFICANT CHANGE UP (ref 0–0.2)
BASOPHILS NFR BLD AUTO: 0.6 % — SIGNIFICANT CHANGE UP (ref 0–2)
BUN SERPL-MCNC: 16.1 MG/DL — SIGNIFICANT CHANGE UP (ref 8–20)
CALCIUM SERPL-MCNC: 9.6 MG/DL — SIGNIFICANT CHANGE UP (ref 8.4–10.5)
CHLORIDE SERPL-SCNC: 101 MMOL/L — SIGNIFICANT CHANGE UP (ref 96–108)
CO2 SERPL-SCNC: 26 MMOL/L — SIGNIFICANT CHANGE UP (ref 22–29)
CREAT SERPL-MCNC: 0.62 MG/DL — SIGNIFICANT CHANGE UP (ref 0.5–1.3)
EGFR: 132 ML/MIN/1.73M2 — SIGNIFICANT CHANGE UP
EOSINOPHIL # BLD AUTO: 0.45 K/UL — SIGNIFICANT CHANGE UP (ref 0–0.5)
EOSINOPHIL NFR BLD AUTO: 7.1 % — HIGH (ref 0–6)
GLUCOSE SERPL-MCNC: 94 MG/DL — SIGNIFICANT CHANGE UP (ref 70–99)
HCT VFR BLD CALC: 30.6 % — LOW (ref 39–50)
HGB BLD-MCNC: 10.1 G/DL — LOW (ref 13–17)
IMM GRANULOCYTES NFR BLD AUTO: 0.3 % — SIGNIFICANT CHANGE UP (ref 0–0.9)
LYMPHOCYTES # BLD AUTO: 1.84 K/UL — SIGNIFICANT CHANGE UP (ref 1–3.3)
LYMPHOCYTES # BLD AUTO: 29.1 % — SIGNIFICANT CHANGE UP (ref 13–44)
MAGNESIUM SERPL-MCNC: 2 MG/DL — SIGNIFICANT CHANGE UP (ref 1.6–2.6)
MCHC RBC-ENTMCNC: 28.2 PG — SIGNIFICANT CHANGE UP (ref 27–34)
MCHC RBC-ENTMCNC: 33 GM/DL — SIGNIFICANT CHANGE UP (ref 32–36)
MCV RBC AUTO: 85.5 FL — SIGNIFICANT CHANGE UP (ref 80–100)
MONOCYTES # BLD AUTO: 0.52 K/UL — SIGNIFICANT CHANGE UP (ref 0–0.9)
MONOCYTES NFR BLD AUTO: 8.2 % — SIGNIFICANT CHANGE UP (ref 2–14)
NEUTROPHILS # BLD AUTO: 3.46 K/UL — SIGNIFICANT CHANGE UP (ref 1.8–7.4)
NEUTROPHILS NFR BLD AUTO: 54.7 % — SIGNIFICANT CHANGE UP (ref 43–77)
PHOSPHATE SERPL-MCNC: 3.7 MG/DL — SIGNIFICANT CHANGE UP (ref 2.4–4.7)
PLATELET # BLD AUTO: 440 K/UL — HIGH (ref 150–400)
POTASSIUM SERPL-MCNC: 4.1 MMOL/L — SIGNIFICANT CHANGE UP (ref 3.5–5.3)
POTASSIUM SERPL-SCNC: 4.1 MMOL/L — SIGNIFICANT CHANGE UP (ref 3.5–5.3)
RBC # BLD: 3.58 M/UL — LOW (ref 4.2–5.8)
RBC # FLD: 15 % — HIGH (ref 10.3–14.5)
SODIUM SERPL-SCNC: 140 MMOL/L — SIGNIFICANT CHANGE UP (ref 135–145)
WBC # BLD: 6.33 K/UL — SIGNIFICANT CHANGE UP (ref 3.8–10.5)
WBC # FLD AUTO: 6.33 K/UL — SIGNIFICANT CHANGE UP (ref 3.8–10.5)

## 2024-02-13 PROCEDURE — 99024 POSTOP FOLLOW-UP VISIT: CPT

## 2024-02-13 PROCEDURE — 99233 SBSQ HOSP IP/OBS HIGH 50: CPT

## 2024-02-13 RX ORDER — MEROPENEM 1 G/30ML
1000 INJECTION INTRAVENOUS EVERY 8 HOURS
Refills: 0 | Status: COMPLETED | OUTPATIENT
Start: 2024-02-13 | End: 2024-02-14

## 2024-02-13 RX ORDER — SODIUM CHLORIDE 9 MG/ML
1500 INJECTION, SOLUTION INTRAVENOUS ONCE
Refills: 0 | Status: COMPLETED | OUTPATIENT
Start: 2024-02-13 | End: 2024-02-13

## 2024-02-13 RX ORDER — MEROPENEM 1 G/30ML
1000 INJECTION INTRAVENOUS EVERY 8 HOURS
Refills: 0 | Status: DISCONTINUED | OUTPATIENT
Start: 2024-02-13 | End: 2024-02-13

## 2024-02-13 RX ADMIN — PANTOPRAZOLE SODIUM 40 MILLIGRAM(S): 20 TABLET, DELAYED RELEASE ORAL at 17:19

## 2024-02-13 RX ADMIN — PANTOPRAZOLE SODIUM 40 MILLIGRAM(S): 20 TABLET, DELAYED RELEASE ORAL at 05:39

## 2024-02-13 RX ADMIN — MEROPENEM 1000 MILLIGRAM(S): 1 INJECTION INTRAVENOUS at 22:57

## 2024-02-13 RX ADMIN — MEROPENEM 1000 MILLIGRAM(S): 1 INJECTION INTRAVENOUS at 12:24

## 2024-02-13 RX ADMIN — CHLORHEXIDINE GLUCONATE 1 APPLICATION(S): 213 SOLUTION TOPICAL at 05:39

## 2024-02-13 RX ADMIN — Medication 1 TABLET(S): at 12:24

## 2024-02-13 RX ADMIN — SODIUM CHLORIDE 1500 MILLILITER(S): 9 INJECTION, SOLUTION INTRAVENOUS at 17:20

## 2024-02-13 RX ADMIN — ENOXAPARIN SODIUM 40 MILLIGRAM(S): 100 INJECTION SUBCUTANEOUS at 17:20

## 2024-02-13 NOTE — PROGRESS NOTE ADULT - ASSESSMENT
28M PMH spina bifida (wheelchair bound), scoliosis, hydrocephalus s/p  shunt placement with multiple revisions followed by Dr. Dorsey at [facility], presented to [facility] with concern for SBO s/p ex lap, partial colectomy ccb bladder perforation with repair on 1/14, shunt externalization on 1/21, s/p abdominal wall fluid collection drain placed by IR on 1/31. IR drain removed 2/7.    Plan:    - Q4 neuro checks   - No neurosurgical intervention at this time  - Maintain externalized shunt, record hourly outputs  - Do not clamp externalized  shunt  - Pain control as needed, avoid over sedation  - Patient pending transfer to [facility] when medically stable for internalization of shunt v. possible endoscopic third ventriculostomy; patient follows with Dr. Dorsey; attendings actively having discussion with neurosurgery at [facility] and transfer center; should have update shortly   - Normotensive SBP goals  - Lovenox 40mg, SCDs for DVT ppx  - Further medical/supportive management per SICU team   - Discussed with Dr. Harris

## 2024-02-13 NOTE — PROGRESS NOTE ADULT - SUBJECTIVE AND OBJECTIVE BOX
INTERVAL HPI/OVERNIGHT EVENTS:    No urine output from either the SP tube or cline noted during the day shift. The midline dressing was found to be heavily saturated. Urology was contacted and SP and Cline were aspirated and irrigated and noted with large sediment and urine. Pt now with good urine flow. Urology states no acute intervention at this time and currently with a controlled vesicocutaneous fistula. Pt remains with stable vitals, afebrile, controlled colostomy output, and tolerating diet.       MEDICATIONS  (STANDING):  chlorhexidine 2% Cloths 1 Application(s) Topical daily  enoxaparin Injectable 40 milliGRAM(s) SubCutaneous every 24 hours  multivitamin 1 Tablet(s) Oral daily  pantoprazole  Injectable 40 milliGRAM(s) IV Push two times a day    MEDICATIONS  (PRN):  acetaminophen     Tablet .. 975 milliGRAM(s) Oral every 6 hours PRN Mild Pain (1 - 3)      Drug Dosing Weight  Height (cm): 132.1 (14 Jan 2024 09:30)  Weight (kg): 70 (14 Jan 2024 07:48)  BMI (kg/m2): 40.1 (14 Jan 2024 09:30)  BSA (m2): 1.51 (14 Jan 2024 09:30)      PAST MEDICAL & SURGICAL HISTORY:  Spina bifida      Scoliosis          ICU Vital Signs Last 24 Hrs  T(C): 36.8 (13 Feb 2024 00:00), Max: 37.1 (12 Feb 2024 15:35)  T(F): 98.3 (13 Feb 2024 00:00), Max: 98.7 (12 Feb 2024 15:35)  HR: 107 (13 Feb 2024 02:00) (82 - 107)  BP: 94/66 (13 Feb 2024 02:00) (94/66 - 119/78)  BP(mean): 76 (13 Feb 2024 02:00) (71 - 94)  ABP: --  ABP(mean): --  RR: 17 (13 Feb 2024 02:00) (12 - 21)  SpO2: 98% (13 Feb 2024 02:00) (96% - 100%)    O2 Parameters below as of 13 Feb 2024 00:00  Patient On (Oxygen Delivery Method): room air                I&O's Detail    11 Feb 2024 07:01  -  12 Feb 2024 07:00  --------------------------------------------------------  IN:    Albumin 5%  - 250 mL: 250 mL    Oral Fluid: 520 mL  Total IN: 770 mL    OUT:    External Ventricular Device (mL): 234 mL    Indwelling Catheter - Suprapubic (mL): 683 mL    Indwelling Catheter - Urethral (mL): 285 mL  Total OUT: 1202 mL    Total NET: -432 mL      12 Feb 2024 07:01  -  13 Feb 2024 02:34  --------------------------------------------------------  IN:    Albumin 5%  - 250 mL: 500 mL  Total IN: 500 mL    OUT:    Colostomy (mL): 375 mL    External Ventricular Device (mL): 204 mL    Indwelling Catheter - Suprapubic (mL): 860 mL    Indwelling Catheter - Urethral (mL): 135 mL  Total OUT: 1574 mL    Total NET: -1074 mL        Physical Exam:    Neurological:  GCS  15, CAM negative, B/L lower extremities with plegia, EVD with clear fluid     HEENT:  EOMI      Respiratory: Unlabored, no accessory muscle use      Cardiovascular: Regular rhythm, sinus tachycardia/NSR       Gastrointestinal: Softly distended, non-tender to palpation, colostomy pink with formed brown stool output.   Midline incision with adequate granulation tissue.     : SPT in place draining yellow urine with large sediment, Cline in place also with yellow urine and also with large sediment.       Extremities: contracted leg     Vascular: Equal and normal pulses: 2+ peripheral pulses throughout      Skin: Midline wound with serous drainage. Packed with dressing placed       LABS:  CBC Full  -  ( 11 Feb 2024 03:20 )  WBC Count : 8.60 K/uL  RBC Count : 3.90 M/uL  Hemoglobin : 10.8 g/dL  Hematocrit : 33.3 %  Platelet Count - Automated : 470 K/uL  Mean Cell Volume : 85.4 fl  Mean Cell Hemoglobin : 27.7 pg  Mean Cell Hemoglobin Concentration : 32.4 gm/dL  Auto Neutrophil # : 5.54 K/uL  Auto Lymphocyte # : 1.92 K/uL  Auto Monocyte # : 0.70 K/uL  Auto Eosinophil # : 0.36 K/uL  Auto Basophil # : 0.05 K/uL  Auto Neutrophil % : 64.5 %  Auto Lymphocyte % : 22.3 %  Auto Monocyte % : 8.1 %  Auto Eosinophil % : 4.2 %  Auto Basophil % : 0.6 %    02-11    140  |  102  |  18.7  ----------------------------<  94  4.4   |  26.0  |  0.72    Ca    9.3      11 Feb 2024 03:20  Phos  3.5     02-11  Mg     1.8     02-11        Urinalysis Basic - ( 11 Feb 2024 03:20 )    Color: x / Appearance: x / SG: x / pH: x  Gluc: 94 mg/dL / Ketone: x  / Bili: x / Urobili: x   Blood: x / Protein: x / Nitrite: x   Leuk Esterase: x / RBC: x / WBC x   Sq Epi: x / Non Sq Epi: x / Bacteria: x

## 2024-02-13 NOTE — PROGRESS NOTE ADULT - CRITICAL CARE ATTENDING COMMENT
x OOB to chair yesterday.  Sharp aspirated yesterday.    GEN:  Awake, alert        -Spoke w/Dr. Harris (NSGY) who stated that he would speak with Dr. Dorsey/[facility] NSGY regarding transfer  -Breathing comfortably on RA  -H/H stable, scds, lovenox  -HD stable, tachycardia overall improved  -Sharp w/minimal drainage overnight but suprapubic catheter is draining, Cr stable, appreciate urology input  -Afebrile, WBC normal, meropenum for pyelonephritis for 10 days total  -Lytes adequate  -No deep lines  -OOB again today    Awaiting possible transfer to [facility]. OOB to chair yesterday.  Cline aspirated yesterday.    GEN:  Awake, alert, comfortable  CHEST:   shunt sitting on chest wall, dressing clean, drainage clear (from  shunt)  ABD:  Soft, non distended, ostomy w/stool, suprapubic catheter in place, midline incision clean and healing w/o drainage noted from inferior pole this am  EXT:  Warm, frog-leg position w/foreshortened feet - unchanged    Spina Bifida  Small bowel obstruction -resolved  Bladder leak  Vesiculocutaneous fistula  High output colostomy  Pyelitis    -Spoke w/Dr. Harris (NSGY) who stated that he would speak with Dr. Dorsey/[facility] NSGY regarding transfer for internalization of  shunt  -Breathing comfortably on RA  -H/H stable, scds, lovenox  -HD stable, tachycardia overall improved  -Cline w/minimal drainage overnight but suprapubic catheter is draining, was able to aspirate urine w/protinaceous material from cline catheter -which now seems to be draining better, Cr stable, appreciate urology input - will work to keep both catheters patent to allow for maximum drainage which hopefully will lead to bladder healing.  It seems when one of the catheters is blocked - urine then drains from the midline abdominal wall.  -Afebrile, WBC normal, meropenum for pyelitis for 7 days total -clarified with urology  -Lytes adequate  -No deep lines  -OOB again today    Awaiting transfer to [facility].  Patient w/o other acute medical issues at this time. Patient to remain in critical care setting at [facility] given his exteriorized  shunt.

## 2024-02-13 NOTE — PROGRESS NOTE ADULT - NS ATTEND AMEND GEN_ALL_CORE FT
NSGY Attg:    see above    patient seen and examined    agree with above    plan of care determined for externalized  shun  continue CSF drainage  awaiting transfer to [facility] per patient request  additional work-up and supportive care per primary team

## 2024-02-13 NOTE — PROGRESS NOTE ADULT - ASSESSMENT
28 year old male with PMHx of spina bifida (wheelchair bound at baseline), scoliosis,  Shunt BIBEMS 1/14 with abdominal pain, n/v, and lethargy, found to have severe SBO, metabolic acidosis, and to be in septic and hypovolemic shock. Patient was brought to the OR 1/14 for ex lap and small bowel resection, c/b bladder perf with primary repair and partial colectomy and admitted to SICU post-op for further management. Developed mixed septic/hypovolemic shock, pneumoperitoneum, suspected UGIB.  shunt externalized. RTOR 1/22 for bladder repair, Kirkpatrick's and EGD. Course complicated by high ostomy output, now resolved, and abdominal wall abscess, s/p drain placement with IR.          NEURO:  Continue with pain control, Tylenol PRN.     Hx of  shunt-  shunt externalized. Open to drain, monitor output.      Will discuss with neurosurgery on plan to move shunt to ventricular pleura shunt here at [facility].  Patient is amenable to this plan.  Patient is post externalization of  shunt and remains patient is at risk for ventriculitis.  Patient understands this.     CV: Tachycardia appears to be related to hypovolemia. Improved        PULM: Currently saturating well on RA.  Continue pulmonary toilet, IS, OOBTC.  Maintain SpO2 > 92%      GI/Nutrition: SBO s/p SBR, Kirkpatrick's - continue Regular high fiber, metamucil -held at this time   Severe protein calorie malnutrition- encourage high protein diet as well, added ensures to patient's tray      Gastritis - GI – recommend PPI BID for 8 weeks, Rpt EGD in 12 weeks.        /Renal:  Urology irrigated SPT/ Cline and now with good urine flow. Hx of urethral stricture now with bladder injury s/p repair x 3   - s/p bladder repair, hydronephrosis. Cont SPT & cline.  Monitor UO from both SP catheter and intraurethral cline catheter. Concern for vesicocutaneous fistula- continue wound care, appears to be improving.     ENDO: Maintain euglycemia 120-180.        ID: Remains afebrile without leukocytosis . Completed course of meropenum.     HEME:   Monitor H/H     SCDs, lovenox 40 daily        SKIN:   Repositioning for DTI prevention while in bed. Dressing changed       Continue local wound care to midline wound with daily packing change and monitor output     Lines: Peripheral IV's       Dispo: Will follow up with [facility] transfer center vs internalization of drain by neurosurgery at [facility].

## 2024-02-13 NOTE — PROGRESS NOTE ADULT - SUBJECTIVE AND OBJECTIVE BOX
HPI:  28M w/ PMH of spina bifida and scoliosis, wheelchair bound at baseline, presenting with abdominal pain, n/v. Pt states that the pain began yesterday morning and has been persistent. He had a bowel movement this morning but prior to that his last bowel movement was one week prior. Denies passing gas. Denies fevers at home. Has been vomiting and nauseous. At home he was lethargic and EMS was called. At that time he was hypotensive and tachycardic and he was BIBEMS to [facility] ED for further workup. On arrival he was tachycardic to the 130s and hypotensive to 80s/50s. Labs notable for K 3.4, bicarb 13, Cr 2.04. Actively vomiting in ED. CT scan showed dilated bowel and stomach consistent with severe SBO with transition point in mid abdomen. Surgery consulted for management.     INTERVAL HPI/OVERNIGHT EVENTS:  28M s/p shunt externalization on 1/21, s/p abdominal wall fluid collection drainage on 1/31 in IR. Patient seen and examined at bedside this morning on rounds. Patient lying comfortably in bed.     1/21: Shunt externalized at bedside  1/31: Abdominal drain placed  2/7: Abdominal drain removed by IR      Vital Signs Last 24 Hrs  T(C): 36.7 (02-13-24 @ 08:24), Max: 37.1 (02-12-24 @ 15:35)  T(F): 98 (02-13-24 @ 08:24), Max: 98.7 (02-12-24 @ 15:35)  HR: 101 (02-13-24 @ 08:00) (72 - 107)  BP: 143/89 (02-13-24 @ 08:00) (94/66 - 143/89)  BP(mean): 104 (02-13-24 @ 08:00) (69 - 104)  RR: 16 (02-13-24 @ 08:00) (14 - 21)  SpO2: 99% (02-13-24 @ 08:00) (95% - 100%)    PHYSICAL EXAM:  GENERAL: NAD, well-groomed  HEAD:  Atraumatic, normocephalic  SHUNT: Externalized, clear colored CSF draining; externalization site dressing c/d/i  MENTAL STATUS: AAOx3; awake; opens eyes spontaneously; appropriately conversant without aphasia; following simple commands  CRANIAL NERVES: PERRL. EOMI without nystagmus. Facial sensation intact V1-3 distribution b/l. Face symmetric, tongue midline. Hearing grossly intact. Speech clear. Head turning and shoulder shrug intact.   MOTOR: RUE 5/5, LUE 5/5, b/l LE 0/5 and contracted at baseline  SENSATION: Grossly intact to light touch all extremities on upper extremities  CHEST/LUNG: Non-labored breathing on room air  SKIN: Warm, dry    LABS:                                   10.1   6.33  )-----------( 440      ( 13 Feb 2024 03:20 )             30.6   02-13    140  |  101  |  16.1  ----------------------------<  94  4.1   |  26.0  |  0.62    Ca    9.6      13 Feb 2024 03:20  Phos  3.7     02-13  Mg     2.0     02-13      RADIOLOGY & ADDITIONAL TESTS:    CT Abdomen and Pelvis w/ Oral Cont and w/ IV Cont (01.30.24 @ 17:58)  IMPRESSION:  No pulmonary embolism through the level of the lobar pulmonary arteries.   Evaluation of more distal segmental and subsegmental pulmonary arteries is limited by suboptimal contrast bolus timing.  Trace bibasilar pleural effusion and atelectasis of posterior basilar segment of right lower lobe.    Interval repositioning of 2  shunt with tips of catheter now terminating subcutaneously at the level of xiphoid process  Patient is status post Akash procedure and small bowel resection and a new ostomy in left lower quadrant abdominal abdomen.   No evidence of extravasation of the contrast from the bowel loops proximal to the ostomy site.  7.2 x 3.1 cm loculated fluid collection/abscess in the left anterolateral abdominal wall.  Postsurgical changes and thickening of urinary bladder wall.    CT Pelvis No Cont (01.21.24 @ 11:51)   IMPRESSION:  Intraperitoneal bladder perforation. Moderate to large amount of   extraluminal contrast surrounding small bowel. Moderate amount of   pneumoperitoneum. Extraluminal contrast and gas is also seen tracking   through the midline ventral abdominal wall incision.    Redemonstrated tract extending from the right bladder wall to the skin   in the right lower quadrant. Question a urostomy. Correlate with patient history.    Irregular cavity within the prostate measuring 3.1 x 1.8 cm that is   contiguous with the urethra. A few coarse calcifications within the   prostate are possibly calculi layering within the cavity.    Unchanged mild bilateral hydroureteronephrosis to the level of the   bladder. Unchanged urothelial thickening of the bilateral collecting   systems and ureters. Correlate for urinary tract infection.    Diffuse wall thickening of the imaged small bowel and sigmoid colon,   which could be reactive or representative of enterocolitis.    CT Head No Cont (01.16.24 @ 10:57)  IMPRESSION: Stable follow-up CT exam when compared with 1/14/2024.

## 2024-02-14 PROCEDURE — 99232 SBSQ HOSP IP/OBS MODERATE 35: CPT | Mod: 24

## 2024-02-14 PROCEDURE — 99233 SBSQ HOSP IP/OBS HIGH 50: CPT

## 2024-02-14 RX ORDER — SODIUM CHLORIDE 9 MG/ML
500 INJECTION, SOLUTION INTRAVENOUS ONCE
Refills: 0 | Status: COMPLETED | OUTPATIENT
Start: 2024-02-14 | End: 2024-02-14

## 2024-02-14 RX ADMIN — SODIUM CHLORIDE 1000 MILLILITER(S): 9 INJECTION, SOLUTION INTRAVENOUS at 10:12

## 2024-02-14 RX ADMIN — MEROPENEM 1000 MILLIGRAM(S): 1 INJECTION INTRAVENOUS at 05:38

## 2024-02-14 RX ADMIN — PANTOPRAZOLE SODIUM 40 MILLIGRAM(S): 20 TABLET, DELAYED RELEASE ORAL at 17:33

## 2024-02-14 RX ADMIN — ENOXAPARIN SODIUM 40 MILLIGRAM(S): 100 INJECTION SUBCUTANEOUS at 17:33

## 2024-02-14 RX ADMIN — SODIUM CHLORIDE 1000 MILLILITER(S): 9 INJECTION, SOLUTION INTRAVENOUS at 19:30

## 2024-02-14 RX ADMIN — PANTOPRAZOLE SODIUM 40 MILLIGRAM(S): 20 TABLET, DELAYED RELEASE ORAL at 05:37

## 2024-02-14 RX ADMIN — Medication 1 TABLET(S): at 11:20

## 2024-02-14 RX ADMIN — CHLORHEXIDINE GLUCONATE 1 APPLICATION(S): 213 SOLUTION TOPICAL at 09:48

## 2024-02-14 NOTE — PHYSICAL THERAPY INITIAL EVALUATION ADULT - PERTINENT HX OF CURRENT PROBLEM, REHAB EVAL
29yo M w/ hx of spina bfida and scoliosis, wheelchair bound at baseline, presenting with abdominal pain, n/v. Pt states that the pain began yesterday morning and has been persistent. He had a bowel movement this morning but prior to that his last bowel movement was one week prior. Denies passing gas. Denies fevers at home. Has been vomiting and nauseous. At home he was lethargic and EMS was called. At that time he was hypotensive and tachycardic and he was BIBEMS to [facility] ED for further workup. On arrival he was tachycardic to the 130s and hypotensive to 80s/50s. Labs notable for K 3.4, bicarb 13, Cr 2.04. Actively vomiting in ED.
28y Male PMH spina bifida, scoliosis, no sensation distal to pubic line, wheelchair bound at baseline, hydrocephalus s/p  shunt placement with multiple revisions followed by Dr. Dorsey at [facility], now presents to [facility] 1/14/24 with concern for small bowel obstruction, s/p exploratory laparotomy with small bowel resection, partial left colectomy, bladder perforation s/p repair on 1/14/24, s/p externalization of shunt 1/21/24, course complicated by abdominal wall fluid collection s/p drainage by IR 1/31 with abdominal drain removal 2/7

## 2024-02-14 NOTE — PROGRESS NOTE ADULT - NS ATTEND AMEND GEN_ALL_CORE FT
BRINDA Attg:    see above    patient seen and examined     agree with above    plan of care determined for externalized  shunt  continue CSF drainage  tx to [facility] per patient request pending  d/w [facility] transfer center and Jordy Rees

## 2024-02-14 NOTE — CHART NOTE - NSCHARTNOTEFT_GEN_A_CORE
Source: Patient [x]  Family [ ]   other [ ]    Current Diet: Diet, Regular:   High Fiber (HIFIBER) (01-31-24 @ 16:37)    PO intake:  < 50% [ ]   50-75%  [ ]   %  [x]  other :    Source for PO intake [x] Patient [ ] family [ ] chart [ ] staff [ ] other    Current Weight:   2/13 53.4 kg  2/7 51.0 kg  2/2 53.0 kg  1/28 57.1 kg  1/16 62.8 kg    % Weight Change: 26 lb (18.8%) weight loss x 3 weeks, +1 dependent edema    Pertinent Medications: MEDICATIONS  (STANDING):  multivitamin 1 Tablet(s) Oral daily  pantoprazole  Injectable 40 milliGRAM(s) IV Push two times a day    Skin: midline abd, IAD, skin abrasions    Nutrition focused physical exam conducted previously - found signs of malnutrition [ ]absent [x]present    Subcutaneous fat loss: [x] Orbital fat pads region, [x]Buccal fat region, [x]Triceps region,  [ ]Ribs region    Muscle wasting: [x]Temples region, [x]Clavicle region, [x]Shoulder region, [ ]Scapula region, [ ]Interosseous region,  [ ]thigh region, [ ]Calf region    Estimated Needs:   [x] no change since previous assessment  [ ] recalculated:     Clinical Course:  28y Male  with pmhx of spina bifida,  shunt, scoliosis, wheelchair bound at baseline, admitted with SBO, now s/p SBR, sigmoid resection, bladder injury, complicated by RTOR further repair of bladder, Kirkpatrick’s, and exteriorization of  shunt. Ongoing effort to have pt transferred to [facility] for internalization of EVD.    Current Nutrition Diagnosis: Pt remains at high nutrition risk secondary to Severe (chronic) protein calorie malnutrition related to inadequate energy/protein intake,  with altered GI function in setting of severe SBO, spina bifida,  shunt as evidenced by pt likely meeting <75% est needs >1mo, +1 edema, severe muscle/fat wasting, now with 18.8% weight loss since admission.    Met with pt at bedside this morning, he reports good appetite/po intake, ate 100% avocado toast and barbosa this morning. Dislikes magic cup, asking for order to be discontinued. Dislikes oral nutrition supplementation. Encouraged continued adequate PO intake and HBV protein food options, pt was receptive. Weights appear relatively stable at this time. Ostomy output 2/13 - 620 ml. RD remains available. Recommendations below:    Recommendations:   1. Continue to encourage and assist with meals as needed; continue to encourage HBV protein food options  2. Continue MVI daily to optimize nutrition   3. Monitor weight trends and nutrition related labs  4. Monitor ostomy output    Monitoring and Evaluation:   [x] PO intake [x] Tolerance to diet prescription [X] Weights  [X] Follow up per protocol [X] Labs: chem 8, phos, mg

## 2024-02-14 NOTE — PHYSICAL THERAPY INITIAL EVALUATION ADULT - PLANNED THERAPY INTERVENTIONS, PT EVAL
balance training/bed mobility training/neuromuscular re-education/ROM/strengthening/transfer training

## 2024-02-14 NOTE — PHYSICAL THERAPY INITIAL EVALUATION ADULT - PASSIVE RANGE OF MOTION EXAMINATION, REHAB EVAL
bilateral upper extremity Passive ROM was WFL (within functional limits)/bilateral lower extremity Passive ROM was WFL (within functional limits)
bilateral lower extremity Passive ROM was WNL

## 2024-02-14 NOTE — PHYSICAL THERAPY INITIAL EVALUATION ADULT - PHYSICAL ASSIST/NONPHYSICAL ASSIST: SIT/SUPINE, REHAB EVAL
verbal cues/nonverbal cues (demo/gestures)/1 person assist
verbal cues/nonverbal cues (demo/gestures)/1 person assist
7

## 2024-02-14 NOTE — CHART NOTE - NSCHARTNOTESELECT_GEN_ALL_CORE
Event Note
Neurosurgery update/Event Note
Transfer Note
Central Line Removal/Event Note
Event Note
Line removal/Event Note
Nutrition Services
SICU floor acceptance/Transfer Note
Urology/Event Note

## 2024-02-14 NOTE — PHYSICAL THERAPY INITIAL EVALUATION ADULT - GENERAL OBSERVATIONS, REHAB EVAL
Pt received in semifowler position with IV, monitor, pleasant and agreeable to PT
Pt received in bed, + IV, +Tele//BP monitoring, supra-pubic catheteter, cline,  Shunt externalized, breathing on RA in NAD, in 0/10 pain, agreeable to PT evaluation

## 2024-02-14 NOTE — PHYSICAL THERAPY INITIAL EVALUATION ADULT - ADDITIONAL COMMENTS
Pt AxOx4 states he is WC baseline, has assist from parents with transfers if needed but can independently transfer from bed to . Pt has ramp to enter.
pt reports living in private home with his parents who work able to assist prn. Pt has ramp to enter, all needs met for pt on the first floor. Pt is w/c bound at baseline given medical hx- able to perform pop-over transfer to w/c independently.  Owns a shower chair.

## 2024-02-14 NOTE — PROGRESS NOTE ADULT - ASSESSMENT
28 year old male with PMHx of spina bifida (wheelchair bound at baseline), scoliosis,  Shunt BIBEMS 1/14 with abdominal pain, n/v, and lethargy, found to have severe SBO, metabolic acidosis, and to be in septic and hypovolemic shock. Patient was brought to the OR 1/14 for ex lap and small bowel resection, c/b bladder perf with primary repair and partial colectomy and admitted to SICU post-op for further management. Developed mixed septic/hypovolemic shock, pneumoperitoneum, suspected UGIB.  shunt externalized. RTOR 1/22 for bladder repair, Kirkpatrick's and EGD. Course complicated by high ostomy output, now resolved, and abdominal wall abscess, s/p drain placement with IR.          NEURO:  Continue with pain control, Tylenol PRN.     Hx of  shunt-  shunt externalized. Open to drain, monitor output.      Neurosurgery still in conversations with [facility] to have pt transferred.  Patient is post externalization of  shunt and remains patient is at risk for ventriculitis.  Patient understands this.     CV: Tachycardia appears to be related to hypovolemia. Improved with IVF        PULM: Currently saturating well on RA.  Continue pulmonary toilet, IS, OOBTC.  Maintain SpO2 > 92%      GI/Nutrition: SBO s/p SBR, Kirkpatrick's - continue Regular high fiber, metamucil -held at this time   Severe protein calorie malnutrition- encourage high protein diet as well, added ensures to patient's tray      Gastritis - GI – recommend PPI BID for 8 weeks, Rpt EGD in 12 weeks.        /Renal:  Urology irrigated SPT/ Cline and now with good urine flow. Hx of urethral stricture now with bladder injury s/p repair x 3   - s/p bladder repair, hydronephrosis. Cont SPT & cline.  Monitor UO from both SP catheter and intraurethral cline catheter. Concern for vesicocutaneous fistula- continue wound care, appears to be improving.     ENDO: Maintain euglycemia 120-180.        ID: Remains afebrile without leukocytosis . Completed course of meropenum.     HEME:   Monitor H/H     SCDs, lovenox 40 daily        SKIN:   Repositioning for DTI prevention while in bed. Dressing changed       Continue local wound care to midline wound with daily packing change and monitor output     Lines: Peripheral IV's       Dispo: Will follow up with [facility] transfer center vs internalization of drain by neurosurgery at [facility].

## 2024-02-14 NOTE — PROGRESS NOTE ADULT - SUBJECTIVE AND OBJECTIVE BOX
INTERVAL HPI/OVERNIGHT EVENTS:  28y Male PMH spina bifida, scoliosis, no sensation distal to pubic line, wheelchair bound at baseline, hydrocephalus s/p  shunt placement with multiple revisions followed by Dr. Dorsey at [facility], now presents to [facility] 1/14/24 with concern for small bowel obstruction, s/p exploratory laparotomy with small bowel resection, partial left colectomy, bladder perforation s/p repair on 1/14/24, s/p externalization of shunt 1/21/24, course complicated by abdominal wall fluid collection s/p drainage by IR 1/31 with abdominal drain removal 2/7. Patient seen this AM sitting in bed eating breakfast, doing well    Vital Signs Last 24 Hrs  T(C): 36.5 (14 Feb 2024 07:51), Max: 37.3 (13 Feb 2024 20:49)  T(F): 97.7 (14 Feb 2024 07:51), Max: 99.1 (13 Feb 2024 20:49)  HR: 118 (14 Feb 2024 09:00) (76 - 118)  BP: 121/72 (14 Feb 2024 09:00) (99/64 - 121/72)  BP(mean): 86 (14 Feb 2024 09:00) (74 - 95)  RR: 13 (14 Feb 2024 09:00) (12 - 20)  SpO2: 98% (14 Feb 2024 09:00) (93% - 100%)    Parameters below as of 14 Feb 2024 08:00  Patient On (Oxygen Delivery Method): ventilator    PHYSICAL EXAM:  GENERAL: NAD, well-groomed  HEAD:  Atraumatic, normocephalic  SHUNT: Externalized, clear colored CSF draining; externalization site dressing c/d/i  MENTAL STATUS: AAOx3; appropriately conversant without aphasia; following commands  CRANIAL NERVES: PERRL. EOMI without nystagmus. Facial sensation intact V1-3 distribution b/l. Face symmetric, tongue midline. Hearing grossly intact. Speech clear. Head turning and shoulder shrug intact.   MOTOR: b/l UE 5/5, b/l LE 0/5 and contracted at baseline  SENSATION: Grossly intact b/l upper extremities    LABS:                        10.1   6.33  )-----------( 440      ( 13 Feb 2024 03:20 )             30.6     02-13    140  |  101  |  16.1  ----------------------------<  94  4.1   |  26.0  |  0.62    Ca    9.6      13 Feb 2024 03:20  Phos  3.7     02-13  Mg     2.0     02-13    Urinalysis Basic - ( 13 Feb 2024 03:20 )    Color: x / Appearance: x / SG: x / pH: x  Gluc: 94 mg/dL / Ketone: x  / Bili: x / Urobili: x   Blood: x / Protein: x / Nitrite: x   Leuk Esterase: x / RBC: x / WBC x   Sq Epi: x / Non Sq Epi: x / Bacteria: x    02-13 @ 07:01  -  02-14 @ 07:00  --------------------------------------------------------  IN: 1500 mL / OUT: 2220 mL / NET: -720 mL    02-14 @ 07:01  - 02-14 @ 10:06  --------------------------------------------------------  IN: 0 mL / OUT: 132 mL / NET: -132 mL    RADIOLOGY & ADDITIONAL TESTS:  CT Abdomen and Pelvis w/ Oral Cont and w/ IV Cont (02.06.24 @ 17:30)  IMPRESSION:  Nearcomplete drainage of the left abdominal peritoneal fluid collection.  Mild bilateral hydroureteronephrosis to the level of the urinary bladder   with extensive thickening and infiltration surrounding the mid left   ureter along with bilateral generalized urothelial enhancement concerning   for pyelitis. Please correlate with urinalysis. Diffuse bladder wall   thickening may reflect concomitant cystitis.  Left abdominal small bowel wall thickening may be infectious or   inflammatory. Thickening versus underdistention of the colon is noted for   which correlation for symptoms of colitis is recommended.  Redemonstration of a tract of fluid and gas extending from the anterior   urinary bladder through the ventral abdominal wall. Correlate for the   presence of vesiculocutaneous fistula.

## 2024-02-14 NOTE — PROGRESS NOTE ADULT - SUBJECTIVE AND OBJECTIVE BOX
INTERVAL HPI/OVERNIGHT EVENTS:        ICU Vital Signs Last 24 Hrs  T(C): 36.7 (13 Feb 2024 23:00), Max: 37.3 (13 Feb 2024 20:49)  T(F): 98.1 (13 Feb 2024 23:00), Max: 99.1 (13 Feb 2024 20:49)  HR: 76 (14 Feb 2024 03:00) (72 - 115)  BP: 109/73 (14 Feb 2024 03:00) (99/69 - 143/89)  BP(mean): 81 (14 Feb 2024 03:00) (69 - 104)  ABP: --  ABP(mean): --  RR: 13 (14 Feb 2024 03:00) (13 - 20)  SpO2: 100% (14 Feb 2024 03:00) (93% - 100%)    O2 Parameters below as of 14 Feb 2024 02:00  Patient On (Oxygen Delivery Method): room air        I&O's Detail    12 Feb 2024 07:01  -  13 Feb 2024 07:00  --------------------------------------------------------  IN:    Albumin 5%  - 250 mL: 500 mL  Total IN: 500 mL    OUT:    Colostomy (mL): 475 mL    External Ventricular Device (mL): 242 mL    Indwelling Catheter - Suprapubic (mL): 1135 mL    Indwelling Catheter - Urethral (mL): 145 mL  Total OUT: 1997 mL    Total NET: -1497 mL      13 Feb 2024 07:01  -  14 Feb 2024 04:10  --------------------------------------------------------  IN:    multiple electrolytes Injection Type 1 Bolus: 1500 mL  Total IN: 1500 mL    OUT:    Colostomy (mL): 875 mL    External Ventricular Device (mL): 194 mL    Indwelling Catheter - Suprapubic (mL): 635 mL    Indwelling Catheter - Urethral (mL): 265 mL  Total OUT: 1969 mL    Total NET: -469 mL          MEDICATIONS  (STANDING):  chlorhexidine 2% Cloths 1 Application(s) Topical daily  enoxaparin Injectable 40 milliGRAM(s) SubCutaneous every 24 hours  meropenem Injectable 1000 milliGRAM(s) IV Push every 8 hours  multivitamin 1 Tablet(s) Oral daily  pantoprazole  Injectable 40 milliGRAM(s) IV Push two times a day    MEDICATIONS  (PRN):  acetaminophen     Tablet .. 975 milliGRAM(s) Oral every 6 hours PRN Mild Pain (1 - 3)              Physical Exam:    Neurological:  GCS  15, CAM negative, B/L lower extremities with plegia, EVD with clear fluid     HEENT:  EOMI      Respiratory: Unlabored, no accessory muscle use      Cardiovascular: Regular rhythm, sinus tachycardia/NSR       Gastrointestinal: Softly distended, non-tender to palpation, colostomy pink with formed brown stool output.   Midline incision with adequate granulation tissue.     : SPT in place draining yellow urine with large sediment, Sharp in place also with yellow urine and also with large sediment.       Extremities: contracted BL LE     Vascular: Equal and normal pulses: 2+ peripheral pulses throughout      Skin: Midline wound with serous drainage. Packed with dressing placed       LABS:             Lab Holiday                          INTERVAL HPI/OVERNIGHT EVENTS:  Ongoing effort to have pt transferred to [facility] for internalization of EVD.. Merrem to remain on until 2/14 for pyelitis.  Pt tachycardic during the afternoon yesterday, 1.5L IVF given with improvement of HR overnight.  No acute issues overnight.  SPT with more output than cline at this time.        ICU Vital Signs Last 24 Hrs  T(C): 36.7 (13 Feb 2024 23:00), Max: 37.3 (13 Feb 2024 20:49)  T(F): 98.1 (13 Feb 2024 23:00), Max: 99.1 (13 Feb 2024 20:49)  HR: 76 (14 Feb 2024 03:00) (72 - 115)  BP: 109/73 (14 Feb 2024 03:00) (99/69 - 143/89)  BP(mean): 81 (14 Feb 2024 03:00) (69 - 104)  ABP: --  ABP(mean): --  RR: 13 (14 Feb 2024 03:00) (13 - 20)  SpO2: 100% (14 Feb 2024 03:00) (93% - 100%)    O2 Parameters below as of 14 Feb 2024 02:00  Patient On (Oxygen Delivery Method): room air        I&O's Detail    12 Feb 2024 07:01  -  13 Feb 2024 07:00  --------------------------------------------------------  IN:    Albumin 5%  - 250 mL: 500 mL  Total IN: 500 mL    OUT:    Colostomy (mL): 475 mL    External Ventricular Device (mL): 242 mL    Indwelling Catheter - Suprapubic (mL): 1135 mL    Indwelling Catheter - Urethral (mL): 145 mL  Total OUT: 1997 mL    Total NET: -1497 mL      13 Feb 2024 07:01  -  14 Feb 2024 04:10  --------------------------------------------------------  IN:    multiple electrolytes Injection Type 1 Bolus: 1500 mL  Total IN: 1500 mL    OUT:    Colostomy (mL): 875 mL    External Ventricular Device (mL): 194 mL    Indwelling Catheter - Suprapubic (mL): 635 mL    Indwelling Catheter - Urethral (mL): 265 mL  Total OUT: 1969 mL    Total NET: -469 mL          MEDICATIONS  (STANDING):  chlorhexidine 2% Cloths 1 Application(s) Topical daily  enoxaparin Injectable 40 milliGRAM(s) SubCutaneous every 24 hours  meropenem Injectable 1000 milliGRAM(s) IV Push every 8 hours  multivitamin 1 Tablet(s) Oral daily  pantoprazole  Injectable 40 milliGRAM(s) IV Push two times a day    MEDICATIONS  (PRN):  acetaminophen     Tablet .. 975 milliGRAM(s) Oral every 6 hours PRN Mild Pain (1 - 3)              Physical Exam:    Neurological:  GCS  15, CAM negative, B/L lower extremities with plegia, EVD with clear fluid     HEENT:  EOMI      Respiratory: Unlabored, no accessory muscle use      Cardiovascular: Regular rhythm, sinus tachycardia/NSR       Gastrointestinal: Softly distended, non-tender to palpation, colostomy pink with formed brown stool output.   Midline incision with adequate granulation tissue.     : SPT in place draining yellow urine with large sediment, Cline in place also with yellow urine and also with large sediment.       Extremities: contracted BL LE     Vascular: Equal and normal pulses: 2+ peripheral pulses throughout      Skin: Midline wound with serous drainage. Packed with dressing placed       LABS:             Lab Holiday

## 2024-02-14 NOTE — PROGRESS NOTE ADULT - CRITICAL CARE ATTENDING COMMENT
x GEN:  Awake, alert      -BP preserved w/intermittent tachycardia, noted to be grossly negative -will give fluid bolus  -Breathing comfortably on RA  -Lab holiday today, scds, lovenox  -Tolerating diet, ostomy functioning -starting to thicken - 875cc  -Supra-pubic and cline catheters are currently draining adequate urine output, continue to aspirate when drainage drops off  -Afebrile, WBC normal, remains on meropenum through 1/14 for pyelitis  -PT to return to help w/transfering Given fluids overnight.  Feels well today.    GEN:  Awake, alert, conversant, smiling, EVD externalized and taped to chest wall, draining clear CSF fluid  ABD:  Soft, non tender (insensate across lower abdomen-unchanged), ostomy pink w/stool, midline incision nearly healed - clean, scant serous drainage from lower pole, suprapubic catheter in place  EXT:  Warm, no edema, legs in frog-leg position w/foreshortened feet - unchanged    Spina Bifida  Small bowel obstruction -resolved  Bladder leak  Vesiculocutaneous fistula  High output colostomy  Pyelitis    -BP preserved w/intermittent tachycardia, noted to be grossly negative -will give fluid bolus  -Breathing comfortably on RA  -Lab holiday today, scds, lovenox  -Tolerating diet, ostomy functioning -starting to thicken - 875cc  -Supra-pubic and cline catheters are currently draining adequate urine output, continue to aspirate when drainage drops off  -Afebrile, remains on meropenum through 1/14 for pyelitis  -Engage PT again to work with patient and assess his ability to transfer from bed to chair (as he did pre op)  -RUE/LUE midline catheters  -Ongoing process continues to secure a bed for the patient at [facility] for internalization of  shunt.  Have been in communication with NSGY here who have been speaking with team at [facility], specifically Dr. Jara who is covering for Dr. Dorsey.  Will follow.

## 2024-02-14 NOTE — PROGRESS NOTE ADULT - ASSESSMENT
28y Male PMH spina bifida, scoliosis, no sensation distal to pubic line, wheelchair bound at baseline, hydrocephalus s/p  shunt placement with multiple revisions followed by Dr. Dorsey at [facility], now presents to [facility] 1/14/24 with concern for small bowel obstruction, s/p exploratory laparotomy with small bowel resection, partial left colectomy, bladder perforation s/p repair on 1/14/24, s/p externalization of shunt 1/21/24, course complicated by abdominal wall fluid collection s/p drainage by IR 1/31 with abdominal drain removal 2/7    PLAN:  - D/w Dr. Harris  - Q4 neuro checks while in house  - Keep CSF system level with umbilicus. DO NOT CLAMP, record hourly outputs  - Pain control PRN- has tylenol PRN  - Patient pending transfer to [facility] when medically stable for internalization of shunt vs. possible endoscopic third ventriculostomy; patient follows with Dr. Dorsey; attendings actively having discussion with neurosurgery at [facility] and transfer center; pending response back from Dr. Dorsey  - Normotensive SBP goals  - Lovenox 40mg, SCDs for DVT ppx  - Further medical/supportive management per SICU team

## 2024-02-15 PROCEDURE — 99233 SBSQ HOSP IP/OBS HIGH 50: CPT

## 2024-02-15 RX ADMIN — Medication 975 MILLIGRAM(S): at 21:00

## 2024-02-15 RX ADMIN — Medication 1 TABLET(S): at 11:05

## 2024-02-15 RX ADMIN — PANTOPRAZOLE SODIUM 40 MILLIGRAM(S): 20 TABLET, DELAYED RELEASE ORAL at 05:34

## 2024-02-15 RX ADMIN — CHLORHEXIDINE GLUCONATE 1 APPLICATION(S): 213 SOLUTION TOPICAL at 05:33

## 2024-02-15 RX ADMIN — ENOXAPARIN SODIUM 40 MILLIGRAM(S): 100 INJECTION SUBCUTANEOUS at 17:46

## 2024-02-15 RX ADMIN — Medication 975 MILLIGRAM(S): at 20:18

## 2024-02-15 RX ADMIN — PANTOPRAZOLE SODIUM 40 MILLIGRAM(S): 20 TABLET, DELAYED RELEASE ORAL at 17:46

## 2024-02-15 NOTE — PROGRESS NOTE ADULT - CRITICAL CARE ATTENDING COMMENT
x Continues to require intermittent fluid boluses in setting of tachycardia for which patient responds    GEN:  Awake, alert Continues to require intermittent fluid boluses in setting of tachycardia for which patient responds    GEN:  Awake, alert, comfortable, smiling    EXT:  Frog leg chronic positioning of b/l lower extremities, feet foreshortened      -Patient has been accepted for transfer to [facility] w/Dr. Jara/[facility]DA.  Pending bed availability  -Intermittent tachycardia could be related to volume losses not recounted from midline wound.  Will place ostomy appliance over lower pole of wound to catch/measure drainage.  Patient w/known vesicular-cutaneous fistula draining intermittently from lower pole of midline incision  -No new labs today -lab holiday  -Tolerating diet, Ostomy output manageable  -Suprapubic catheter drainage has slowed this am - will aspirate catheter - as it intermittently gets clogged proteinaceous material  -Afebrile, now off antibiotics for pyelitis  -b/l midline catethers Continues to require intermittent fluid boluses in setting of tachycardia for which patient responds    GEN:  Awake, alert, comfortable, smiling  CHEST:   shunt taped to anterior chest wall , noted to be draining clear CSF fluid  ABD:  Soft, non tender (insensate in lower abdomen), incision healing but area of some drainage of serous fluid from lower pole, ostomy pink w/stool, suprapubic catheter in place  EXT:  Frog leg chronic positioning of b/l lower extremities, feet foreshortened    Spina Bifida  Small bowel obstruction -resolved  Colon injury w/leak  Bladder leak  Vesiculocutaneous fistula  High output colostomy  Pyelitis    -Patient has been accepted for transfer to [facility] w/Dr. Jara/[facility] for internalization of  shunt.  Pending bed availability.  -Intermittent tachycardia may be related to volume losses not recounted from midline wound -as tachycardia improves after boluses given.  Will place ostomy appliance over lower pole of wound to catch/measure drainage.  Patient w/known vesiculocutaneous fistula draining intermittently from lower pole of midline incision.  Urology aware of this fistula and wants to continue w/dual bladder drainage with suprapubic catheter and cline.  They feel that with time and ongoing drainage that the fistula will close off.  Suprapubic catheter drainage has slowed this am - aspirated catheter and removed proteinaceous plug- as it intermittently gets clogged proteinaceous material -now flowing.  -No new labs today -lab holiday  -Tolerating diet, ostomy output manageable  -Afebrile, now off antibiotics for pyelitis  -b/l midline catheters  -Continue OOB w/PT for conditioning of upper extremities  Patient without acute critical care needs but remains in SICU setting with externalized  shunt in place per hospital nursing policy.

## 2024-02-15 NOTE — PROGRESS NOTE ADULT - ASSESSMENT
28y Male PMH spina bifida, scoliosis, no sensation distal to pubic line, wheelchair bound at baseline, hydrocephalus s/p  shunt placement with multiple revisions followed by Dr. Dorsey at [facility], now presents to [facility] 1/14/24 with concern for small bowel obstruction, s/p exploratory laparotomy with small bowel resection, partial left colectomy, bladder perforation s/p repair on 1/14/24, s/p externalization of shunt 1/21/24, course complicated by abdominal wall fluid collection s/p drainage by IR 1/31 with abdominal drain removal 2/7    PLAN:  - Will d/w attending - Q4 neuro checks while in house  - Keep CSF system level with umbilicus. DO NOT CLAMP, record hourly outputs  - Pain control PRN- has tylenol PRN  - Patient pending transfer to [facility] as now medically stable for internalization of shunt vs. possible endoscopic third ventriculostomy; patient follows with Dr. Dorsey; Dr. Dorsey away this week but transfer accepted by covering neurosurgeon Dr. Jara. Pending bed availability  - Normotensive SBP goals  - Lovenox 40mg, SCDs for DVT ppx  - Further medical/supportive management per SICU team

## 2024-02-15 NOTE — PROGRESS NOTE ADULT - SUBJECTIVE AND OBJECTIVE BOX
INTERVAL HPI/OVERNIGHT EVENTS:  28y Male PMH spina bifida, scoliosis, no sensation distal to pubic line, wheelchair bound at baseline, hydrocephalus s/p  shunt placement with multiple revisions followed by Dr. Dorsey at [facility], now presents to [facility] 1/14/24 with concern for small bowel obstruction, s/p exploratory laparotomy with small bowel resection, partial left colectomy, bladder perforation s/p repair on 1/14/24, s/p externalization of shunt 1/21/24, course complicated by abdominal wall fluid collection s/p drainage by IR 1/31 with abdominal drain removal 2/7. Patient seen this AM, no complaints, excited that he was accepted by [facility]    Vital Signs Last 24 Hrs  T(C): 36.8 (15 Feb 2024 08:07), Max: 37.3 (14 Feb 2024 16:20)  T(F): 98.3 (15 Feb 2024 08:07), Max: 99.2 (14 Feb 2024 16:20)  HR: 78 (15 Feb 2024 09:00) (74 - 117)  BP: 109/61 (15 Feb 2024 09:00) (100/67 - 134/87)  BP(mean): 74 (15 Feb 2024 09:00) (73 - 109)  RR: 15 (15 Feb 2024 09:00) (0 - 24)  SpO2: 99% (15 Feb 2024 09:00) (96% - 100%)    Parameters below as of 15 Feb 2024 08:00  Patient On (Oxygen Delivery Method): room air    PHYSICAL EXAM:  GENERAL: NAD, well-groomed  HEAD:  Atraumatic, normocephalic  SHUNT: Externalized, clear colored CSF draining; externalization site dressing c/d/i  MENTAL STATUS: AAOx3; appropriately conversant without aphasia; following commands  CRANIAL NERVES: PERRL. EOMI without nystagmus. Facial sensation intact V1-3 distribution b/l. Face symmetric, tongue midline. Hearing grossly intact. Speech clear. Head turning and shoulder shrug intact.   MOTOR: b/l UE 5/5, b/l LE 0/5 and contracted at baseline  SENSATION: Grossly intact b/l upper extremities    LABS:    02-14 @ 07:01  -  02-15 @ 07:00  --------------------------------------------------------  IN: 1000 mL / OUT: 2316 mL / NET: -1316 mL    02-15 @ 07:01  -  02-15 @ 09:49  --------------------------------------------------------  IN: 0 mL / OUT: 168 mL / NET: -168 mL    RADIOLOGY & ADDITIONAL TESTS:  CT Abdomen and Pelvis w/ Oral Cont and w/ IV Cont (02.06.24 @ 17:30)  IMPRESSION:  Nearcomplete drainage of the left abdominal peritoneal fluid collection.  Mild bilateral hydroureteronephrosis to the level of the urinary bladder   with extensive thickening and infiltration surrounding the mid left   ureter along with bilateral generalized urothelial enhancement concerning   for pyelitis. Please correlate with urinalysis. Diffuse bladder wall   thickening may reflect concomitant cystitis.  Left abdominal small bowel wall thickening may be infectious or   inflammatory. Thickening versus underdistention of the colon is noted for   which correlation for symptoms of colitis is recommended.  Redemonstration of a tract of fluid and gas extending from the anterior   urinary bladder through the ventral abdominal wall. Correlate for the   presence of vesiculocutaneous fistula.

## 2024-02-15 NOTE — PROGRESS NOTE ADULT - ASSESSMENT
Assessment: 28 year old male with PMHx of spina bifida (wheelchair bound at baseline), scoliosis,  Shunt BIBEMS 1/14 with abdominal pain, n/v, and lethargy, found to have severe SBO, metabolic acidosis, and to be in septic and hypovolemic shock. Patient was brought to the OR 1/14 for ex lap and small bowel resection, c/b bladder perf with primary repair and partial colectomy and admitted to SICU post-op for further management. Developed mixed septic/hypovolemic shock, pneumoperitoneum, suspected UGIB.  shunt externalized. RTOR 1/22 for bladder repair, Kirkpatrick's and EGD. Course complicated by high ostomy output, now resolved, and abdominal wall abscess, s/p drain placement with IR.        Plan:     NEURO: Continue with pain control, Tylenol PRN.        Hx of  shunt-  shunt externalized. Open to drain, monitor output.         Patient accepted for transfer to [facility] for treatment of  shunt. Pending bed availability          CV: Tachycardia appears to be related to hypovolemia. Improved with IVF            PULM: Currently saturating well on RA.  Continue pulmonary toilet, IS, OOBTC.  Maintain SpO2 > 92%             GI/Nutrition: SBO s/p SBR, Kirkpatrick's - continue Regular high fiber, metamucil -held at this time      Severe protein calorie malnutrition- encourage high protein diet as well, added ensures to patient's tray         Gastritis - GI – recommend PPI BID for 8 weeks, Rpt EGD in 12 weeks.              /Renal:  Urology irrigated SPT/ Cline and now with good urine flow. Hx of urethral stricture now with bladder injury s/p repair x 3   - s/p bladder repair, hydronephrosis. Cont SPT & cline.  Monitor UO from both SP catheter and intraurethral cline catheter. Concern for vesicocutaneous fistula- continue wound care, appears to be improving.            ENDO: Maintain euglycemia 120-180.               ID: Remains afebrile without leukocytosis. Completed course of meropenum.            HEME:   Monitor H/H        SCDs, lovenox 40 daily               SKIN:   Repositioning for DTI prevention while in bed. Dressing changed          Continue local wound care to midline wound with daily packing change and monitor output            Lines: Peripheral IV's              Dispo: Will follow up with [facility] transfer center for bed availability.

## 2024-02-15 NOTE — PROGRESS NOTE ADULT - SUBJECTIVE AND OBJECTIVE BOX
24h Events: Patient continues to have intermittent sinus tachycardia that responds very well to fluid. Continues to have unquantifiable drainage from midline wound. Accepted for transfer to [facility] and pending bed.     ICU Vital Signs Last 24 Hrs  T(C): 36.8 (14 Feb 2024 23:41), Max: 37.3 (14 Feb 2024 16:20)  T(F): 98.3 (14 Feb 2024 23:41), Max: 99.2 (14 Feb 2024 16:20)  HR: 78 (15 Feb 2024 05:00) (74 - 118)  BP: 110/59 (15 Feb 2024 05:00) (100/67 - 134/87)  BP(mean): 73 (15 Feb 2024 05:00) (73 - 109)  ABP: --  ABP(mean): --  RR: 0 (15 Feb 2024 05:00) (0 - 24)  SpO2: 99% (15 Feb 2024 05:00) (96% - 100%)    O2 Parameters below as of 15 Feb 2024 04:00  Patient On (Oxygen Delivery Method): room air    I&O's Detail    13 Feb 2024 07:01  -  14 Feb 2024 07:00  --------------------------------------------------------  IN:    multiple electrolytes Injection Type 1 Bolus: 1500 mL  Total IN: 1500 mL    OUT:    Colostomy (mL): 875 mL    External Ventricular Device (mL): 240 mL    Indwelling Catheter - Suprapubic (mL): 785 mL    Indwelling Catheter - Urethral (mL): 320 mL  Total OUT: 2220 mL    Total NET: -720 mL      14 Feb 2024 07:01  -  15 Feb 2024 05:38  --------------------------------------------------------  IN:    multiple electrolytes Injection Type 1 Bolus: 500 mL  Total IN: 500 mL    OUT:    Colostomy (mL): 100 mL    External Ventricular Device (mL): 212 mL    Indwelling Catheter - Suprapubic (mL): 1230 mL    Indwelling Catheter - Urethral (mL): 480 mL  Total OUT: 2022 mL    Total NET: -1522 mL      MEDICATIONS  (STANDING):  chlorhexidine 2% Cloths 1 Application(s) Topical daily  enoxaparin Injectable 40 milliGRAM(s) SubCutaneous every 24 hours  multivitamin 1 Tablet(s) Oral daily  pantoprazole  Injectable 40 milliGRAM(s) IV Push two times a day    MEDICATIONS  (PRN):  acetaminophen     Tablet .. 975 milliGRAM(s) Oral every 6 hours PRN Mild Pain (1 - 3)      Physical Exam:    Neurological:  GCS  15, CAM negative, B/L lower extremities with plegia, EVD with clear fluid      HEENT:  EOMI       Respiratory: Unlabored, no accessory muscle use       Cardiovascular: Regular rhythm, sinus tachycardia/NSR        Gastrointestinal: Softly distended, non-tender to palpation, colostomy pink with formed brown stool output. Midline incision with serous drainage and adequate granulation tissue.      : SPT in place draining yellow urine with large sediment, Sharp in place also with yellow urine and also with large sediment.        Extremities: contracted BL LE      Vascular: Equal and normal pulses: 2+ peripheral pulses throughout       Skin: Midline wound with serous drainage. Packed with dressing placed     LABS:

## 2024-02-16 PROCEDURE — 99232 SBSQ HOSP IP/OBS MODERATE 35: CPT | Mod: 24

## 2024-02-16 PROCEDURE — 99232 SBSQ HOSP IP/OBS MODERATE 35: CPT

## 2024-02-16 PROCEDURE — 99233 SBSQ HOSP IP/OBS HIGH 50: CPT

## 2024-02-16 RX ORDER — SODIUM CHLORIDE 9 MG/ML
1500 INJECTION, SOLUTION INTRAVENOUS ONCE
Refills: 0 | Status: COMPLETED | OUTPATIENT
Start: 2024-02-16 | End: 2024-02-16

## 2024-02-16 RX ORDER — SODIUM CHLORIDE 9 MG/ML
1000 INJECTION, SOLUTION INTRAVENOUS ONCE
Refills: 0 | Status: COMPLETED | OUTPATIENT
Start: 2024-02-16 | End: 2024-02-16

## 2024-02-16 RX ADMIN — PANTOPRAZOLE SODIUM 40 MILLIGRAM(S): 20 TABLET, DELAYED RELEASE ORAL at 05:35

## 2024-02-16 RX ADMIN — ENOXAPARIN SODIUM 40 MILLIGRAM(S): 100 INJECTION SUBCUTANEOUS at 17:12

## 2024-02-16 RX ADMIN — SODIUM CHLORIDE 1000 MILLILITER(S): 9 INJECTION, SOLUTION INTRAVENOUS at 03:51

## 2024-02-16 RX ADMIN — Medication 1 TABLET(S): at 11:36

## 2024-02-16 RX ADMIN — SODIUM CHLORIDE 750 MILLILITER(S): 9 INJECTION, SOLUTION INTRAVENOUS at 22:06

## 2024-02-16 RX ADMIN — PANTOPRAZOLE SODIUM 40 MILLIGRAM(S): 20 TABLET, DELAYED RELEASE ORAL at 17:12

## 2024-02-16 RX ADMIN — CHLORHEXIDINE GLUCONATE 1 APPLICATION(S): 213 SOLUTION TOPICAL at 05:34

## 2024-02-16 NOTE — PROGRESS NOTE ADULT - SUBJECTIVE AND OBJECTIVE BOX
24H events: Transfer to [facility] planned for monday for internalization of EVD. Intermittent tachycardia, given 1L palsmalye earlier this mornign     PAST MEDICAL & SURGICAL HISTORY:  Spina bifida      Scoliosis        MEDICATIONS  (STANDING):  chlorhexidine 2% Cloths 1 Application(s) Topical daily  enoxaparin Injectable 40 milliGRAM(s) SubCutaneous every 24 hours  multivitamin 1 Tablet(s) Oral daily  pantoprazole  Injectable 40 milliGRAM(s) IV Push two times a day    MEDICATIONS  (PRN):  acetaminophen     Tablet .. 975 milliGRAM(s) Oral every 6 hours PRN Mild Pain (1 - 3)      ICU Vital Signs Last 24 Hrs  T(C): 36.4 (16 Feb 2024 03:00), Max: 37.2 (15 Feb 2024 15:56)  T(F): 97.5 (16 Feb 2024 03:00), Max: 99 (15 Feb 2024 15:56)  HR: 76 (16 Feb 2024 04:00) (75 - 119)  BP: 107/77 (16 Feb 2024 04:00) (90/65 - 119/75)  BP(mean): 88 (16 Feb 2024 04:00) (73 - 93)  RR: 14 (16 Feb 2024 04:00) (0 - 22)  SpO2: 100% (16 Feb 2024 04:00) (95% - 100%)    O2 Parameters below as of 16 Feb 2024 04:00  Patient On (Oxygen Delivery Method): room air        Drug Dosing Weight  Height (cm): 132.1 (14 Jan 2024 09:30)  Weight (kg): 70 (14 Jan 2024 07:48)  BMI (kg/m2): 40.1 (14 Jan 2024 09:30)  BSA (m2): 1.51 (14 Jan 2024 09:30)    CENTRAL LINE: [ ] YES [x ] NO  LOCATION:   DATE INSERTED:  REMOVE: [ ] YES [ ] NO  EXPLAIN:    DEL CASTILLO: [x ] YES [ ] NO    DATE INSERTED:  REMOVE: [ ] YES [x ] NO  EXPLAIN:    A-LINE: [ ] YES [x ] NO  LOCATION:   DATE INSERTED:  REMOVE: [ ] YES [ ] NO  EXPLAIN:                    I&O's Detail    14 Feb 2024 07:01  -  15 Feb 2024 07:00  --------------------------------------------------------  IN:    multiple electrolytes Injection Type 1 Bolus: 1000 mL  Total IN: 1000 mL    OUT:    Colostomy (mL): 100 mL    External Ventricular Device (mL): 236 mL    Indwelling Catheter - Suprapubic (mL): 1365 mL    Indwelling Catheter - Urethral (mL): 615 mL  Total OUT: 2316 mL    Total NET: -1316 mL      15 Feb 2024 07:01  -  16 Feb 2024 04:50  --------------------------------------------------------  IN:    multiple electrolytes Injection Type 1 Bolus: 1000 mL    Oral Fluid: 120 mL  Total IN: 1120 mL    OUT:    Colostomy (mL): 500 mL    External Ventricular Device (mL): 220 mL    Indwelling Catheter - Suprapubic (mL): 570 mL    Indwelling Catheter - Urethral (mL): 510 mL  Total OUT: 1800 mL    Total NET: -680 mL            Neurological:  GCS  15, A&Ox3, CAM negative, B/L lower extremities with plegia, EVD draining clear fluid      HEENT:  EOMI, no JVD       Respiratory: Unlabored, no accessory muscle use, equal and bilateral chest rise        Cardiovascular: Regular rhythm, sinus tachycardia with rate in the low 100s    Gastrointestinal: Softly distended, non-tender to palpation, colostomy pink with formed brown stool output. Midline incision with serous drainage and adequate granulation tissue.      : SPT in place draining yellow urine with large sediment, Del Castillo in place also with yellow urine and also with large sediment.        Extremities: contracted BL LE      Vascular: Equal and normal pulses: 2+ peripheral pulses throughout       Skin: Midline wound with serous drainage. Packed with dressing placed

## 2024-02-16 NOTE — PROGRESS NOTE ADULT - NS ATTEND AMEND GEN_ALL_CORE FT
BRINDA Attg:    see above    patient seen and examined     agree with above    plan of care determined for externalized  shunt  continue CSF drainage  tx to [facility] per patient request pending

## 2024-02-16 NOTE — PROGRESS NOTE ADULT - NS ATTEND OPT1A GEN_ALL_CORE
History/Exam/Medical decision making
Medical decision making
History/Exam/Medical decision making
Medical decision making
History/Exam/Medical decision making
Medical decision making
History/Exam/Medical decision making
Medical decision making
History/Exam/Medical decision making
Medical decision making

## 2024-02-16 NOTE — PROGRESS NOTE ADULT - ASSESSMENT
Assessment: 28 year old male with PMHx of spina bifida (wheelchair bound at baseline), scoliosis,  Shunt BIBEMS 1/14 with abdominal pain, n/v, and lethargy, found to have severe SBO, metabolic acidosis, and to be in septic and hypovolemic shock. Patient was brought to the OR 1/14 for ex lap and small bowel resection, c/b bladder perf with primary repair and partial colectomy and admitted to SICU post-op for further management. Developed mixed septic/hypovolemic shock, pneumoperitoneum, suspected UGIB.  shunt externalized. RTOR 1/22 for bladder repair, Kirkpatrikc's and EGD. Course complicated by high ostomy output, now resolved, and abdominal wall abscess, s/p drain placement with IR, no removed.        Plan:     NEURO: Continue with pain control, Tylenol PRN.      Hx of  shunt-  shunt externalized. Open to drain, monitor output.       Patient accepted for transfer to [facility] for treatment of  shunt. Pending bed availability        CV: Tachycardia appears to be related to hypovolemia. Improved with IVF     PULM: Currently saturating well on RA.  Continue pulmonary toilet, IS, OOBTC.  Maintain SpO2 > 92%          GI/Nutrition: SBO s/p SBR, Kirkpatrick's - continue Regular high fiber, metamucil -held at this time    Severe protein calorie malnutrition- encourage high protein diet as well, added ensures to patient's tray       Gastritis - GI – recommend PPI BID for 8 weeks, Rpt EGD in 12 weeks.         /Renal:  Urology irrigated SPT/ Cline and now with good urine flow. Hx of urethral stricture now with bladder injury s/p repair x 3   - s/p bladder repair, hydronephrosis. Cont SPT & cline.  Monitor UO from both SP catheter and intraurethral cline catheter. Concern for vesicocutaneous fistula- continue wound care, appears to be improving.      ENDO: Maintain euglycemia 120-180.         ID: Remains afebrile without leukocytosis. Completed course of meropenum.      HEME:   Monitor H/H      SCDs, lovenox 40 daily         SKIN:   Repositioning for DTI prevention while in bed. Dressing changed        Continue local wound care to midline wound with daily packing change and monitor output      Lines: Peripheral IV's        Dispo: Will follow up with [facility] transfer, planning for monday

## 2024-02-16 NOTE — PROGRESS NOTE ADULT - SUBJECTIVE AND OBJECTIVE BOX
Patient is a 28y old  Male who presents with a chief complaint of small bowel obstruction (16 Feb 2024 04:50)    HPI:  SUBJECTIVE: 29yo M w/ hx of spina bfida and scoliosis, wheelchair bound at baseline, presenting with abdominal pain, n/v. Pt states that the pain began yesterday morning and has been persistent. He had a bowel movement this morning but prior to that his last bowel movement was one week prior. Denies passing gas. Denies fevers at home. Has been vomiting and nauseous. At home he was lethargic and EMS was called. At that time he was hypotensive and tachycardic and he was BIBEMS to [facility] ED for further workup. On arrival he was tachycardic to the 130s and hypotensive to 80s/50s. Labs notable for K 3.4, bicarb 13, Cr 2.04. Actively vomiting in ED. CT scan showed dilated bowel and stomach consistent with severe SBO with transition point in mid abdomen. Surgery consulted for management.  (14 Jan 2024 11:27)      Interval history:  Patient seen and examined at bedside by neurosurgery team. Patient remains with shunt externalization, EVD bag changed today. Patient has no acute complaints. No acute events reported overnight. Pending bed at [facility].       Vital Signs Last 24 Hrs  T(C): 36.7 (16 Feb 2024 07:55), Max: 37.2 (15 Feb 2024 15:56)  T(F): 98.1 (16 Feb 2024 07:55), Max: 99 (15 Feb 2024 15:56)  HR: 81 (16 Feb 2024 09:20) (75 - 119)  BP: 102/65 (16 Feb 2024 09:00) (90/65 - 124/92)  BP(mean): 76 (16 Feb 2024 09:00) (73 - 102)  RR: 16 (16 Feb 2024 09:20) (12 - 22)  SpO2: 97% (16 Feb 2024 09:20) (90% - 100%)    Parameters below as of 16 Feb 2024 08:00  Patient On (Oxygen Delivery Method): room air      Physical Exam:  Constitutional: NAD, lying in bed  Neuro  * Mental Status:  GCS 15: Awake, alert, oriented to conversation. No aphasia or difficulty speaking. No dysarthria.   * Cranial Nerves: Cnii-Cnxii grossly intact. EOMI, tongue midline, no gaze deviation  * Motor: RUE 5/5, LUE 5/5, b/l LE 0/5 and contracted (Baseline)  * Sensory: intact b/l UE   * Reflexes: not assessed   Shunt: shunt externalization dressing in place, no drainage, clean/dry/intact      LABS:  No recent labs, reviewed from 2/13       Medications:  MEDICATIONS  (STANDING):  chlorhexidine 2% Cloths 1 Application(s) Topical daily  enoxaparin Injectable 40 milliGRAM(s) SubCutaneous every 24 hours  multivitamin 1 Tablet(s) Oral daily  pantoprazole  Injectable 40 milliGRAM(s) IV Push two times a day    MEDICATIONS  (PRN):  acetaminophen     Tablet .. 975 milliGRAM(s) Oral every 6 hours PRN Mild Pain (1 - 3)      RADIOLOGY & ADDITIONAL STUDIES:  No new neurosurgical imaging to review

## 2024-02-16 NOTE — PROGRESS NOTE ADULT - ASSESSMENT
Assessment:  28y Male PMH spina bifida, scoliosis, no sensation distal to pubic line, wheelchair bound at baseline, hydrocephalus s/p  shunt placement with multiple revisions followed by Dr. Dorsey at [facility], now presents to [facility] 1/14/24 with concern for small bowel obstruction, s/p exploratory laparotomy with small bowel resection, partial left colectomy, bladder perforation s/p repair on 1/14/24, s/p externalization of shunt 1/21/24, course complicated by abdominal wall fluid collection s/p drainage by IR 1/31 with abdominal drain removal 2/7.  - Day 26 of shunt externalization  - Shunt draining appropriately      Plan:  - Discussed with Dr. Harris  - Q4 hour neuro checks  - SBP goal normotensive  - Keep CSF system level with umbilicus, do not clamp, record output hourly   - DVT prophylaxis: SCDs, lovenox   - Pending transfer to [facility] for further shunt management per patient preference, accepted by neurosurgery Dr. Jara, pending bed availability  - Further care per SICU team

## 2024-02-16 NOTE — PROGRESS NOTE ADULT - CRITICAL CARE ATTENDING COMMENT
x 1 liter fluid bolus overnight for tachycardia      -No pain, EVD continues to drain  -Tachycardia resolved after fluids given, ongoing daily patient is negative from ostomy drainage (pretty formed) as well as EVD drainage and urine from the two catheters  -Breathing comfortably on RA  -H/H stable, scds, lovenox  -Completed IV abx for pyelitis, afebrile, now off antibiotics  -OOB to chair    -Lab holiday tomorrow  -Plan for transfer on Monday as he has been accepted to [facility]. 1 liter fluid bolus overnight for tachycardia    GEN:  Awake, alert, comfortable, smiling, sitting in a chair  CHEST:   shunt taped to anterior chest wall ,clear CSF drainage noted  ABD:  Soft, non tender (insensate in lower abdomen), incision healing w/minimal serous drainage from lower pole of incision via ostomy bag, ostomy pink w/formed stool, suprapubic catheter in place -ongoing sediment noted in catheter tubing (also noted in cline tubing)  EXT:  Frog leg chronic positioning of b/l lower extremities, feet foreshortened    Spina Bifida  Small bowel obstruction -resolved  Colon injury w/leak  Bladder leak  Vesiculocutaneous fistula  High output colostomy  Pyelitis    -No pain, EVD continues to drain clear CSF  -Tachycardia resolved after fluids given, ongoing daily patient is negative from ostomy drainage (now pretty formed) as well as EVD drainage and urine from the two catheters as well as intermittent wound drainage, encourage patient to take in more PO daily   -Breathing comfortably on RA  -H/H stable, scds, lovenox  -Completed IV abx for pyelitis, afebrile, now off antibiotics  -OOB to chair  -Lab holiday tomorrow  -Cline and suprapubic catheter currently working well - if urine output drops off -need to aspirate to remove inspissated sediment from small bowel mucosa  -Continue OOB to chair - parents to try and bring in patient's own wheelchair   -Plan for transfer on Monday as he has been accepted to [facility].  Will go to NSGY service (w/Dr. Jara and Dr. Dorsey) but will be followed by Dr. Rickey Abmrosio from Urology as well.  Yesterday Dr. Ambrosio spoke with Dr. Abbott (Urologist who was in last operative procedure here) for an update.  Dr. Ambrosio will assume urologic care of the patient upon transfer.

## 2024-02-17 PROCEDURE — 99233 SBSQ HOSP IP/OBS HIGH 50: CPT

## 2024-02-17 PROCEDURE — 99232 SBSQ HOSP IP/OBS MODERATE 35: CPT | Mod: 24

## 2024-02-17 PROCEDURE — 99231 SBSQ HOSP IP/OBS SF/LOW 25: CPT

## 2024-02-17 RX ORDER — SODIUM CHLORIDE 9 MG/ML
500 INJECTION, SOLUTION INTRAVENOUS ONCE
Refills: 0 | Status: COMPLETED | OUTPATIENT
Start: 2024-02-17 | End: 2024-02-17

## 2024-02-17 RX ADMIN — CHLORHEXIDINE GLUCONATE 1 APPLICATION(S): 213 SOLUTION TOPICAL at 05:37

## 2024-02-17 RX ADMIN — SODIUM CHLORIDE 500 MILLILITER(S): 9 INJECTION, SOLUTION INTRAVENOUS at 03:29

## 2024-02-17 RX ADMIN — PANTOPRAZOLE SODIUM 40 MILLIGRAM(S): 20 TABLET, DELAYED RELEASE ORAL at 17:13

## 2024-02-17 RX ADMIN — PANTOPRAZOLE SODIUM 40 MILLIGRAM(S): 20 TABLET, DELAYED RELEASE ORAL at 05:37

## 2024-02-17 RX ADMIN — Medication 1 TABLET(S): at 11:15

## 2024-02-17 RX ADMIN — ENOXAPARIN SODIUM 40 MILLIGRAM(S): 100 INJECTION SUBCUTANEOUS at 17:13

## 2024-02-17 NOTE — PROGRESS NOTE ADULT - CRITICAL CARE ATTENDING COMMENT
x Required fluid again overnight.    GEN:        -HD stable but has periods of tachycardia overnight that each time respond to fluid challenges.  I/Os may not be accurately showing oral intake.  Will check strict I/Os.  -Afebrile  -Lab holiday again today Required fluid bolus again overnight.    GEN:  Awake, alert, comfortable, smiling, conversant  CHEST:   shunt fastened at chest w/clear CSF drainage noted  ABD:  Soft, non tender (insensate in lower abdomen), dressing over incision clean and dry, ostomy pink w/formed stool, suprapubic catheter in place -ongoing sediment noted in catheter tubing (also noted in cline tubing)  EXT:  Frog leg chronic positioning of b/l lower extremities, feet foreshortened    Spina Bifida  Small bowel obstruction -resolved  Colon injury w/leak  Bladder leak  Vesiculocutaneous fistula  High output colostomy  Pyelitis    -HD stable but has periods of tachycardia overnight that each time respond to fluid challenges.  I/Os may not be accurately showing oral intake.  Patient reports that he is drinking some fluids daily. Will check strict I/Os.  Encouraged increased PO intake.  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  -Suprapubic and cline catheters to remain.  Have remained patent.  If they get clogged they need be aspirated to keep them flowing regularly.  Stitch holding suprapubic catheter has come free - would replace -even though likely a tract has formed , want a safeguard to prevent catheter from being inadvertently pulled out  -Plan remains for patient to be transferred to [facility] for internalization of EVD.  Urology will follow with patient once at [facility] as well.

## 2024-02-17 NOTE — PROGRESS NOTE ADULT - SUBJECTIVE AND OBJECTIVE BOX
24h Events:  Patient intermittently tachycardic to low 100s. Gave 2L IVF this AM with improvement. Plan to transfer to [facility] hospital for further care this week.     ICU Vital Signs Last 24 Hrs  T(C): 36.8 (17 Feb 2024 00:00), Max: 37.7 (16 Feb 2024 19:06)  T(F): 98.3 (17 Feb 2024 00:00), Max: 99.8 (16 Feb 2024 19:06)  HR: 97 (17 Feb 2024 02:00) (76 - 115)  BP: 94/57 (17 Feb 2024 02:00) (94/57 - 127/95)  BP(mean): 69 (17 Feb 2024 02:00) (69 - 105)  ABP: --  ABP(mean): --  RR: 16 (17 Feb 2024 02:00) (12 - 27)  SpO2: 97% (17 Feb 2024 02:00) (90% - 100%)    O2 Parameters below as of 16 Feb 2024 20:00  Patient On (Oxygen Delivery Method): room air            I&O's Detail    15 Feb 2024 07:01  -  16 Feb 2024 07:00  --------------------------------------------------------  IN:    multiple electrolytes Injection Type 1 Bolus: 1000 mL    Oral Fluid: 120 mL  Total IN: 1120 mL    OUT:    Colostomy (mL): 700 mL    External Ventricular Device (mL): 248 mL    Indwelling Catheter - Suprapubic (mL): 665 mL    Indwelling Catheter - Urethral (mL): 560 mL  Total OUT: 2173 mL    Total NET: -1053 mL      16 Feb 2024 07:01  -  17 Feb 2024 02:50  --------------------------------------------------------  IN:  Total IN: 0 mL    OUT:    Colostomy (mL): 800 mL    External Ventricular Device (mL): 187 mL    Indwelling Catheter - Suprapubic (mL): 415 mL    Indwelling Catheter - Urethral (mL): 525 mL  Total OUT: 1927 mL    Total NET: -1927 mL              MEDICATIONS  (STANDING):  chlorhexidine 2% Cloths 1 Application(s) Topical daily  enoxaparin Injectable 40 milliGRAM(s) SubCutaneous every 24 hours  multiple electrolytes Injection Type 1 Bolus 500 milliLiter(s) IV Bolus once  multivitamin 1 Tablet(s) Oral daily  pantoprazole  Injectable 40 milliGRAM(s) IV Push two times a day    MEDICATIONS  (PRN):  acetaminophen     Tablet .. 975 milliGRAM(s) Oral every 6 hours PRN Mild Pain (1 - 3)      Physical Exam:    Neurological:  GCS  15, A&Ox3, B/L lower extremities with plegia, EVD draining clear fluid      HEENT:  EOMI, no JVD       Respiratory: Unlabored, no accessory muscle use, equal and bilateral chest rise        Cardiovascular: Regular rhythm, sinus tachycardia with rate in the low 100s    Gastrointestinal: Softly distended. Midline incision with serous drainage and adequate granulation tissue.      : SPT in place draining yellow urine with large sediment, Sharp in place also with yellow urine and also with large sediment.        Extremities: contracted BL LE      Vascular: Equal and normal pulses: 2+ peripheral pulses throughout              LABS:              RECENT CULTURES:

## 2024-02-17 NOTE — PROGRESS NOTE ADULT - ASSESSMENT
Assessment: 28 year old male with PMHx of spina bifida (wheelchair bound at baseline), scoliosis,  Shunt BIBEMS 1/14 with abdominal pain, n/v, and lethargy, found to have severe SBO, metabolic acidosis, and to be in septic and hypovolemic shock. Patient was brought to the OR 1/14 for ex lap and small bowel resection, c/b bladder perf with primary repair and partial colectomy and admitted to SICU post-op for further management. Developed mixed septic/hypovolemic shock, pneumoperitoneum, suspected UGIB.  shunt externalized. RTOR 1/22 for bladder repair, Kirkpatrick's and EGD. Course complicated by high ostomy output, now resolved, and abdominal wall abscess, s/p drain placement with IR, now removed. Likely transfer to [facility] this week.      Plan:     NEURO: Continue with pain control, Tylenol PRN.      Hx of  shunt-  shunt externalized. Open to drain, monitor output.       Patient accepted for transfer to [facility] for treatment of  shunt.     CV: Tachycardia appears to be related to hypovolemia. Improved with IVF, given 2L this AM     PULM: Currently saturating well on RA.  Continue pulmonary toilet, IS, OOBTC.  Maintain SpO2 > 92%        GI/Nutrition: SBO s/p SBR, Kirkpatrick's - continue Regular high fiber,   Gastritis - GI – recommend PPI BID for 8 weeks, Rpt EGD in 12 weeks.       /Renal: Hx of urethral stricture now with bladder injury s/p repair x 3   - s/p bladder repair, hydronephrosis. Cont SPT & cline.  Monitor UO from both SP catheter and intraurethral cline catheter.       ENDO: Maintain euglycemia 120-180.         ID: Remains afebrile without leukocytosis. Completed course of meropenum for pyelitis.      HEME:   Monitor H/H, SCDs, lovenox 40 daily         SKIN:   Repositioning for DTI prevention while in bed. Dressing changed        Continue local wound care to midline wound with daily packing change and monitor output      Lines: Peripheral IV's        Dispo: likely transfer for EVD internalization this week

## 2024-02-17 NOTE — PROGRESS NOTE ADULT - NS ATTEND AMEND GEN_ALL_CORE FT
Neurosurgery Attending Attestation:    Patient seen and examined at bedside. Agree with plan and note as documented above.    exam stable, externalized shunt draining and patent. Awaiting [facility] transfer.    -Rakel Keith MD

## 2024-02-18 LAB
ANION GAP SERPL CALC-SCNC: 13 MMOL/L — SIGNIFICANT CHANGE UP (ref 5–17)
BASOPHILS # BLD AUTO: 0.03 K/UL — SIGNIFICANT CHANGE UP (ref 0–0.2)
BASOPHILS NFR BLD AUTO: 0.4 % — SIGNIFICANT CHANGE UP (ref 0–2)
BUN SERPL-MCNC: 19.5 MG/DL — SIGNIFICANT CHANGE UP (ref 8–20)
CALCIUM SERPL-MCNC: 9.1 MG/DL — SIGNIFICANT CHANGE UP (ref 8.4–10.5)
CHLORIDE SERPL-SCNC: 99 MMOL/L — SIGNIFICANT CHANGE UP (ref 96–108)
CO2 SERPL-SCNC: 24 MMOL/L — SIGNIFICANT CHANGE UP (ref 22–29)
CREAT SERPL-MCNC: 0.66 MG/DL — SIGNIFICANT CHANGE UP (ref 0.5–1.3)
EGFR: 129 ML/MIN/1.73M2 — SIGNIFICANT CHANGE UP
EOSINOPHIL # BLD AUTO: 0.2 K/UL — SIGNIFICANT CHANGE UP (ref 0–0.5)
EOSINOPHIL NFR BLD AUTO: 2.7 % — SIGNIFICANT CHANGE UP (ref 0–6)
GLUCOSE SERPL-MCNC: 92 MG/DL — SIGNIFICANT CHANGE UP (ref 70–99)
HCT VFR BLD CALC: 31.6 % — LOW (ref 39–50)
HGB BLD-MCNC: 10.1 G/DL — LOW (ref 13–17)
IMM GRANULOCYTES NFR BLD AUTO: 0.3 % — SIGNIFICANT CHANGE UP (ref 0–0.9)
LYMPHOCYTES # BLD AUTO: 1.34 K/UL — SIGNIFICANT CHANGE UP (ref 1–3.3)
LYMPHOCYTES # BLD AUTO: 17.8 % — SIGNIFICANT CHANGE UP (ref 13–44)
MAGNESIUM SERPL-MCNC: 1.8 MG/DL — SIGNIFICANT CHANGE UP (ref 1.6–2.6)
MCHC RBC-ENTMCNC: 26.9 PG — LOW (ref 27–34)
MCHC RBC-ENTMCNC: 32 GM/DL — SIGNIFICANT CHANGE UP (ref 32–36)
MCV RBC AUTO: 84 FL — SIGNIFICANT CHANGE UP (ref 80–100)
MONOCYTES # BLD AUTO: 0.46 K/UL — SIGNIFICANT CHANGE UP (ref 0–0.9)
MONOCYTES NFR BLD AUTO: 6.1 % — SIGNIFICANT CHANGE UP (ref 2–14)
NEUTROPHILS # BLD AUTO: 5.47 K/UL — SIGNIFICANT CHANGE UP (ref 1.8–7.4)
NEUTROPHILS NFR BLD AUTO: 72.7 % — SIGNIFICANT CHANGE UP (ref 43–77)
PHOSPHATE SERPL-MCNC: 3.6 MG/DL — SIGNIFICANT CHANGE UP (ref 2.4–4.7)
PLATELET # BLD AUTO: 434 K/UL — HIGH (ref 150–400)
POTASSIUM SERPL-MCNC: 4.4 MMOL/L — SIGNIFICANT CHANGE UP (ref 3.5–5.3)
POTASSIUM SERPL-SCNC: 4.4 MMOL/L — SIGNIFICANT CHANGE UP (ref 3.5–5.3)
RBC # BLD: 3.76 M/UL — LOW (ref 4.2–5.8)
RBC # FLD: 14.5 % — SIGNIFICANT CHANGE UP (ref 10.3–14.5)
SARS-COV-2 RNA SPEC QL NAA+PROBE: SIGNIFICANT CHANGE UP
SODIUM SERPL-SCNC: 136 MMOL/L — SIGNIFICANT CHANGE UP (ref 135–145)
WBC # BLD: 7.52 K/UL — SIGNIFICANT CHANGE UP (ref 3.8–10.5)
WBC # FLD AUTO: 7.52 K/UL — SIGNIFICANT CHANGE UP (ref 3.8–10.5)

## 2024-02-18 PROCEDURE — 99233 SBSQ HOSP IP/OBS HIGH 50: CPT

## 2024-02-18 RX ADMIN — PANTOPRAZOLE SODIUM 40 MILLIGRAM(S): 20 TABLET, DELAYED RELEASE ORAL at 18:04

## 2024-02-18 RX ADMIN — Medication 1 TABLET(S): at 11:20

## 2024-02-18 RX ADMIN — CHLORHEXIDINE GLUCONATE 1 APPLICATION(S): 213 SOLUTION TOPICAL at 06:11

## 2024-02-18 RX ADMIN — PANTOPRAZOLE SODIUM 40 MILLIGRAM(S): 20 TABLET, DELAYED RELEASE ORAL at 06:11

## 2024-02-18 RX ADMIN — ENOXAPARIN SODIUM 40 MILLIGRAM(S): 100 INJECTION SUBCUTANEOUS at 18:02

## 2024-02-18 NOTE — PROGRESS NOTE ADULT - ASSESSMENT
28y Male PMH spina bifida, scoliosis, no sensation distal to pubic line, wheelchair bound at baseline, hydrocephalus s/p  shunt placement with multiple revisions followed by Dr. Dorsey at [facility], now presents to [facility] 1/14/24 with concern for small bowel obstruction, s/p exploratory laparotomy with small bowel resection, partial left colectomy, bladder perforation s/p repair on 1/14/24, s/p externalization of shunt 1/21/24, course complicated by abdominal wall fluid collection s/p drainage by IR 1/31 with abdominal drain removal 2/7.  - Day 28 of shunt externalization  - Shunt draining appropriately      Plan:  - Discussed with Dr. Harris  - Q4 hour neuro checks  - SBP goal normotensive  - Keep CSF system level with umbilicus, do not clamp, record output hourly   - DVT prophylaxis: SCDs, lovenox   - Pending transfer to [facility] for further shunt management per patient preference, accepted by neurosurgery Dr. Jara, pending bed availability  - Further care per SICU team

## 2024-02-18 NOTE — PROGRESS NOTE ADULT - SUBJECTIVE AND OBJECTIVE BOX
HPI:  SUBJECTIVE: 27yo M w/ hx of spina bfida and scoliosis, wheelchair bound at baseline, presenting with abdominal pain, n/v. Pt states that the pain began yesterday morning and has been persistent. He had a bowel movement this morning but prior to that his last bowel movement was one week prior. Denies passing gas. Denies fevers at home. Has been vomiting and nauseous. At home he was lethargic and EMS was called. At that time he was hypotensive and tachycardic and he was BIBEMS to [facility] ED for further workup. On arrival he was tachycardic to the 130s and hypotensive to 80s/50s. Labs notable for K 3.4, bicarb 13, Cr 2.04. Actively vomiting in ED. CT scan showed dilated bowel and stomach consistent with severe SBO with transition point in mid abdomen. Surgery consulted for management.  (14 Jan 2024 11:27)    Interval history:  Patient seen and examined at bedside by neurosurgery team. Patient remains with shunt externalization. Patient has no acute complaints. No acute events reported overnight. Pending bed at [facility].    Vital Signs Last 24 Hrs  T(C): 37.2 (18 Feb 2024 07:51), Max: 38.3 (17 Feb 2024 23:42)  T(F): 99 (18 Feb 2024 07:51), Max: 100.9 (17 Feb 2024 23:42)  HR: 92 (18 Feb 2024 07:00) (78 - 118)  BP: 102/76 (18 Feb 2024 07:00) (91/55 - 136/92)  BP(mean): 87 (18 Feb 2024 07:00) (64 - 105)  RR: 22 (18 Feb 2024 07:00) (14 - 24)  SpO2: 99% (18 Feb 2024 07:00) (95% - 100%)    Parameters below as of 17 Feb 2024 16:00  Patient On (Oxygen Delivery Method): room air    PHYSICAL EXAM:  GENERAL: NAD, well-groomed  HEAD:  Atraumatic, normocephalic  SHUNT: Externalized, clear colored CSF draining; externalization site dressing c/d/i  MENTAL STATUS: AAOx3; appropriately conversant without aphasia; following commands  CRANIAL NERVES: PERRL. EOMI without nystagmus. Facial sensation intact V1-3 distribution b/l. Face symmetric, tongue midline. Hearing grossly intact. Speech clear. Head turning and shoulder shrug intact.   MOTOR: b/l UE 5/5, b/l LE 0/5 and contracted at baseline  SENSATION: Grossly intact b/l upper extremities  LABS:    02-17 @ 07:01  -  02-18 @ 07:00  --------------------------------------------------------  IN: 1343 mL / OUT: 2969 mL / NET: -1626 mL

## 2024-02-18 NOTE — PROGRESS NOTE ADULT - SUBJECTIVE AND OBJECTIVE BOX
INTERVAL HPI/OVERNIGHT EVENTS/SUBJECTIVE: Less tachycardia.  No CO.  Denies abdominal pain, NV, CP, SOB or other CO.     ICU Vital Signs Last 24 Hrs  T(C): 38.3 (17 Feb 2024 23:42), Max: 38.3 (17 Feb 2024 23:42)  T(F): 100.9 (17 Feb 2024 23:42), Max: 100.9 (17 Feb 2024 23:42)  HR: 82 (18 Feb 2024 00:00) (77 - 118)  BP: 96/60 (18 Feb 2024 00:00) (91/55 - 136/92)  BP(mean): 72 (18 Feb 2024 00:00) (66 - 105)  ABP: --  ABP(mean): --  RR: 16 (18 Feb 2024 00:00) (13 - 24)  SpO2: 98% (18 Feb 2024 00:00) (95% - 100%)    O2 Parameters below as of 17 Feb 2024 16:00  Patient On (Oxygen Delivery Method): room air            I&O's Detail    16 Feb 2024 07:01  -  17 Feb 2024 07:00  --------------------------------------------------------  IN:  Total IN: 0 mL    OUT:    Colostomy (mL): 800 mL    External Ventricular Device (mL): 231 mL    Indwelling Catheter - Suprapubic (mL): 715 mL    Indwelling Catheter - Urethral (mL): 800 mL  Total OUT: 2546 mL    Total NET: -2546 mL      17 Feb 2024 07:01  -  18 Feb 2024 01:38  --------------------------------------------------------  IN:    Oral Fluid: 873 mL  Total IN: 873 mL    OUT:    Colostomy (mL): 700 mL    External Ventricular Device (mL): 196 mL    Indwelling Catheter - Suprapubic (mL): 975 mL    Indwelling Catheter - Urethral (mL): 440 mL  Total OUT: 2311 mL    Total NET: -1438 mL                MEDICATIONS  (STANDING):  chlorhexidine 2% Cloths 1 Application(s) Topical daily  enoxaparin Injectable 40 milliGRAM(s) SubCutaneous every 24 hours  multivitamin 1 Tablet(s) Oral daily  pantoprazole  Injectable 40 milliGRAM(s) IV Push two times a day    MEDICATIONS  (PRN):  acetaminophen     Tablet .. 975 milliGRAM(s) Oral every 6 hours PRN Mild Pain (1 - 3)    PHYSICAL EXAM:     Gen: NAD, Well appearing, No cyanosis, Pallor.    Neurological: A&Ox3, GCS 15.    Neck: Supple. NT AT, FROM no pain.  No JVD. No meningeal signs    Pulmonary: NAD, CTA, = BL .      Cardiovascular: RRR, S1, S2, No Murmurs, rubs or gallops noted.    Gastrointestinal:ND, Soft, NT.    Extremities: NT, AT, no edema, erythema or palpable cord noted.  FROM, = 2+ pulses throughout.    LABS:              RECENT CULTURES:            CAPILLARY BLOOD GLUCOSE      RADIOLOGY & ADDITIONAL STUDIES:    ASSESSMENT/PLAN:  28 year old male with PMHx of spina bifida (wheelchair bound at baseline), scoliosis,  Shunt BIBEMS 1/14 with abdominal pain, n/v, and lethargy, found to have severe SBO, metabolic acidosis, and to be in septic and hypovolemic shock. Patient was brought to the OR 1/14 for ex lap and small bowel resection, c/b bladder perf with primary repair and partial colectomy and admitted to SICU post-op for further management. Developed mixed septic/hypovolemic shock, pneumoperitoneum, suspected UGIB.  shunt externalized. RTOR 1/22 for bladder repair, Kirkpatrick's and EGD. Course complicated by high ostomy output, now resolved, and abdominal wall abscess, s/p drain placement with IR, now removed. Likely transfer to [place] this week.      Neurological: Tylenol PRN.  Maintain open  Shunt at all times to prevent hydrocephalous.  Will transfer to [place] for internalization    Pulmonary: Urge IS. OOTC    Cardiovascular: Tachycardia improving.  Would give intermittent IVF as needed for further tachycardia if not related to pain or fever.    Gastrointestinal: High fiber diet.  PPI X 8 weeks total and Repeat EGD in 12 weeks.    Genitourinary: Urethral stricture with bladder injury SP Repair.  Keep SP tube and Sharp.  Aspirate as needed if one slows down output. Urology to follow at [place].    Heme: SCD lovenox.    ID: Monitoring off of abx.    Lines/ Tubes: Keep both urinary tubes.  Keep Ventricular drain open. PIVs    Dispo: Plan to transfer to [facility] Monday / Tuesday or soonest bed becomes available.       CRITICAL CARE TIME SPENT: 0

## 2024-02-18 NOTE — PROGRESS NOTE ADULT - CRITICAL CARE ATTENDING COMMENT
x Patient notes that he drank more fluids yesterday.    GEN:  Awake, alert, comfortable, smiling, conversant  CHEST:  Clear CSF draining from  shunt-taped to chest   ABD:  Soft, non tender (insensate in lower abdomen), lower pole of incision noted to have clear serous drainage, ostomy pink w/formed stool, suprapubic catheter in place (now sutured in) and cline in place - both actively draining  EXT:  Frog leg chronic positioning of b/l lower extremities, feet foreshortened    Spina Bifida  Small bowel obstruction -resolved  Colon injury w/leak  Bladder leak  Vesiculocutaneous fistula  High output colostomy  Pyelitis -resolved  Gastric Ulcer  Erosive Gastritis    -Plan for internalization of EVD w/Dr. Dorsey (patient's neurosurgeon) at [facility]  -Comfortable  -Breathing comfortably on RA  -H/H stable, patient did not require fluid bolus overnight, encouraged patient to continue w/taking liquids, will continue with strict I/Os - still recorded overall to have been negative yesterday - but all labs adequate, patient is normotensive and without tachycardia  -SCDs, lovenox, out of bed  -Tolerating diet, ostomy functioning, BID PPI in place as patient found to have gastric ulcer and erosive gastritis at time of OR, GI suggested 8 weeks of BID PPI total  -Cr stable, both catheters functioning well -if urine output drops off from either - would aspirate until get sediment/urine plug out.  Patient will have increased drainage from midline wound when one of the catheters get clogged, per urology - both catheters to remain per urology  -Afebrile, (low grade temp overnight was quickly repeated and found to have been incorrect), wbc normal, remains off antibiotics  -Lytes adequate  -Midline catheters  -COVID negative    Plan in place for patient to be transferred to [facility] tomorrow to Dr. Jara/Dr. Dorsey w/Dr. Whitaker/Urology to follow.  Patient is comfortable and in agreement with transfer.  No clinical contraindications for transfer at this time.

## 2024-02-19 VITALS
RESPIRATION RATE: 15 BRPM | HEART RATE: 86 BPM | OXYGEN SATURATION: 100 % | TEMPERATURE: 98 F | DIASTOLIC BLOOD PRESSURE: 74 MMHG | SYSTOLIC BLOOD PRESSURE: 111 MMHG

## 2024-02-19 PROCEDURE — 99024 POSTOP FOLLOW-UP VISIT: CPT

## 2024-02-19 PROCEDURE — 99232 SBSQ HOSP IP/OBS MODERATE 35: CPT | Mod: 24

## 2024-02-19 RX ADMIN — Medication 975 MILLIGRAM(S): at 09:20

## 2024-02-19 RX ADMIN — PANTOPRAZOLE SODIUM 40 MILLIGRAM(S): 20 TABLET, DELAYED RELEASE ORAL at 06:32

## 2024-02-19 RX ADMIN — Medication 975 MILLIGRAM(S): at 08:20

## 2024-02-19 RX ADMIN — Medication 975 MILLIGRAM(S): at 02:00

## 2024-02-19 NOTE — PROGRESS NOTE ADULT - ASSESSMENT
A/P: 28M PMH Spinal Bifida / Scoliosis Wheelchair Bound, S/p VPS w/ reversion P/W Septic Shock 2nd to SBO S/p X-lap w/ SB Rsx- Akash's C/w Bladder Perf w/ primary repair / Partial Colectomy 1/14 / S/p Ext VPS 1/21 / S/p Bladder Repair w/ SPT placement 1/22 and IR Drain 1/31 C/w Hypovolumic Shock 2nd to GIB, Metabolic Derangement / Acidosis 2nd to Shock, COVID, Hypovolemia    -Awaiting tfx to [facility] for Internalization of Shunt   -Cont Neuro cks q 4  -CSF hr otpt, unclamp, level w/ umbilicus  -Care per primary team    Diss w/ attending

## 2024-02-19 NOTE — PROGRESS NOTE ADULT - SUBJECTIVE AND OBJECTIVE BOX
INTERVAL HPI/OVERNIGHT EVENTS: Patient doing well. Appears in good spirits, excited to transfer to [facility]. SICU team gave sign out to [facility] surgical team. Patient had no episodes of hemodynamic changes overnight. Tolerating diet. Taking in more po fluids. Colosotmy outputs and SBT/Cline tube outputs as listed below.  Remains afebrile, without leukocytosis. No drainage from midline.       PAST MEDICAL & SURGICAL HISTORY:  Spina bifida      Scoliosis          MEDICATIONS  (STANDING):  chlorhexidine 2% Cloths 1 Application(s) Topical daily  enoxaparin Injectable 40 milliGRAM(s) SubCutaneous every 24 hours  multivitamin 1 Tablet(s) Oral daily  pantoprazole  Injectable 40 milliGRAM(s) IV Push two times a day    MEDICATIONS  (PRN):  acetaminophen     Tablet .. 975 milliGRAM(s) Oral every 6 hours PRN Mild Pain (1 - 3)      ICU Vital Signs Last 24 Hrs  T(C): 37 (19 Feb 2024 00:03), Max: 37.2 (18 Feb 2024 07:51)  T(F): 98.6 (19 Feb 2024 00:03), Max: 99 (18 Feb 2024 07:51)  HR: 83 (19 Feb 2024 00:00) (78 - 113)  BP: 97/63 (19 Feb 2024 00:00) (92/61 - 130/99)  BP(mean): 73 (19 Feb 2024 00:00) (64 - 108)  ABP: --  ABP(mean): --  RR: 15 (19 Feb 2024 00:00) (13 - 22)  SpO2: 100% (19 Feb 2024 00:00) (95% - 100%)    O2 Parameters below as of 19 Feb 2024 00:00  Patient On (Oxygen Delivery Method): room air            Drug Dosing Weight  Height (cm): 132.1 (14 Jan 2024 09:30)  Weight (kg): 70 (14 Jan 2024 07:48)  BMI (kg/m2): 40.1 (14 Jan 2024 09:30)  BSA (m2): 1.51 (14 Jan 2024 09:30)    CENTRAL LINE: [ ] YES [ ] NO  LOCATION:   DATE INSERTED:  REMOVE: [ ] YES [ ] NO  EXPLAIN:    CLINE: [ ] YES [ ] NO    DATE INSERTED:  REMOVE: [ ] YES [ ] NO  EXPLAIN:    A-LINE: [ ] YES [ ] NO  LOCATION:   DATE INSERTED:  REMOVE: [ ] YES [ ] NO  EXPLAIN:        I&O's Detail    17 Feb 2024 07:01  -  18 Feb 2024 07:00  --------------------------------------------------------  IN:    Oral Fluid: 1343 mL  Total IN: 1343 mL    OUT:    Colostomy (mL): 700 mL    External Ventricular Device (mL): 259 mL    Indwelling Catheter - Suprapubic (mL): 1295 mL    Indwelling Catheter - Urethral (mL): 715 mL  Total OUT: 2969 mL    Total NET: -1626 mL      18 Feb 2024 07:01  -  19 Feb 2024 00:51  --------------------------------------------------------  IN:    Oral Fluid: 1054 mL  Total IN: 1054 mL    OUT:    Colostomy (mL): 100 mL    External Ventricular Device (mL): 124 mL    Indwelling Catheter - Suprapubic (mL): 795 mL    Indwelling Catheter - Urethral (mL): 115 mL  Total OUT: 1134 mL    Total NET: -80 mL              Physical Exam:  Cons: NAD, sitting upright in bed, talkative with staff     Neurological:   CAM neg     RASS 0   Baseline lower extremities with minimal motor and sensation     Respiratory: Unlabored, no accessory muscle use, cta b/l     Cardiovascular: Regular rate & rhythm    Gastrointestinal: midline incision w minimal output adequate granulation tissue, colostomy viable with formed/ thick yellow stool.     : SPT in place, cline in place with sediment     Extremities: +2 peripheral edema, legs in frog leg position     Skin: No rashes    LABS:  CBC Full  -  ( 18 Feb 2024 08:20 )  WBC Count : 7.52 K/uL  RBC Count : 3.76 M/uL  Hemoglobin : 10.1 g/dL  Hematocrit : 31.6 %  Platelet Count - Automated : 434 K/uL  Mean Cell Volume : 84.0 fl  Mean Cell Hemoglobin : 26.9 pg  Mean Cell Hemoglobin Concentration : 32.0 gm/dL  Auto Neutrophil # : 5.47 K/uL  Auto Lymphocyte # : 1.34 K/uL  Auto Monocyte # : 0.46 K/uL  Auto Eosinophil # : 0.20 K/uL  Auto Basophil # : 0.03 K/uL  Auto Neutrophil % : 72.7 %  Auto Lymphocyte % : 17.8 %  Auto Monocyte % : 6.1 %  Auto Eosinophil % : 2.7 %  Auto Basophil % : 0.4 %    02-18    136  |  99  |  19.5  ----------------------------<  92  4.4   |  24.0  |  0.66    Ca    9.1      18 Feb 2024 08:20  Phos  3.6     02-18  Mg     1.8     02-18        Urinalysis Basic - ( 18 Feb 2024 08:20 )    Color: x / Appearance: x / SG: x / pH: x  Gluc: 92 mg/dL / Ketone: x  / Bili: x / Urobili: x   Blood: x / Protein: x / Nitrite: x   Leuk Esterase: x / RBC: x / WBC x   Sq Epi: x / Non Sq Epi: x / Bacteria: x

## 2024-02-19 NOTE — PROGRESS NOTE ADULT - NS_MD_PANP_GEN_ALL_CORE

## 2024-02-19 NOTE — PROGRESS NOTE ADULT - NS ATTEND AMEND GEN_ALL_CORE FT
28 year old male with pmhx of spina bifida and scoliosis who presented with SBO and underwent exploratory laparotomy, SBR, repair of bladder injury, and colon resection and primary anastomosis with postoperative course notable for anastomotic leak s/p exlap, Akash, partial cystectomy, EGD and colonoscopy     NEURO;  #Hx of hydrocephalus s/p  shunt   - s/p shunt externalization   - monitor drainage hourly   - f/u cultures   - Q4 neurochecks   - transfer to [place] today for shunt internalization   #Analgesia: Tylenol     CARDIO:   #Sinus tachycardia   - resolved with increased oral intake     PULM:  On RA   #Aspiration precautions: head of bed elevation     GI:   #SBO s/p multiple ex laps with SBR and Akash procedure, high output ostomy, severe protein calorie malnutrition  - High fiber diet   - monitor ostomy output   - daily dressing/packing change   #Bowel regimen: held.   #Gastritis, clean base ulcer: PPI     RENAL:   #urethral stricture, bladder injury s/p partial cystectomy with SPC tube and cline in place   - monitor outputs    HEME:   Thromboprophylaxis: SCD + Lovenox     ID:   #Anastomotic leak s/p resection   - completed course of antibiotics     ENDO:   - euglycemia     LINES/SKIN: PIV, EVD, SPC, Cline, ostomy      CODE STATUS: Full     DISPOSITION: Transfer to [place] today

## 2024-02-19 NOTE — PROGRESS NOTE ADULT - NUTRITIONAL ASSESSMENT
This patient has been assessed with a concern for Malnutrition and has been determined to have a diagnosis/diagnoses of Severe protein-calorie malnutrition and Underweight (BMI < 19).    This patient is being managed with:   Diet Regular-  High Fiber (HIFIBER)  Entered: Jan 31 2024  4:37PM    Diet Regular-  Fiber/Residue Restricted (LOWFIBER)  Entered: Jan 26 2024 11:25AM    The following pending diet order is being considered for treatment of Severe protein-calorie malnutrition and Underweight (BMI < 19):null
This patient has been assessed with a concern for Malnutrition and has been determined to have a diagnosis/diagnoses of Severe protein-calorie malnutrition.    This patient is being managed with:   Diet Regular-  High Fiber (HIFIBER)  Entered: Jan 25 2024 10:08AM  
This patient has been assessed with a concern for Malnutrition and has been determined to have a diagnosis/diagnoses of Underweight (BMI < 19) and Severe protein-calorie malnutrition.    This patient is being managed with:   Diet Regular-  High Fiber (HIFIBER)  Entered: Jan 31 2024  4:37PM    Diet Regular-  Fiber/Residue Restricted (LOWFIBER)  Entered: Jan 26 2024 11:25AM    The following pending diet order is being considered for treatment of Underweight (BMI < 19) and Severe protein-calorie malnutrition:null
This patient has been assessed with a concern for Malnutrition and has been determined to have a diagnosis/diagnoses of Severe protein-calorie malnutrition and Underweight (BMI < 19).    This patient is being managed with:   Diet Regular-  High Fiber (HIFIBER)  Entered: Jan 31 2024  4:37PM    Diet Regular-  Fiber/Residue Restricted (LOWFIBER)  Entered: Jan 26 2024 11:25AM    The following pending diet order is being considered for treatment of Severe protein-calorie malnutrition and Underweight (BMI < 19):null
This patient has been assessed with a concern for Malnutrition and has been determined to have a diagnosis/diagnoses of Severe protein-calorie malnutrition and Underweight (BMI < 19).    This patient is being managed with:   Diet Regular-  High Fiber (HIFIBER)  Supplement Feeding Modality:  Oral  Ensure Enlive Cans or Servings Per Day:  3       Frequency:  Three Times a day  Ensure Pudding Cans or Servings Per Day:  3       Frequency:  Three Times a day  Entered: Jan 27 2024  1:37AM    Diet Regular-  Fiber/Residue Restricted (LOWFIBER)  Entered: Jan 26 2024 11:25AM    The following pending diet order is being considered for treatment of Severe protein-calorie malnutrition and Underweight (BMI < 19):null
Diet, clear liquid- bariatric clear liquid
This patient has been assessed with a concern for Malnutrition and has been determined to have a diagnosis/diagnoses of Severe protein-calorie malnutrition and Underweight (BMI < 19).    This patient is being managed with:   Diet Regular-  High Fiber (HIFIBER)  Entered: Jan 31 2024  4:37PM    Diet Regular-  Fiber/Residue Restricted (LOWFIBER)  Entered: Jan 26 2024 11:25AM    The following pending diet order is being considered for treatment of Severe protein-calorie malnutrition and Underweight (BMI < 19):null
This patient has been assessed with a concern for Malnutrition and has been determined to have a diagnosis/diagnoses of Severe protein-calorie malnutrition and Underweight (BMI < 19).    This patient is being managed with:   Diet Regular-  High Fiber (HIFIBER)  Supplement Feeding Modality:  Oral  Ensure Enlive Cans or Servings Per Day:  3       Frequency:  Three Times a day  Ensure Pudding Cans or Servings Per Day:  3       Frequency:  Three Times a day  Entered: Jan 27 2024  1:37AM    Diet Regular-  Fiber/Residue Restricted (LOWFIBER)  Entered: Jan 26 2024 11:25AM    The following pending diet order is being considered for treatment of Severe protein-calorie malnutrition and Underweight (BMI < 19):null
This patient has been assessed with a concern for Malnutrition and has been determined to have a diagnosis/diagnoses of Severe protein-calorie malnutrition.    This patient is being managed with:   Diet Regular-  High Fiber (HIFIBER)  Entered: Jan 25 2024 10:08AM  
This patient has been assessed with a concern for Malnutrition and has been determined to have a diagnosis/diagnoses of Underweight (BMI < 19) and Severe protein-calorie malnutrition.    This patient is being managed with:   Diet Regular-  High Fiber (HIFIBER)  Entered: Jan 31 2024  4:37PM    Diet Regular-  Fiber/Residue Restricted (LOWFIBER)  Entered: Jan 26 2024 11:25AM    The following pending diet order is being considered for treatment of Underweight (BMI < 19) and Severe protein-calorie malnutrition:null
This patient has been assessed with a concern for Malnutrition and has been determined to have a diagnosis/diagnoses of Severe protein-calorie malnutrition and Underweight (BMI < 19).    This patient is being managed with:   Diet Regular-  Fiber/Residue Restricted (LOWFIBER)  Entered: Jan 26 2024 11:25AM    Diet Regular-  High Fiber (HIFIBER)  Entered: Jan 26 2024 11:19AM    The following pending diet order is being considered for treatment of Severe protein-calorie malnutrition and Underweight (BMI < 19):null
This patient has been assessed with a concern for Malnutrition and has been determined to have a diagnosis/diagnoses of Severe protein-calorie malnutrition and Underweight (BMI < 19).    This patient is being managed with:   Diet Regular-  High Fiber (HIFIBER)  Entered: Jan 31 2024  4:37PM    Diet Regular-  Fiber/Residue Restricted (LOWFIBER)  Entered: Jan 26 2024 11:25AM    The following pending diet order is being considered for treatment of Severe protein-calorie malnutrition and Underweight (BMI < 19):null
This patient has been assessed with a concern for Malnutrition and has been determined to have a diagnosis/diagnoses of Severe protein-calorie malnutrition and Underweight (BMI < 19).    This patient is being managed with:   Diet Regular-  High Fiber (HIFIBER)  Supplement Feeding Modality:  Oral  Ensure Enlive Cans or Servings Per Day:  3       Frequency:  Three Times a day  Ensure Pudding Cans or Servings Per Day:  3       Frequency:  Three Times a day  Entered: Jan 27 2024  1:37AM    Diet Regular-  Fiber/Residue Restricted (LOWFIBER)  Entered: Jan 26 2024 11:25AM    The following pending diet order is being considered for treatment of Severe protein-calorie malnutrition and Underweight (BMI < 19):null
This patient has been assessed with a concern for Malnutrition and has been determined to have a diagnosis/diagnoses of Severe protein-calorie malnutrition.    This patient is being managed with:   Diet NPO-  Entered: Jan 22 2024  7:06PM  
Diet, Regular:   High Fiber (HIFIBER)  Supplement Feeding Modality:  Oral  Ensure Enlive Cans or Servings Per Day:  3       Frequency:  Three Times a day  Ensure Pudding Cans or Servings Per Day:  3       Frequency:  Three Times a day (01-27-24 @ 01:38) [Active]  Diet, Regular: Fiber/Residue Restricted (LOWFIBER) (01-26-24 @ 11:25) [Pending Verification By Attending]

## 2024-02-19 NOTE — PROGRESS NOTE ADULT - ASSESSMENT
ASSESSMENT/PLAN:  28 year old male with PMHx of spina bifida (wheelchair bound at baseline), scoliosis,  Shunt BIBEMS 1/14 with abdominal pain, n/v, and lethargy, found to have severe SBO, metabolic acidosis, and to be in septic and hypovolemic shock. Patient was brought to the OR 1/14 for ex lap and small bowel resection, c/b bladder perf with primary repair and partial colectomy and admitted to SICU post-op for further management. Developed mixed septic/hypovolemic shock, pneumoperitoneum, suspected UGIB.  shunt externalized. RTOR 1/22 for bladder repair, Kirkpatrick's and EGD. Course complicated by high ostomy output, now resolved, and abdominal wall abscess, s/p drain placement with IR, now removed. Likely transfer to [place] today.       Neurological: Tylenol PRN.  Hx of hydrocephalous- keep externalized shunt open to drain.  Will transfer to [place] for internalization    Pulmonary: Pulmonary tolieting. Urge IS. OOTC    Cardiovascular: Tachycardia improved with encouraging po intact. No hemodynamic issues     Gastrointestinal: High fiber diet.  Hx of gastritis - c/w PPI X 8 weeks (4 weeks from now) and Repeat EGD in 12 weeks.    Genitourinary: Urethral stricture with bladder injury of neobladder s/p primary repair.  Keep SP tube and Sharp.  Aspirate as needed if one slows down output. Urology to follow at [place].    Heme: SCD lovenox.    ID: Monitoring off of abx.    Lines/ Tubes: Keep both urinary tubes.  Keep Ventricular drain open. PIVs    Dispo: Plan to transfer to [facility] today

## 2024-02-19 NOTE — PROGRESS NOTE ADULT - NS ATTEND AMEND GEN_ALL_CORE FT
I agree with the above. I personally examined and saw the patient. At neurological baseline, no headaches. Chest shunt externalization site c/d/i. Pending transfer to [place] for reinternalization.
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
## 2024-02-19 NOTE — PROGRESS NOTE ADULT - PROVIDER SPECIALTY LIST ADULT
Intervent Radiology
Neurosurgery
SICU
Urology
Intervent Radiology
Neurosurgery
Neurosurgery
SICU
Urology
Gastroenterology
Neurosurgery
SICU
Urology
Gastroenterology
Neurosurgery
SICU
Urology
Urology
Neurosurgery
SICU
Neurosurgery
Urology
Urology

## 2024-02-19 NOTE — PROGRESS NOTE ADULT - THIS PATIENT HAS THE FOLLOWING CONDITION(S)/DIAGNOSES ON THIS ADMISSION:
None
VPS
None
Sepsis
None
h/o VPS and recently externalized VPS given SBO
None
VPS
None
None
Sepsis
Sepsis
None

## 2024-02-19 NOTE — PROGRESS NOTE ADULT - REASON FOR ADMISSION
Small bowel obstruction
Small bowel obstruction
Small bowel obstruction.
small bowel obstruction

## 2024-02-19 NOTE — PROGRESS NOTE ADULT - NS ATTEND OPT1 GEN_ALL_CORE

## 2024-03-09 LAB
CULTURE RESULTS: SIGNIFICANT CHANGE UP
SPECIMEN SOURCE: SIGNIFICANT CHANGE UP

## 2024-03-25 PROBLEM — Q05.9 SPINA BIFIDA, UNSPECIFIED: Chronic | Status: ACTIVE | Noted: 2024-01-14

## 2024-04-09 ENCOUNTER — APPOINTMENT (OUTPATIENT)
Dept: TRAUMA SURGERY | Facility: CLINIC | Age: 29
End: 2024-04-09

## 2024-06-04 NOTE — DISCHARGE NOTE PROVIDER - PROVIDER TOKENS
I have attempted without success to contact this patient by phone to reschedule HLA lab appt. Message was left to return call.     PROVIDER:[TOKEN:[[id number]],FOLLOWUP:[Routine],ESTABLISHEDPATIENT:[T]]

## 2024-06-20 NOTE — PROGRESS NOTE ADULT - NSPROGADDITIONALINFOA_GEN_ALL_CORE
----- Message from Sergio Blankenship MD sent at 6/20/2024  9:35 AM CDT -----  Will send portal comment  Routing to referral team for repeat US    Mammo neg  US most likely benign- repeat ordered for 6 months.  
BRINDA Attg:    see above    patient seen and examined by PA team with Dr. Richmond    agree with above
BRINDA Attg:    see above    patient seen and examined by PA team with Dr. Richmond    agree with above
NSGY Attg:    see above    patient seen and examined by PA staff    agree with exam and plan as above
NSGY Attg:    see above    patient seen and examined by PA staff    agree with exam and plan as above  CT head pending

## 2024-06-21 NOTE — CHART NOTE - NSCHARTNOTEFT_GEN_A_CORE
Reviewed CT Abd/Pelvis with Dr. Vidal/IR.  He feels abdominal abscess is acceptable size/location to attempt drainage.  Will notify patient.  Check INR.  NPO for now.  Plan for attempted drainage later today or tomorrow. Birth Control Pills Pregnancy And Lactation Text: This medication should be avoided if pregnant and for the first 30 days post-partum. Isotretinoin Counseling: Patient should get monthly blood tests, not donate blood, not drive at night if vision affected, not share medication, and not undergo elective surgery for 6 months after tx completed. Side effects reviewed, pt to contact office should one occur. Sarecycline Pregnancy And Lactation Text: This medication is Pregnancy Category D and not consider safe during pregnancy. It is also excreted in breast milk. Dapsone Pregnancy And Lactation Text: This medication is Pregnancy Category C and is not considered safe during pregnancy or breast feeding. Topical Sulfur Applications Pregnancy And Lactation Text: This medication is Pregnancy Category C and has an unknown safety profile during pregnancy. It is unknown if this topical medication is excreted in breast milk. Azithromycin Counseling:  I discussed with the patient the risks of azithromycin including but not limited to GI upset, allergic reaction, drug rash, diarrhea, and yeast infections. High Dose Vitamin A Counseling: Side effects reviewed, pt to contact office should one occur. Topical Clindamycin Pregnancy And Lactation Text: This medication is Pregnancy Category B and is considered safe during pregnancy. It is unknown if it is excreted in breast milk. Benzoyl Peroxide Counseling: Patient counseled that medicine may cause skin irritation and bleach clothing.  In the event of skin irritation, the patient was advised to reduce the amount of the drug applied or use it less frequently.   The patient verbalized understanding of the proper use and possible adverse effects of benzoyl peroxide.  All of the patient's questions and concerns were addressed. Spironolactone Pregnancy And Lactation Text: This medication can cause feminization of the male fetus and should be avoided during pregnancy. The active metabolite is also found in breast milk. Azithromycin Pregnancy And Lactation Text: This medication is considered safe during pregnancy and is also secreted in breast milk. Doxycycline Counseling:  Patient counseled regarding possible photosensitivity and increased risk for sunburn.  Patient instructed to avoid sunlight, if possible.  When exposed to sunlight, patients should wear protective clothing, sunglasses, and sunscreen.  The patient was instructed to call the office immediately if the following severe adverse effects occur:  hearing changes, easy bruising/bleeding, severe headache, or vision changes.  The patient verbalized understanding of the proper use and possible adverse effects of doxycycline.  All of the patient's questions and concerns were addressed. Winlevi Counseling:  I discussed with the patient the risks of topical clascoterone including but not limited to erythema, scaling, itching, and stinging. Patient voiced their understanding. Azelaic Acid Counseling: Patient counseled that medicine may cause skin irritation and to avoid applying near the eyes.  In the event of skin irritation, the patient was advised to reduce the amount of the drug applied or use it less frequently.   The patient verbalized understanding of the proper use and possible adverse effects of azelaic acid.  All of the patient's questions and concerns were addressed. Bactrim Pregnancy And Lactation Text: This medication is Pregnancy Category D and is known to cause fetal risk.  It is also excreted in breast milk. Tazorac Pregnancy And Lactation Text: This medication is not safe during pregnancy. It is unknown if this medication is excreted in breast milk. Topical Retinoid Pregnancy And Lactation Text: This medication is Pregnancy Category C. It is unknown if this medication is excreted in breast milk. Aklief counseling:  Patient advised to apply a pea-sized amount only at bedtime and wait 30 minutes after washing their face before applying.  If too drying, patient may add a non-comedogenic moisturizer.  The most commonly reported side effects including irritation, redness, scaling, dryness, stinging, burning, itching, and increased risk of sunburn.  The patient verbalized understanding of the proper use and possible adverse effects of retinoids.  All of the patient's questions and concerns were addressed. Include Pregnancy/Lactation Warning?: No Erythromycin Counseling:  I discussed with the patient the risks of erythromycin including but not limited to GI upset, allergic reaction, drug rash, diarrhea, increase in liver enzymes, and yeast infections. Topical Clindamycin Counseling: Patient counseled that this medication may cause skin irritation or allergic reactions.  In the event of skin irritation, the patient was advised to reduce the amount of the drug applied or use it less frequently.   The patient verbalized understanding of the proper use and possible adverse effects of clindamycin.  All of the patient's questions and concerns were addressed. Azelaic Acid Pregnancy And Lactation Text: This medication is considered safe during pregnancy and breast feeding. Tetracycline Counseling: Patient counseled regarding possible photosensitivity and increased risk for sunburn.  Patient instructed to avoid sunlight, if possible.  When exposed to sunlight, patients should wear protective clothing, sunglasses, and sunscreen.  The patient was instructed to call the office immediately if the following severe adverse effects occur:  hearing changes, easy bruising/bleeding, severe headache, or vision changes.  The patient verbalized understanding of the proper use and possible adverse effects of tetracycline.  All of the patient's questions and concerns were addressed. Patient understands to avoid pregnancy while on therapy due to potential birth defects. Detail Level: Zone Topical Sulfur Applications Counseling: Topical Sulfur Counseling: Patient counseled that this medication may cause skin irritation or allergic reactions.  In the event of skin irritation, the patient was advised to reduce the amount of the drug applied or use it less frequently.   The patient verbalized understanding of the proper use and possible adverse effects of topical sulfur application.  All of the patient's questions and concerns were addressed. Benzoyl Peroxide Pregnancy And Lactation Text: This medication is Pregnancy Category C. It is unknown if benzoyl peroxide is excreted in breast milk. High Dose Vitamin A Pregnancy And Lactation Text: High dose vitamin A therapy is contraindicated during pregnancy and breast feeding. Dapsone Counseling: I discussed with the patient the risks of dapsone including but not limited to hemolytic anemia, agranulocytosis, rashes, methemoglobinemia, kidney failure, peripheral neuropathy, headaches, GI upset, and liver toxicity.  Patients who start dapsone require monitoring including baseline LFTs and weekly CBCs for the first month, then every month thereafter.  The patient verbalized understanding of the proper use and possible adverse effects of dapsone.  All of the patient's questions and concerns were addressed. Isotretinoin Pregnancy And Lactation Text: This medication is Pregnancy Category X and is considered extremely dangerous during pregnancy. It is unknown if it is excreted in breast milk. Spironolactone Counseling: Patient advised regarding risks of diarrhea, abdominal pain, hyperkalemia, birth defects (for female patients), liver toxicity and renal toxicity. The patient may need blood work to monitor liver and kidney function and potassium levels while on therapy. The patient verbalized understanding of the proper use and possible adverse effects of spironolactone.  All of the patient's questions and concerns were addressed. Doxycycline Pregnancy And Lactation Text: This medication is Pregnancy Category D and not consider safe during pregnancy. It is also excreted in breast milk but is considered safe for shorter treatment courses. Minocycline Counseling: Patient advised regarding possible photosensitivity and discoloration of the teeth, skin, lips, tongue and gums.  Patient instructed to avoid sunlight, if possible.  When exposed to sunlight, patients should wear protective clothing, sunglasses, and sunscreen.  The patient was instructed to call the office immediately if the following severe adverse effects occur:  hearing changes, easy bruising/bleeding, severe headache, or vision changes.  The patient verbalized understanding of the proper use and possible adverse effects of minocycline.  All of the patient's questions and concerns were addressed. Topical Retinoid counseling:  Patient advised to apply a pea-sized amount only at bedtime and wait 30 minutes after washing their face before applying.  If too drying, patient may add a non-comedogenic moisturizer. The patient verbalized understanding of the proper use and possible adverse effects of retinoids.  All of the patient's questions and concerns were addressed. Erythromycin Pregnancy And Lactation Text: This medication is Pregnancy Category B and is considered safe during pregnancy. It is also excreted in breast milk. Sarecycline Counseling: Patient advised regarding possible photosensitivity and discoloration of the teeth, skin, lips, tongue and gums.  Patient instructed to avoid sunlight, if possible.  When exposed to sunlight, patients should wear protective clothing, sunglasses, and sunscreen.  The patient was instructed to call the office immediately if the following severe adverse effects occur:  hearing changes, easy bruising/bleeding, severe headache, or vision changes.  The patient verbalized understanding of the proper use and possible adverse effects of sarecycline.  All of the patient's questions and concerns were addressed. Aklief Pregnancy And Lactation Text: It is unknown if this medication is safe to use during pregnancy.  It is unknown if this medication is excreted in breast milk.  Breastfeeding women should use the topical cream on the smallest area of the skin for the shortest time needed while breastfeeding.  Do not apply to nipple and areola. Birth Control Pills Counseling: Birth Control Pill Counseling: I discussed with the patient the potential side effects of OCPs including but not limited to increased risk of stroke, heart attack, thrombophlebitis, deep venous thrombosis, hepatic adenomas, breast changes, GI upset, headaches, and depression.  The patient verbalized understanding of the proper use and possible adverse effects of OCPs. All of the patient's questions and concerns were addressed. Tazorac Counseling:  Patient advised that medication is irritating and drying.  Patient may need to apply sparingly and wash off after an hour before eventually leaving it on overnight.  The patient verbalized understanding of the proper use and possible adverse effects of tazorac.  All of the patient's questions and concerns were addressed. Winlevi Pregnancy And Lactation Text: This medication is considered safe during pregnancy and breastfeeding. Bactrim Counseling:  I discussed with the patient the risks of sulfa antibiotics including but not limited to GI upset, allergic reaction, drug rash, diarrhea, dizziness, photosensitivity, and yeast infections.  Rarely, more serious reactions can occur including but not limited to aplastic anemia, agranulocytosis, methemoglobinemia, blood dyscrasias, liver or kidney failure, lung infiltrates or desquamative/blistering drug rashes. Detail Level: Detailed

## 2024-08-28 NOTE — PROGRESS NOTE ADULT - SUBJECTIVE AND OBJECTIVE BOX
NSx PA Note  Externalized Shunt day#27    HPI  27yo M w/ hx of spina bfida and scoliosis, wheelchair bound at baseline, presenting with abdominal pain, n/v. Pt states that the pain began yesterday morning and has been persistent. He had a bowel movement this morning but prior to that his last bowel movement was one week prior. Denies passing gas. Denies fevers at home. Has been vomiting and nauseous. At home he was lethargic and EMS was called. At that time he was hypotensive and tachycardic and he was BIBEMS to [facility] ED for further workup. On arrival he was tachycardic to the 130s and hypotensive to 80s/50s. Labs notable for K 3.4, bicarb 13, Cr 2.04. Actively vomiting in ED. CT scan showed dilated bowel and stomach consistent with severe SBO with transition point in mid abdomen. Surgery consulted for management.    Interval/Overnight Events   Intermittent tachycardia as per SICU, treated with IVF. Otherwise stable from a NSx standpoint with externalized shunt, s/p bag change yesterday. Awaiting bed at [facility]. Denies headaches, dizziness, change in vision, n/v.     MEDICATIONS  (STANDING):  chlorhexidine 2% Cloths 1 Application(s) Topical daily  enoxaparin Injectable 40 milliGRAM(s) SubCutaneous every 24 hours  multivitamin 1 Tablet(s) Oral daily  pantoprazole  Injectable 40 milliGRAM(s) IV Push two times a day    MEDICATIONS  (PRN):  acetaminophen     Tablet .. 975 milliGRAM(s) Oral every 6 hours PRN Mild Pain (1 - 3)    Vital Signs Last 24 Hrs  T(C): 36.9 (17 Feb 2024 11:11), Max: 37.7 (16 Feb 2024 19:06)  T(F): 98.5 (17 Feb 2024 11:11), Max: 99.8 (16 Feb 2024 19:06)  HR: 91 (17 Feb 2024 11:00) (77 - 115)  BP: 119/73 (17 Feb 2024 11:00) (91/60 - 127/95)  BP(mean): 85 (17 Feb 2024 11:00) (69 - 105)  RR: 18 (17 Feb 2024 11:00) (13 - 27)  SpO2: 98% (17 Feb 2024 11:00) (94% - 100%)    Parameters below as of 17 Feb 2024 08:00  Patient On (Oxygen Delivery Method): room air    Neuro- Awake, alert, orientedx3  speech clear and appropriate  follows commands  b/l UEs 5/5  b/l LEs paretic at baseline   exam stable, no acute changes   Shunt- externalized, draining, CSF clear     Plan:  NS stable, exam stable   Q4 hour neuro checks   Keep SBP normotensive   CW externalized shunt at current parameters, measure and record outputs hourly, notify neuro PA with any changes in quality of output   CW DVT ppx, SCDs in bed   Incentive judson  Bowel Regimen  Transfer to [facility] when bed available   Seen by Dr. Keith        Detail Level: Detailed Detail Level: Simple Sunscreen Recommendation Label Override: Broad Spectrum Sunscreen SPF 30+
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
## 2025-06-17 NOTE — PROCEDURE NOTE - NSCOMPLICATION_GEN_A_CORE
MEDICATION(S) REQUESTED: HYDROcodone-acetaminophen (NORCO) 5-325 MG per tablet     LAST OFFICE VISIT: 3/3/25  PLAN per provider note: Hydrocodone-acetaminophen 5-325 mg 3 times daily     F/U OFFICE VISIT: 7/3/25     PDMP REVIEWED: NO UNEXPECTED ACTIVITY NOTED          REFILL IS DUE ON: 6/16/25     OPIOID AGREEMENT IN PLACE/ UP TO DATE: Yes; 8/29/24     LAST UDS: COMPLETED ON 8/29/24.     Refill(s) has been sent for PROVIDER to review and sign.  
no complications

## 2025-07-29 NOTE — CHART NOTE - NSCHARTNOTEFT_GEN_A_CORE
Detail Level: None SICU TRANSFER NOTE  -----------------------------  ICU Admission Date: 01/14/2024  Transfer Date: 01-17-24 @ 12:05    Admission Diagnosis:  1. SBO  2. Acidosis  3. Septic Shock  4. COVID +  5. Bladder Injury    Active Problems/injuries:   1. SBO  2. Acidosis  3. Septic Shock  4. COVID +  5. Bladder Injury    Procedures:   1. Exploratory Laparotomy, small bowel resection, partial left colectomy, and bladder repair 01/14:  Injury to sigmoid colon requiring resection of 5cm and side to side colocolonic anastomosis with GA 80mm blue staple. During lysis of adhesions the bladder was injured and it was repaired in 2 layers, inner chromic and outer Vicryl. Small bowel was significantly dilated and ran from LT to TI. Approximately 30cm proximal from TI, a previous are of small bowel resection was identified with significant small bowel dilatation proximal to it, decision was made to resect previous anastomosis with 40cm if dilated atomic small bowel. Resection was performed with a GA 80mm blue stapler on a side to side fashion.    2. Urinary Device Placement 01/14- patient has challenging anatomy therefore urology placed 10 Fr silicone catheter.     Consultants:  [ ] Cardiology  [ ] Endocrine  [ ] Infectious Disease  [ ] Medicine  [X] Neurosurgery  [ ] Ortho       [ ] Weight Bearing Status:  [ ] Palliative       [ ] Advanced Directives:    [ ] Physical Medicine and Rehab       [ ] Disposition :   [ ] Plastics  [ ] Pulmonary  [X] Urology    Medications  acetaminophen   IVPB .. 1000 milliGRAM(s) IV Intermittent every 6 hours PRN  benzocaine 20% Spray 1 Spray(s) Topical every 6 hours PRN  benzocaine/menthol Lozenge 1 Lozenge Oral every 8 hours PRN  cefoTEtan  IVPB 2 Gram(s) IV Intermittent every 12 hours  chlorhexidine 2% Cloths 1 Application(s) Topical daily  dextrose 5% + lactated ringers. 1000 milliLiter(s) IV Continuous <Continuous>  enoxaparin Injectable 40 milliGRAM(s) SubCutaneous every 24 hours      [ ] I attest I have reviewed and reconciled all medications prior to transfer    IV Fluids  D5LR @ 75 CC/hr    Indication: Newly advanced to [facility]D with poor PO intake    Antibiotics:  cefoTEtan  IVPB 2 Gram(s) IV Intermittent every 12 hours    Indication: Empiric coverage End Date: TBD      I have discussed this case with SICU Team upon transfer and all questions regarding ICU course were answered.  The following items are to be followed up:  1. Serial neurologic assessments  2. maintain MAP >65  4. Maintain O2 sat >92%  5. Clears and ADAT. NGT DC'd today. Consider decreasing/discontinuing D5LR if taking adequate PO intake tomorrow.  7. Monitor output of 19F brandon on top of bladder.  8. Bladder scan Q4 hours: Pt s/p bladder repair. Flush Sharp every 4 hours with 50 cc   9. F/up urology recs  9. Maintain Euglycemia   10.Continue to monitor WBC and fever curve  11. Follow up on sensitivities for urine culture  12. [facility] held currently. HIT negative. Serotonin releasing assay pending Ndc (300 Mg Prefilled Syringe): [address]-15 Hide Non-Enhanced Ndc Variable: No Administered By (Optional): Micheal COSTA Consent: The risks of pain and injection site reactions were reviewed with the patient prior to the injection. Lot # (Optional): [id number] Billing Preferences: 1 Date Of Next Injection: 2 Weeks Syringe Size Used (Required For Enhanced Ndc): 300 mg/2ml prefilled pen Dupixent Dosing: 300 mg Ndc (200 Mg Prefilled Syringe): [address]-04 Additional Comments: Patient is well controlled on Dupixent. Wishes to continue. No complaints. Next injection is here in office Ndc (300 Mg Prefilled Pen): [address]-74 Expiration Date (Optional): 09/30/2026 Use Enhanced Ndc?: Yes J-Code:  Ndc (200 Mg Prefilled Pen): 4774-7339-22

## (undated) DEVICE — SYR ALLIANCE II INFLATION 60ML

## (undated) DEVICE — DENTURE CUP PINK

## (undated) DEVICE — MASK PROCED EARLOOP 50/BX LRC COVID ADD

## (undated) DEVICE — SENSOR O2 FINGER ADULT

## (undated) DEVICE — ELCTR ROCKER SWITCH PENCIL BLUE 10FT

## (undated) DEVICE — Device

## (undated) DEVICE — DRAPE FLUID WARMER 44 X 66"

## (undated) DEVICE — DRSG 2X2

## (undated) DEVICE — SOL IRR BAG H2O 1000ML

## (undated) DEVICE — WARMING BLANKET FULL ADULT

## (undated) DEVICE — SOL IRR BAG NS 0.9% 1000ML

## (undated) DEVICE — SUT MONOCRYL 4-0 27" PS-2 UNDYED

## (undated) DEVICE — PACK IV START WITH CHG

## (undated) DEVICE — FOLEY CATH 2-WAY 24FR 30CC SILICONE

## (undated) DEVICE — WARMING BLANKET UPPER ADULT

## (undated) DEVICE — FOLEY TRAY 16FR 5CC LF UMETER CLOSED

## (undated) DEVICE — SOL BAG NS 0.9% 1000ML

## (undated) DEVICE — TUBING ALARIS PUMP MODULE NON-DEHP

## (undated) DEVICE — TUBING IV SET GRAVITY 3Y 100" MACRO

## (undated) DEVICE — SUT VICRYL 2-0 54" REEL UNDYED

## (undated) DEVICE — SUT VICRYL 0 36" CT-1 UNDYED

## (undated) DEVICE — BALLOON US ENDO

## (undated) DEVICE — SYR SLIP 10CC

## (undated) DEVICE — POOLE SUCTION TIP

## (undated) DEVICE — BITE BLOCK ADULT 20 X 27MM (GREEN)

## (undated) DEVICE — ELCTR BOVIE BLADE 3/4" EXTENDED LENGTH 6"

## (undated) DEVICE — WARMING BLANKET LOWER ADULT

## (undated) DEVICE — UNDERPAD LINEN SAVER 23 X 36"

## (undated) DEVICE — FORCEP RADIAL JAW 4 W NDL 2.4MM 2.8MM 240CM ORANGE DISP

## (undated) DEVICE — SUT PERMAHAND 3-0 REEL

## (undated) DEVICE — PACK MAJOR ABDOMINAL WITH LAP

## (undated) DEVICE — CATH IV SAFE BC 22G X 1" (BLUE)

## (undated) DEVICE — SYR LUER SLIP TIP 50CC

## (undated) DEVICE — FOLEY HOLDER STATLOCK 2 WAY ADULT

## (undated) DEVICE — RETAINER VICERA FISH LG

## (undated) DEVICE — SOL IRR POUR H2O 1000ML

## (undated) DEVICE — TUBING FOR SMOKE EVACUATOR (PURPLE END)

## (undated) DEVICE — TUBING SUCTION CONN 6FT STERILE

## (undated) DEVICE — TUBING IV EXTENSION MACRO W CLAVE 7"

## (undated) DEVICE — GLV 8 PROTEXIS (BLUE)

## (undated) DEVICE — LIGASURE IMPACT

## (undated) DEVICE — SUT VICRYL 2-0 36" CT-1 UNDYED

## (undated) DEVICE — GLV 6.5 PROTEXIS (WHITE)

## (undated) DEVICE — SUT PDS II 2-0 27" CT-1

## (undated) DEVICE — DRSG CURITY GAUZE SPONGE 4 X 4" 12-PLY NON-STERILE

## (undated) DEVICE — SYR IV FLUSH SALINE 10ML 30/TY

## (undated) DEVICE — TUBING CONNECTING 6MM 20FT

## (undated) DEVICE — SUCTION YANKAUER NO CONTROL VENT

## (undated) DEVICE — CATH IV SAFE BC 20G X 1.16" (PINK)

## (undated) DEVICE — GOWN IMPERV XL

## (undated) DEVICE — DRAPE 1/2 SHEET 40X57"

## (undated) DEVICE — TUBING SUCTION 20FT

## (undated) DEVICE — GLV 7.5 PROTEXIS (WHITE)

## (undated) DEVICE — ELCTR GROUNDING PAD ADULT COVIDIEN

## (undated) DEVICE — STAPLER SKIN PROXIMATE

## (undated) DEVICE — SOL IRR POUR NS 0.9% 1000ML

## (undated) DEVICE — SUT PDS II 1 48" TP-1

## (undated) DEVICE — VENODYNE/SCD SLEEVE CALF MEDIUM

## (undated) DEVICE — GLV 8 PROTEXIS (WHITE)